# Patient Record
Sex: MALE | Race: WHITE | NOT HISPANIC OR LATINO | Employment: UNEMPLOYED | URBAN - METROPOLITAN AREA
[De-identification: names, ages, dates, MRNs, and addresses within clinical notes are randomized per-mention and may not be internally consistent; named-entity substitution may affect disease eponyms.]

---

## 2020-04-23 LAB — EXTERNAL HIV SCREEN: NORMAL

## 2021-12-23 ENCOUNTER — HOSPITAL ENCOUNTER (INPATIENT)
Facility: HOSPITAL | Age: 61
LOS: 3 days | Discharge: HOME | DRG: 720 | End: 2021-12-27
Attending: EMERGENCY MEDICINE | Admitting: STUDENT IN AN ORGANIZED HEALTH CARE EDUCATION/TRAINING PROGRAM
Payer: COMMERCIAL

## 2021-12-23 ENCOUNTER — APPOINTMENT (EMERGENCY)
Dept: RADIOLOGY | Facility: HOSPITAL | Age: 61
DRG: 720 | End: 2021-12-23
Payer: COMMERCIAL

## 2021-12-23 DIAGNOSIS — J18.9 PNEUMONIA: ICD-10-CM

## 2021-12-23 DIAGNOSIS — J44.1 ACUTE EXACERBATION OF CHRONIC OBSTRUCTIVE PULMONARY DISEASE (COPD) (HCC): ICD-10-CM

## 2021-12-23 DIAGNOSIS — Z72.0 TOBACCO ABUSE DISORDER: ICD-10-CM

## 2021-12-23 DIAGNOSIS — J44.1 COPD EXACERBATION (HCC): Primary | ICD-10-CM

## 2021-12-23 LAB
ALBUMIN SERPL BCP-MCNC: 4.5 G/DL (ref 3.5–5)
ALP SERPL-CCNC: 73 U/L (ref 46–116)
ALT SERPL W P-5'-P-CCNC: 21 U/L (ref 12–78)
ANION GAP SERPL CALCULATED.3IONS-SCNC: 11 MMOL/L (ref 4–13)
AST SERPL W P-5'-P-CCNC: 24 U/L (ref 5–45)
BASOPHILS # BLD AUTO: 0.13 THOUSANDS/ΜL (ref 0–0.1)
BASOPHILS NFR BLD AUTO: 1 % (ref 0–1)
BILIRUB SERPL-MCNC: 0.64 MG/DL (ref 0.2–1)
BUN SERPL-MCNC: 17 MG/DL (ref 5–25)
CALCIUM SERPL-MCNC: 9.2 MG/DL (ref 8.3–10.1)
CARDIAC TROPONIN I PNL SERPL HS: 16 NG/L
CHLORIDE SERPL-SCNC: 103 MMOL/L (ref 100–108)
CO2 SERPL-SCNC: 29 MMOL/L (ref 21–32)
CREAT SERPL-MCNC: 0.98 MG/DL (ref 0.6–1.3)
EOSINOPHIL # BLD AUTO: 0.43 THOUSAND/ΜL (ref 0–0.61)
EOSINOPHIL NFR BLD AUTO: 3 % (ref 0–6)
ERYTHROCYTE [DISTWIDTH] IN BLOOD BY AUTOMATED COUNT: 13.2 % (ref 11.6–15.1)
FLUAV RNA RESP QL NAA+PROBE: NEGATIVE
FLUBV RNA RESP QL NAA+PROBE: NEGATIVE
GFR SERPL CREATININE-BSD FRML MDRD: 82 ML/MIN/1.73SQ M
GLUCOSE SERPL-MCNC: 100 MG/DL (ref 65–140)
HCT VFR BLD AUTO: 52.4 % (ref 36.5–49.3)
HGB BLD-MCNC: 17.3 G/DL (ref 12–17)
IMM GRANULOCYTES # BLD AUTO: 0.08 THOUSAND/UL (ref 0–0.2)
IMM GRANULOCYTES NFR BLD AUTO: 1 % (ref 0–2)
LYMPHOCYTES # BLD AUTO: 1.37 THOUSANDS/ΜL (ref 0.6–4.47)
LYMPHOCYTES NFR BLD AUTO: 8 % (ref 14–44)
MCH RBC QN AUTO: 29.9 PG (ref 26.8–34.3)
MCHC RBC AUTO-ENTMCNC: 33 G/DL (ref 31.4–37.4)
MCV RBC AUTO: 91 FL (ref 82–98)
MONOCYTES # BLD AUTO: 0.82 THOUSAND/ΜL (ref 0.17–1.22)
MONOCYTES NFR BLD AUTO: 5 % (ref 4–12)
NEUTROPHILS # BLD AUTO: 13.81 THOUSANDS/ΜL (ref 1.85–7.62)
NEUTS SEG NFR BLD AUTO: 82 % (ref 43–75)
NRBC BLD AUTO-RTO: 0 /100 WBCS
PLATELET # BLD AUTO: 323 THOUSANDS/UL (ref 149–390)
PMV BLD AUTO: 9.7 FL (ref 8.9–12.7)
POTASSIUM SERPL-SCNC: 4.4 MMOL/L (ref 3.5–5.3)
PROT SERPL-MCNC: 8.5 G/DL (ref 6.4–8.2)
RBC # BLD AUTO: 5.78 MILLION/UL (ref 3.88–5.62)
RSV RNA RESP QL NAA+PROBE: NEGATIVE
SARS-COV-2 RNA RESP QL NAA+PROBE: NEGATIVE
SODIUM SERPL-SCNC: 143 MMOL/L (ref 136–145)
WBC # BLD AUTO: 16.64 THOUSAND/UL (ref 4.31–10.16)

## 2021-12-23 PROCEDURE — 93005 ELECTROCARDIOGRAM TRACING: CPT

## 2021-12-23 PROCEDURE — 84484 ASSAY OF TROPONIN QUANT: CPT | Performed by: EMERGENCY MEDICINE

## 2021-12-23 PROCEDURE — 0241U HB NFCT DS VIR RESP RNA 4 TRGT: CPT | Performed by: EMERGENCY MEDICINE

## 2021-12-23 PROCEDURE — 99285 EMERGENCY DEPT VISIT HI MDM: CPT

## 2021-12-23 PROCEDURE — 71045 X-RAY EXAM CHEST 1 VIEW: CPT

## 2021-12-23 PROCEDURE — 80053 COMPREHEN METABOLIC PANEL: CPT | Performed by: EMERGENCY MEDICINE

## 2021-12-23 PROCEDURE — 36415 COLL VENOUS BLD VENIPUNCTURE: CPT | Performed by: EMERGENCY MEDICINE

## 2021-12-23 PROCEDURE — 96374 THER/PROPH/DIAG INJ IV PUSH: CPT

## 2021-12-23 PROCEDURE — 85025 COMPLETE CBC W/AUTO DIFF WBC: CPT | Performed by: EMERGENCY MEDICINE

## 2021-12-23 PROCEDURE — 94640 AIRWAY INHALATION TREATMENT: CPT

## 2021-12-23 RX ORDER — METHYLPREDNISOLONE SODIUM SUCCINATE 125 MG/2ML
125 INJECTION, POWDER, LYOPHILIZED, FOR SOLUTION INTRAMUSCULAR; INTRAVENOUS ONCE
Status: COMPLETED | OUTPATIENT
Start: 2021-12-23 | End: 2021-12-23

## 2021-12-23 RX ORDER — ALBUTEROL SULFATE 2.5 MG/3ML
5 SOLUTION RESPIRATORY (INHALATION) ONCE
Status: COMPLETED | OUTPATIENT
Start: 2021-12-23 | End: 2021-12-23

## 2021-12-23 RX ADMIN — METHYLPREDNISOLONE SODIUM SUCCINATE 125 MG: 125 INJECTION, POWDER, FOR SOLUTION INTRAMUSCULAR; INTRAVENOUS at 21:48

## 2021-12-23 RX ADMIN — IPRATROPIUM BROMIDE 0.5 MG: 0.5 SOLUTION RESPIRATORY (INHALATION) at 21:48

## 2021-12-23 RX ADMIN — ALBUTEROL SULFATE 5 MG: 2.5 SOLUTION RESPIRATORY (INHALATION) at 21:48

## 2021-12-24 PROBLEM — M54.50 CHRONIC MIDLINE LOW BACK PAIN WITHOUT SCIATICA: Status: ACTIVE | Noted: 2020-04-22

## 2021-12-24 PROBLEM — J44.1 ACUTE EXACERBATION OF CHRONIC OBSTRUCTIVE PULMONARY DISEASE (COPD) (HCC): Status: ACTIVE | Noted: 2021-12-24

## 2021-12-24 PROBLEM — A41.9 SEPSIS (HCC): Status: ACTIVE | Noted: 2021-12-24

## 2021-12-24 PROBLEM — J18.9 PNEUMONIA: Status: ACTIVE | Noted: 2021-12-24

## 2021-12-24 PROBLEM — J44.9 COPD (CHRONIC OBSTRUCTIVE PULMONARY DISEASE) (HCC): Status: ACTIVE | Noted: 2020-05-22

## 2021-12-24 PROBLEM — B18.1 CHRONIC VIRAL HEPATITIS B WITHOUT DELTA AGENT AND WITHOUT COMA (HCC): Status: ACTIVE | Noted: 2021-08-11

## 2021-12-24 PROBLEM — G89.29 CHRONIC MIDLINE LOW BACK PAIN WITHOUT SCIATICA: Status: ACTIVE | Noted: 2020-04-22

## 2021-12-24 PROBLEM — Z72.0 TOBACCO ABUSE DISORDER: Status: ACTIVE | Noted: 2020-04-22

## 2021-12-24 LAB
2HR DELTA HS TROPONIN: 3 NG/L
4HR DELTA HS TROPONIN: -1 NG/L
APTT PPP: 37 SECONDS (ref 23–37)
ATRIAL RATE: 113 BPM
CARDIAC TROPONIN I PNL SERPL HS: 15 NG/L
CARDIAC TROPONIN I PNL SERPL HS: 19 NG/L
INR PPP: 1.03 (ref 0.84–1.19)
LACTATE SERPL-SCNC: 1.5 MMOL/L (ref 0.5–2)
P AXIS: 78 DEGREES
PR INTERVAL: 136 MS
PROCALCITONIN SERPL-MCNC: <0.05 NG/ML
PROTHROMBIN TIME: 13.1 SECONDS (ref 11.6–14.5)
QRS AXIS: 93 DEGREES
QRSD INTERVAL: 84 MS
QT INTERVAL: 318 MS
QTC INTERVAL: 436 MS
S PNEUM AG UR QL: NEGATIVE
T WAVE AXIS: 68 DEGREES
VENTRICULAR RATE: 113 BPM

## 2021-12-24 PROCEDURE — 84484 ASSAY OF TROPONIN QUANT: CPT | Performed by: EMERGENCY MEDICINE

## 2021-12-24 PROCEDURE — 84145 PROCALCITONIN (PCT): CPT | Performed by: EMERGENCY MEDICINE

## 2021-12-24 PROCEDURE — 87449 NOS EACH ORGANISM AG IA: CPT | Performed by: PHYSICIAN ASSISTANT

## 2021-12-24 PROCEDURE — 36415 COLL VENOUS BLD VENIPUNCTURE: CPT | Performed by: EMERGENCY MEDICINE

## 2021-12-24 PROCEDURE — 85610 PROTHROMBIN TIME: CPT | Performed by: EMERGENCY MEDICINE

## 2021-12-24 PROCEDURE — 94640 AIRWAY INHALATION TREATMENT: CPT

## 2021-12-24 PROCEDURE — 99223 1ST HOSP IP/OBS HIGH 75: CPT | Performed by: INTERNAL MEDICINE

## 2021-12-24 PROCEDURE — 87186 SC STD MICRODIL/AGAR DIL: CPT | Performed by: EMERGENCY MEDICINE

## 2021-12-24 PROCEDURE — 87205 SMEAR GRAM STAIN: CPT | Performed by: PHYSICIAN ASSISTANT

## 2021-12-24 PROCEDURE — NC001 PR NO CHARGE: Performed by: NURSE PRACTITIONER

## 2021-12-24 PROCEDURE — 85730 THROMBOPLASTIN TIME PARTIAL: CPT | Performed by: EMERGENCY MEDICINE

## 2021-12-24 PROCEDURE — 87077 CULTURE AEROBIC IDENTIFY: CPT | Performed by: EMERGENCY MEDICINE

## 2021-12-24 PROCEDURE — 99406 BEHAV CHNG SMOKING 3-10 MIN: CPT | Performed by: INTERNAL MEDICINE

## 2021-12-24 PROCEDURE — 83605 ASSAY OF LACTIC ACID: CPT | Performed by: EMERGENCY MEDICINE

## 2021-12-24 PROCEDURE — 99285 EMERGENCY DEPT VISIT HI MDM: CPT | Performed by: EMERGENCY MEDICINE

## 2021-12-24 PROCEDURE — 94760 N-INVAS EAR/PLS OXIMETRY 1: CPT

## 2021-12-24 PROCEDURE — 87040 BLOOD CULTURE FOR BACTERIA: CPT | Performed by: EMERGENCY MEDICINE

## 2021-12-24 PROCEDURE — 94664 DEMO&/EVAL PT USE INHALER: CPT

## 2021-12-24 PROCEDURE — 93010 ELECTROCARDIOGRAM REPORT: CPT | Performed by: INTERNAL MEDICINE

## 2021-12-24 PROCEDURE — 87070 CULTURE OTHR SPECIMN AEROBIC: CPT | Performed by: PHYSICIAN ASSISTANT

## 2021-12-24 RX ORDER — METHYLPREDNISOLONE SODIUM SUCCINATE 40 MG/ML
40 INJECTION, POWDER, LYOPHILIZED, FOR SOLUTION INTRAMUSCULAR; INTRAVENOUS EVERY 8 HOURS
Status: DISCONTINUED | OUTPATIENT
Start: 2021-12-24 | End: 2021-12-24

## 2021-12-24 RX ORDER — IPRATROPIUM BROMIDE AND ALBUTEROL SULFATE 2.5; .5 MG/3ML; MG/3ML
3 SOLUTION RESPIRATORY (INHALATION)
Status: DISCONTINUED | OUTPATIENT
Start: 2021-12-24 | End: 2021-12-24

## 2021-12-24 RX ORDER — ALBUTEROL SULFATE 90 UG/1
2 AEROSOL, METERED RESPIRATORY (INHALATION) EVERY 4 HOURS PRN
Status: DISCONTINUED | OUTPATIENT
Start: 2021-12-24 | End: 2021-12-27 | Stop reason: HOSPADM

## 2021-12-24 RX ORDER — BUDESONIDE 0.5 MG/2ML
0.5 INHALANT ORAL
Status: DISCONTINUED | OUTPATIENT
Start: 2021-12-24 | End: 2021-12-27 | Stop reason: HOSPADM

## 2021-12-24 RX ORDER — METHYLPREDNISOLONE SODIUM SUCCINATE 40 MG/ML
40 INJECTION, POWDER, LYOPHILIZED, FOR SOLUTION INTRAMUSCULAR; INTRAVENOUS EVERY 12 HOURS SCHEDULED
Status: DISCONTINUED | OUTPATIENT
Start: 2021-12-24 | End: 2021-12-26

## 2021-12-24 RX ORDER — HYDROMORPHONE HCL/PF 1 MG/ML
0.5 SYRINGE (ML) INJECTION EVERY 6 HOURS PRN
Status: DISCONTINUED | OUTPATIENT
Start: 2021-12-24 | End: 2021-12-27 | Stop reason: HOSPADM

## 2021-12-24 RX ORDER — NICOTINE 21 MG/24HR
1 PATCH, TRANSDERMAL 24 HOURS TRANSDERMAL DAILY
Status: DISCONTINUED | OUTPATIENT
Start: 2021-12-24 | End: 2021-12-27 | Stop reason: HOSPADM

## 2021-12-24 RX ORDER — ALBUTEROL SULFATE 90 UG/1
2 AEROSOL, METERED RESPIRATORY (INHALATION) EVERY 6 HOURS PRN
COMMUNITY
Start: 2021-08-26

## 2021-12-24 RX ORDER — GUAIFENESIN 600 MG
600 TABLET, EXTENDED RELEASE 12 HR ORAL EVERY 12 HOURS SCHEDULED
Status: DISCONTINUED | OUTPATIENT
Start: 2021-12-24 | End: 2021-12-27 | Stop reason: HOSPADM

## 2021-12-24 RX ORDER — BENZONATATE 100 MG/1
100 CAPSULE ORAL 3 TIMES DAILY PRN
Status: DISCONTINUED | OUTPATIENT
Start: 2021-12-24 | End: 2021-12-27 | Stop reason: HOSPADM

## 2021-12-24 RX ORDER — ACETAMINOPHEN 325 MG/1
650 TABLET ORAL EVERY 6 HOURS PRN
Status: DISCONTINUED | OUTPATIENT
Start: 2021-12-24 | End: 2021-12-27 | Stop reason: HOSPADM

## 2021-12-24 RX ORDER — UMECLIDINIUM BROMIDE AND VILANTEROL TRIFENATATE 62.5; 25 UG/1; UG/1
1 POWDER RESPIRATORY (INHALATION) DAILY
COMMUNITY
Start: 2021-09-01

## 2021-12-24 RX ORDER — AZITHROMYCIN 250 MG/1
250 TABLET, FILM COATED ORAL EVERY 24 HOURS
Status: DISCONTINUED | OUTPATIENT
Start: 2021-12-24 | End: 2021-12-26

## 2021-12-24 RX ORDER — BUDESONIDE 0.5 MG/2ML
0.5 INHALANT ORAL
Status: DISCONTINUED | OUTPATIENT
Start: 2021-12-24 | End: 2021-12-24

## 2021-12-24 RX ORDER — MAGNESIUM HYDROXIDE/ALUMINUM HYDROXICE/SIMETHICONE 120; 1200; 1200 MG/30ML; MG/30ML; MG/30ML
30 SUSPENSION ORAL EVERY 6 HOURS PRN
Status: DISCONTINUED | OUTPATIENT
Start: 2021-12-24 | End: 2021-12-27 | Stop reason: HOSPADM

## 2021-12-24 RX ORDER — LEVALBUTEROL 1.25 MG/.5ML
SOLUTION, CONCENTRATE RESPIRATORY (INHALATION)
Status: COMPLETED
Start: 2021-12-24 | End: 2021-12-24

## 2021-12-24 RX ORDER — OXYCODONE HYDROCHLORIDE 5 MG/1
5 TABLET ORAL EVERY 6 HOURS PRN
Status: DISCONTINUED | OUTPATIENT
Start: 2021-12-24 | End: 2021-12-27 | Stop reason: HOSPADM

## 2021-12-24 RX ORDER — LEVALBUTEROL 1.25 MG/.5ML
1.25 SOLUTION, CONCENTRATE RESPIRATORY (INHALATION)
Status: DISCONTINUED | OUTPATIENT
Start: 2021-12-24 | End: 2021-12-26

## 2021-12-24 RX ADMIN — IPRATROPIUM BROMIDE 0.5 MG: 0.5 SOLUTION RESPIRATORY (INHALATION) at 08:53

## 2021-12-24 RX ADMIN — SODIUM CHLORIDE 250 ML: 0.9 INJECTION, SOLUTION INTRAVENOUS at 03:52

## 2021-12-24 RX ADMIN — METHYLPREDNISOLONE SODIUM SUCCINATE 40 MG: 40 INJECTION, POWDER, FOR SOLUTION INTRAMUSCULAR; INTRAVENOUS at 10:25

## 2021-12-24 RX ADMIN — CEFTRIAXONE SODIUM 1000 MG: 10 INJECTION, POWDER, FOR SOLUTION INTRAVENOUS at 10:28

## 2021-12-24 RX ADMIN — IPRATROPIUM BROMIDE 0.5 MG: 0.5 SOLUTION RESPIRATORY (INHALATION) at 21:10

## 2021-12-24 RX ADMIN — CEFEPIME HYDROCHLORIDE 2000 MG: 2 INJECTION, POWDER, FOR SOLUTION INTRAVENOUS at 00:42

## 2021-12-24 RX ADMIN — LEVALBUTEROL HYDROCHLORIDE 1.25 MG: 1.25 SOLUTION, CONCENTRATE RESPIRATORY (INHALATION) at 08:54

## 2021-12-24 RX ADMIN — UMECLIDINIUM BROMIDE AND VILANTEROL TRIFENATATE 1 PUFF: 62.5; 25 POWDER RESPIRATORY (INHALATION) at 10:24

## 2021-12-24 RX ADMIN — LEVALBUTEROL HYDROCHLORIDE 1.25 MG: 1.25 SOLUTION, CONCENTRATE RESPIRATORY (INHALATION) at 03:03

## 2021-12-24 RX ADMIN — VANCOMYCIN HYDROCHLORIDE 1250 MG: 1 INJECTION, POWDER, LYOPHILIZED, FOR SOLUTION INTRAVENOUS at 01:12

## 2021-12-24 RX ADMIN — LEVALBUTEROL HYDROCHLORIDE 1.25 MG: 1.25 SOLUTION, CONCENTRATE RESPIRATORY (INHALATION) at 21:10

## 2021-12-24 RX ADMIN — AZITHROMYCIN MONOHYDRATE 250 MG: 250 TABLET ORAL at 13:17

## 2021-12-24 RX ADMIN — LEVALBUTEROL HYDROCHLORIDE 1.25 MG: 1.25 SOLUTION, CONCENTRATE RESPIRATORY (INHALATION) at 13:18

## 2021-12-24 RX ADMIN — IPRATROPIUM BROMIDE 0.5 MG: 0.5 SOLUTION RESPIRATORY (INHALATION) at 03:04

## 2021-12-24 RX ADMIN — BUDESONIDE 0.5 MG: 0.5 INHALANT ORAL at 21:10

## 2021-12-24 RX ADMIN — GUAIFENESIN 600 MG: 600 TABLET ORAL at 22:08

## 2021-12-24 RX ADMIN — METHYLPREDNISOLONE SODIUM SUCCINATE 40 MG: 40 INJECTION, POWDER, FOR SOLUTION INTRAMUSCULAR; INTRAVENOUS at 22:08

## 2021-12-24 RX ADMIN — IPRATROPIUM BROMIDE 0.5 MG: 0.5 SOLUTION RESPIRATORY (INHALATION) at 13:19

## 2021-12-25 LAB
ALBUMIN SERPL BCP-MCNC: 4.1 G/DL (ref 3.5–5)
ALP SERPL-CCNC: 60 U/L (ref 46–116)
ALT SERPL W P-5'-P-CCNC: 18 U/L (ref 12–78)
ANION GAP SERPL CALCULATED.3IONS-SCNC: 10 MMOL/L (ref 4–13)
AST SERPL W P-5'-P-CCNC: 23 U/L (ref 5–45)
BASOPHILS # BLD AUTO: 0.03 THOUSANDS/ΜL (ref 0–0.1)
BASOPHILS NFR BLD AUTO: 0 % (ref 0–1)
BILIRUB SERPL-MCNC: 0.39 MG/DL (ref 0.2–1)
BUN SERPL-MCNC: 28 MG/DL (ref 5–25)
CALCIUM SERPL-MCNC: 9.2 MG/DL (ref 8.3–10.1)
CHLORIDE SERPL-SCNC: 105 MMOL/L (ref 100–108)
CO2 SERPL-SCNC: 27 MMOL/L (ref 21–32)
CREAT SERPL-MCNC: 1.21 MG/DL (ref 0.6–1.3)
EOSINOPHIL # BLD AUTO: 0 THOUSAND/ΜL (ref 0–0.61)
EOSINOPHIL NFR BLD AUTO: 0 % (ref 0–6)
ERYTHROCYTE [DISTWIDTH] IN BLOOD BY AUTOMATED COUNT: 13.5 % (ref 11.6–15.1)
GFR SERPL CREATININE-BSD FRML MDRD: 64 ML/MIN/1.73SQ M
GLUCOSE SERPL-MCNC: 133 MG/DL (ref 65–140)
HCT VFR BLD AUTO: 49 % (ref 36.5–49.3)
HGB BLD-MCNC: 16.4 G/DL (ref 12–17)
IMM GRANULOCYTES # BLD AUTO: 0.16 THOUSAND/UL (ref 0–0.2)
IMM GRANULOCYTES NFR BLD AUTO: 1 % (ref 0–2)
LYMPHOCYTES # BLD AUTO: 1.2 THOUSANDS/ΜL (ref 0.6–4.47)
LYMPHOCYTES NFR BLD AUTO: 5 % (ref 14–44)
MCH RBC QN AUTO: 30.5 PG (ref 26.8–34.3)
MCHC RBC AUTO-ENTMCNC: 33.5 G/DL (ref 31.4–37.4)
MCV RBC AUTO: 91 FL (ref 82–98)
MONOCYTES # BLD AUTO: 1.08 THOUSAND/ΜL (ref 0.17–1.22)
MONOCYTES NFR BLD AUTO: 4 % (ref 4–12)
NEUTROPHILS # BLD AUTO: 22.24 THOUSANDS/ΜL (ref 1.85–7.62)
NEUTS SEG NFR BLD AUTO: 90 % (ref 43–75)
NRBC BLD AUTO-RTO: 0 /100 WBCS
PLATELET # BLD AUTO: 355 THOUSANDS/UL (ref 149–390)
PMV BLD AUTO: 9.4 FL (ref 8.9–12.7)
POTASSIUM SERPL-SCNC: 4.7 MMOL/L (ref 3.5–5.3)
PROCALCITONIN SERPL-MCNC: <0.05 NG/ML
PROT SERPL-MCNC: 7.7 G/DL (ref 6.4–8.2)
RBC # BLD AUTO: 5.38 MILLION/UL (ref 3.88–5.62)
SODIUM SERPL-SCNC: 142 MMOL/L (ref 136–145)
WBC # BLD AUTO: 24.71 THOUSAND/UL (ref 4.31–10.16)

## 2021-12-25 PROCEDURE — 80053 COMPREHEN METABOLIC PANEL: CPT | Performed by: INTERNAL MEDICINE

## 2021-12-25 PROCEDURE — 94640 AIRWAY INHALATION TREATMENT: CPT

## 2021-12-25 PROCEDURE — 94760 N-INVAS EAR/PLS OXIMETRY 1: CPT

## 2021-12-25 PROCEDURE — 99233 SBSQ HOSP IP/OBS HIGH 50: CPT | Performed by: INTERNAL MEDICINE

## 2021-12-25 PROCEDURE — 84145 PROCALCITONIN (PCT): CPT | Performed by: EMERGENCY MEDICINE

## 2021-12-25 PROCEDURE — 85025 COMPLETE CBC W/AUTO DIFF WBC: CPT | Performed by: INTERNAL MEDICINE

## 2021-12-25 RX ADMIN — METHYLPREDNISOLONE SODIUM SUCCINATE 40 MG: 40 INJECTION, POWDER, FOR SOLUTION INTRAMUSCULAR; INTRAVENOUS at 09:06

## 2021-12-25 RX ADMIN — NICOTINE 1 PATCH: 21 PATCH, EXTENDED RELEASE TRANSDERMAL at 09:08

## 2021-12-25 RX ADMIN — AZITHROMYCIN MONOHYDRATE 250 MG: 250 TABLET ORAL at 12:07

## 2021-12-25 RX ADMIN — BUDESONIDE 0.5 MG: 0.5 INHALANT ORAL at 08:23

## 2021-12-25 RX ADMIN — IPRATROPIUM BROMIDE 0.5 MG: 0.5 SOLUTION RESPIRATORY (INHALATION) at 14:04

## 2021-12-25 RX ADMIN — GUAIFENESIN 600 MG: 600 TABLET ORAL at 09:06

## 2021-12-25 RX ADMIN — GUAIFENESIN 600 MG: 600 TABLET ORAL at 20:50

## 2021-12-25 RX ADMIN — BUDESONIDE 0.5 MG: 0.5 INHALANT ORAL at 20:27

## 2021-12-25 RX ADMIN — IPRATROPIUM BROMIDE 0.5 MG: 0.5 SOLUTION RESPIRATORY (INHALATION) at 20:28

## 2021-12-25 RX ADMIN — CEFTRIAXONE SODIUM 1000 MG: 10 INJECTION, POWDER, FOR SOLUTION INTRAVENOUS at 15:58

## 2021-12-25 RX ADMIN — NICOTINE 1 PATCH: 21 PATCH, EXTENDED RELEASE TRANSDERMAL at 12:12

## 2021-12-25 RX ADMIN — IPRATROPIUM BROMIDE 0.5 MG: 0.5 SOLUTION RESPIRATORY (INHALATION) at 08:23

## 2021-12-25 RX ADMIN — METHYLPREDNISOLONE SODIUM SUCCINATE 40 MG: 40 INJECTION, POWDER, FOR SOLUTION INTRAMUSCULAR; INTRAVENOUS at 20:50

## 2021-12-25 RX ADMIN — LEVALBUTEROL HYDROCHLORIDE 1.25 MG: 1.25 SOLUTION, CONCENTRATE RESPIRATORY (INHALATION) at 20:28

## 2021-12-25 RX ADMIN — DICLOFENAC SODIUM 2 G: 10 GEL TOPICAL at 12:08

## 2021-12-25 RX ADMIN — LEVALBUTEROL HYDROCHLORIDE 1.25 MG: 1.25 SOLUTION, CONCENTRATE RESPIRATORY (INHALATION) at 14:04

## 2021-12-25 RX ADMIN — DICLOFENAC SODIUM 2 G: 10 GEL TOPICAL at 18:28

## 2021-12-25 RX ADMIN — LEVALBUTEROL HYDROCHLORIDE 1.25 MG: 1.25 SOLUTION, CONCENTRATE RESPIRATORY (INHALATION) at 08:23

## 2021-12-26 PROBLEM — R78.81 POSITIVE BLOOD CULTURE: Status: ACTIVE | Noted: 2021-12-26

## 2021-12-26 LAB
ANION GAP SERPL CALCULATED.3IONS-SCNC: 8 MMOL/L (ref 4–13)
BACTERIA SPT RESP CULT: ABNORMAL
BASOPHILS # BLD AUTO: 0.01 THOUSANDS/ΜL (ref 0–0.1)
BASOPHILS NFR BLD AUTO: 0 % (ref 0–1)
BUN SERPL-MCNC: 32 MG/DL (ref 5–25)
CALCIUM SERPL-MCNC: 8.7 MG/DL (ref 8.3–10.1)
CHLORIDE SERPL-SCNC: 105 MMOL/L (ref 100–108)
CO2 SERPL-SCNC: 28 MMOL/L (ref 21–32)
CREAT SERPL-MCNC: 1.18 MG/DL (ref 0.6–1.3)
EOSINOPHIL # BLD AUTO: 0 THOUSAND/ΜL (ref 0–0.61)
EOSINOPHIL NFR BLD AUTO: 0 % (ref 0–6)
ERYTHROCYTE [DISTWIDTH] IN BLOOD BY AUTOMATED COUNT: 13.2 % (ref 11.6–15.1)
GFR SERPL CREATININE-BSD FRML MDRD: 66 ML/MIN/1.73SQ M
GLUCOSE SERPL-MCNC: 135 MG/DL (ref 65–140)
GRAM STN SPEC: ABNORMAL
HCT VFR BLD AUTO: 46.8 % (ref 36.5–49.3)
HGB BLD-MCNC: 15.8 G/DL (ref 12–17)
IMM GRANULOCYTES # BLD AUTO: 0.07 THOUSAND/UL (ref 0–0.2)
IMM GRANULOCYTES NFR BLD AUTO: 0 % (ref 0–2)
LYMPHOCYTES # BLD AUTO: 1.01 THOUSANDS/ΜL (ref 0.6–4.47)
LYMPHOCYTES NFR BLD AUTO: 6 % (ref 14–44)
MCH RBC QN AUTO: 30.4 PG (ref 26.8–34.3)
MCHC RBC AUTO-ENTMCNC: 33.8 G/DL (ref 31.4–37.4)
MCV RBC AUTO: 90 FL (ref 82–98)
MONOCYTES # BLD AUTO: 0.91 THOUSAND/ΜL (ref 0.17–1.22)
MONOCYTES NFR BLD AUTO: 6 % (ref 4–12)
NEUTROPHILS # BLD AUTO: 14.59 THOUSANDS/ΜL (ref 1.85–7.62)
NEUTS SEG NFR BLD AUTO: 88 % (ref 43–75)
NRBC BLD AUTO-RTO: 0 /100 WBCS
PLATELET # BLD AUTO: 301 THOUSANDS/UL (ref 149–390)
PMV BLD AUTO: 9.3 FL (ref 8.9–12.7)
POTASSIUM SERPL-SCNC: 4.4 MMOL/L (ref 3.5–5.3)
RBC # BLD AUTO: 5.19 MILLION/UL (ref 3.88–5.62)
SODIUM SERPL-SCNC: 141 MMOL/L (ref 136–145)
WBC # BLD AUTO: 16.59 THOUSAND/UL (ref 4.31–10.16)

## 2021-12-26 PROCEDURE — NC001 PR NO CHARGE: Performed by: NURSE PRACTITIONER

## 2021-12-26 PROCEDURE — 85025 COMPLETE CBC W/AUTO DIFF WBC: CPT | Performed by: INTERNAL MEDICINE

## 2021-12-26 PROCEDURE — 80048 BASIC METABOLIC PNL TOTAL CA: CPT | Performed by: INTERNAL MEDICINE

## 2021-12-26 PROCEDURE — 94760 N-INVAS EAR/PLS OXIMETRY 1: CPT

## 2021-12-26 PROCEDURE — 94640 AIRWAY INHALATION TREATMENT: CPT

## 2021-12-26 RX ORDER — PREDNISONE 20 MG/1
40 TABLET ORAL DAILY
Status: DISCONTINUED | OUTPATIENT
Start: 2021-12-27 | End: 2021-12-27 | Stop reason: HOSPADM

## 2021-12-26 RX ORDER — CEFPODOXIME PROXETIL 200 MG/1
200 TABLET, FILM COATED ORAL 2 TIMES DAILY WITH MEALS
Status: DISCONTINUED | OUTPATIENT
Start: 2021-12-26 | End: 2021-12-27 | Stop reason: HOSPADM

## 2021-12-26 RX ORDER — LEVALBUTEROL 1.25 MG/.5ML
1.25 SOLUTION, CONCENTRATE RESPIRATORY (INHALATION)
Status: DISCONTINUED | OUTPATIENT
Start: 2021-12-26 | End: 2021-12-27 | Stop reason: HOSPADM

## 2021-12-26 RX ORDER — ECHINACEA PURPUREA EXTRACT 125 MG
1 TABLET ORAL
Status: DISCONTINUED | OUTPATIENT
Start: 2021-12-26 | End: 2021-12-27 | Stop reason: HOSPADM

## 2021-12-26 RX ADMIN — GUAIFENESIN 600 MG: 600 TABLET ORAL at 08:39

## 2021-12-26 RX ADMIN — IPRATROPIUM BROMIDE 0.5 MG: 0.5 SOLUTION RESPIRATORY (INHALATION) at 09:04

## 2021-12-26 RX ADMIN — BUDESONIDE 0.5 MG: 0.5 INHALANT ORAL at 20:06

## 2021-12-26 RX ADMIN — METHYLPREDNISOLONE SODIUM SUCCINATE 40 MG: 40 INJECTION, POWDER, FOR SOLUTION INTRAMUSCULAR; INTRAVENOUS at 08:39

## 2021-12-26 RX ADMIN — LEVALBUTEROL HYDROCHLORIDE 1.25 MG: 1.25 SOLUTION, CONCENTRATE RESPIRATORY (INHALATION) at 02:24

## 2021-12-26 RX ADMIN — BUDESONIDE 0.5 MG: 0.5 INHALANT ORAL at 09:04

## 2021-12-26 RX ADMIN — LEVALBUTEROL HYDROCHLORIDE 1.25 MG: 1.25 SOLUTION, CONCENTRATE RESPIRATORY (INHALATION) at 09:04

## 2021-12-26 RX ADMIN — ACETAMINOPHEN 650 MG: 325 TABLET, FILM COATED ORAL at 20:29

## 2021-12-26 RX ADMIN — IPRATROPIUM BROMIDE 0.5 MG: 0.5 SOLUTION RESPIRATORY (INHALATION) at 13:00

## 2021-12-26 RX ADMIN — IPRATROPIUM BROMIDE 0.5 MG: 0.5 SOLUTION RESPIRATORY (INHALATION) at 02:24

## 2021-12-26 RX ADMIN — IPRATROPIUM BROMIDE 0.5 MG: 0.5 SOLUTION RESPIRATORY (INHALATION) at 20:06

## 2021-12-26 RX ADMIN — GUAIFENESIN 600 MG: 600 TABLET ORAL at 20:16

## 2021-12-26 RX ADMIN — DICLOFENAC SODIUM 2 G: 10 GEL TOPICAL at 08:39

## 2021-12-26 RX ADMIN — NICOTINE 1 PATCH: 21 PATCH, EXTENDED RELEASE TRANSDERMAL at 08:39

## 2021-12-26 RX ADMIN — LEVALBUTEROL HYDROCHLORIDE 1.25 MG: 1.25 SOLUTION, CONCENTRATE RESPIRATORY (INHALATION) at 20:06

## 2021-12-26 RX ADMIN — LEVALBUTEROL HYDROCHLORIDE 1.25 MG: 1.25 SOLUTION, CONCENTRATE RESPIRATORY (INHALATION) at 13:00

## 2021-12-26 RX ADMIN — CEFPODOXIME PROXETIL 200 MG: 200 TABLET, FILM COATED ORAL at 17:22

## 2021-12-27 VITALS
DIASTOLIC BLOOD PRESSURE: 76 MMHG | BODY MASS INDEX: 25.79 KG/M2 | OXYGEN SATURATION: 92 % | TEMPERATURE: 97.5 F | WEIGHT: 170.19 LBS | RESPIRATION RATE: 22 BRPM | HEIGHT: 68 IN | HEART RATE: 86 BPM | SYSTOLIC BLOOD PRESSURE: 130 MMHG

## 2021-12-27 PROBLEM — A41.9 SEPSIS (HCC): Status: RESOLVED | Noted: 2021-12-24 | Resolved: 2021-12-27

## 2021-12-27 LAB
BACTERIA BLD CULT: ABNORMAL
GRAM STN SPEC: ABNORMAL

## 2021-12-27 PROCEDURE — 94664 DEMO&/EVAL PT USE INHALER: CPT

## 2021-12-27 PROCEDURE — 94761 N-INVAS EAR/PLS OXIMETRY MLT: CPT

## 2021-12-27 PROCEDURE — 94640 AIRWAY INHALATION TREATMENT: CPT

## 2021-12-27 PROCEDURE — 94760 N-INVAS EAR/PLS OXIMETRY 1: CPT

## 2021-12-27 PROCEDURE — 99406 BEHAV CHNG SMOKING 3-10 MIN: CPT | Performed by: INTERNAL MEDICINE

## 2021-12-27 PROCEDURE — 99239 HOSP IP/OBS DSCHRG MGMT >30: CPT | Performed by: INTERNAL MEDICINE

## 2021-12-27 RX ORDER — PREDNISONE 20 MG/1
40 TABLET ORAL DAILY
Qty: 10 TABLET | Refills: 0 | Status: SHIPPED | OUTPATIENT
Start: 2021-12-28 | End: 2022-01-02

## 2021-12-27 RX ORDER — NICOTINE 21 MG/24HR
1 PATCH, TRANSDERMAL 24 HOURS TRANSDERMAL DAILY
Qty: 28 PATCH | Refills: 0 | Status: SHIPPED | OUTPATIENT
Start: 2021-12-28 | End: 2022-02-22

## 2021-12-27 RX ORDER — CEFPODOXIME PROXETIL 200 MG/1
200 TABLET, FILM COATED ORAL 2 TIMES DAILY WITH MEALS
Qty: 20 TABLET | Refills: 0 | Status: SHIPPED | OUTPATIENT
Start: 2021-12-27 | End: 2022-01-06

## 2021-12-27 RX ADMIN — IPRATROPIUM BROMIDE 0.5 MG: 0.5 SOLUTION RESPIRATORY (INHALATION) at 07:51

## 2021-12-27 RX ADMIN — NICOTINE 1 PATCH: 21 PATCH, EXTENDED RELEASE TRANSDERMAL at 08:42

## 2021-12-27 RX ADMIN — LEVALBUTEROL HYDROCHLORIDE 1.25 MG: 1.25 SOLUTION, CONCENTRATE RESPIRATORY (INHALATION) at 13:23

## 2021-12-27 RX ADMIN — LEVALBUTEROL HYDROCHLORIDE 1.25 MG: 1.25 SOLUTION, CONCENTRATE RESPIRATORY (INHALATION) at 07:51

## 2021-12-27 RX ADMIN — DICLOFENAC SODIUM 2 G: 10 GEL TOPICAL at 08:31

## 2021-12-27 RX ADMIN — IPRATROPIUM BROMIDE 0.5 MG: 0.5 SOLUTION RESPIRATORY (INHALATION) at 13:23

## 2021-12-27 RX ADMIN — ENOXAPARIN SODIUM 40 MG: 40 INJECTION SUBCUTANEOUS at 08:42

## 2021-12-27 RX ADMIN — CEFPODOXIME PROXETIL 200 MG: 200 TABLET, FILM COATED ORAL at 08:31

## 2021-12-27 RX ADMIN — GUAIFENESIN 600 MG: 600 TABLET ORAL at 08:42

## 2021-12-27 RX ADMIN — PREDNISONE 40 MG: 20 TABLET ORAL at 08:43

## 2021-12-27 RX ADMIN — BUDESONIDE 0.5 MG: 0.5 INHALANT ORAL at 07:51

## 2021-12-29 LAB — BACTERIA BLD CULT: NORMAL

## 2021-12-30 LAB

## 2022-02-18 ENCOUNTER — OFFICE VISIT (OUTPATIENT)
Dept: INTERNAL MEDICINE CLINIC | Facility: CLINIC | Age: 62
End: 2022-02-18
Payer: COMMERCIAL

## 2022-02-18 VITALS
OXYGEN SATURATION: 94 % | SYSTOLIC BLOOD PRESSURE: 124 MMHG | TEMPERATURE: 97.9 F | DIASTOLIC BLOOD PRESSURE: 80 MMHG | WEIGHT: 176 LBS | HEIGHT: 68 IN | BODY MASS INDEX: 26.67 KG/M2 | HEART RATE: 92 BPM

## 2022-02-18 DIAGNOSIS — R73.01 ABNORMAL FASTING GLUCOSE: ICD-10-CM

## 2022-02-18 DIAGNOSIS — Z72.0 TOBACCO ABUSE DISORDER: ICD-10-CM

## 2022-02-18 DIAGNOSIS — J43.9 PULMONARY EMPHYSEMA, UNSPECIFIED EMPHYSEMA TYPE (HCC): ICD-10-CM

## 2022-02-18 DIAGNOSIS — B18.1 CHRONIC VIRAL HEPATITIS B WITHOUT DELTA AGENT AND WITHOUT COMA (HCC): Primary | ICD-10-CM

## 2022-02-18 DIAGNOSIS — Z13.220 ENCOUNTER FOR LIPID SCREENING FOR CARDIOVASCULAR DISEASE: ICD-10-CM

## 2022-02-18 DIAGNOSIS — Z13.6 ENCOUNTER FOR LIPID SCREENING FOR CARDIOVASCULAR DISEASE: ICD-10-CM

## 2022-02-18 DIAGNOSIS — Z12.11 SCREENING FOR COLON CANCER: ICD-10-CM

## 2022-02-18 PROCEDURE — 99214 OFFICE O/P EST MOD 30 MIN: CPT | Performed by: INTERNAL MEDICINE

## 2022-02-18 NOTE — PROGRESS NOTES
Assessment/Plan:      Quality Measures:                                   BMI Counseling: Body mass index is 25 88 kg/m²  The BMI is above normal  Nutrition recommendations include decreasing portion sizes and encouraging healthy choices of fruits and vegetables  Exercise recommendations include moderate physical activity 150 minutes/week  Rationale for BMI follow-up plan is due to patient being overweight or obese  Tobacco Cessation Counseling: Tobacco cessation counseling was provided  The patient is sincerely urged to quit consumption of tobacco  He is not ready to quit tobacco         Return in about 3 months (around 5/18/2022)  No problem-specific Assessment & Plan notes found for this encounter  Diagnoses and all orders for this visit:    Chronic viral hepatitis B without delta agent and without coma ([facility] )    Pulmonary emphysema, unspecified emphysema type (HCC)  -     CBC and differential; Future  -     Comprehensive metabolic panel; Future  -     TSH, 3rd generation with Free T4 reflex; Future    Tobacco abuse disorder    Encounter for lipid screening for cardiovascular disease  -     Lipid Panel with Direct LDL reflex; Future    Screening for colon cancer  -     Cologuard    Abnormal fasting glucose  -     Cancel: Hemoglobin A1C; Future          Subjective:      Patient ID: Guy Hammer is a 64 y o  male  [facility] is here to establish care  PMH including pulmonary emphysema  He is  and works in construction  He is from [place]  He was recently hospitalized for COPD exacerbation for 4 days  He was discharged with PO ABT and steroids  He finished the medications  He was supposed to have a hernia repair which required cardiology and pulmonary clearance but he became sick  Unclear if he will go through with it at this point  He does not trust the mesh that will be used in the surgery  He is a heavy smoker  Not interested in quitting  He is using anoro and oxygen as needed   He does have an albuterol prescription  He did not want to get blood work at first but states he will even though he is afraid of needles  Agreed to cologuard testing  ALLERGIES:  No Known Allergies    CURRENT MEDICATIONS:    Current Outpatient Medications:     albuterol (PROVENTIL HFA,VENTOLIN HFA) 90 mcg/act inhaler, Inhale 2 puffs every 6 (six) hours as needed, Disp: , Rfl:     umeclidinium-vilanterol (Anoro Ellipta) 62 5-25 MCG/INH inhaler, Inhale 1 puff daily, Disp: , Rfl:     nicotine (NICODERM CQ) 21 mg/24 hr TD 24 hr patch, Place 1 patch on the skin daily (Patient not taking: Reported on 2/18/2022 ), Disp: 28 patch, Rfl: 0    ACTIVE PROBLEM LIST:  Patient Active Problem List   Diagnosis    Chronic midline low back pain without sciatica    Chronic viral hepatitis B without delta agent and without coma (HCC)    COPD (chronic obstructive pulmonary disease) (HCC)    Tobacco abuse disorder    Acute exacerbation of chronic obstructive pulmonary disease (COPD) (HCC)    Pneumonia    Positive blood culture       PAST MEDICAL HISTORY:  Past Medical History:   Diagnosis Date    [facility]        PAST SURGICAL HISTORY:  History reviewed  No pertinent surgical history  FAMILY HISTORY:  History reviewed  No pertinent family history      SOCIAL HISTORY:  Social History     Socioeconomic History    Marital status: /Civil Union     Spouse name: Not on file    Number of children: Not on file    Years of education: Not on file    Highest education level: Not on file   Occupational History    Not on file   Tobacco Use    Smoking status: Current Every Day Smoker     Packs/day: 2 00     Years: 47 00     Pack years: 94 00    Smokeless tobacco: Never Used   Vaping Use    Vaping Use: Every day   Substance and Sexual Activity    Alcohol use: Not Currently    Drug use: No    Sexual activity: Not on file   Other Topics Concern    Not on file   Social History Narrative  Not on file     Social Determinants of Health     Financial Resource Strain: Not on file   Food Insecurity: No Food Insecurity    Worried About Running Out of Food in the Last Year: Never true    Jose L of Food in the Last Year: Never true   Transportation Needs: No Transportation Needs    Lack of Transportation (Medical): No    Lack of Transportation (Non-Medical): No   Physical Activity: Not on file   Stress: Not on file   Social Connections: Not on file   Intimate Partner Violence: Not on file   Housing Stability: Low Risk     Unable to Pay for Housing in the Last Year: No    Number of Places Lived in the Last Year: 1    Unstable Housing in the Last Year: No       Review of Systems   Respiratory: Positive for cough, shortness of breath and wheezing  All other systems reviewed and are negative  Objective:  Vitals:    02/18/22 1331   BP: 124/80   BP Location: Left arm   Patient Position: Sitting   Cuff Size: Adult   Pulse: 92   Temp: 97 9 °F (36 6 °C)   TempSrc: Temporal   SpO2: 94%   Weight: 79 8 kg (176 lb)   Height: 5' 8" (1 727 m)     Body mass index is 26 76 kg/m²  Physical Exam  Vitals and nursing note reviewed  Constitutional:       Appearance: Normal appearance  HENT:      Head: Normocephalic and atraumatic  Right Ear: Tympanic membrane normal       Left Ear: Tympanic membrane normal    Cardiovascular:      Rate and Rhythm: Normal rate and regular rhythm  Heart sounds: Normal heart sounds  Pulmonary:      Effort: Pulmonary effort is normal       Breath sounds: Decreased breath sounds present  Musculoskeletal:      Cervical back: Normal range of motion  Neurological:      Mental Status: He is alert  RESULTS:    No results found for this or any previous visit (from the past 1008 hour(s))  This note was created with voice recognition software  Phonic, grammatical and spelling errors may be present within the note as a result

## 2022-02-22 ENCOUNTER — OFFICE VISIT (OUTPATIENT)
Dept: INTERNAL MEDICINE CLINIC | Facility: CLINIC | Age: 62
End: 2022-02-22
Payer: COMMERCIAL

## 2022-02-22 VITALS
BODY MASS INDEX: 26.67 KG/M2 | TEMPERATURE: 98 F | HEART RATE: 109 BPM | OXYGEN SATURATION: 96 % | DIASTOLIC BLOOD PRESSURE: 80 MMHG | SYSTOLIC BLOOD PRESSURE: 124 MMHG | WEIGHT: 176 LBS | HEIGHT: 68 IN

## 2022-02-22 DIAGNOSIS — Z72.0 TOBACCO ABUSE DISORDER: ICD-10-CM

## 2022-02-22 DIAGNOSIS — J44.1 ACUTE EXACERBATION OF CHRONIC OBSTRUCTIVE PULMONARY DISEASE (COPD) (HCC): ICD-10-CM

## 2022-02-22 DIAGNOSIS — J43.9 PULMONARY EMPHYSEMA, UNSPECIFIED EMPHYSEMA TYPE (HCC): Primary | ICD-10-CM

## 2022-02-22 PROBLEM — G89.29 CHRONIC MIDLINE LOW BACK PAIN WITHOUT SCIATICA: Status: RESOLVED | Noted: 2020-04-22 | Resolved: 2022-02-22

## 2022-02-22 PROBLEM — R78.81 POSITIVE BLOOD CULTURE: Status: RESOLVED | Noted: 2021-12-26 | Resolved: 2022-02-22

## 2022-02-22 PROBLEM — J18.9 PNEUMONIA: Status: RESOLVED | Noted: 2021-12-24 | Resolved: 2022-02-22

## 2022-02-22 PROBLEM — M54.50 CHRONIC MIDLINE LOW BACK PAIN WITHOUT SCIATICA: Status: RESOLVED | Noted: 2020-04-22 | Resolved: 2022-02-22

## 2022-02-22 PROCEDURE — 99214 OFFICE O/P EST MOD 30 MIN: CPT | Performed by: INTERNAL MEDICINE

## 2022-02-22 RX ORDER — NICOTINE 21 MG/24HR
1 PATCH, TRANSDERMAL 24 HOURS TRANSDERMAL DAILY
Qty: 28 PATCH | Refills: 0 | Status: SHIPPED | OUTPATIENT
Start: 2022-02-22

## 2022-02-22 RX ORDER — METHYLPREDNISOLONE 4 MG/1
TABLET ORAL
Qty: 21 EACH | Refills: 0 | Status: SHIPPED | OUTPATIENT
Start: 2022-02-22 | End: 2022-05-12 | Stop reason: SDUPTHER

## 2022-02-22 NOTE — PROGRESS NOTES
Assessment/Plan:      Quality Measures:     BMI Counseling: Body mass index is 26 76 kg/m²  The BMI is above normal  Nutrition recommendations include decreasing portion sizes and encouraging healthy choices of fruits and vegetables  Rationale for BMI follow-up plan is due to patient being overweight or obese  Return for Next scheduled follow up  No problem-specific Assessment & Plan notes found for this encounter  Diagnoses and all orders for this visit:    Pulmonary emphysema, unspecified emphysema type ([facility] )  -     methylPREDNISolone 4 MG tablet therapy pack; Use as directed on package    Tobacco abuse disorder  -     nicotine (NICODERM CQ) 21 mg/24 hr TD 24 hr patch; Place 1 patch on the skin daily    Acute exacerbation of chronic obstructive pulmonary disease (COPD) ([facility] )          Subjective:      Patient ID: Gillian Ritchie is a 64 y o  male  Miriam Andujar is here for c/o SOB chest tightness, SOB since last night  H/o severe pulmonary emphysema  Recommended smoking cessation  + cough  PE with decreased breath sounds  No sputum production        ALLERGIES:  No Known Allergies    CURRENT MEDICATIONS:    Current Outpatient Medications:     albuterol (PROVENTIL HFA,VENTOLIN HFA) 90 mcg/act inhaler, Inhale 2 puffs every 6 (six) hours as needed, Disp: , Rfl:     umeclidinium-vilanterol (Anoro Ellipta) 62 5-25 MCG/INH inhaler, Inhale 1 puff daily, Disp: , Rfl:     methylPREDNISolone 4 MG tablet therapy pack, Use as directed on package, Disp: 21 each, Rfl: 0    nicotine (NICODERM CQ) 21 mg/24 hr TD 24 hr patch, Place 1 patch on the skin daily, Disp: 28 patch, Rfl: 0    ACTIVE PROBLEM LIST:  Patient Active Problem List   Diagnosis    Chronic viral hepatitis B without delta agent and without coma (HCC)    COPD (chronic obstructive pulmonary disease) (HCC)    Tobacco abuse disorder    Acute exacerbation of chronic obstructive pulmonary disease (COPD) (HCC)       PAST MEDICAL HISTORY:  Past Medical History:   Diagnosis Date    COPD (chronic obstructive pulmonary disease) ([facility] )        PAST SURGICAL HISTORY:  History reviewed  No pertinent surgical history  FAMILY HISTORY:  History reviewed  No pertinent family history  SOCIAL HISTORY:  Social History     Socioeconomic History    Marital status: /Civil Union     Spouse name: Not on file    Number of children: Not on file    Years of education: Not on file    Highest education level: Not on file   Occupational History    Not on file   Tobacco Use    Smoking status: Current Every Day Smoker     Packs/day: 2 00     Years: 47 00     Pack years: 94 00    Smokeless tobacco: Never Used   Vaping Use    Vaping Use: Every day   Substance and Sexual Activity    Alcohol use: Not Currently    Drug use: No    Sexual activity: Not on file   Other Topics Concern    Not on file   Social History Narrative    Not on file     Social Determinants of Health     Financial Resource Strain: Not on file   Food Insecurity: No Food Insecurity    Worried About Running Out of Food in the Last Year: Never true    Jose L of Food in the Last Year: Never true   Transportation Needs: No Transportation Needs    Lack of Transportation (Medical): No    Lack of Transportation (Non-Medical): No   Physical Activity: Not on file   Stress: Not on file   Social Connections: Not on file   Intimate Partner Violence: Not on file   Housing Stability: Low Risk     Unable to Pay for Housing in the Last Year: No    Number of Places Lived in the Last Year: 1    Unstable Housing in the Last Year: No       Review of Systems   Respiratory: Positive for cough, chest tightness and shortness of breath  All other systems reviewed and are negative          Objective:  Vitals:    02/22/22 1459   BP: 124/80   BP Location: Right arm   Patient Position: Sitting   Cuff Size: Adult   Pulse: (!) 109   Temp: 98 °F (36 7 °C)   TempSrc: Temporal   SpO2: 96%   Weight: 79 8 kg (176 lb)   Height: 5' 8" (1 727 m)     Body mass index is 26 76 kg/m²  Physical Exam  Vitals and nursing note reviewed  Constitutional:       Appearance: He is well-developed  HENT:      Head: Normocephalic and atraumatic  Cardiovascular:      Rate and Rhythm: Normal rate  Pulmonary:      Effort: Pulmonary effort is normal       Breath sounds: Decreased breath sounds present  Abdominal:      Palpations: Abdomen is soft  Musculoskeletal:         General: Normal range of motion  Cervical back: Normal range of motion  Skin:     General: Skin is warm and dry  Neurological:      General: No focal deficit present  Mental Status: He is alert and oriented to person, place, and time  Psychiatric:         Mood and Affect: Mood is anxious  RESULTS:    No results found for this or any previous visit (from the past 1008 hour(s))  This note was created with voice recognition software  Phonic, grammatical and spelling errors may be present within the note as a result

## 2022-04-15 LAB — HCV AB SER-ACNC: NEGATIVE

## 2022-04-19 ENCOUNTER — OFFICE VISIT (OUTPATIENT)
Dept: INTERNAL MEDICINE CLINIC | Facility: CLINIC | Age: 62
End: 2022-04-19
Payer: COMMERCIAL

## 2022-04-19 VITALS
WEIGHT: 177 LBS | TEMPERATURE: 98.6 F | HEIGHT: 68 IN | OXYGEN SATURATION: 96 % | SYSTOLIC BLOOD PRESSURE: 126 MMHG | DIASTOLIC BLOOD PRESSURE: 80 MMHG | HEART RATE: 66 BPM | BODY MASS INDEX: 26.83 KG/M2

## 2022-04-19 DIAGNOSIS — R10.32 LEFT LOWER QUADRANT ABDOMINAL PAIN: Primary | ICD-10-CM

## 2022-04-19 DIAGNOSIS — Z72.0 TOBACCO ABUSE DISORDER: ICD-10-CM

## 2022-04-19 DIAGNOSIS — K40.90 RIGHT INGUINAL HERNIA: ICD-10-CM

## 2022-04-19 PROBLEM — F17.210 TOBACCO DEPENDENCE DUE TO CIGARETTES: Status: ACTIVE | Noted: 2021-09-02

## 2022-04-19 PROBLEM — R10.9 ABDOMINAL PAIN: Status: ACTIVE | Noted: 2022-04-15

## 2022-04-19 PROCEDURE — 99214 OFFICE O/P EST MOD 30 MIN: CPT | Performed by: PHYSICIAN ASSISTANT

## 2022-04-19 NOTE — PATIENT INSTRUCTIONS
I recommend patient get in contact with his surgeon to discuss his recent hospitalization  If he does not get any satisfaction he will get back to me  If the pain worsens patient is to get to the emergency room

## 2022-04-19 NOTE — PROGRESS NOTES
Assessment/Plan:   Patient Instructions   I recommend patient get in contact with his surgeon to discuss his recent hospitalization  If he does not get any satisfaction he will get back to me  If the pain worsens patient is to get to the emergency room  Can use Tylenol for pain  Quality Measures:       Return for Next scheduled follow up  Diagnoses and all orders for this visit:    Left lower quadrant abdominal pain    Right inguinal hernia    Tobacco abuse disorder          Subjective:      Patient ID: Trixie White is a 64 y o  male  Hospital follow-up    Patient was hospitalized on 04/14/2022 at [address] [place] [address] for abdominal pain  Review of the notes indicate he was having left lower quadrant abdominal pain  CT done raise a question of a subacute appendicitis  Patient was hospitalized with what is indicated as the plan of antibiotics and conservative treatment  Patient did not understand this to be what was going to happen and he thought he was to have an operation  He then became anxious and signed out against medical advice  He has a history of diverticulosis but nothing acute was reported on the CT  He has a large right inguinal hernia and also clinically a left inguinal hernia  Prior toThis admission he had seen a general surgeon with [address] [place] [address] regarding the inguinal hernia and was told to follow-up in 2 months and that he had to stop smoking  At this time patient states the pain seems to just come and go, no  symptoms  No nausea, vomiting, diarrhea  Bowels are functioning  No noted blood in stool  Records from patient's hospital stay have been reviewed                 ALLERGIES:  No Known Allergies    CURRENT MEDICATIONS:    Current Outpatient Medications:     albuterol (PROVENTIL HFA,VENTOLIN HFA) 90 mcg/act inhaler, Inhale 2 puffs every 6 (six) hours as needed, Disp: , Rfl:     methylPREDNISolone 4 MG tablet therapy pack, Use as directed on package, Disp: 21 each, Rfl: 0    nicotine (NICODERM CQ) 21 mg/24 hr TD 24 hr patch, Place 1 patch on the skin daily, Disp: 28 patch, Rfl: 0    umeclidinium-vilanterol (Anoro Ellipta) 62 5-25 MCG/INH inhaler, Inhale 1 puff daily, Disp: , Rfl:     ACTIVE PROBLEM LIST:  Patient Active Problem List   Diagnosis    Chronic viral hepatitis B without delta agent and without coma (HCC)    COPD (chronic obstructive pulmonary disease) (HCC)    Tobacco abuse disorder    Acute exacerbation of chronic obstructive pulmonary disease (COPD) ([facility] )    Abdominal pain    Right inguinal hernia    Tobacco dependence due to cigarettes       PAST MEDICAL HISTORY:  Past Medical History:   Diagnosis Date    COPD (chronic obstructive pulmonary disease) ([facility] )        PAST SURGICAL HISTORY:  History reviewed  No pertinent surgical history  FAMILY HISTORY:  History reviewed  No pertinent family history  SOCIAL HISTORY:  Social History     Socioeconomic History    Marital status: /Civil Union     Spouse name: Not on file    Number of children: Not on file    Years of education: Not on file    Highest education level: Not on file   Occupational History    Not on file   Tobacco Use    Smoking status: Current Every Day Smoker     Packs/day: 2 00     Years: 47 00     Pack years: 94 00    Smokeless tobacco: Never Used   Vaping Use    Vaping Use: Every day   Substance and Sexual Activity    Alcohol use: Not Currently    Drug use: No    Sexual activity: Not on file   Other Topics Concern    Not on file   Social History Narrative    Not on file     Social Determinants of Health     Financial Resource Strain: Not on file   Food Insecurity: No Food Insecurity    Worried About Running Out of Food in the Last Year: Never true    Jose L of Food in the Last Year: Never true   Transportation Needs: No Transportation Needs    Lack of Transportation (Medical): No    Lack of Transportation (Non-Medical):  No   Physical Activity: Not on file   Stress: Not on file   Social Connections: Not on file   Intimate Partner Violence: Not on file   Housing Stability: Low Risk     Unable to Pay for Housing in the Last Year: No    Number of Places Lived in the Last Year: 1    Unstable Housing in the Last Year: No       Review of Systems   Constitutional: Negative for activity change, chills, fatigue and fever  HENT: Negative for congestion  Eyes: Negative for discharge  Respiratory: Positive for cough and shortness of breath  Negative for chest tightness  History of COPD with chronic shortness of breath and cough   Cardiovascular: Negative for chest pain, palpitations and leg swelling  Gastrointestinal: Positive for abdominal pain  Negative for blood in stool, constipation, diarrhea, nausea and vomiting  Endocrine: Negative for polydipsia, polyphagia and polyuria  Genitourinary: Positive for scrotal swelling  Negative for difficulty urinating and testicular pain  Musculoskeletal: Negative for arthralgias and myalgias  Skin: Negative for rash  Allergic/Immunologic: Negative for immunocompromised state  Neurological: Negative for dizziness, syncope, weakness, light-headedness and headaches  Hematological: Negative for adenopathy  Does not bruise/bleed easily  Psychiatric/Behavioral: Negative for dysphoric mood, sleep disturbance and suicidal ideas  The patient is not nervous/anxious  Objective:  Vitals:    04/19/22 0925   BP: 126/80   BP Location: Left arm   Patient Position: Sitting   Cuff Size: Adult   Pulse: 66   Temp: 98 6 °F (37 °C)   TempSrc: Tympanic   SpO2: 96%   Weight: 80 3 kg (177 lb)   Height: 5' 8" (1 727 m)     Body mass index is 26 91 kg/m²  Physical Exam  Vitals and nursing note reviewed  Constitutional:       General: He is not in acute distress  Appearance: He is well-developed        Comments: Smells heavily of cigarette smoke   HENT:      Head: Normocephalic and atraumatic  Eyes:      Extraocular Movements: Extraocular movements intact  Pupils: Pupils are equal, round, and reactive to light  Neck:      Thyroid: No thyromegaly  Vascular: No carotid bruit or JVD  Cardiovascular:      Rate and Rhythm: Normal rate and regular rhythm  Heart sounds: Normal heart sounds  Pulmonary:      Effort: Pulmonary effort is normal  No respiratory distress  Comments: Decreased overall breath sounds throughout  Abdominal:      General: Abdomen is flat  Bowel sounds are normal       Palpations: Abdomen is soft  Tenderness: There is no abdominal tenderness  There is no right CVA tenderness, left CVA tenderness, guarding or rebound  Hernia: A hernia is present  Hernia is present in the left inguinal area and right inguinal area  Musculoskeletal:      Cervical back: Neck supple  Right lower leg: No edema  Left lower leg: No edema  Lymphadenopathy:      Cervical: No cervical adenopathy  Skin:     General: Skin is warm and dry  Findings: No rash  Neurological:      General: No focal deficit present  Mental Status: He is alert and oriented to person, place, and time  Mental status is at baseline  Psychiatric:         Mood and Affect: Mood normal          Behavior: Behavior normal            RESULTS:    No results found for this or any previous visit (from the past 1008 hour(s))  This note was created with voice recognition software  Phonic, grammatical and spelling errors may be present within the note as a result

## 2022-05-03 ENCOUNTER — DOCUMENTATION (OUTPATIENT)
Dept: INTERNAL MEDICINE CLINIC | Facility: CLINIC | Age: 62
End: 2022-05-03

## 2022-05-03 ENCOUNTER — TELEPHONE (OUTPATIENT)
Dept: ADMINISTRATIVE | Facility: OTHER | Age: 62
End: 2022-05-03

## 2022-05-03 NOTE — TELEPHONE ENCOUNTER
Upon review of the In Basket request we were able to locate, review, and update the patient chart as requested for Hepatitis C  and HIV  Any additional questions or concerns should be emailed to the Practice Liaisons via [email or web address] email, please do not reply via In Basket      Thank you  Jenny Maldonado

## 2022-05-03 NOTE — PROGRESS NOTES
Spoke with patient he did receive his cologuard kit and will complete it this week prior to his follow up appt with Dr Christopher Murray

## 2022-05-03 NOTE — TELEPHONE ENCOUNTER
----- Message from Diego Hall sent at 5/3/2022 10:26 AM EDT -----  Regarding: hepc / HIV  05/03/22 10:26 AM    Hello, our patient attached above has had Hepatitis C completed/performed  Please assist in updating the patient chart by pulling the Care Everywhere (CE) document  The date of service is 4/15/22  Thank you,  Diego HallS [facility] [place] [facility]       05/03/22 10:27 AM    Hello, our patient Kerri Philippe has had HIV completed/performed  Please assist in updating the patient chart by pulling the Care Everywhere (CE) document  The date of service is 4/23/20       Thank you,  Sonya Thornton MA   INTERNAL MED [facility]

## 2022-05-12 ENCOUNTER — TELEPHONE (OUTPATIENT)
Dept: INTERNAL MEDICINE CLINIC | Facility: CLINIC | Age: 62
End: 2022-05-12

## 2022-05-12 NOTE — TELEPHONE ENCOUNTER
Patient is having sob due to his copd  Would like to know if you would send a script to methylprednisolone 4 MG tablet therapy pack? Dr Barbie Baumann usually gives him 10 mg  Per patient he did call his office and they would not give him appt  Did offer him an appointment but he could not make those times       [facility]    Call back #[phone]

## 2022-05-19 ENCOUNTER — OFFICE VISIT (OUTPATIENT)
Dept: INTERNAL MEDICINE CLINIC | Facility: CLINIC | Age: 62
End: 2022-05-19
Payer: COMMERCIAL

## 2022-05-19 VITALS
WEIGHT: 176.2 LBS | DIASTOLIC BLOOD PRESSURE: 78 MMHG | TEMPERATURE: 99.3 F | HEART RATE: 116 BPM | HEIGHT: 68 IN | RESPIRATION RATE: 18 BRPM | OXYGEN SATURATION: 93 % | SYSTOLIC BLOOD PRESSURE: 118 MMHG | BODY MASS INDEX: 26.7 KG/M2

## 2022-05-19 DIAGNOSIS — Z12.11 COLON CANCER SCREENING: ICD-10-CM

## 2022-05-19 DIAGNOSIS — K57.92 DIVERTICULITIS: Primary | ICD-10-CM

## 2022-05-19 PROCEDURE — 99214 OFFICE O/P EST MOD 30 MIN: CPT | Performed by: INTERNAL MEDICINE

## 2022-05-19 RX ORDER — BUPROPION HYDROCHLORIDE 150 MG/1
150 TABLET ORAL EVERY MORNING
COMMUNITY
Start: 2022-04-09

## 2022-05-19 RX ORDER — AMOXICILLIN AND CLAVULANATE POTASSIUM 875; 125 MG/1; MG/1
1 TABLET, FILM COATED ORAL EVERY 12 HOURS SCHEDULED
Qty: 20 TABLET | Refills: 0 | Status: SHIPPED | OUTPATIENT
Start: 2022-05-19 | End: 2022-05-29

## 2022-05-19 NOTE — PROGRESS NOTES
Assessment/Plan:     Kishore Sheikh is here with abdominal pain  He was seen in the ED for abdominal pain and found to have mild appendicitis  He is having LLQ pain c/w diverticulitis  Will tx with ABT  Quality Measures:       Depression Screening and Follow-up Plan: Patient was screened for depression during today's encounter  They screened negative with a PHQ-2 score of 1  Tobacco Cessation Counseling: Tobacco cessation counseling was provided  The patient is sincerely urged to quit consumption of tobacco  He is not ready to quit tobacco         Return in about 2 weeks (around 6/2/2022)  No problem-specific Assessment & Plan notes found for this encounter  Diagnoses and all orders for this visit:    Diverticulitis  -     amoxicillin-clavulanate (Augmentin) 875-125 mg per tablet; Take 1 tablet by mouth every 12 (twelve) hours for 10 days  -     Ambulatory referral for colonoscopy; Future    Colon cancer screening  -     Ambulatory referral for colonoscopy; Future    Other orders  -     buPROPion (WELLBUTRIN XL) 150 mg 24 hr tablet; Take 150 mg by mouth every morning          Subjective:      Patient ID: Orion Finnegan is a 64 y o  male  Ginna Ege is here with abdominal pain        ALLERGIES:  No Known Allergies    CURRENT MEDICATIONS:    Current Outpatient Medications:     albuterol (PROVENTIL HFA,VENTOLIN HFA) 90 mcg/act inhaler, Inhale 2 puffs every 6 (six) hours as needed, Disp: , Rfl:     amoxicillin-clavulanate (Augmentin) 875-125 mg per tablet, Take 1 tablet by mouth every 12 (twelve) hours for 10 days, Disp: 20 tablet, Rfl: 0    buPROPion (WELLBUTRIN XL) 150 mg 24 hr tablet, Take 150 mg by mouth every morning, Disp: , Rfl:     nicotine (NICODERM CQ) 21 mg/24 hr TD 24 hr patch, Place 1 patch on the skin daily, Disp: 28 patch, Rfl: 0    umeclidinium-vilanterol (Anoro Ellipta) 62 5-25 MCG/INH inhaler, Inhale 1 puff daily, Disp: , Rfl:     ACTIVE PROBLEM LIST:  Patient Active Problem List Diagnosis    Chronic viral hepatitis B without delta agent and without coma (HCC)    COPD (chronic obstructive pulmonary disease) (HCC)    Tobacco abuse disorder    Acute exacerbation of chronic obstructive pulmonary disease (COPD) ([facility] )    Abdominal pain    Right inguinal hernia    Tobacco dependence due to cigarettes       PAST MEDICAL HISTORY:  Past Medical History:   Diagnosis Date    COPD (chronic obstructive pulmonary disease) ([facility] )        PAST SURGICAL HISTORY:  History reviewed  No pertinent surgical history  FAMILY HISTORY:  History reviewed  No pertinent family history  SOCIAL HISTORY:  Social History     Socioeconomic History    Marital status: /Civil Union     Spouse name: Not on file    Number of children: Not on file    Years of education: Not on file    Highest education level: Not on file   Occupational History    Not on file   Tobacco Use    Smoking status: Current Every Day Smoker     Packs/day: 2 00     Years: 47 00     Pack years: 94 00    Smokeless tobacco: Never Used   Vaping Use    Vaping Use: Every day   Substance and Sexual Activity    Alcohol use: Not Currently    Drug use: No    Sexual activity: Not on file   Other Topics Concern    Not on file   Social History Narrative    Not on file     Social Determinants of Health     Financial Resource Strain: Not on file   Food Insecurity: No Food Insecurity    Worried About Running Out of Food in the Last Year: Never true    Jose L of Food in the Last Year: Never true   Transportation Needs: No Transportation Needs    Lack of Transportation (Medical): No    Lack of Transportation (Non-Medical):  No   Physical Activity: Not on file   Stress: Not on file   Social Connections: Not on file   Intimate Partner Violence: Not on file   Housing Stability: Low Risk     Unable to Pay for Housing in the Last Year: No    Number of Places Lived in the Last Year: 1    Unstable Housing in the Last Year: No       Review of Systems   Gastrointestinal: Positive for abdominal pain  All other systems reviewed and are negative  Objective:  Vitals:    05/19/22 1415   BP: 118/78   BP Location: Left arm   Patient Position: Sitting   Cuff Size: Adult   Pulse: (!) 116   Resp: 18   Temp: 99 3 °F (37 4 °C)   TempSrc: Tympanic   SpO2: 93%   Weight: 79 9 kg (176 lb 3 2 oz)   Height: 5' 8" (1 727 m)     Body mass index is 26 79 kg/m²  Physical Exam  Vitals and nursing note reviewed  Constitutional:       Appearance: Normal appearance  HENT:      Head: Normocephalic and atraumatic  Nose: Nose normal       Mouth/Throat:      Mouth: Mucous membranes are moist    Cardiovascular:      Rate and Rhythm: Tachycardia present  Pulmonary:      Effort: Pulmonary effort is normal    Abdominal:      General: Bowel sounds are normal       Palpations: Abdomen is soft  Tenderness: There is abdominal tenderness  Musculoskeletal:         General: Normal range of motion  Cervical back: Normal range of motion  Skin:     General: Skin is warm and dry  Neurological:      General: No focal deficit present  Mental Status: He is alert and oriented to person, place, and time  Psychiatric:         Mood and Affect: Mood normal          Behavior: Behavior normal            RESULTS:    Recent Results (from the past 1008 hour(s))   Hepatitis C antibody    Collection Time: 04/15/22 12:00 AM   Result Value Ref Range    HEP C AB negative        This note was created with voice recognition software  Phonic, grammatical and spelling errors may be present within the note as a result

## 2022-05-31 ENCOUNTER — OFFICE VISIT (OUTPATIENT)
Dept: INTERNAL MEDICINE CLINIC | Facility: CLINIC | Age: 62
End: 2022-05-31
Payer: COMMERCIAL

## 2022-05-31 VITALS
RESPIRATION RATE: 18 BRPM | OXYGEN SATURATION: 96 % | HEIGHT: 68 IN | HEART RATE: 92 BPM | BODY MASS INDEX: 26.83 KG/M2 | TEMPERATURE: 97.3 F | DIASTOLIC BLOOD PRESSURE: 80 MMHG | SYSTOLIC BLOOD PRESSURE: 130 MMHG | WEIGHT: 177 LBS

## 2022-05-31 DIAGNOSIS — K46.9 HERNIA OF ABDOMINAL CAVITY: Primary | ICD-10-CM

## 2022-05-31 PROCEDURE — 99214 OFFICE O/P EST MOD 30 MIN: CPT | Performed by: INTERNAL MEDICINE

## 2022-05-31 RX ORDER — LEVOFLOXACIN 500 MG/1
500 TABLET, FILM COATED ORAL DAILY
COMMUNITY
Start: 2022-05-25 | End: 2022-06-04

## 2022-05-31 RX ORDER — DEXTROMETHORPHAN HYDROBROMIDE AND PROMETHAZINE HYDROCHLORIDE 15; 6.25 MG/5ML; MG/5ML
5 SYRUP ORAL 4 TIMES DAILY PRN
COMMUNITY
Start: 2022-05-25 | End: 2022-06-02

## 2022-05-31 NOTE — PROGRESS NOTES
Assessment/Plan:     Harpreet Carranza is here to discuss CT findings of April's study  Findings c/w abdominal wall hernia  He was treated for diverticulitis early May  Reports continued abdominal pain  Refer to surgery  Quality Measures:       No follow-ups on file  No problem-specific Assessment & Plan notes found for this encounter  Diagnoses and all orders for this visit:    Hernia of abdominal cavity  -     Ambulatory Referral to General Surgery; Future    Other orders  -     levofloxacin (LEVAQUIN) 500 mg tablet; Take 500 mg by mouth daily  -     promethazine-dextromethorphan (PHENERGAN-DM) 6 25-15 mg/5 mL oral syrup; Take 5 mL by mouth 4 (four) times a day as needed        Subjective:      Patient ID: Susan Yancey is a 64 y o  male  Pt is here to discuss CT findings        ALLERGIES:  No Known Allergies    CURRENT MEDICATIONS:    Current Outpatient Medications:     albuterol (PROVENTIL HFA,VENTOLIN HFA) 90 mcg/act inhaler, Inhale 2 puffs every 6 (six) hours as needed, Disp: , Rfl:     buPROPion (WELLBUTRIN XL) 150 mg 24 hr tablet, Take 150 mg by mouth every morning, Disp: , Rfl:     levofloxacin (LEVAQUIN) 500 mg tablet, Take 500 mg by mouth daily, Disp: , Rfl:     nicotine (NICODERM CQ) 21 mg/24 hr TD 24 hr patch, Place 1 patch on the skin daily, Disp: 28 patch, Rfl: 0    promethazine-dextromethorphan (PHENERGAN-DM) 6 25-15 mg/5 mL oral syrup, Take 5 mL by mouth 4 (four) times a day as needed, Disp: , Rfl:     umeclidinium-vilanterol (Anoro Ellipta) 62 5-25 MCG/INH inhaler, Inhale 1 puff daily, Disp: , Rfl:     ACTIVE PROBLEM LIST:  Patient Active Problem List   Diagnosis    Chronic viral hepatitis B without delta agent and without coma (HCC)    COPD (chronic obstructive pulmonary disease) (HCC)    Tobacco abuse disorder    Acute exacerbation of chronic obstructive pulmonary disease (COPD) ([address] )    Abdominal pain    Right inguinal hernia    Tobacco dependence due to cigarettes PAST MEDICAL HISTORY:  Past Medical History:   Diagnosis Date    COPD (chronic obstructive pulmonary disease) ([facility] )        PAST SURGICAL HISTORY:  History reviewed  No pertinent surgical history  FAMILY HISTORY:  History reviewed  No pertinent family history  SOCIAL HISTORY:  Social History     Socioeconomic History    Marital status: /Civil Union     Spouse name: Not on file    Number of children: Not on file    Years of education: Not on file    Highest education level: Not on file   Occupational History    Not on file   Tobacco Use    Smoking status: Current Every Day Smoker     Packs/day: 2 00     Years: 47 00     Pack years: 94 00    Smokeless tobacco: Never Used   Vaping Use    Vaping Use: Every day   Substance and Sexual Activity    Alcohol use: Not Currently    Drug use: No    Sexual activity: Not on file   Other Topics Concern    Not on file   Social History Narrative    Not on file     Social Determinants of Health     Financial Resource Strain: Not on file   Food Insecurity: No Food Insecurity    Worried About Running Out of Food in the Last Year: Never true    Jose L of Food in the Last Year: Never true   Transportation Needs: No Transportation Needs    Lack of Transportation (Medical): No    Lack of Transportation (Non-Medical): No   Physical Activity: Not on file   Stress: Not on file   Social Connections: Not on file   Intimate Partner Violence: Not on file   Housing Stability: Low Risk     Unable to Pay for Housing in the Last Year: No    Number of Places Lived in the Last Year: 1    Unstable Housing in the Last Year: No       Review of Systems   Gastrointestinal: Positive for abdominal pain  Psychiatric/Behavioral: The patient is nervous/anxious  All other systems reviewed and are negative          Objective:  Vitals:    05/31/22 1034   BP: 130/80   BP Location: Left arm   Patient Position: Sitting   Cuff Size: Adult   Pulse: 92   Resp: 18   Temp: (!) 97 3 °F (36 3 °C)   TempSrc: Tympanic   SpO2: 96%   Weight: 80 3 kg (177 lb)   Height: 5' 8" (1 727 m)     Body mass index is 26 91 kg/m²  Physical Exam  Vitals and nursing note reviewed  Constitutional:       Appearance: Normal appearance  HENT:      Head: Normocephalic and atraumatic  Nose: Nose normal    Cardiovascular:      Rate and Rhythm: Normal rate and regular rhythm  Abdominal:      General: Bowel sounds are normal       Tenderness: There is abdominal tenderness  Musculoskeletal:         General: Normal range of motion  Cervical back: Normal range of motion  Skin:     General: Skin is warm and dry  Neurological:      General: No focal deficit present  Mental Status: He is alert  Psychiatric:         Mood and Affect: Mood normal            RESULTS:    No results found for this or any previous visit (from the past 1008 hour(s))  This note was created with voice recognition software  Phonic, grammatical and spelling errors may be present within the note as a result

## 2022-06-15 ENCOUNTER — CONSULT (OUTPATIENT)
Dept: SURGERY | Facility: CLINIC | Age: 62
End: 2022-06-15
Payer: COMMERCIAL

## 2022-06-15 VITALS
HEART RATE: 70 BPM | SYSTOLIC BLOOD PRESSURE: 138 MMHG | DIASTOLIC BLOOD PRESSURE: 88 MMHG | WEIGHT: 177 LBS | OXYGEN SATURATION: 100 % | BODY MASS INDEX: 26.83 KG/M2 | HEIGHT: 68 IN

## 2022-06-15 DIAGNOSIS — K57.90 DIVERTICULAR DISEASE: ICD-10-CM

## 2022-06-15 DIAGNOSIS — K46.9 HERNIA OF ABDOMINAL CAVITY: ICD-10-CM

## 2022-06-15 DIAGNOSIS — R10.32 LEFT LOWER QUADRANT ABDOMINAL PAIN: Primary | ICD-10-CM

## 2022-06-15 PROCEDURE — 99244 OFF/OP CNSLTJ NEW/EST MOD 40: CPT | Performed by: SURGERY

## 2022-06-15 NOTE — PROGRESS NOTES
Consult- General Surgery   Piper Pierson III 64 y o  male MRN: [id number]  Unit/Bed#:  Encounter: [id number]    Assessment/Plan     Assessment:  Left-sided abdominal pain, etiology unknown  History of tobacco abuse  History of COPD  Plan: At this time I advised the patient to have a CT scan of the abdomen and pelvis due to persistent left-sided abdominal pain  He does have bilateral inguinal hernia, left greater than the right  He has a history of diverticular disease with diverticulitis in the past, CT scan report from April of 2022 at [facility] revealed diverticular disease  I will advised the patient to have screening colonoscopy with GI in the near future  Further recommendations will be made pending on the CT scan results and colonoscopy  History of Present Illness     HPI:  Piper Pierson III is a 64 y o  male who presents to my office for evaluation of inguinal hernia  The patient has been complaining of left-sided abdominal pain for quite some time, the symptoms and not related with any type of food ingestion, not related to any physical activity, he does complains of occasional constipation, denied having any chills, fever, nausea, vomiting or any other constitutional symptoms  The patient has had CT scan of the abdomen and pelvis in April of this year because of the pain revealing left inguinal hernia, this was performed at [facility]  No films available at this time  The patient will still has persistent symptoms without improvement with any type of over-the-counter medication  He never had colonoscopy in the past   He denied having any fever, chills or any other constitutional symptoms  The patient used to smoke 5 packs a day, he is down to 1 pack a day  Review of Systems: The rest of the review of system total of 10 were negative except for the HPI      Historical Information   Past Medical History:   Diagnosis Date    COPD (chronic obstructive pulmonary disease) ([facility] ) History reviewed  No pertinent surgical history  Social History   Social History     Substance and Sexual Activity   Alcohol Use Not Currently     Social History     Substance and Sexual Activity   Drug Use No     Social History     Tobacco Use   Smoking Status Current Every Day Smoker    Packs/day: 2 00    Years: 47 00    Pack years: 94 00   Smokeless Tobacco Never Used     Family History: non-contributory    Meds/Allergies   all medications and allergies reviewed     Current Outpatient Medications:     albuterol (PROVENTIL HFA,VENTOLIN HFA) 90 mcg/act inhaler, Inhale 2 puffs every 6 (six) hours as needed, Disp: , Rfl:     buPROPion (WELLBUTRIN XL) 150 mg 24 hr tablet, Take 150 mg by mouth every morning, Disp: , Rfl:     nicotine (NICODERM CQ) 21 mg/24 hr TD 24 hr patch, Place 1 patch on the skin daily, Disp: 28 patch, Rfl: 0    umeclidinium-vilanterol (Anoro Ellipta) 62 5-25 MCG/INH inhaler, Inhale 1 puff daily, Disp: , Rfl:   No Known Allergies    Objective     Current Vitals:   Blood Pressure: 138/88 (06/15/22 1234)  Pulse: 70 (06/15/22 1234)  Height: 5' 8" (172 7 cm) (06/15/22 1234)  Weight - Scale: 80 3 kg (177 lb) (06/15/22 1234)  SpO2: 100 % (06/15/22 1234)    Physical Exam  Vitals and nursing note reviewed  Constitutional:       General: He is not in acute distress  Cardiovascular:      Rate and Rhythm: Normal rate and regular rhythm  Heart sounds: No murmur heard  Pulmonary:      Effort: No respiratory distress  Breath sounds: Normal breath sounds  Abdominal:      Palpations: Abdomen is soft  There is no mass  Tenderness: There is no abdominal tenderness  Genitourinary:     Comments: Digital examination of the right and left inguinal canal revealed bilateral inguinal hernia, left greater than the right, both easily reducible at this time  Skin:     General: Skin is warm  Coloration: Skin is not jaundiced  Findings: No erythema or rash     Neurological: Mental Status: He is alert and oriented to person, place, and time  Cranial Nerves: No cranial nerve deficit     Psychiatric:         Mood and Affect: Mood normal          Behavior: Behavior normal

## 2022-07-07 ENCOUNTER — HOSPITAL ENCOUNTER (OUTPATIENT)
Dept: CT IMAGING | Facility: HOSPITAL | Age: 62
Discharge: HOME | End: 2022-07-07
Attending: SURGERY
Payer: COMMERCIAL

## 2022-07-07 DIAGNOSIS — R10.32 LEFT LOWER QUADRANT ABDOMINAL PAIN: ICD-10-CM

## 2022-07-07 PROCEDURE — G1004 CDSM NDSC: HCPCS

## 2022-07-07 PROCEDURE — 74176 CT ABD & PELVIS W/O CONTRAST: CPT

## 2022-07-13 ENCOUNTER — TELEPHONE (OUTPATIENT)
Dept: SURGERY | Facility: CLINIC | Age: 62
End: 2022-07-13

## 2022-07-14 ENCOUNTER — TELEPHONE (OUTPATIENT)
Dept: SURGERY | Facility: CLINIC | Age: 62
End: 2022-07-14

## 2022-07-15 ENCOUNTER — TELEPHONE (OUTPATIENT)
Dept: INTERNAL MEDICINE CLINIC | Facility: CLINIC | Age: 62
End: 2022-07-15

## 2022-07-15 DIAGNOSIS — J43.9 PULMONARY EMPHYSEMA, UNSPECIFIED EMPHYSEMA TYPE (HCC): Primary | ICD-10-CM

## 2022-07-15 RX ORDER — METHYLPREDNISOLONE 4 MG/1
TABLET ORAL
Qty: 21 EACH | Refills: 0 | Status: SHIPPED | OUTPATIENT
Start: 2022-07-15 | End: 2022-08-05 | Stop reason: SDUPTHER

## 2022-07-15 NOTE — TELEPHONE ENCOUNTER
Patient is requesting a script for Prednisone, 10 mg tablets, he takes 4 tablets for 3 days; 3 tablets for 3 days; 2 tablets for 3 days; and then 1 tablet for 3 days  He is having a hard time breathing and his pulmonologist, Dr Elias Richardson office was is not in this afternoon to refill this for him  He is asking for enough to get him through until Wednesday, July 20  He cannot see him until then  This is usually given to him for this  Please let patient know today if this will be sent over today      Call back #[phone]

## 2022-07-18 ENCOUNTER — OFFICE VISIT (OUTPATIENT)
Dept: SURGERY | Facility: CLINIC | Age: 62
End: 2022-07-18
Payer: COMMERCIAL

## 2022-07-18 VITALS
HEART RATE: 92 BPM | WEIGHT: 178 LBS | BODY MASS INDEX: 26.98 KG/M2 | SYSTOLIC BLOOD PRESSURE: 144 MMHG | HEIGHT: 68 IN | DIASTOLIC BLOOD PRESSURE: 88 MMHG | OXYGEN SATURATION: 97 %

## 2022-07-18 DIAGNOSIS — K76.9 LIVER LESION: Primary | ICD-10-CM

## 2022-07-18 DIAGNOSIS — R10.32 LEFT LOWER QUADRANT ABDOMINAL PAIN: Primary | ICD-10-CM

## 2022-07-18 PROCEDURE — 99213 OFFICE O/P EST LOW 20 MIN: CPT | Performed by: SURGERY

## 2022-07-18 NOTE — PROGRESS NOTES
Follow Up - General Surgery   Ric Barba III 64 y o  male MRN: [id number]  Unit/Bed#:  Encounter: [id number]    Assessment/Plan     Assessment:  Left lower quadrant abdominal pain, etiology unknown, suspect diverticular disease  History of COPD  History of heavy smoking  Plan:  The patient was advised to have a colonoscopy since he never had colonoscopy in the past   For the liver lesion I will defer that to his primary care physician to order the MRI  He will follow up in our office once he decides to quit smoking to proceed with hernia surgery  History of Present Illness     HPI:  Ric Barba III is a 64 y o  male who presents to my office for follow-up  The patient was complaining of pain from the left lower quadrant for which CT scan of the abdomen and pelvis was ordered  CT scan films and report discussed with patient  He still has persistent left lower quadrant abdominal pain not related to inguinal hernias at this time  He had no fever, chills, diarrhea or constipation  He never had colonoscopy in the past   Patient is scheduled to see GI next month to proceed with colonoscopy  Review of Systems: The rest of the review of system total of 10 were negative except for the HPI  Historical Information   Past Medical History:   Diagnosis Date    COPD (chronic obstructive pulmonary disease) ([facility] )      History reviewed  No pertinent surgical history    Social History   Social History     Substance and Sexual Activity   Alcohol Use Not Currently     Social History     Substance and Sexual Activity   Drug Use No     Social History     Tobacco Use   Smoking Status Current Every Day Smoker    Packs/day: 2 00    Years: 47 00    Pack years: 94 00   Smokeless Tobacco Never Used     Family History: non-contributory    Meds/Allergies   all medications and allergies reviewed     Current Outpatient Medications:     albuterol (PROVENTIL HFA,VENTOLIN HFA) 90 mcg/act inhaler, Inhale 2 puffs every 6 (six) hours as needed, Disp: , Rfl:     buPROPion (WELLBUTRIN XL) 150 mg 24 hr tablet, Take 150 mg by mouth every morning, Disp: , Rfl:     methylPREDNISolone 4 MG tablet therapy pack, Use as directed on package, Disp: 21 each, Rfl: 0    umeclidinium-vilanterol (Anoro Ellipta) 62 5-25 MCG/INH inhaler, Inhale 1 puff daily, Disp: , Rfl:     nicotine (NICODERM CQ) 21 mg/24 hr TD 24 hr patch, Place 1 patch on the skin daily (Patient not taking: Reported on 7/18/2022), Disp: 28 patch, Rfl: 0  No Known Allergies    Objective     Current Vitals:   Blood Pressure: 144/88 (07/18/22 1259)  Pulse: 92 (07/18/22 1259)  Height: 5' 8" (172 7 cm) (07/18/22 1259)  Weight - Scale: 80 7 kg (178 lb) (07/18/22 1259)  SpO2: 97 % (07/18/22 1259)    Physical Exam  Vitals and nursing note reviewed  Constitutional:       General: He is not in acute distress  Cardiovascular:      Rate and Rhythm: Normal rate and regular rhythm  Pulmonary:      Effort: No respiratory distress  Breath sounds: Normal breath sounds  Abdominal:      Comments: Abdomen is soft, nondistended and mildly tender left lower quadrant without guarding or rebound  No mass palpable visceromegaly noted  Skin:     General: Skin is warm  Coloration: Skin is not jaundiced  Findings: No erythema or rash  Neurological:      Mental Status: He is alert and oriented to person, place, and time  Cranial Nerves: No cranial nerve deficit  Psychiatric:         Mood and Affect: Mood normal          Behavior: Behavior normal        Imaging: I have personally reviewed pertinent reports  and I have personally reviewed pertinent films in PACS  Procedure: CT abdomen pelvis without contrast    Result Date: 7/13/2022  Narrative: CT ABDOMEN AND PELVIS WITHOUT IV CONTRAST INDICATION:   R10 32: Left lower quadrant pain  COMPARISON:  None   TECHNIQUE:  CT examination of the abdomen and pelvis was performed without intravenous contrast  This examination was performed without intravenous contrast in the context of the critical nationwide Omnipaque shortage  Axial, sagittal, and coronal 2D reformatted images were created from the source data and submitted for interpretation  Radiation dose length product (DLP) for this visit:  600 mGy-cm   This examination, like all CT scans performed in the [facility], was performed utilizing techniques to minimize radiation dose exposure, including the use of iterative reconstruction and automated exposure control  Enteric contrast was administered  FINDINGS: ABDOMEN LOWER CHEST: Bibasilar groundglass density seen within the dependent atelectasis LIVER/BILIARY TREE:  A hypodensity seen left hepatic lobe in image 14 series 2 measuring 1 3 cm, additional hypodensity seen in segment 5 image 32 series 2 measuring 1 5 cm GALLBLADDER:  No calcified gallstones  No pericholecystic inflammatory change  SPLEEN:  Unremarkable  PANCREAS:  Unremarkable  ADRENAL GLANDS:  Unremarkable  KIDNEYS/URETERS:  Unremarkable  No hydronephrosis  STOMACH AND BOWEL:  Unremarkable  APPENDIX:  No findings to suggest appendicitis  ABDOMINOPELVIC CAVITY:  No ascites  No pneumoperitoneum  No lymphadenopathy  VESSELS:  No abdominal aortic aneurysm seen Atherosclerotic changes are seen PELVIS REPRODUCTIVE ORGANS:  Unremarkable for patient's age  URINARY BLADDER:  Unremarkable  ABDOMINAL WALL/INGUINAL REGIONS:  Umbilical hernia containing fat seen   OSSEOUS STRUCTURES:  No acute compression collapse of the vertebra No gross lytic lesion     Impression: No acute inflammatory stranding No hydronephrosis No renal or ureteric calculi Incidentally noted hypodense liver lesions ranging in size from 1 3 to 1 5 cm, indeterminate suggest nonemergent evaluation with the MRI for further characterization A significant finding and follow-up notification has been created Workstation performed: [id number]

## 2022-07-19 ENCOUNTER — TELEPHONE (OUTPATIENT)
Dept: INTERNAL MEDICINE CLINIC | Facility: CLINIC | Age: 62
End: 2022-07-19

## 2022-07-19 NOTE — TELEPHONE ENCOUNTER
Patient is coming in tomorrow, 07/20, to see Dr Kaur Rodriguez and this will be discussed at that time

## 2022-07-19 NOTE — TELEPHONE ENCOUNTER
----- Message from Sloan Aguilar MD sent at 7/18/2022  1:19 PM EDT -----  Liver lesion seen on ct abdomen- I ordered mri which was recommended by the radiologist  ----- Message -----  From: Meli Almazan MD  Sent: 7/18/2022   1:16 PM EDT  To: Sloan Aguilar MD

## 2022-08-03 ENCOUNTER — TELEPHONE (OUTPATIENT)
Dept: OTHER | Facility: OTHER | Age: 62
End: 2022-08-03

## 2022-08-03 NOTE — TELEPHONE ENCOUNTER
Called and spoke to patient   Confirmed his appointment for 8/4/2022 @ 3:20 pm with Dr Enedina Gross

## 2022-08-04 ENCOUNTER — OFFICE VISIT (OUTPATIENT)
Dept: GASTROENTEROLOGY | Facility: CLINIC | Age: 62
End: 2022-08-04
Payer: COMMERCIAL

## 2022-08-04 VITALS
BODY MASS INDEX: 27.43 KG/M2 | SYSTOLIC BLOOD PRESSURE: 128 MMHG | OXYGEN SATURATION: 98 % | HEIGHT: 68 IN | DIASTOLIC BLOOD PRESSURE: 78 MMHG | WEIGHT: 181 LBS | HEART RATE: 78 BPM

## 2022-08-04 DIAGNOSIS — Z12.11 COLON CANCER SCREENING: ICD-10-CM

## 2022-08-04 DIAGNOSIS — K57.90 DIVERTICULAR DISEASE: ICD-10-CM

## 2022-08-04 PROCEDURE — 99244 OFF/OP CNSLTJ NEW/EST MOD 40: CPT | Performed by: INTERNAL MEDICINE

## 2022-08-04 RX ORDER — PREDNISONE 10 MG/1
TABLET ORAL
COMMUNITY
Start: 2022-07-20 | End: 2022-08-05

## 2022-08-04 RX ORDER — DEXTROMETHORPHAN HYDROBROMIDE AND PROMETHAZINE HYDROCHLORIDE 15; 6.25 MG/5ML; MG/5ML
SYRUP ORAL
COMMUNITY
Start: 2022-07-20 | End: 2022-09-06

## 2022-08-04 NOTE — PROGRESS NOTES
[facility] - Outpatient Consultation  Fidel Contreras III 58 y o  male MRN: [id number]  Encounter: [id number]          ASSESSMENT AND PLAN:      1  Colon cancer screening  - Ambulatory referral for colonoscopy  - Colonoscopy; Future    2  Diverticular disease  - Ambulatory Referral to Gastroenterology  - Colonoscopy; Future    ______________________________________________________________________    HPI:  This 68-year-old male was referred to the office for the evaluation of chronic left lower quadrant abdominal pain  This pain but ongoing for the past year  He states that he has it almost every single day  He does very in his bowel movements from diarrhea at times to constipation at times  He denies any problems with recent rectal bleeding  He has never undergone colonoscopy in the past   He did undergo a CT scan of the abdomen and pelvis on July 7, 2022  It showed evidence of an umbilical hernia containing fat  There was a hypodensity seen in the left hepatic lobe measuring 1 3 cm as well as an additional hypodensity in segment 5 measuring 1 5 cm  The pancreas spleen and adrenal glands were unremarkable  There was no evidence of hydronephrosis or renal/ureteral calculi  There has been no weight loss or weight gain  He states that home oxygen sometimes helps the pain  He denies anything that seems to provoke the pain  REVIEW OF SYSTEMS:    CONSTITUTIONAL: Denies any fever, chills, rigors, and weight loss  HEENT: No earache or tinnitus  Denies hearing loss or visual disturbances  CARDIOVASCULAR: No chest pain or palpitations  RESPIRATORY: Denies any cough, hemoptysis, shortness of breath or dyspnea on exertion  GASTROINTESTINAL: As noted in the History of Present Illness  GENITOURINARY: No problems with urination  Denies any hematuria or dysuria  NEUROLOGIC: No dizziness or vertigo, denies headaches  MUSCULOSKELETAL: Denies any muscle or joint pain     SKIN: Denies skin rashes or itching  ENDOCRINE: Denies excessive thirst  Denies intolerance to heat or cold  PSYCHOSOCIAL: Denies depression or anxiety  Denies any recent memory loss  Historical Information   Past Medical History:   Diagnosis Date    COPD (chronic obstructive pulmonary disease) ([facility] [address]     Diverticulitis of colon     Hernia of abdominal cavity     Liver cyst      History reviewed  No pertinent surgical history  Social History   Social History     Substance and Sexual Activity   Alcohol Use Not Currently     Social History     Substance and Sexual Activity   Drug Use No     Social History     Tobacco Use   Smoking Status Current Every Day Smoker    Packs/day: 2 00    Years: 47 00    Pack years: 94 00   Smokeless Tobacco Never Used     History reviewed  No pertinent family history  Meds/Allergies       Current Outpatient Medications:     promethazine-dextromethorphan (PHENERGAN-DM) 6 25-15 mg/5 mL oral syrup    umeclidinium-vilanterol (Anoro Ellipta) 62 5-25 MCG/INH inhaler    albuterol (PROVENTIL HFA,VENTOLIN HFA) 90 mcg/act inhaler    buPROPion (WELLBUTRIN XL) 150 mg 24 hr tablet    methylPREDNISolone 4 MG tablet therapy pack    nicotine (NICODERM CQ) 21 mg/24 hr TD 24 hr patch    predniSONE 10 mg tablet    No Known Allergies        Objective     Blood pressure 128/78, pulse 78, height 5' 8" (1 727 m), weight 82 1 kg (181 lb), SpO2 98 %  Body mass index is 27 52 kg/m²  PHYSICAL EXAM:      General Appearance:   Alert, cooperative, no distress   HEENT:   Normocephalic, atraumatic, anicteric      Neck:  Supple, symmetrical, trachea midline   Lungs:   Clear to auscultation bilaterally; no rales, rhonchi or wheezing; respirations unlabored    Heart[de-identified]   Regular rate and rhythm; no murmur, rub, or gallop     Abdomen:   Soft, tender in the left lower quadrant, non-distended; normal bowel sounds; no masses, no organomegaly    Genitalia:   Deferred    Rectal:   Deferred  Extremities:  No cyanosis, clubbing or edema    Pulses:  2+ and symmetric    Skin:  No jaundice, rashes, or lesions    Lymph nodes:  No palpable cervical lymphadenopathy        Lab Results:   No visits with results within 1 Day(s) from this visit  Latest known visit with results is:   Telephone on 05/03/2022   Component Date Value    HEP C AB 04/15/2022 negative     HIV Screen 04/23/2020 Non-Reactive          Radiology Results:   CT abdomen pelvis without contrast    Result Date: 7/13/2022  Narrative: CT ABDOMEN AND PELVIS WITHOUT IV CONTRAST INDICATION:   R10 32: Left lower quadrant pain  COMPARISON:  None  TECHNIQUE:  CT examination of the abdomen and pelvis was performed without intravenous contrast  This examination was performed without intravenous contrast in the context of the critical nationwide Omnipaque shortage  Axial, sagittal, and coronal 2D reformatted images were created from the source data and submitted for interpretation  Radiation dose length product (DLP) for this visit:  600 mGy-cm   This examination, like all CT scans performed in the [facility], was performed utilizing techniques to minimize radiation dose exposure, including the use of iterative reconstruction and automated exposure control  Enteric contrast was administered  FINDINGS: ABDOMEN LOWER CHEST: Bibasilar groundglass density seen within the dependent atelectasis LIVER/BILIARY TREE:  A hypodensity seen left hepatic lobe in image 14 series 2 measuring 1 3 cm, additional hypodensity seen in segment 5 image 32 series 2 measuring 1 5 cm GALLBLADDER:  No calcified gallstones  No pericholecystic inflammatory change  SPLEEN:  Unremarkable  PANCREAS:  Unremarkable  ADRENAL GLANDS:  Unremarkable  KIDNEYS/URETERS:  Unremarkable  No hydronephrosis  STOMACH AND BOWEL:  Unremarkable  APPENDIX:  No findings to suggest appendicitis  ABDOMINOPELVIC CAVITY:  No ascites  No pneumoperitoneum  No lymphadenopathy   VESSELS:  No abdominal aortic aneurysm seen Atherosclerotic changes are seen PELVIS REPRODUCTIVE ORGANS:  Unremarkable for patient's age  URINARY BLADDER:  Unremarkable  ABDOMINAL WALL/INGUINAL REGIONS:  Umbilical hernia containing fat seen   OSSEOUS STRUCTURES:  No acute compression collapse of the vertebra No gross lytic lesion     Impression: No acute inflammatory stranding No hydronephrosis No renal or ureteric calculi Incidentally noted hypodense liver lesions ranging in size from 1 3 to 1 5 cm, indeterminate suggest nonemergent evaluation with the MRI for further characterization A significant finding and follow-up notification has been created Workstation performed: [id number]

## 2022-08-04 NOTE — LETTER
August 9, 2022     Umang Estrella, [address] [facility] [address]  [place]    Patient: Deedee Enciso III   YOB: 1960   Date of Visit: 8/4/2022       Dear Dr Missael Lyons: Thank you for referring Adrienne Horton to me for evaluation  Below are my notes for this consultation  If you have questions, please do not hesitate to call me  I look forward to following your patient along with you  Sincerely,        María Meeks, DO        CC: No Recipients  María Meeks,   8/4/2022  3:45 PM  Signed  [facility] - Outpatient Consultation  Calleen Pickerel III 58 y o  male MRN: [id number]  Encounter: [id number]          ASSESSMENT AND PLAN:      1  Colon cancer screening  - Ambulatory referral for colonoscopy  - Colonoscopy; Future    2  Diverticular disease  - Ambulatory Referral to Gastroenterology  - Colonoscopy; Future    ______________________________________________________________________    HPI:  This 79-year-old male was referred to the office for the evaluation of chronic left lower quadrant abdominal pain  This pain but ongoing for the past year  He states that he has it almost every single day  He does very in his bowel movements from diarrhea at times to constipation at times  He denies any problems with recent rectal bleeding  He has never undergone colonoscopy in the past   He did undergo a CT scan of the abdomen and pelvis on July 7, 2022  It showed evidence of an umbilical hernia containing fat  There was a hypodensity seen in the left hepatic lobe measuring 1 3 cm as well as an additional hypodensity in segment 5 measuring 1 5 cm  The pancreas spleen and adrenal glands were unremarkable  There was no evidence of hydronephrosis or renal/ureteral calculi  There has been no weight loss or weight gain  He states that home oxygen sometimes helps the pain  He denies anything that seems to provoke the pain        REVIEW OF SYSTEMS:    CONSTITUTIONAL: Denies any fever, chills, rigors, and weight loss  HEENT: No earache or tinnitus  Denies hearing loss or visual disturbances  CARDIOVASCULAR: No chest pain or palpitations  RESPIRATORY: Denies any cough, hemoptysis, shortness of breath or dyspnea on exertion  GASTROINTESTINAL: As noted in the History of Present Illness  GENITOURINARY: No problems with urination  Denies any hematuria or dysuria  NEUROLOGIC: No dizziness or vertigo, denies headaches  MUSCULOSKELETAL: Denies any muscle or joint pain  SKIN: Denies skin rashes or itching  ENDOCRINE: Denies excessive thirst  Denies intolerance to heat or cold  PSYCHOSOCIAL: Denies depression or anxiety  Denies any recent memory loss  Historical Information   Past Medical History:   Diagnosis Date    COPD (chronic obstructive pulmonary disease) ([facility] [address] )     Diverticulitis of colon     Hernia of abdominal cavity     Liver cyst      History reviewed  No pertinent surgical history  Social History   Social History     Substance and Sexual Activity   Alcohol Use Not Currently     Social History     Substance and Sexual Activity   Drug Use No     Social History     Tobacco Use   Smoking Status Current Every Day Smoker    Packs/day: 2 00    Years: 47 00    Pack years: 94 00   Smokeless Tobacco Never Used     History reviewed  No pertinent family history  Meds/Allergies       Current Outpatient Medications:     promethazine-dextromethorphan (PHENERGAN-DM) 6 25-15 mg/5 mL oral syrup    umeclidinium-vilanterol (Anoro Ellipta) 62 5-25 MCG/INH inhaler    albuterol (PROVENTIL HFA,VENTOLIN HFA) 90 mcg/act inhaler    buPROPion (WELLBUTRIN XL) 150 mg 24 hr tablet    methylPREDNISolone 4 MG tablet therapy pack    nicotine (NICODERM CQ) 21 mg/24 hr TD 24 hr patch    predniSONE 10 mg tablet    No Known Allergies        Objective     Blood pressure 128/78, pulse 78, height 5' 8" (1 727 m), weight 82 1 kg (181 lb), SpO2 98 %   Body mass index is 27 52 kg/m²  PHYSICAL EXAM:      General Appearance:   Alert, cooperative, no distress   HEENT:   Normocephalic, atraumatic, anicteric      Neck:  Supple, symmetrical, trachea midline   Lungs:   Clear to auscultation bilaterally; no rales, rhonchi or wheezing; respirations unlabored    Heart[de-identified]   Regular rate and rhythm; no murmur, rub, or gallop  Abdomen:   Soft, tender in the left lower quadrant, non-distended; normal bowel sounds; no masses, no organomegaly    Genitalia:   Deferred    Rectal:   Deferred    Extremities:  No cyanosis, clubbing or edema    Pulses:  2+ and symmetric    Skin:  No jaundice, rashes, or lesions    Lymph nodes:  No palpable cervical lymphadenopathy        Lab Results:   No visits with results within 1 Day(s) from this visit  Latest known visit with results is:   Telephone on 05/03/2022   Component Date Value    HEP C AB 04/15/2022 negative     HIV Screen 04/23/2020 Non-Reactive          Radiology Results:   CT abdomen pelvis without contrast    Result Date: 7/13/2022  Narrative: CT ABDOMEN AND PELVIS WITHOUT IV CONTRAST INDICATION:   R10 32: Left lower quadrant pain  COMPARISON:  None  TECHNIQUE:  CT examination of the abdomen and pelvis was performed without intravenous contrast  This examination was performed without intravenous contrast in the context of the critical nationwide Omnipaque shortage  Axial, sagittal, and coronal 2D reformatted images were created from the source data and submitted for interpretation  Radiation dose length product (DLP) for this visit:  600 mGy-cm   This examination, like all CT scans performed in the [facility], was performed utilizing techniques to minimize radiation dose exposure, including the use of iterative reconstruction and automated exposure control  Enteric contrast was administered   FINDINGS: ABDOMEN LOWER CHEST: Bibasilar groundglass density seen within the dependent atelectasis LIVER/BILIARY TREE: A hypodensity seen left hepatic lobe in image 14 series 2 measuring 1 3 cm, additional hypodensity seen in segment 5 image 32 series 2 measuring 1 5 cm GALLBLADDER:  No calcified gallstones  No pericholecystic inflammatory change  SPLEEN:  Unremarkable  PANCREAS:  Unremarkable  ADRENAL GLANDS:  Unremarkable  KIDNEYS/URETERS:  Unremarkable  No hydronephrosis  STOMACH AND BOWEL:  Unremarkable  APPENDIX:  No findings to suggest appendicitis  ABDOMINOPELVIC CAVITY:  No ascites  No pneumoperitoneum  No lymphadenopathy  VESSELS:  No abdominal aortic aneurysm seen Atherosclerotic changes are seen PELVIS REPRODUCTIVE ORGANS:  Unremarkable for patient's age  URINARY BLADDER:  Unremarkable  ABDOMINAL WALL/INGUINAL REGIONS:  Umbilical hernia containing fat seen   OSSEOUS STRUCTURES:  No acute compression collapse of the vertebra No gross lytic lesion     Impression: No acute inflammatory stranding No hydronephrosis No renal or ureteric calculi Incidentally noted hypodense liver lesions ranging in size from 1 3 to 1 5 cm, indeterminate suggest nonemergent evaluation with the MRI for further characterization A significant finding and follow-up notification has been created Workstation performed: [id number]

## 2022-08-04 NOTE — PATIENT INSTRUCTIONS
Scheduled date of colonoscopy (as of today):10/20/22  Physician performing colonoscopy:Christopher  Location of colonoscopy:[place]  Bowel prep reviewed with patient:mag Cit/miralax  Instructions reviewed with patient by:Stanislav johnston  Clearances:  none

## 2022-08-05 ENCOUNTER — TELEPHONE (OUTPATIENT)
Dept: INTERNAL MEDICINE CLINIC | Facility: CLINIC | Age: 62
End: 2022-08-05

## 2022-08-05 ENCOUNTER — TELEPHONE (OUTPATIENT)
Dept: OTHER | Facility: OTHER | Age: 62
End: 2022-08-05

## 2022-08-05 DIAGNOSIS — J43.9 PULMONARY EMPHYSEMA, UNSPECIFIED EMPHYSEMA TYPE (HCC): ICD-10-CM

## 2022-08-05 RX ORDER — METHYLPREDNISOLONE 4 MG/1
TABLET ORAL
Qty: 21 EACH | Refills: 0 | Status: SHIPPED | OUTPATIENT
Start: 2022-08-05 | End: 2022-08-19 | Stop reason: SDUPTHER

## 2022-08-05 NOTE — TELEPHONE ENCOUNTER
TYE: Spoke with patient  History of diverticular disease    Patient c/o lower abdominal pain, He feels he is constipated  Tylenol helps relief the pain  Reassured patient he can take tylenol PRN, warm heating pads, and start colace 100mg daily  HE understands if he develops diarrhea to stop stool softener

## 2022-08-05 NOTE — TELEPHONE ENCOUNTER
Patient stopped by the office asking for an appt due to he is having breathing problems  Patient was offered an appt for this afternoon but said he wasn't able to come back  Patient wanted to know if you could send in a script for prednisone? Please advise    Madelyn Pradhan

## 2022-08-19 ENCOUNTER — TELEPHONE (OUTPATIENT)
Dept: INTERNAL MEDICINE CLINIC | Facility: CLINIC | Age: 62
End: 2022-08-19

## 2022-08-19 DIAGNOSIS — J43.9 PULMONARY EMPHYSEMA, UNSPECIFIED EMPHYSEMA TYPE (HCC): ICD-10-CM

## 2022-08-19 RX ORDER — METHYLPREDNISOLONE 4 MG/1
TABLET ORAL
Qty: 21 EACH | Refills: 0 | Status: SHIPPED | OUTPATIENT
Start: 2022-08-19 | End: 2022-09-06

## 2022-08-19 NOTE — TELEPHONE ENCOUNTER
As per Dr Payam Alvarez, "I sent in a steroid pack that Dr Katrin Amin had given before   If not improving, needs to be seen somewhere "    Spoke with the patient and advised him of this  He verbalized understanding

## 2022-08-19 NOTE — TELEPHONE ENCOUNTER
Patient is requesting prednisone for his breathing  He is having some difficulty  He does not want to go to ER  He is asking if a script could be sent to the pharmacy  He called his pulmonologist and he said they are not in today       Please advise        Call back #[phone]

## 2022-09-06 ENCOUNTER — OFFICE VISIT (OUTPATIENT)
Dept: INTERNAL MEDICINE CLINIC | Facility: CLINIC | Age: 62
End: 2022-09-06
Payer: COMMERCIAL

## 2022-09-06 VITALS
HEART RATE: 92 BPM | RESPIRATION RATE: 16 BRPM | OXYGEN SATURATION: 95 % | SYSTOLIC BLOOD PRESSURE: 108 MMHG | WEIGHT: 179 LBS | BODY MASS INDEX: 27.13 KG/M2 | HEIGHT: 68 IN | DIASTOLIC BLOOD PRESSURE: 70 MMHG

## 2022-09-06 DIAGNOSIS — Z72.0 TOBACCO ABUSE DISORDER: ICD-10-CM

## 2022-09-06 DIAGNOSIS — K13.79 MOUTH PAIN: ICD-10-CM

## 2022-09-06 DIAGNOSIS — Z12.2 SCREENING FOR LUNG CANCER: ICD-10-CM

## 2022-09-06 DIAGNOSIS — F17.210 CIGARETTE NICOTINE DEPENDENCE WITHOUT COMPLICATION: Primary | ICD-10-CM

## 2022-09-06 PROCEDURE — 99214 OFFICE O/P EST MOD 30 MIN: CPT | Performed by: INTERNAL MEDICINE

## 2022-09-06 NOTE — PROGRESS NOTES
Assessment/Plan:      Patient is here with mouth pain  Should be seen by ENT and the dentist      He has a CT neck pending for a soft tissue mass on his neck  Looks like a lipoma  CT lung screening also ordered  Quality Measures:  BMI Counseling: Body mass index is 27 22 kg/m²  The BMI is above normal  Nutrition recommendations include decreasing portion sizes and encouraging healthy choices of fruits and vegetables  Exercise recommendations include moderate physical activity 150 minutes/week  Rationale for BMI follow-up plan is due to patient being overweight or obese  Tobacco Cessation Counseling: Tobacco cessation counseling was provided  The patient is sincerely urged to quit consumption of tobacco  He is not ready to quit tobacco           No follow-ups on file  No problem-specific Assessment & Plan notes found for this encounter  Diagnoses and all orders for this visit:    Cigarette nicotine dependence without complication  -     CT lung screening program; Future    Tobacco abuse disorder    Screening for lung cancer  -     CT lung screening program; Future    Mouth pain  -     Ambulatory Referral to Otolaryngology; Future          Subjective:      Patient ID: Yanely Hyde is a 58 y o  male  Here with mouth pain        ALLERGIES:  No Known Allergies    CURRENT MEDICATIONS:    Current Outpatient Medications:     albuterol (PROVENTIL HFA,VENTOLIN HFA) 90 mcg/act inhaler, Inhale 2 puffs every 6 (six) hours as needed, Disp: , Rfl:     umeclidinium-vilanterol (Anoro Ellipta) 62 5-25 MCG/INH inhaler, Inhale 1 puff daily, Disp: , Rfl:     ACTIVE PROBLEM LIST:  Patient Active Problem List   Diagnosis    Chronic viral hepatitis B without delta agent and without coma (HCC)    COPD (chronic obstructive pulmonary disease) (HCC)    Tobacco abuse disorder    Acute exacerbation of chronic obstructive pulmonary disease (COPD) ([facility] [address]    Abdominal pain    Right inguinal hernia    Tobacco dependence due to cigarettes    Left lower quadrant abdominal pain       PAST MEDICAL HISTORY:  Past Medical History:   Diagnosis Date    COPD (chronic obstructive pulmonary disease) ([address] )     Diverticulitis of colon     Hernia of abdominal cavity     Liver cyst        PAST SURGICAL HISTORY:  History reviewed  No pertinent surgical history  FAMILY HISTORY:  History reviewed  No pertinent family history  SOCIAL HISTORY:  Social History     Socioeconomic History    Marital status: /Civil Union     Spouse name: Not on file    Number of children: Not on file    Years of education: Not on file    Highest education level: Not on file   Occupational History    Not on file   Tobacco Use    Smoking status: Current Every Day Smoker     Packs/day: 2 00     Years: 47 00     Pack years: 94 00    Smokeless tobacco: Never Used   Vaping Use    Vaping Use: Every day   Substance and Sexual Activity    Alcohol use: Not Currently    Drug use: No    Sexual activity: Yes   Other Topics Concern    Not on file   Social History Narrative    Not on file     Social Determinants of Health     Financial Resource Strain: Not on file   Food Insecurity: No Food Insecurity    Worried About Running Out of Food in the Last Year: Never true    Jose L of Food in the Last Year: Never true   Transportation Needs: No Transportation Needs    Lack of Transportation (Medical): No    Lack of Transportation (Non-Medical): No   Physical Activity: Not on file   Stress: Not on file   Social Connections: Not on file   Intimate Partner Violence: Not on file   Housing Stability: Low Risk     Unable to Pay for Housing in the Last Year: No    Number of Places Lived in the Last Year: 1    Unstable Housing in the Last Year: No       Review of Systems   HENT: Positive for dental problem  Respiratory: Positive for shortness of breath  All other systems reviewed and are negative          Objective:  Vitals:    09/06/22 0943   BP: 108/70   BP Location: Left arm   Patient Position: Sitting   Pulse: 92   Resp: 16   SpO2: 95%   Weight: 81 2 kg (179 lb)   Height: 5' 8" (1 727 m)     Body mass index is 27 22 kg/m²  Physical Exam  Vitals and nursing note reviewed  Constitutional:       Comments: disheleved   HENT:      Head: Normocephalic and atraumatic  Mouth/Throat:      Dentition: Abnormal dentition  Dental tenderness, dental caries, dental abscesses and gum lesions present  Neck:      Comments: Neck mass  Cardiovascular:      Rate and Rhythm: Normal rate  Pulmonary:      Effort: Pulmonary effort is normal       Breath sounds: Normal breath sounds  Musculoskeletal:         General: Normal range of motion  Cervical back: Normal range of motion  Skin:     General: Skin is warm and dry  Neurological:      General: No focal deficit present  Mental Status: He is alert and oriented to person, place, and time  Psychiatric:         Mood and Affect: Mood normal            RESULTS:    No results found for this or any previous visit (from the past 1008 hour(s))  This note was created with voice recognition software  Phonic, grammatical and spelling errors may be present within the note as a result

## 2022-09-13 ENCOUNTER — HOSPITAL ENCOUNTER (OUTPATIENT)
Dept: MRI IMAGING | Facility: HOSPITAL | Age: 62
Discharge: HOME | End: 2022-09-13
Attending: INTERNAL MEDICINE
Payer: COMMERCIAL

## 2022-09-13 ENCOUNTER — HOSPITAL ENCOUNTER (OUTPATIENT)
Dept: RADIOLOGY | Facility: HOSPITAL | Age: 62
End: 2022-09-13
Payer: COMMERCIAL

## 2022-09-13 ENCOUNTER — HOSPITAL ENCOUNTER (OUTPATIENT)
Dept: RADIOLOGY | Facility: HOSPITAL | Age: 62
Discharge: HOME | End: 2022-09-13

## 2022-09-13 DIAGNOSIS — K76.9 LIVER LESION: ICD-10-CM

## 2022-09-13 PROCEDURE — 74181 MRI ABDOMEN W/O CONTRAST: CPT

## 2022-09-13 PROCEDURE — G1004 CDSM NDSC: HCPCS

## 2022-09-16 ENCOUNTER — TELEPHONE (OUTPATIENT)
Dept: INTERNAL MEDICINE CLINIC | Facility: CLINIC | Age: 62
End: 2022-09-16

## 2022-09-19 ENCOUNTER — TELEMEDICINE (OUTPATIENT)
Dept: INTERNAL MEDICINE CLINIC | Facility: CLINIC | Age: 62
End: 2022-09-19
Payer: COMMERCIAL

## 2022-09-19 VITALS — BODY MASS INDEX: 27.22 KG/M2 | HEIGHT: 68 IN

## 2022-09-19 DIAGNOSIS — B18.1 CHRONIC VIRAL HEPATITIS B WITHOUT DELTA AGENT AND WITHOUT COMA (HCC): ICD-10-CM

## 2022-09-19 DIAGNOSIS — J43.9 PULMONARY EMPHYSEMA, UNSPECIFIED EMPHYSEMA TYPE (HCC): Primary | ICD-10-CM

## 2022-09-19 DIAGNOSIS — U07.1 COVID: ICD-10-CM

## 2022-09-19 PROCEDURE — 99214 OFFICE O/P EST MOD 30 MIN: CPT | Performed by: INTERNAL MEDICINE

## 2022-09-19 RX ORDER — NIRMATRELVIR AND RITONAVIR 300-100 MG
3 KIT ORAL 2 TIMES DAILY
Qty: 30 TABLET | Refills: 0 | Status: SHIPPED | OUTPATIENT
Start: 2022-09-19 | End: 2022-09-24

## 2022-09-19 RX ORDER — GUAIFENESIN 600 MG/1
1200 TABLET, EXTENDED RELEASE ORAL EVERY 12 HOURS SCHEDULED
Qty: 56 TABLET | Refills: 0 | Status: SHIPPED | OUTPATIENT
Start: 2022-09-19 | End: 2022-10-03

## 2022-09-19 NOTE — PROGRESS NOTES
COVID-19 Outpatient Progress Note    Assessment/Plan:    Problem List Items Addressed This Visit        Digestive    Chronic viral hepatitis B without delta agent and without coma (HCC)    Relevant Medications    nirmatrelvir & ritonavir (Paxlovid, 300/100,) tablet therapy pack       Respiratory    COPD (chronic obstructive pulmonary disease) (HCC) - Primary    Relevant Medications    guaiFENesin (MUCINEX) 600 mg 12 hr tablet      Other Visit Diagnoses     COVID        Relevant Medications    nirmatrelvir & ritonavir (Paxlovid, 300/100,) tablet therapy pack    guaiFENesin (MUCINEX) 600 mg 12 hr tablet         Disposition:     Patient has asymptomatic or mild COVID-19 infection  Based off CDC guidelines, they were recommended to isolate for 5 days  If they are asymptomatic or symptoms are improving with no fevers in the past 24 hours, isolation may be ended followed by 5 days of wearing a mask when around othes to minimize risk of infecting others  If still have a fever or other symptoms have not improved, continue to isolate until they improve  Regardless of when they end isolation, avoid being around people who are more likely to get very sick from COVID-19 until at least day 11  I have spent 10 minutes directly with the patient  Greater than 50% of this time was spent in counseling/coordination of care regarding: instructions for management         Encounter provider: Mounika Smith MD     Provider located at: [address] [place] [address] [address] [place] [id number] [phone]     Recent Visits  Date Type Provider Dept   09/16/22 Telephone Erin Jimenez [facility]   Showing recent visits within past 7 days and meeting all other requirements  Today's Visits  Date Type Provider Dept   09/19/22 Telemedicine Mounika Smith MD [facility]   Showing today's visits and meeting all other requirements  Future Appointments  No visits were found meeting these conditions  Showing future appointments within next 150 days and meeting all other requirements     This virtual check-in was done via [address] and patient was informed that this is a secure, HIPAA-compliant platform  He agrees to proceed  Patient agrees to participate in a virtual check in via telephone or video visit instead of presenting to the office to address urgent/immediate medical needs  Patient is aware this is a billable service  He acknowledged consent and understanding of privacy and security of the video platform  The patient has agreed to participate and understands they can discontinue the visit at any time  After connecting through [facility], the patient was identified by name and date of birth  Jarrod Gonzalez III was informed that this was a telemedicine visit and that the exam was being conducted confidentially over secure lines  My office door was closed  No one else was in the room  Jarrod Gonzalez III acknowledged consent and understanding of privacy and security of the telemedicine visit  I informed the patient that I have reviewed his record in Epic and presented the opportunity for him to ask any questions regarding the visit today  The patient agreed to participate  Verification of patient location:  Patient is located in the following state in which I hold an active license: [place]    Subjective:   Jarrod Gonzalez III is a 58 y o  male who has been screened for COVID-19  Symptom change since last report: worsening  Patient's symptoms include chills, nasal congestion, rhinorrhea, cough and headache      - Date of symptom onset: 9/15/2022  - Date of positive COVID-19 test: 9/18/2022  Type of test: Home antigen  Patient with typical symptoms of COVID-19 and they attest that they were positive on home rapid antigen testing  Image of positive result is not able to be uploaded into their chart       COVID-19 vaccination status: Not vaccinated    Gerson Britton has been staying home and has isolated themselves in his home  He is taking care to not share personal items and is cleaning all surfaces that are touched often, like counters, tabletops, and doorknobs using household cleaning sprays or wipes  He is wearing a mask when he leaves his room  Lab Results   Component Value Date    SARSCOV2 Negative 12/23/2021       Review of Systems   Constitutional: Positive for chills  HENT: Positive for congestion and rhinorrhea  Respiratory: Positive for cough  Neurological: Positive for headaches  All other systems reviewed and are negative  Current Outpatient Medications on File Prior to Visit   Medication Sig    albuterol (PROVENTIL HFA,VENTOLIN HFA) 90 mcg/act inhaler Inhale 2 puffs every 6 (six) hours as needed    umeclidinium-vilanterol (Anoro Ellipta) 62 5-25 MCG/INH inhaler Inhale 1 puff daily       Objective:    Ht 5' 8" (1 727 m)   BMI 27 22 kg/m²      Physical Exam  Vitals and nursing note reviewed  Constitutional:       Appearance: He is ill-appearing  Pulmonary:      Effort: Pulmonary effort is normal    Neurological:      Mental Status: He is alert and oriented to person, place, and time     Psychiatric:         Mood and Affect: Mood normal        Tiffany Martinez MD

## 2022-10-13 ENCOUNTER — TELEPHONE (OUTPATIENT)
Dept: INTERNAL MEDICINE CLINIC | Facility: CLINIC | Age: 62
End: 2022-10-13

## 2022-10-13 NOTE — TELEPHONE ENCOUNTER
LMOM advising patient that the form was completed and faxed  Pt also advised that this is a one time courtesy

## 2022-10-13 NOTE — TELEPHONE ENCOUNTER
Okay to do this as a 1 time courtesy  Patient needs to contact the electric company in figure out some type of payment method so this does not happen again
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
## 2022-11-01 ENCOUNTER — OFFICE VISIT (OUTPATIENT)
Dept: INTERNAL MEDICINE CLINIC | Facility: CLINIC | Age: 62
End: 2022-11-01

## 2022-11-01 VITALS
SYSTOLIC BLOOD PRESSURE: 114 MMHG | RESPIRATION RATE: 18 BRPM | DIASTOLIC BLOOD PRESSURE: 76 MMHG | HEART RATE: 95 BPM | TEMPERATURE: 96.7 F | HEIGHT: 68 IN | BODY MASS INDEX: 27.25 KG/M2 | OXYGEN SATURATION: 92 % | WEIGHT: 179.8 LBS

## 2022-11-01 DIAGNOSIS — K40.90 RIGHT INGUINAL HERNIA: ICD-10-CM

## 2022-11-01 DIAGNOSIS — R22.0 LUMP OF SCALP: ICD-10-CM

## 2022-11-01 DIAGNOSIS — I10 PRIMARY HYPERTENSION: ICD-10-CM

## 2022-11-01 DIAGNOSIS — R73.01 ABNORMAL FASTING GLUCOSE: ICD-10-CM

## 2022-11-01 DIAGNOSIS — F17.210 CIGARETTE NICOTINE DEPENDENCE WITHOUT COMPLICATION: Primary | ICD-10-CM

## 2022-11-01 DIAGNOSIS — N52.9 ERECTILE DYSFUNCTION, UNSPECIFIED ERECTILE DYSFUNCTION TYPE: ICD-10-CM

## 2022-11-01 DIAGNOSIS — Z72.0 TOBACCO ABUSE DISORDER: ICD-10-CM

## 2022-11-01 DIAGNOSIS — J43.9 PULMONARY EMPHYSEMA, UNSPECIFIED EMPHYSEMA TYPE (HCC): ICD-10-CM

## 2022-11-01 PROBLEM — J44.1 ACUTE EXACERBATION OF CHRONIC OBSTRUCTIVE PULMONARY DISEASE (COPD) (HCC): Status: RESOLVED | Noted: 2021-12-24 | Resolved: 2022-11-01

## 2022-11-01 PROBLEM — R10.32 LEFT LOWER QUADRANT ABDOMINAL PAIN: Status: RESOLVED | Noted: 2022-07-18 | Resolved: 2022-11-01

## 2022-11-01 RX ORDER — METHYLPREDNISOLONE 4 MG/1
TABLET ORAL
Qty: 21 EACH | Refills: 0 | Status: SHIPPED | OUTPATIENT
Start: 2022-11-01 | End: 2022-11-01

## 2022-11-01 RX ORDER — PREDNISONE 20 MG/1
40 TABLET ORAL DAILY
Qty: 10 TABLET | Refills: 0 | Status: SHIPPED | OUTPATIENT
Start: 2022-11-01 | End: 2022-11-06

## 2022-11-01 RX ORDER — ALBUTEROL SULFATE 90 UG/1
2 AEROSOL, METERED RESPIRATORY (INHALATION) EVERY 4 HOURS PRN
Qty: 18 G | Refills: 3 | Status: SHIPPED | OUTPATIENT
Start: 2022-11-01

## 2022-11-01 RX ORDER — BUPROPION HYDROCHLORIDE 150 MG/1
1 TABLET ORAL EVERY MORNING
COMMUNITY
Start: 2022-10-10
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
## 2022-12-09 ENCOUNTER — TELEPHONE (OUTPATIENT)
Dept: INTERNAL MEDICINE CLINIC | Facility: CLINIC | Age: 62
End: 2022-12-09

## 2022-12-09 DIAGNOSIS — J43.9 PULMONARY EMPHYSEMA, UNSPECIFIED EMPHYSEMA TYPE (HCC): ICD-10-CM

## 2022-12-09 DIAGNOSIS — Z72.0 TOBACCO ABUSE DISORDER: Primary | ICD-10-CM

## 2022-12-09 RX ORDER — PREDNISONE 20 MG/1
40 TABLET ORAL DAILY
Qty: 10 TABLET | Refills: 0 | Status: SHIPPED | OUTPATIENT
Start: 2022-12-09 | End: 2022-12-14 | Stop reason: SDUPTHER

## 2022-12-09 NOTE — TELEPHONE ENCOUNTER
Patient is asking if you can give him another script for prednisone like you did the last time  Patient said that you gave him 20 mg tablet-take 2 tablets by mouth every day for 5 days       Patient said that he is having difficulty breathing    [facility]

## 2022-12-14 ENCOUNTER — DOCUMENTATION (OUTPATIENT)
Dept: INTERNAL MEDICINE CLINIC | Facility: CLINIC | Age: 62
End: 2022-12-14

## 2022-12-14 DIAGNOSIS — J43.2 CENTRILOBULAR EMPHYSEMA (HCC): ICD-10-CM

## 2022-12-14 DIAGNOSIS — J43.9 PULMONARY EMPHYSEMA, UNSPECIFIED EMPHYSEMA TYPE (HCC): Primary | ICD-10-CM

## 2022-12-14 RX ORDER — LEVOFLOXACIN 500 MG/1
500 TABLET, FILM COATED ORAL EVERY 24 HOURS
Qty: 7 TABLET | Refills: 0 | Status: SHIPPED | OUTPATIENT
Start: 2022-12-14 | End: 2022-12-21

## 2022-12-14 RX ORDER — PREDNISONE 20 MG/1
40 TABLET ORAL DAILY
Qty: 10 TABLET | Refills: 0 | Status: SHIPPED | OUTPATIENT
Start: 2022-12-14 | End: 2022-12-19

## 2022-12-15 ENCOUNTER — OFFICE VISIT (OUTPATIENT)
Dept: INTERNAL MEDICINE CLINIC | Facility: CLINIC | Age: 62
End: 2022-12-15

## 2022-12-15 ENCOUNTER — HOSPITAL ENCOUNTER (OUTPATIENT)
Dept: RADIOLOGY | Facility: HOSPITAL | Age: 62
End: 2022-12-15

## 2022-12-15 VITALS
BODY MASS INDEX: 27.89 KG/M2 | HEART RATE: 93 BPM | WEIGHT: 184 LBS | HEIGHT: 68 IN | RESPIRATION RATE: 18 BRPM | TEMPERATURE: 96.9 F | DIASTOLIC BLOOD PRESSURE: 84 MMHG | OXYGEN SATURATION: 96 % | SYSTOLIC BLOOD PRESSURE: 138 MMHG

## 2022-12-15 DIAGNOSIS — J43.9 PULMONARY EMPHYSEMA, UNSPECIFIED EMPHYSEMA TYPE (HCC): Primary | ICD-10-CM

## 2022-12-15 DIAGNOSIS — I10 PRIMARY HYPERTENSION: ICD-10-CM

## 2022-12-15 DIAGNOSIS — R06.02 SOB (SHORTNESS OF BREATH): ICD-10-CM

## 2022-12-15 DIAGNOSIS — Z72.0 TOBACCO ABUSE DISORDER: ICD-10-CM

## 2022-12-15 DIAGNOSIS — J43.9 PULMONARY EMPHYSEMA, UNSPECIFIED EMPHYSEMA TYPE (HCC): ICD-10-CM

## 2022-12-15 RX ORDER — NICOTINE 21 MG/24HR
PATCH, TRANSDERMAL 24 HOURS TRANSDERMAL
COMMUNITY
Start: 2022-12-06

## 2022-12-15 RX ORDER — MONTELUKAST SODIUM 10 MG/1
10 TABLET ORAL
Qty: 30 TABLET | Refills: 5 | Status: SHIPPED | OUTPATIENT
Start: 2022-12-15

## 2022-12-15 NOTE — PROGRESS NOTES
Assessment/Plan:     Patient is here with c/o sob and cough; was seen yesterday in the parking lot and I sent him steroids and ABT  Lungs sound anibal; concern for PNA; will obtain STAT CXR  Quality Measures:     BMI Counseling: Body mass index is 27 34 kg/m²  The BMI is above normal  Nutrition recommendations include decreasing portion sizes and encouraging healthy choices of fruits and vegetables  Rationale for BMI follow-up plan is due to patient being overweight or obese  Depression Screening and Follow-up Plan: Patient was screened for depression during today's encounter  They screened negative with a PHQ-2 score of 1  Tobacco Cessation Counseling: Tobacco cessation counseling was provided  The patient is sincerely urged to quit consumption of tobacco  He is not ready to quit tobacco           Return for Next scheduled follow up  No problem-specific Assessment & Plan notes found for this encounter  Diagnoses and all orders for this visit:    Pulmonary emphysema, unspecified emphysema type ([address] )  -     XR chest pa & lateral; Future  -     montelukast (SINGULAIR) 10 mg tablet; Take 1 tablet (10 mg total) by mouth daily at bedtime    Tobacco abuse disorder    Primary hypertension    SOB (shortness of breath)  -     XR chest pa & lateral; Future    Other orders  -     nicotine (NICODERM CQ) 21 mg/24 hr TD 24 hr patch          Subjective:      Patient ID: Nichelle Calvert is a 58 y o  male  Here with sick complaints        ALLERGIES:  No Known Allergies    CURRENT MEDICATIONS:    Current Outpatient Medications:   •  albuterol (PROVENTIL HFA,VENTOLIN HFA) 90 mcg/act inhaler, Inhale 2 puffs every 4 (four) hours as needed for wheezing, Disp: 18 g, Rfl: 3  •  buPROPion (WELLBUTRIN XL) 150 mg 24 hr tablet, Take 1 tablet by mouth every morning, Disp: , Rfl:   •  levofloxacin (LEVAQUIN) 500 mg tablet, Take 1 tablet (500 mg total) by mouth every 24 hours for 7 days, Disp: 7 tablet, Rfl: 0  • montelukast (SINGULAIR) 10 mg tablet, Take 1 tablet (10 mg total) by mouth daily at bedtime, Disp: 30 tablet, Rfl: 5  •  nicotine (NICODERM CQ) 21 mg/24 hr TD 24 hr patch, , Disp: , Rfl:   •  predniSONE 20 mg tablet, Take 2 tablets (40 mg total) by mouth daily for 5 days, Disp: 10 tablet, Rfl: 0  •  umeclidinium-vilanterol (Anoro Ellipta) 62 5-25 MCG/INH inhaler, Inhale 1 puff daily, Disp: , Rfl:     ACTIVE PROBLEM LIST:  Patient Active Problem List   Diagnosis   • Chronic viral hepatitis B without delta agent and without coma (HCC)   • COPD (chronic obstructive pulmonary disease) (HCC)   • Tobacco abuse disorder   • Abdominal pain   • Right inguinal hernia   • Tobacco dependence due to cigarettes       PAST MEDICAL HISTORY:  Past Medical History:   Diagnosis Date   • COPD (chronic obstructive pulmonary disease) ([facility] )    • Diverticulitis of colon    • Hernia of abdominal cavity    • Liver cyst        PAST SURGICAL HISTORY:  History reviewed  No pertinent surgical history  FAMILY HISTORY:  History reviewed  No pertinent family history      SOCIAL HISTORY:  Social History     Socioeconomic History   • Marital status: /Civil Union     Spouse name: Not on file   • Number of children: Not on file   • Years of education: Not on file   • Highest education level: Not on file   Occupational History   • Not on file   Tobacco Use   • Smoking status: Every Day     Packs/day: 2 00     Years: 47 00     Pack years: 94 00     Types: Cigarettes   • Smokeless tobacco: Never   Vaping Use   • Vaping Use: Every day   Substance and Sexual Activity   • Alcohol use: Not Currently   • Drug use: No   • Sexual activity: Yes   Other Topics Concern   • Not on file   Social History Narrative   • Not on file     Social Determinants of Health     Financial Resource Strain: Not on file   Food Insecurity: No Food Insecurity   • Worried About Running Out of Food in the Last Year: Never true   • Ran Out of Food in the Last Year: Never true Transportation Needs: No Transportation Needs   • Lack of Transportation (Medical): No   • Lack of Transportation (Non-Medical): No   Physical Activity: Not on file   Stress: Not on file   Social Connections: Not on file   Intimate Partner Violence: Not on file   Housing Stability: Low Risk    • Unable to Pay for Housing in the Last Year: No   • Number of Places Lived in the Last Year: 1   • Unstable Housing in the Last Year: No       Review of Systems   Respiratory: Positive for cough, shortness of breath and wheezing  All other systems reviewed and are negative  Objective:  Vitals:    12/15/22 1027   BP: 138/84   BP Location: Left arm   Patient Position: Sitting   Pulse: 93   Resp: 18   Temp: (!) 96 9 °F (36 1 °C)   TempSrc: Tympanic   SpO2: 96%   Weight: 83 5 kg (184 lb)   Height: 5' 8" (1 727 m)     Body mass index is 27 98 kg/m²  Physical Exam  Vitals and nursing note reviewed  Constitutional:       Appearance: Normal appearance  HENT:      Head: Normocephalic and atraumatic  Cardiovascular:      Rate and Rhythm: Normal rate  Heart sounds: Normal heart sounds  Pulmonary:      Effort: Pulmonary effort is normal       Breath sounds: Rhonchi present  Musculoskeletal:         General: Normal range of motion  Cervical back: Normal range of motion  Comments: Fingernail clubbing b/l hands   Skin:     General: Skin is warm  Neurological:      General: No focal deficit present  Mental Status: He is alert and oriented to person, place, and time  Mental status is at baseline  Psychiatric:         Mood and Affect: Mood normal            RESULTS:    No results found for this or any previous visit (from the past 1008 hour(s))  This note was created with voice recognition software  Phonic, grammatical and spelling errors may be present within the note as a result

## 2022-12-18 ENCOUNTER — VBI (OUTPATIENT)
Dept: ADMINISTRATIVE | Facility: OTHER | Age: 62
End: 2022-12-18

## 2022-12-28 ENCOUNTER — OFFICE VISIT (OUTPATIENT)
Dept: INTERNAL MEDICINE CLINIC | Facility: CLINIC | Age: 62
End: 2022-12-28

## 2022-12-28 VITALS
DIASTOLIC BLOOD PRESSURE: 66 MMHG | SYSTOLIC BLOOD PRESSURE: 122 MMHG | RESPIRATION RATE: 16 BRPM | HEIGHT: 68 IN | BODY MASS INDEX: 27.01 KG/M2 | OXYGEN SATURATION: 95 % | WEIGHT: 178.2 LBS | TEMPERATURE: 97.3 F | HEART RATE: 92 BPM

## 2022-12-28 DIAGNOSIS — K57.92 DIVERTICULITIS: ICD-10-CM

## 2022-12-28 DIAGNOSIS — Z72.0 TOBACCO ABUSE DISORDER: Primary | ICD-10-CM

## 2022-12-28 RX ORDER — AMOXICILLIN AND CLAVULANATE POTASSIUM 875; 125 MG/1; MG/1
1 TABLET, FILM COATED ORAL EVERY 12 HOURS SCHEDULED
Qty: 20 TABLET | Refills: 0 | Status: SHIPPED | OUTPATIENT
Start: 2022-12-28 | End: 2023-01-07

## 2022-12-28 NOTE — PROGRESS NOTES
Assessment/Plan:     Here with abdominal pain since christmas; pain is to LLQ and feels similar to when he had diverticulitis; reports some diarrhea alternating with constipation; has not followed up with GI; will report to the ER if he develops worsening pain and or fever  He was given the number for GI  Quality Measures:     BMI Counseling: Body mass index is 27 98 kg/m²  The BMI is above normal  Nutrition recommendations include decreasing portion sizes and encouraging healthy choices of fruits and vegetables  Exercise recommendations include moderate physical activity 150 minutes/week  Rationale for BMI follow-up plan is due to patient being overweight or obese  Depression Screening and Follow-up Plan: Patient was screened for depression during today's encounter  They screened negative with a PHQ-2 score of 2  Tobacco Cessation Counseling: Tobacco cessation counseling was provided  The patient is sincerely urged to quit consumption of tobacco  He is not ready to quit tobacco           Return for Next scheduled follow up  No problem-specific Assessment & Plan notes found for this encounter  Diagnoses and all orders for this visit:    Tobacco abuse disorder    Diverticulitis  -     amoxicillin-clavulanate (Augmentin) 875-125 mg per tablet; Take 1 tablet by mouth every 12 (twelve) hours for 10 days          Subjective:      Patient ID: Alessandra Altman is a 58 y o  male  Here with abdominal pain        ALLERGIES:  No Known Allergies    CURRENT MEDICATIONS:    Current Outpatient Medications:   •  albuterol (PROVENTIL HFA,VENTOLIN HFA) 90 mcg/act inhaler, Inhale 2 puffs every 4 (four) hours as needed for wheezing, Disp: 18 g, Rfl: 3  •  amoxicillin-clavulanate (Augmentin) 875-125 mg per tablet, Take 1 tablet by mouth every 12 (twelve) hours for 10 days, Disp: 20 tablet, Rfl: 0  •  buPROPion (WELLBUTRIN XL) 150 mg 24 hr tablet, Take 1 tablet by mouth every morning, Disp: , Rfl:   •  montelukast (SINGULAIR) 10 mg tablet, Take 1 tablet (10 mg total) by mouth daily at bedtime, Disp: 30 tablet, Rfl: 5  •  nicotine (NICODERM CQ) 21 mg/24 hr TD 24 hr patch, , Disp: , Rfl:   •  umeclidinium-vilanterol (Anoro Ellipta) 62 5-25 MCG/INH inhaler, Inhale 1 puff daily, Disp: , Rfl:     ACTIVE PROBLEM LIST:  Patient Active Problem List   Diagnosis   • Chronic viral hepatitis B without delta agent and without coma (HCC)   • COPD (chronic obstructive pulmonary disease) (HCC)   • Tobacco abuse disorder   • Abdominal pain   • Right inguinal hernia   • Tobacco dependence due to cigarettes       PAST MEDICAL HISTORY:  Past Medical History:   Diagnosis Date   • COPD (chronic obstructive pulmonary disease) ([facility] )    • Diverticulitis of colon    • Hernia of abdominal cavity    • Liver cyst        PAST SURGICAL HISTORY:  History reviewed  No pertinent surgical history  FAMILY HISTORY:  History reviewed  No pertinent family history      SOCIAL HISTORY:  Social History     Socioeconomic History   • Marital status: /Civil Union     Spouse name: Not on file   • Number of children: Not on file   • Years of education: Not on file   • Highest education level: Not on file   Occupational History   • Not on file   Tobacco Use   • Smoking status: Every Day     Packs/day: 2 00     Years: 47 00     Pack years: 94 00     Types: Cigarettes   • Smokeless tobacco: Never   Vaping Use   • Vaping Use: Every day   Substance and Sexual Activity   • Alcohol use: Not Currently   • Drug use: No   • Sexual activity: Yes   Other Topics Concern   • Not on file   Social History Narrative   • Not on file     Social Determinants of Health     Financial Resource Strain: Not on file   Food Insecurity: Not on file   Transportation Needs: Not on file   Physical Activity: Not on file   Stress: Not on file   Social Connections: Not on file   Intimate Partner Violence: Not on file   Housing Stability: Not on file       Review of Systems   Respiratory: Positive for shortness of breath  Gastrointestinal: Positive for abdominal pain, constipation and diarrhea  All other systems reviewed and are negative  Objective:  Vitals:    12/28/22 0857   BP: 122/66   BP Location: Left arm   Patient Position: Sitting   Cuff Size: Adult   Pulse: 92   Resp: 16   Temp: (!) 97 3 °F (36 3 °C)   TempSrc: Tympanic   SpO2: 95%   Weight: 80 8 kg (178 lb 3 2 oz)   Height: 5' 8" (1 727 m)     Body mass index is 27 1 kg/m²  Physical Exam  Vitals and nursing note reviewed  Constitutional:       Appearance: He is well-developed  HENT:      Head: Normocephalic and atraumatic  Cardiovascular:      Rate and Rhythm: Normal rate  Pulmonary:      Effort: Pulmonary effort is normal       Breath sounds: Normal breath sounds  Abdominal:      General: Bowel sounds are increased  Palpations: Abdomen is soft  Tenderness: There is abdominal tenderness in the left lower quadrant  Skin:     General: Skin is warm  Neurological:      General: No focal deficit present  Mental Status: He is alert  RESULTS:    No results found for this or any previous visit (from the past 1008 hour(s))  This note was created with voice recognition software  Phonic, grammatical and spelling errors may be present within the note as a result

## 2023-01-03 ENCOUNTER — PREP FOR PROCEDURE (OUTPATIENT)
Dept: GASTROENTEROLOGY | Facility: CLINIC | Age: 63
End: 2023-01-03

## 2023-01-03 ENCOUNTER — OFFICE VISIT (OUTPATIENT)
Dept: UROLOGY | Facility: CLINIC | Age: 63
End: 2023-01-03

## 2023-01-03 VITALS
DIASTOLIC BLOOD PRESSURE: 84 MMHG | HEIGHT: 68 IN | OXYGEN SATURATION: 99 % | BODY MASS INDEX: 26.52 KG/M2 | SYSTOLIC BLOOD PRESSURE: 138 MMHG | HEART RATE: 84 BPM | WEIGHT: 175 LBS

## 2023-01-03 DIAGNOSIS — Z12.5 SCREENING FOR PROSTATE CANCER: Primary | ICD-10-CM

## 2023-01-03 DIAGNOSIS — Z12.11 COLON CANCER SCREENING: Primary | ICD-10-CM

## 2023-01-03 DIAGNOSIS — N52.9 ERECTILE DYSFUNCTION, UNSPECIFIED ERECTILE DYSFUNCTION TYPE: ICD-10-CM

## 2023-01-03 DIAGNOSIS — N52.9 ERECTILE DYSFUNCTION, UNSPECIFIED ERECTILE DYSFUNCTION TYPE: Primary | ICD-10-CM

## 2023-01-03 RX ORDER — SILDENAFIL 50 MG/1
50 TABLET, FILM COATED ORAL DAILY PRN
Qty: 10 TABLET | Refills: 0 | Status: SHIPPED | OUTPATIENT
Start: 2023-01-03

## 2023-01-03 RX ORDER — TADALAFIL 10 MG/1
TABLET ORAL
Qty: 10 TABLET | Refills: 0 | Status: SHIPPED | OUTPATIENT
Start: 2023-01-03 | End: 2023-01-03

## 2023-01-03 NOTE — PROGRESS NOTES
1/3/2023      Chief Complaint   Patient presents with   • Erectile Dysfunction         Assessment and Plan    58 y o  male -- New patient    1  Erectile dysfunction  2  Tobacco dependence disorder  3  COPD  - Discussed conservative measures with tobacco cessation, diet, exercise  - Discussed addition of PDE5i  Medication and side effects reviewed  Prescription for Cialis sent to pharmacy  - Denies chest pain or use of nitroglycerin  - Follow up in 3 months  - Call with any questions or concerns  - All questions answered; patient understands and agrees with plan    4  Routine prostate cancer screening  - PSA order in place, no PSA on file for review  - YULIA today deferred  - PSA in near future      History of Present Illness  Aldona Lundborg III is a 58 y o  male new patient here for evaluation of erectile dysfunction  Patient states he has had difficulties with achieving and maintaining erections for quite some time  Denies prior use of PDE 5 inhibitors  Denies chest pain or use of nitroglycerin  Patient has longstanding history of tobacco abuse  Patient also has COPD  Denies urinary symptoms  Denies gross hematuria, dysuria, flank pain, suprapubic pressure, fever, chills, nausea, vomiting, weight loss, bone pain  Denies prior prostate cancer screening  No prior PSA on file for review  Denies family history of  malignancies  Denies seeing urology in the past      Review of Systems   Constitutional: Negative for activity change, appetite change, chills and fever  HENT: Negative for congestion and trouble swallowing  Respiratory: Negative for cough and shortness of breath  Cardiovascular: Negative for chest pain, palpitations and leg swelling  Gastrointestinal: Negative for abdominal pain, constipation, diarrhea, nausea and vomiting  Genitourinary: Negative for difficulty urinating, dysuria, flank pain, frequency, hematuria and urgency     Musculoskeletal: Negative for back pain and gait problem  Skin: Negative for wound  Allergic/Immunologic: Negative for immunocompromised state  Neurological: Negative for dizziness and syncope  Hematological: Does not bruise/bleed easily  Psychiatric/Behavioral: Negative for confusion  All other systems reviewed and are negative  Vitals  Vitals:    01/03/23 1421   BP: 138/84   BP Location: Left arm   Patient Position: Sitting   Cuff Size: Adult   Pulse: 84   SpO2: 99%   Weight: 79 4 kg (175 lb)   Height: 5' 8" (1 727 m)       Physical Exam  Constitutional:       General: He is not in acute distress  Appearance: Normal appearance  He is not ill-appearing, toxic-appearing or diaphoretic  HENT:      Head: Normocephalic  Nose: No congestion  Eyes:      General: No scleral icterus  Right eye: No discharge  Left eye: No discharge  Conjunctiva/sclera: Conjunctivae normal       Pupils: Pupils are equal, round, and reactive to light  Pulmonary:      Effort: Pulmonary effort is normal    Musculoskeletal:      Cervical back: Normal range of motion  Skin:     General: Skin is warm and dry  Coloration: Skin is not jaundiced or pale  Findings: No bruising, erythema, lesion or rash  Neurological:      General: No focal deficit present  Mental Status: He is alert and oriented to person, place, and time  Mental status is at baseline  Gait: Gait normal    Psychiatric:         Mood and Affect: Mood normal          Behavior: Behavior normal          Thought Content:  Thought content normal          Judgment: Judgment normal            Past History  Past Medical History:   Diagnosis Date   • COPD (chronic obstructive pulmonary disease) (HCC)    • Diverticulitis of colon    • Erectile dysfunction    • Hernia of abdominal cavity    • Liver cyst      Social History     Socioeconomic History   • Marital status: /Civil Union     Spouse name: None   • Number of children: None   • Years of education: None   • Highest education level: None   Occupational History   • None   Tobacco Use   • Smoking status: Every Day     Packs/day: 1 50     Types: Cigarettes   • Smokeless tobacco: Never   Vaping Use   • Vaping Use: Never used   Substance and Sexual Activity   • Alcohol use: Not Currently   • Drug use: No   • Sexual activity: Yes   Other Topics Concern   • None   Social History Narrative   • None     Social Determinants of Health     Financial Resource Strain: Not on file   Food Insecurity: Not on file   Transportation Needs: Not on file   Physical Activity: Not on file   Stress: Not on file   Social Connections: Not on file   Intimate Partner Violence: Not on file   Housing Stability: Not on file     Social History     Tobacco Use   Smoking Status Every Day   • Packs/day: 1 50   • Types: Cigarettes   Smokeless Tobacco Never     History reviewed  No pertinent family history  The following portions of the patient's history were reviewed and updated as appropriate: allergies, current medications, past medical history, past social history, past surgical history and problem list     Results  No results found for this or any previous visit (from the past 1 hour(s))  ]  No results found for: PSA  Lab Results   Component Value Date    CALCIUM 8 7 12/26/2021    K 4 4 12/26/2021    CO2 28 12/26/2021     12/26/2021    BUN 32 (H) 12/26/2021    CREATININE 1 18 12/26/2021     Lab Results   Component Value Date    WBC 16 59 (H) 12/26/2021    HGB 15 8 12/26/2021    HCT 46 8 12/26/2021    MCV 90 12/26/2021     12/26/2021       Anika Travis PA-C

## 2023-01-06 ENCOUNTER — TELEPHONE (OUTPATIENT)
Dept: INTERNAL MEDICINE CLINIC | Facility: CLINIC | Age: 63
End: 2023-01-06

## 2023-01-06 NOTE — TELEPHONE ENCOUNTER
Pt called on voicemail asking if we could prescribe him prednisone?      Didn't leave a reason as to why

## 2023-02-14 ENCOUNTER — TELEPHONE (OUTPATIENT)
Dept: INTERNAL MEDICINE CLINIC | Facility: CLINIC | Age: 63
End: 2023-02-14

## 2023-02-14 DIAGNOSIS — J43.9 PULMONARY EMPHYSEMA, UNSPECIFIED EMPHYSEMA TYPE (HCC): Primary | ICD-10-CM

## 2023-02-14 RX ORDER — METHYLPREDNISOLONE 4 MG/1
TABLET ORAL
Qty: 21 EACH | Refills: 0 | Status: SHIPPED | OUTPATIENT
Start: 2023-02-14 | End: 2023-03-23 | Stop reason: ALTCHOICE

## 2023-02-14 NOTE — TELEPHONE ENCOUNTER
This is Triny Kateryna for doctor Gary Hunter  I really need a prescription for Prednisone for my cough  [phone]

## 2023-02-23 ENCOUNTER — TELEPHONE (OUTPATIENT)
Dept: INTERNAL MEDICINE CLINIC | Facility: CLINIC | Age: 63
End: 2023-02-23

## 2023-02-23 NOTE — TELEPHONE ENCOUNTER
Patient presented to the office today with complaints of discomfort under his diaphragm, lower abdominal pain, discolored stools, and left hand pain  I assessed the patient as best as I could at the check in window  I reviewed cardiac related signs/symptoms (lightheadedness, headaches, blurry vision, left arm pain, palpitations, jaw/neck pain) all to which he denied experiencing  His pain seemed more like a burning pain versus dull  Given his history, I informed him that it could, but not definitively, be an issue with his gallbladder  He informed me that this was told to him in the past as well  He complained of some discolored stools  Unrelated to the other matter, he complained of some left hand pain/tingling, a potential cyst on his wrist, but states that the pain was not radiating up his arm  I explained that whether it is something potentially cardiac or gallbladder related, he should go to the ED to be assessed  I informed him that they can perform stat labs and imaging to check everything out  The patient did not care to go to the ED and verbalized that he was just going to head back home  I advised him that since he was already out, he should head to the hospital and be on the safe side regarding the symptoms he was having  Patient declined and left the office

## 2023-03-06 ENCOUNTER — TELEPHONE (OUTPATIENT)
Dept: INTERNAL MEDICINE CLINIC | Facility: CLINIC | Age: 63
End: 2023-03-06

## 2023-03-07 NOTE — TELEPHONE ENCOUNTER
Uche Casillas MD  [facility] 14 hours ago (4:26 PM)     Advanced emphysema r/t smoking; No nodules; repeat 1 year

## 2023-03-23 ENCOUNTER — OFFICE VISIT (OUTPATIENT)
Dept: INTERNAL MEDICINE CLINIC | Facility: CLINIC | Age: 63
End: 2023-03-23

## 2023-03-23 VITALS
HEIGHT: 68 IN | DIASTOLIC BLOOD PRESSURE: 66 MMHG | SYSTOLIC BLOOD PRESSURE: 124 MMHG | TEMPERATURE: 98 F | HEART RATE: 92 BPM | OXYGEN SATURATION: 96 % | WEIGHT: 176 LBS | BODY MASS INDEX: 26.67 KG/M2

## 2023-03-23 DIAGNOSIS — K57.92 DIVERTICULITIS: ICD-10-CM

## 2023-03-23 DIAGNOSIS — Z72.0 TOBACCO ABUSE DISORDER: ICD-10-CM

## 2023-03-23 DIAGNOSIS — J43.9 PULMONARY EMPHYSEMA, UNSPECIFIED EMPHYSEMA TYPE (HCC): Primary | ICD-10-CM

## 2023-03-23 DIAGNOSIS — R10.32 LEFT LOWER QUADRANT ABDOMINAL PAIN: ICD-10-CM

## 2023-03-23 DIAGNOSIS — I10 PRIMARY HYPERTENSION: ICD-10-CM

## 2023-03-23 NOTE — PROGRESS NOTES
Assessment/Plan:      Jocelin Hardwick is here with LLQ pain x months; h/o diverticulitis; reports pain is continuous, will radiate to mid abdomen, feels like he is being hit in the stomach with a bat, associated with some diarrhea and nausea; right now, it is tolerable; has a couple of hernias needing repair; has colonoscopy scheduled for 3/27;    Labs and imaging ordered;    Still smoking;     Quality Measures:     BMI Counseling: Body mass index is 26 76 kg/m²  The BMI is above normal  Nutrition recommendations include decreasing portion sizes and encouraging healthy choices of fruits and vegetables  Exercise recommendations include moderate physical activity 150 minutes/week  Rationale for BMI follow-up plan is due to patient being overweight or obese  Depression Screening and Follow-up Plan: Clincally patient does not have depression  No treatment is required  Tobacco Cessation Counseling: Tobacco cessation counseling was provided  The patient is sincerely urged to quit consumption of tobacco  He is not ready to quit tobacco           Return in about 3 months (around 6/23/2023)  No problem-specific Assessment & Plan notes found for this encounter  Diagnoses and all orders for this visit:    Pulmonary emphysema, unspecified emphysema type ([facility] [address] )    Tobacco abuse disorder    Primary hypertension    Diverticulitis    Left lower quadrant abdominal pain  -     CBC and differential; Future  -     Comprehensive metabolic panel; Future  -     Urinalysis, Screen  -     CT abdomen pelvis w contrast; Future          Subjective:      Patient ID: Daylin Smith is a 58 y o  male  Here with abdominal pain        ALLERGIES:  No Known Allergies    CURRENT MEDICATIONS:    Current Outpatient Medications:   •  albuterol (PROVENTIL HFA,VENTOLIN HFA) 90 mcg/act inhaler, Inhale 2 puffs every 4 (four) hours as needed for wheezing, Disp: 18 g, Rfl: 3  •  nicotine (NICODERM CQ) 21 mg/24 hr TD 24 hr patch, , Disp: , Rfl: •  sildenafil (VIAGRA) 50 MG tablet, Take 1 tablet (50 mg total) by mouth daily as needed for erectile dysfunction, Disp: 10 tablet, Rfl: 0  •  umeclidinium-vilanterol (Anoro Ellipta) 62 5-25 MCG/INH inhaler, Inhale 1 puff daily, Disp: , Rfl:     ACTIVE PROBLEM LIST:  Patient Active Problem List   Diagnosis   • Chronic viral hepatitis B without delta agent and without coma (HCC)   • COPD (chronic obstructive pulmonary disease) (HCC)   • Tobacco abuse disorder   • Abdominal pain   • Right inguinal hernia   • Tobacco dependence due to cigarettes       PAST MEDICAL HISTORY:  Past Medical History:   Diagnosis Date   • COPD (chronic obstructive pulmonary disease) (HCC)    • Diverticulitis of colon    • Erectile dysfunction    • Hernia of abdominal cavity    • Liver cyst        PAST SURGICAL HISTORY:  History reviewed  No pertinent surgical history  FAMILY HISTORY:  History reviewed  No pertinent family history  SOCIAL HISTORY:  Social History     Socioeconomic History   • Marital status: /Civil Union     Spouse name: Not on file   • Number of children: Not on file   • Years of education: Not on file   • Highest education level: Not on file   Occupational History   • Not on file   Tobacco Use   • Smoking status: Every Day     Packs/day: 1 50     Types: Cigarettes   • Smokeless tobacco: Never   Vaping Use   • Vaping Use: Never used   Substance and Sexual Activity   • Alcohol use: Not Currently   • Drug use: No   • Sexual activity: Yes   Other Topics Concern   • Not on file   Social History Narrative   • Not on file     Social Determinants of Health     Financial Resource Strain: Not on file   Food Insecurity: Not on file   Transportation Needs: Not on file   Physical Activity: Not on file   Stress: Not on file   Social Connections: Not on file   Intimate Partner Violence: Not on file   Housing Stability: Not on file       Review of Systems   Respiratory: Positive for shortness of breath      Gastrointestinal: Positive for abdominal pain  All other systems reviewed and are negative  Objective:  Vitals:    03/23/23 1115   BP: 124/66   Pulse: 92   Temp: 98 °F (36 7 °C)   SpO2: 96%   Weight: 79 8 kg (176 lb)   Height: 5' 8" (1 727 m)     Body mass index is 26 76 kg/m²  Physical Exam  Vitals and nursing note reviewed  Constitutional:       Appearance: Normal appearance  Comments: disheveled   HENT:      Head: Normocephalic and atraumatic  Cardiovascular:      Rate and Rhythm: Normal rate and regular rhythm  Heart sounds: Normal heart sounds  Pulmonary:      Effort: Pulmonary effort is normal       Breath sounds: Wheezing present  Abdominal:      General: Bowel sounds are normal       Palpations: Abdomen is soft  Tenderness: There is abdominal tenderness  Musculoskeletal:         General: Normal range of motion  Cervical back: Normal range of motion  Right lower leg: No edema  Left lower leg: No edema  Skin:     General: Skin is warm and dry  Neurological:      General: No focal deficit present  Mental Status: He is alert and oriented to person, place, and time  Mental status is at baseline  Psychiatric:         Mood and Affect: Mood normal            RESULTS:    In chart    This note was created with voice recognition software  Phonic, grammatical and spelling errors may be present within the note as a result

## 2023-06-23 ENCOUNTER — OFFICE VISIT (OUTPATIENT)
Dept: INTERNAL MEDICINE CLINIC | Facility: CLINIC | Age: 63
End: 2023-06-23
Payer: COMMERCIAL

## 2023-06-23 VITALS
WEIGHT: 175.8 LBS | DIASTOLIC BLOOD PRESSURE: 68 MMHG | HEIGHT: 68 IN | HEART RATE: 95 BPM | BODY MASS INDEX: 26.64 KG/M2 | OXYGEN SATURATION: 95 % | SYSTOLIC BLOOD PRESSURE: 118 MMHG

## 2023-06-23 DIAGNOSIS — J43.9 PULMONARY EMPHYSEMA, UNSPECIFIED EMPHYSEMA TYPE (HCC): ICD-10-CM

## 2023-06-23 DIAGNOSIS — Z12.5 PROSTATE CANCER SCREENING: ICD-10-CM

## 2023-06-23 DIAGNOSIS — Z72.0 TOBACCO ABUSE DISORDER: Primary | ICD-10-CM

## 2023-06-23 DIAGNOSIS — I10 PRIMARY HYPERTENSION: ICD-10-CM

## 2023-06-23 PROBLEM — R10.9 ABDOMINAL PAIN: Status: RESOLVED | Noted: 2022-04-15 | Resolved: 2023-06-23

## 2023-06-23 PROCEDURE — 99214 OFFICE O/P EST MOD 30 MIN: CPT | Performed by: INTERNAL MEDICINE

## 2023-06-23 RX ORDER — AZITHROMYCIN 250 MG/1
TABLET, FILM COATED ORAL
COMMUNITY
Start: 2023-05-08 | End: 2023-06-23

## 2023-06-23 RX ORDER — AZITHROMYCIN 250 MG/1
TABLET, FILM COATED ORAL
Qty: 6 TABLET | Refills: 0 | Status: SHIPPED | OUTPATIENT
Start: 2023-06-23 | End: 2023-06-28

## 2023-06-23 RX ORDER — BENZONATATE 200 MG/1
200 CAPSULE ORAL 3 TIMES DAILY PRN
Qty: 20 CAPSULE | Refills: 0 | Status: SHIPPED | OUTPATIENT
Start: 2023-06-23

## 2023-06-23 RX ORDER — BUPROPION HYDROCHLORIDE 150 MG/1
TABLET ORAL
COMMUNITY
Start: 2023-06-19 | End: 2023-06-23

## 2023-06-23 RX ORDER — PREDNISONE 20 MG/1
40 TABLET ORAL DAILY
Qty: 10 TABLET | Refills: 0 | Status: SHIPPED | OUTPATIENT
Start: 2023-06-23 | End: 2023-06-28

## 2023-06-23 RX ORDER — BENZONATATE 200 MG/1
CAPSULE ORAL
COMMUNITY
Start: 2023-05-16 | End: 2023-06-23 | Stop reason: SDUPTHER

## 2023-06-23 RX ORDER — METHYLPREDNISOLONE 4 MG/1
TABLET ORAL
Qty: 21 EACH | Refills: 0 | Status: SHIPPED | OUTPATIENT
Start: 2023-06-23 | End: 2023-06-23

## 2023-06-23 NOTE — PROGRESS NOTES
Assessment/Plan:     Sudhakar Little is here for 3 month f/u, reports sob and productive cough, h/o emphysema and following with pulmonary, recently treated with steroids and ABT; lungs with decreased sounds; We discussed his other issues, abdominal pain continues, CT A/P ordered last visit; he does have a h/o diverticulitis and hernia; needs to see GI and surgery; discussed this numerous times; he continues to share with me that his wife is on drugs; Labs ordered  Quality Measures:     BMI Counseling: Body mass index is 26 73 kg/m²  The BMI is above normal  Nutrition recommendations include decreasing portion sizes and encouraging healthy choices of fruits and vegetables  Exercise recommendations include moderate physical activity 150 minutes/week  Rationale for BMI follow-up plan is due to patient being overweight or obese  Depression Screening and Follow-up Plan: Clincally patient does not have depression  No treatment is required  Tobacco Cessation Counseling: Tobacco cessation counseling was provided  The patient is sincerely urged to quit consumption of tobacco  He is not ready to quit tobacco           Return in about 4 months (around 10/23/2023)  No problem-specific Assessment & Plan notes found for this encounter  Diagnoses and all orders for this visit:    Tobacco abuse disorder    Primary hypertension  -     Comprehensive metabolic panel; Future  -     TSH, 3rd generation with Free T4 reflex; Future  -     CBC and differential; Future  -     Hemoglobin A1C; Future  -     Lipid Panel with Direct LDL reflex; Future    Pulmonary emphysema, unspecified emphysema type ([facility] [address] )  -     Discontinue: methylPREDNISolone 4 MG tablet therapy pack; Use as directed on package  -     azithromycin (Zithromax) 250 mg tablet; Take 2 tablets (500 mg total) by mouth daily for 1 day, THEN 1 tablet (250 mg total) daily for 4 days  -     benzonatate (TESSALON) 200 MG capsule;  Take 1 capsule (200 mg total) by mouth 3 (three) times a day as needed for cough  -     predniSONE 20 mg tablet; Take 2 tablets (40 mg total) by mouth daily for 5 days    Prostate cancer screening  -     PSA, total and free; Future    Other orders  -     Discontinue: azithromycin (ZITHROMAX) 250 mg tablet; TAKE 2 TABLETS BY MOUTH ON DAY ONE; THEN ONE TABLET DAILY FOR 4 DAYS  (Patient not taking: Reported on 6/23/2023)  -     Discontinue: benzonatate (TESSALON) 200 MG capsule; TAKE 1 CAPSULE (200 MG TOTAL) BY MOUTH 3 (THREE) TIMES A DAY AS NEEDED FOR COUGH   -     Discontinue: buPROPion (WELLBUTRIN XL) 150 mg 24 hr tablet;  (Patient not taking: Reported on 6/23/2023)          Subjective:      Patient ID: Lavern Templeton is a 58 y o  male  Here for routine f/u  ALLERGIES:  No Known Allergies    CURRENT MEDICATIONS:    Current Outpatient Medications:   •  albuterol (PROVENTIL HFA,VENTOLIN HFA) 90 mcg/act inhaler, Inhale 2 puffs every 4 (four) hours as needed for wheezing, Disp: 18 g, Rfl: 3  •  azithromycin (Zithromax) 250 mg tablet, Take 2 tablets (500 mg total) by mouth daily for 1 day, THEN 1 tablet (250 mg total) daily for 4 days  , Disp: 6 tablet, Rfl: 0  •  benzonatate (TESSALON) 200 MG capsule, Take 1 capsule (200 mg total) by mouth 3 (three) times a day as needed for cough, Disp: 20 capsule, Rfl: 0  •  nicotine (NICODERM CQ) 21 mg/24 hr TD 24 hr patch, , Disp: , Rfl:   •  predniSONE 20 mg tablet, Take 2 tablets (40 mg total) by mouth daily for 5 days, Disp: 10 tablet, Rfl: 0  •  sildenafil (VIAGRA) 50 MG tablet, Take 1 tablet (50 mg total) by mouth daily as needed for erectile dysfunction, Disp: 10 tablet, Rfl: 0  •  umeclidinium-vilanterol (Anoro Ellipta) 62 5-25 MCG/INH inhaler, Inhale 1 puff daily, Disp: , Rfl:     ACTIVE PROBLEM LIST:  Patient Active Problem List   Diagnosis   • Chronic viral hepatitis B without delta agent and without coma (HCC)   • COPD (chronic obstructive pulmonary disease) (HCC)   • Tobacco abuse disorder • Right inguinal hernia   • Tobacco dependence due to cigarettes       PAST MEDICAL HISTORY:  Past Medical History:   Diagnosis Date   • COPD (chronic obstructive pulmonary disease) ([facility] [address] )    • Diverticulitis of colon    • Erectile dysfunction    • Hernia of abdominal cavity    • Liver cyst        PAST SURGICAL HISTORY:  History reviewed  No pertinent surgical history  FAMILY HISTORY:  History reviewed  No pertinent family history  SOCIAL HISTORY:  Social History     Socioeconomic History   • Marital status: /Civil Union     Spouse name: Not on file   • Number of children: Not on file   • Years of education: Not on file   • Highest education level: Not on file   Occupational History   • Not on file   Tobacco Use   • Smoking status: Every Day     Packs/day: 1 50     Types: Cigarettes   • Smokeless tobacco: Never   Vaping Use   • Vaping Use: Never used   Substance and Sexual Activity   • Alcohol use: Not Currently   • Drug use: No   • Sexual activity: Yes   Other Topics Concern   • Not on file   Social History Narrative   • Not on file     Social Determinants of Health     Financial Resource Strain: Not on file   Food Insecurity: No Food Insecurity (12/24/2021)    Hunger Vital Sign    • Worried About Running Out of Food in the Last Year: Never true    • Ran Out of Food in the Last Year: Never true   Transportation Needs: No Transportation Needs (12/24/2021)    PRAPARE - Transportation    • Lack of Transportation (Medical): No    • Lack of Transportation (Non-Medical): No   Physical Activity: Not on file   Stress: Not on file   Social Connections: Not on file   Intimate Partner Violence: Not on file   Housing Stability: Low Risk  (12/24/2021)    Housing Stability Vital Sign    • Unable to Pay for Housing in the Last Year: No    • Number of Places Lived in the Last Year: 1    • Unstable Housing in the Last Year: No       Review of Systems   Constitutional: Negative for chills and fever     HENT: Negative "for ear pain and sore throat  Eyes: Negative for pain and visual disturbance  Respiratory: Positive for cough and shortness of breath  Cardiovascular: Negative for chest pain and palpitations  Gastrointestinal: Negative for abdominal pain and vomiting  Genitourinary: Negative for dysuria and hematuria  Musculoskeletal: Negative for arthralgias and back pain  Skin: Negative for color change and rash  Neurological: Negative for seizures and syncope  All other systems reviewed and are negative  Objective:  Vitals:    06/23/23 0907   BP: 118/68   BP Location: Left arm   Patient Position: Sitting   Cuff Size: Adult   Pulse: 95   SpO2: 95%   Weight: 79 7 kg (175 lb 12 8 oz)   Height: 5' 8\" (1 727 m)     Body mass index is 26 73 kg/m²  Physical Exam  Vitals and nursing note reviewed  Constitutional:       Appearance: Normal appearance  HENT:      Head: Normocephalic and atraumatic  Cardiovascular:      Rate and Rhythm: Normal rate and regular rhythm  Heart sounds: Normal heart sounds  Pulmonary:      Effort: Pulmonary effort is normal       Breath sounds: Decreased breath sounds present  Musculoskeletal:         General: Normal range of motion  Cervical back: Normal range of motion  Skin:     General: Skin is warm and dry  Neurological:      General: No focal deficit present  Mental Status: He is alert and oriented to person, place, and time  Mental status is at baseline  Psychiatric:         Mood and Affect: Mood normal            RESULTS:    In chart    This note was created with voice recognition software  Phonic, grammatical and spelling errors may be present within the note as a result      "

## 2023-07-27 ENCOUNTER — VBI (OUTPATIENT)
Dept: ADMINISTRATIVE | Facility: OTHER | Age: 63
End: 2023-07-27

## 2023-10-18 ENCOUNTER — VBI (OUTPATIENT)
Dept: ADMINISTRATIVE | Facility: OTHER | Age: 63
End: 2023-10-18

## 2023-10-24 ENCOUNTER — OFFICE VISIT (OUTPATIENT)
Dept: INTERNAL MEDICINE CLINIC | Facility: CLINIC | Age: 63
End: 2023-10-24
Payer: COMMERCIAL

## 2023-10-24 VITALS
SYSTOLIC BLOOD PRESSURE: 118 MMHG | WEIGHT: 179.4 LBS | DIASTOLIC BLOOD PRESSURE: 68 MMHG | HEIGHT: 68 IN | OXYGEN SATURATION: 96 % | HEART RATE: 86 BPM | BODY MASS INDEX: 27.19 KG/M2

## 2023-10-24 DIAGNOSIS — J43.9 PULMONARY EMPHYSEMA, UNSPECIFIED EMPHYSEMA TYPE (HCC): Primary | ICD-10-CM

## 2023-10-24 DIAGNOSIS — F17.210 TOBACCO DEPENDENCE DUE TO CIGARETTES: ICD-10-CM

## 2023-10-24 DIAGNOSIS — I10 PRIMARY HYPERTENSION: ICD-10-CM

## 2023-10-24 DIAGNOSIS — R73.01 ABNORMAL FASTING GLUCOSE: ICD-10-CM

## 2023-10-24 DIAGNOSIS — R10.32 LEFT LOWER QUADRANT ABDOMINAL PAIN: ICD-10-CM

## 2023-10-24 DIAGNOSIS — Z12.5 PROSTATE CANCER SCREENING: ICD-10-CM

## 2023-10-24 DIAGNOSIS — Z87.19 HISTORY OF DIVERTICULITIS: ICD-10-CM

## 2023-10-24 PROCEDURE — 99214 OFFICE O/P EST MOD 30 MIN: CPT | Performed by: INTERNAL MEDICINE

## 2023-10-24 RX ORDER — PREDNISONE 20 MG/1
40 TABLET ORAL DAILY
Qty: 10 TABLET | Refills: 0 | Status: SHIPPED | OUTPATIENT
Start: 2023-10-24 | End: 2023-10-29

## 2023-10-24 NOTE — PROGRESS NOTES
Assessment/Plan:     Patient is here for routine follow-up. We reviewed his chronic medical problems. He is due for blood work which has been ordered today including CBC CMP TSH A1c lipid and PSA. History of pulmonary emphysema he does follow with [facility] pulmonary. Up-to-date with CT lung screening. Lungs are decreased throughout, denies any changes in his cough or sputum production but some wheezing and shortness of breath. We will send a course of steroids. Discussed smoking cessation again. Discussed his chronic left lower quadrant pain, says that it comes and goes, feels like a pressure feels bloated, some constipation, denies nausea vomiting diarrhea. CAT scan of the abdomen pelvis was ordered last visit, he will call to schedule it. Probably will have to go back to surgery and GI. Quality Measures:     BMI Counseling: Body mass index is 27.28 kg/m². The BMI is above normal. Nutrition recommendations include decreasing portion sizes and encouraging healthy choices of fruits and vegetables. Exercise recommendations include moderate physical activity 150 minutes/week. Rationale for BMI follow-up plan is due to patient being overweight or obese. Depression Screening and Follow-up Plan: Clincally patient does not have depression. No treatment is required. Tobacco Cessation Counseling: Tobacco cessation counseling was provided. The patient is sincerely urged to quit consumption of tobacco. He is not ready to quit tobacco.          Return in about 4 months (around 2/24/2024). No problem-specific Assessment & Plan notes found for this encounter. Diagnoses and all orders for this visit:    Pulmonary emphysema, unspecified emphysema type ([address])  -     predniSONE 20 mg tablet;  Take 2 tablets (40 mg total) by mouth daily for 5 days    Tobacco dependence due to cigarettes    Left lower quadrant abdominal pain  -     Urinalysis, Screen    Primary hypertension  -     CBC and differential; Future  -     Comprehensive metabolic panel; Future  -     TSH, 3rd generation with Free T4 reflex; Future    Prostate cancer screening  -     PSA, total and free; Future    Abnormal fasting glucose  -     Hemoglobin A1C; Future  -     Lipid Panel with Direct LDL reflex; Future    History of diverticulitis  -     Urinalysis, Screen          Subjective:      Patient ID: Alisa Denver is a 61 y.o. male. Is here for routine follow-up        ALLERGIES:  No Known Allergies    CURRENT MEDICATIONS:    Current Outpatient Medications:     albuterol (PROVENTIL HFA,VENTOLIN HFA) 90 mcg/act inhaler, Inhale 2 puffs every 4 (four) hours as needed for wheezing, Disp: 18 g, Rfl: 3    nicotine (NICODERM CQ) 21 mg/24 hr TD 24 hr patch, , Disp: , Rfl:     predniSONE 20 mg tablet, Take 2 tablets (40 mg total) by mouth daily for 5 days, Disp: 10 tablet, Rfl: 0    umeclidinium-vilanterol (Anoro Ellipta) 62.5-25 MCG/INH inhaler, Inhale 1 puff daily, Disp: , Rfl:     ACTIVE PROBLEM LIST:  Patient Active Problem List   Diagnosis    Chronic viral hepatitis B without delta agent and without coma (HCC)    COPD (chronic obstructive pulmonary disease) (HCC)    Tobacco abuse disorder    Right inguinal hernia    Tobacco dependence due to cigarettes       PAST MEDICAL HISTORY:  Past Medical History:   Diagnosis Date    COPD (chronic obstructive pulmonary disease) (HCC)     Diverticulitis of colon     Erectile dysfunction     Hernia of abdominal cavity     Liver cyst        PAST SURGICAL HISTORY:  History reviewed. No pertinent surgical history. FAMILY HISTORY:  History reviewed. No pertinent family history.     SOCIAL HISTORY:  Social History     Socioeconomic History    Marital status: /Civil Union     Spouse name: Not on file    Number of children: Not on file    Years of education: Not on file    Highest education level: Not on file   Occupational History    Not on file   Tobacco Use    Smoking status: Every Day Packs/day: 1.50     Years: 50.00     Total pack years: 75.00     Types: Cigarettes    Smokeless tobacco: Never   Vaping Use    Vaping Use: Never used   Substance and Sexual Activity    Alcohol use: Not Currently    Drug use: No    Sexual activity: Yes   Other Topics Concern    Not on file   Social History Narrative    Not on file     Social Determinants of Health     Financial Resource Strain: Not on file   Food Insecurity: No Food Insecurity (12/24/2021)    Hunger Vital Sign     Worried About Running Out of Food in the Last Year: Never true     Ran Out of Food in the Last Year: Never true   Transportation Needs: No Transportation Needs (12/24/2021)    PRAPARE - Transportation     Lack of Transportation (Medical): No     Lack of Transportation (Non-Medical): No   Physical Activity: Not on file   Stress: Not on file   Social Connections: Not on file   Intimate Partner Violence: Not on file   Housing Stability: Low Risk  (12/24/2021)    Housing Stability Vital Sign     Unable to Pay for Housing in the Last Year: No     Number of Places Lived in the Last Year: 1     Unstable Housing in the Last Year: No       Review of Systems   Constitutional:  Negative for chills and fever. HENT:  Negative for ear pain and sore throat. Eyes:  Negative for pain and visual disturbance. Respiratory:  Positive for shortness of breath. Negative for cough. Cardiovascular:  Negative for chest pain and palpitations. Gastrointestinal:  Positive for abdominal pain (LLQ, bloated, gas like, constipation, comes and goes, positional; no diarrhea;). Negative for vomiting. Genitourinary:  Negative for dysuria and hematuria. Musculoskeletal:  Negative for arthralgias and back pain. Skin:  Negative for color change and rash. Neurological:  Negative for seizures and syncope. All other systems reviewed and are negative.         Objective:  Vitals:    10/24/23 0813   BP: 118/68   BP Location: Left arm   Patient Position: Sitting Cuff Size: Standard   Pulse: 86   SpO2: 96%   Weight: 81.4 kg (179 lb 6.4 oz)   Height: 5' 8" (1.727 m)     Body mass index is 27.28 kg/m². Physical Exam  Vitals and nursing note reviewed. Constitutional:       Appearance: Normal appearance. He is well-developed. Comments: disheveled   HENT:      Head: Normocephalic and atraumatic. Cardiovascular:      Rate and Rhythm: Normal rate and regular rhythm. Pulmonary:      Effort: Pulmonary effort is normal.      Breath sounds: Decreased air movement present. Decreased breath sounds present. Abdominal:      General: Abdomen is flat. Palpations: Abdomen is soft. Tenderness: There is abdominal tenderness in the left upper quadrant and left lower quadrant. Musculoskeletal:         General: Normal range of motion. Cervical back: Normal range of motion. Skin:     General: Skin is warm and dry. Neurological:      General: No focal deficit present. Mental Status: He is alert and oriented to person, place, and time. Mental status is at baseline. Psychiatric:         Mood and Affect: Mood normal.           RESULTS:  Hemoglobin   Date/Time Value Ref Range Status   12/26/2021 05:32 AM 15.8 12.0 - 17.0 g/dL Final     Hematocrit   Date/Time Value Ref Range Status   12/26/2021 05:32 AM 46.8 36.5 - 49.3 % Final     Platelets   Date/Time Value Ref Range Status   12/26/2021 05:32  149 - 390 Thousands/uL Final     Sodium   Date/Time Value Ref Range Status   12/26/2021 05:32  136 - 145 mmol/L Final     BUN   Date/Time Value Ref Range Status   12/26/2021 05:32 AM 32 (H) 5 - 25 mg/dL Final     Creatinine   Date/Time Value Ref Range Status   12/26/2021 05:32 AM 1.18 0.60 - 1.30 mg/dL Final     Comment:     Standardized to IDMS reference method      In chart    This note was created with voice recognition software. Phonic, grammatical and spelling errors may be present within the note as a result.

## 2023-11-03 ENCOUNTER — TELEPHONE (OUTPATIENT)
Dept: INTERNAL MEDICINE CLINIC | Facility: CLINIC | Age: 63
End: 2023-11-03

## 2023-11-03 NOTE — TELEPHONE ENCOUNTER
Patient is wondering if you can give him an antibiotic for his stomach due to pain. He has not scheduled the CT yet but thinks he has an infection going on.     [facility]

## 2023-12-07 ENCOUNTER — OFFICE VISIT (OUTPATIENT)
Dept: INTERNAL MEDICINE CLINIC | Facility: CLINIC | Age: 63
End: 2023-12-07
Payer: COMMERCIAL

## 2023-12-07 VITALS
RESPIRATION RATE: 16 BRPM | OXYGEN SATURATION: 98 % | DIASTOLIC BLOOD PRESSURE: 68 MMHG | SYSTOLIC BLOOD PRESSURE: 134 MMHG | HEIGHT: 68 IN | BODY MASS INDEX: 27.49 KG/M2 | HEART RATE: 111 BPM | WEIGHT: 181.4 LBS

## 2023-12-07 DIAGNOSIS — F17.210 TOBACCO DEPENDENCE DUE TO CIGARETTES: ICD-10-CM

## 2023-12-07 DIAGNOSIS — J43.9 PULMONARY EMPHYSEMA, UNSPECIFIED EMPHYSEMA TYPE (HCC): ICD-10-CM

## 2023-12-07 DIAGNOSIS — Z87.19 HISTORY OF DIVERTICULITIS: ICD-10-CM

## 2023-12-07 DIAGNOSIS — R10.32 LEFT LOWER QUADRANT ABDOMINAL PAIN: Primary | ICD-10-CM

## 2023-12-07 PROCEDURE — 99214 OFFICE O/P EST MOD 30 MIN: CPT | Performed by: INTERNAL MEDICINE

## 2023-12-07 RX ORDER — METRONIDAZOLE 500 MG/1
500 TABLET ORAL EVERY 8 HOURS SCHEDULED
Qty: 30 TABLET | Refills: 0 | Status: SHIPPED | OUTPATIENT
Start: 2023-12-07 | End: 2023-12-17

## 2023-12-07 NOTE — PROGRESS NOTES
Assessment/Plan:     Here with recurrent LLQ pain, feels like he is being "punched in the gut", comes and goes, associated with diarrhea and constipation, yellowish diarrhea yesterday, denies n/v, fever, or chills; h/o abdominal hernia and diverticulitis; I have ordered numerous CT scans and he is non compliant, discussed that I will send flagyl today for presumed diverticulitis but will not again unless he gets it done;    Needs to f/u with GI and surgery; flagyl sent, CBC CMP urine ordered. Quality Measures:     BMI Counseling: Body mass index is 27.58 kg/m². The BMI is above normal. Nutrition recommendations include decreasing portion sizes and encouraging healthy choices of fruits and vegetables. Exercise recommendations include moderate physical activity 150 minutes/week. Rationale for BMI follow-up plan is due to patient being overweight or obese. Depression Screening and Follow-up Plan: Patient was screened for depression during today's encounter. They screened negative with a PHQ-2 score of 0. Tobacco Cessation Counseling: Tobacco cessation counseling was provided. The patient is sincerely urged to quit consumption of tobacco. He is not ready to quit tobacco.          Return for Next scheduled follow up. No problem-specific Assessment & Plan notes found for this encounter. Diagnoses and all orders for this visit:    Left lower quadrant abdominal pain  -     CBC and differential; Future  -     Comprehensive metabolic panel; Future  -     UA w Reflex to Microscopic w Reflex to Culture -Lab Collect; Future  -     metroNIDAZOLE (FLAGYL) 500 mg tablet; Take 1 tablet (500 mg total) by mouth every 8 (eight) hours for 10 days    History of diverticulitis  -     metroNIDAZOLE (FLAGYL) 500 mg tablet;  Take 1 tablet (500 mg total) by mouth every 8 (eight) hours for 10 days    Pulmonary emphysema, unspecified emphysema type ([address])    Tobacco dependence due to cigarettes          Subjective: Patient ID: Bradley Bonilla is a 61 y.o. male. Here with abdominal pain. ALLERGIES:  No Known Allergies    CURRENT MEDICATIONS:    Current Outpatient Medications:     albuterol (PROVENTIL HFA,VENTOLIN HFA) 90 mcg/act inhaler, Inhale 2 puffs every 4 (four) hours as needed for wheezing, Disp: 18 g, Rfl: 3    metroNIDAZOLE (FLAGYL) 500 mg tablet, Take 1 tablet (500 mg total) by mouth every 8 (eight) hours for 10 days, Disp: 30 tablet, Rfl: 0    nicotine (NICODERM CQ) 21 mg/24 hr TD 24 hr patch, , Disp: , Rfl:     umeclidinium-vilanterol (Anoro Ellipta) 62.5-25 MCG/INH inhaler, Inhale 1 puff daily, Disp: , Rfl:     ACTIVE PROBLEM LIST:  Patient Active Problem List   Diagnosis    Chronic viral hepatitis B without delta agent and without coma (HCC)    COPD (chronic obstructive pulmonary disease) (HCC)    Tobacco abuse disorder    Right inguinal hernia    Tobacco dependence due to cigarettes       PAST MEDICAL HISTORY:  Past Medical History:   Diagnosis Date    COPD (chronic obstructive pulmonary disease) (HCC)     Diverticulitis of colon     Erectile dysfunction     Hernia of abdominal cavity     Liver cyst        PAST SURGICAL HISTORY:  History reviewed. No pertinent surgical history. FAMILY HISTORY:  History reviewed. No pertinent family history.     SOCIAL HISTORY:  Social History     Socioeconomic History    Marital status: /Civil Union     Spouse name: Not on file    Number of children: Not on file    Years of education: Not on file    Highest education level: Not on file   Occupational History    Not on file   Tobacco Use    Smoking status: Every Day     Packs/day: 1.50     Years: 50.00     Total pack years: 75.00     Types: Cigarettes    Smokeless tobacco: Never   Vaping Use    Vaping Use: Never used   Substance and Sexual Activity    Alcohol use: Not Currently    Drug use: No    Sexual activity: Yes   Other Topics Concern    Not on file   Social History Narrative    Not on file     Social Determinants of Health     Financial Resource Strain: Not on file   Food Insecurity: No Food Insecurity (12/24/2021)    Hunger Vital Sign     Worried About Running Out of Food in the Last Year: Never true     Ran Out of Food in the Last Year: Never true   Transportation Needs: No Transportation Needs (12/24/2021)    PRAPARE - Transportation     Lack of Transportation (Medical): No     Lack of Transportation (Non-Medical): No   Physical Activity: Not on file   Stress: Not on file   Social Connections: Not on file   Intimate Partner Violence: Not on file   Housing Stability: Low Risk  (12/24/2021)    Housing Stability Vital Sign     Unable to Pay for Housing in the Last Year: No     Number of Places Lived in the Last Year: 1     Unstable Housing in the Last Year: No       Review of Systems   Constitutional:  Negative for chills and fever. HENT:  Negative for ear pain and sore throat. Eyes:  Negative for pain and visual disturbance. Respiratory:  Negative for cough and shortness of breath. Cardiovascular:  Negative for chest pain and palpitations. Gastrointestinal:  Positive for abdominal pain and diarrhea (yellowish stool). Negative for vomiting. Genitourinary:  Negative for dysuria and hematuria. Musculoskeletal:  Positive for arthralgias. Negative for back pain. Skin:  Negative for color change and rash. Neurological:  Negative for seizures and syncope. All other systems reviewed and are negative. Objective:  Vitals:    12/07/23 1126   BP: 134/68   BP Location: Left arm   Patient Position: Sitting   Cuff Size: Adult   Pulse: (!) 111   Resp: 16   SpO2: 98%   Weight: 82.3 kg (181 lb 6.4 oz)   Height: 5' 8" (1.727 m)     Body mass index is 27.58 kg/m². Physical Exam  Vitals and nursing note reviewed. Constitutional:       Appearance: Normal appearance. He is normal weight. HENT:      Head: Normocephalic and atraumatic. Cardiovascular:      Rate and Rhythm: Normal rate. Pulmonary:      Effort: Pulmonary effort is normal.   Musculoskeletal:         General: Normal range of motion. Cervical back: Normal range of motion. Skin:     General: Skin is warm and dry. Neurological:      General: No focal deficit present. Mental Status: He is alert and oriented to person, place, and time. Mental status is at baseline. Psychiatric:         Mood and Affect: Mood normal.           RESULTS:  Hemoglobin   Date/Time Value Ref Range Status   12/26/2021 05:32 AM 15.8 12.0 - 17.0 g/dL Final     Hematocrit   Date/Time Value Ref Range Status   12/26/2021 05:32 AM 46.8 36.5 - 49.3 % Final     Platelets   Date/Time Value Ref Range Status   12/26/2021 05:32  149 - 390 Thousands/uL Final     Sodium   Date/Time Value Ref Range Status   12/26/2021 05:32  136 - 145 mmol/L Final     BUN   Date/Time Value Ref Range Status   12/26/2021 05:32 AM 32 (H) 5 - 25 mg/dL Final     Creatinine   Date/Time Value Ref Range Status   12/26/2021 05:32 AM 1.18 0.60 - 1.30 mg/dL Final     Comment:     Standardized to IDMS reference method      In chart    This note was created with voice recognition software. Phonic, grammatical and spelling errors may be present within the note as a result.

## 2024-01-22 ENCOUNTER — HOSPITAL ENCOUNTER (EMERGENCY)
Facility: HOSPITAL | Age: 64
Discharge: HOME | End: 2024-01-22
Attending: EMERGENCY MEDICINE
Payer: COMMERCIAL

## 2024-01-22 ENCOUNTER — APPOINTMENT (EMERGENCY)
Dept: RADIOLOGY | Facility: HOSPITAL | Age: 64
End: 2024-01-22
Payer: COMMERCIAL

## 2024-01-22 VITALS
DIASTOLIC BLOOD PRESSURE: 69 MMHG | TEMPERATURE: 98.1 F | SYSTOLIC BLOOD PRESSURE: 143 MMHG | OXYGEN SATURATION: 96 % | RESPIRATION RATE: 20 BRPM | HEART RATE: 91 BPM

## 2024-01-22 DIAGNOSIS — J44.1 COPD EXACERBATION (HCC): Primary | ICD-10-CM

## 2024-01-22 LAB
ALBUMIN SERPL BCP-MCNC: 4.5 G/DL (ref 3.5–5)
ALP SERPL-CCNC: 54 U/L (ref 34–104)
ALT SERPL W P-5'-P-CCNC: 8 U/L (ref 7–52)
ANION GAP SERPL CALCULATED.3IONS-SCNC: 7 MMOL/L
AST SERPL W P-5'-P-CCNC: 13 U/L (ref 13–39)
BASOPHILS # BLD AUTO: 0.06 THOUSANDS/ÂΜL (ref 0–0.1)
BASOPHILS NFR BLD AUTO: 0 % (ref 0–1)
BILIRUB SERPL-MCNC: 0.84 MG/DL (ref 0.2–1)
BUN SERPL-MCNC: 18 MG/DL (ref 5–25)
CALCIUM SERPL-MCNC: 9.5 MG/DL (ref 8.4–10.2)
CARDIAC TROPONIN I PNL SERPL HS: 3 NG/L
CHLORIDE SERPL-SCNC: 104 MMOL/L (ref 96–108)
CO2 SERPL-SCNC: 29 MMOL/L (ref 21–32)
CREAT SERPL-MCNC: 1.06 MG/DL (ref 0.6–1.3)
D DIMER PPP FEU-MCNC: 0.36 UG/ML FEU
EOSINOPHIL # BLD AUTO: 0.17 THOUSAND/ÂΜL (ref 0–0.61)
EOSINOPHIL NFR BLD AUTO: 1 % (ref 0–6)
ERYTHROCYTE [DISTWIDTH] IN BLOOD BY AUTOMATED COUNT: 13.6 % (ref 11.6–15.1)
FLUAV RNA RESP QL NAA+PROBE: NEGATIVE
FLUBV RNA RESP QL NAA+PROBE: NEGATIVE
GFR SERPL CREATININE-BSD FRML MDRD: 74 ML/MIN/1.73SQ M
GLUCOSE SERPL-MCNC: 90 MG/DL (ref 65–140)
HCT VFR BLD AUTO: 50.8 % (ref 36.5–49.3)
HGB BLD-MCNC: 16.9 G/DL (ref 12–17)
IMM GRANULOCYTES # BLD AUTO: 0.09 THOUSAND/UL (ref 0–0.2)
IMM GRANULOCYTES NFR BLD AUTO: 1 % (ref 0–2)
LYMPHOCYTES # BLD AUTO: 1.95 THOUSANDS/ÂΜL (ref 0.6–4.47)
LYMPHOCYTES NFR BLD AUTO: 12 % (ref 14–44)
MCH RBC QN AUTO: 30.3 PG (ref 26.8–34.3)
MCHC RBC AUTO-ENTMCNC: 33.3 G/DL (ref 31.4–37.4)
MCV RBC AUTO: 91 FL (ref 82–98)
MONOCYTES # BLD AUTO: 1.66 THOUSAND/ÂΜL (ref 0.17–1.22)
MONOCYTES NFR BLD AUTO: 10 % (ref 4–12)
NEUTROPHILS # BLD AUTO: 12.64 THOUSANDS/ÂΜL (ref 1.85–7.62)
NEUTS SEG NFR BLD AUTO: 76 % (ref 43–75)
NRBC BLD AUTO-RTO: 0 /100 WBCS
PLATELET # BLD AUTO: 297 THOUSANDS/UL (ref 149–390)
PMV BLD AUTO: 9.1 FL (ref 8.9–12.7)
POTASSIUM SERPL-SCNC: 4.7 MMOL/L (ref 3.5–5.3)
PROT SERPL-MCNC: 7.6 G/DL (ref 6.4–8.4)
RBC # BLD AUTO: 5.57 MILLION/UL (ref 3.88–5.62)
RSV RNA RESP QL NAA+PROBE: NEGATIVE
SARS-COV-2 RNA RESP QL NAA+PROBE: NEGATIVE
SODIUM SERPL-SCNC: 140 MMOL/L (ref 135–147)
WBC # BLD AUTO: 16.57 THOUSAND/UL (ref 4.31–10.16)

## 2024-01-22 PROCEDURE — 0241U HB NFCT DS VIR RESP RNA 4 TRGT: CPT | Performed by: EMERGENCY MEDICINE

## 2024-01-22 PROCEDURE — 94760 N-INVAS EAR/PLS OXIMETRY 1: CPT

## 2024-01-22 PROCEDURE — 94640 AIRWAY INHALATION TREATMENT: CPT

## 2024-01-22 PROCEDURE — 93005 ELECTROCARDIOGRAM TRACING: CPT

## 2024-01-22 PROCEDURE — 96374 THER/PROPH/DIAG INJ IV PUSH: CPT

## 2024-01-22 PROCEDURE — 85379 FIBRIN DEGRADATION QUANT: CPT | Performed by: EMERGENCY MEDICINE

## 2024-01-22 PROCEDURE — 96375 TX/PRO/DX INJ NEW DRUG ADDON: CPT

## 2024-01-22 PROCEDURE — 71046 X-RAY EXAM CHEST 2 VIEWS: CPT

## 2024-01-22 PROCEDURE — 85025 COMPLETE CBC W/AUTO DIFF WBC: CPT | Performed by: EMERGENCY MEDICINE

## 2024-01-22 PROCEDURE — 94644 CONT INHLJ TX 1ST HOUR: CPT

## 2024-01-22 PROCEDURE — 80053 COMPREHEN METABOLIC PANEL: CPT | Performed by: EMERGENCY MEDICINE

## 2024-01-22 PROCEDURE — 84484 ASSAY OF TROPONIN QUANT: CPT | Performed by: EMERGENCY MEDICINE

## 2024-01-22 PROCEDURE — 99285 EMERGENCY DEPT VISIT HI MDM: CPT | Performed by: EMERGENCY MEDICINE

## 2024-01-22 PROCEDURE — 99285 EMERGENCY DEPT VISIT HI MDM: CPT

## 2024-01-22 PROCEDURE — 36415 COLL VENOUS BLD VENIPUNCTURE: CPT | Performed by: EMERGENCY MEDICINE

## 2024-01-22 RX ORDER — AMOXICILLIN AND CLAVULANATE POTASSIUM 875; 125 MG/1; MG/1
1 TABLET, FILM COATED ORAL EVERY 12 HOURS
Qty: 14 TABLET | Refills: 0 | Status: SHIPPED | OUTPATIENT
Start: 2024-01-22 | End: 2024-01-29

## 2024-01-22 RX ORDER — SODIUM CHLORIDE FOR INHALATION 0.9 %
12 VIAL, NEBULIZER (ML) INHALATION ONCE
Status: COMPLETED | OUTPATIENT
Start: 2024-01-22 | End: 2024-01-22

## 2024-01-22 RX ORDER — KETOROLAC TROMETHAMINE 30 MG/ML
15 INJECTION, SOLUTION INTRAMUSCULAR; INTRAVENOUS ONCE
Status: COMPLETED | OUTPATIENT
Start: 2024-01-22 | End: 2024-01-22

## 2024-01-22 RX ORDER — DEXAMETHASONE SODIUM PHOSPHATE 10 MG/ML
10 INJECTION, SOLUTION INTRAMUSCULAR; INTRAVENOUS ONCE
Status: COMPLETED | OUTPATIENT
Start: 2024-01-22 | End: 2024-01-22

## 2024-01-22 RX ORDER — AMOXICILLIN AND CLAVULANATE POTASSIUM 875; 125 MG/1; MG/1
1 TABLET, FILM COATED ORAL ONCE
Status: COMPLETED | OUTPATIENT
Start: 2024-01-22 | End: 2024-01-22

## 2024-01-22 RX ORDER — DEXAMETHASONE 6 MG/1
6 TABLET ORAL ONCE
Qty: 1 TABLET | Refills: 0 | Status: SHIPPED | OUTPATIENT
Start: 2024-01-22 | End: 2024-01-22

## 2024-01-22 RX ADMIN — IPRATROPIUM BROMIDE 1 MG: 0.5 SOLUTION RESPIRATORY (INHALATION) at 13:51

## 2024-01-22 RX ADMIN — ALBUTEROL SULFATE 10 MG: 2.5 SOLUTION RESPIRATORY (INHALATION) at 13:51

## 2024-01-22 RX ADMIN — DEXAMETHASONE SODIUM PHOSPHATE 10 MG: 10 INJECTION, SOLUTION INTRAMUSCULAR; INTRAVENOUS at 13:52

## 2024-01-22 RX ADMIN — AMOXICILLIN AND CLAVULANATE POTASSIUM 1 TABLET: 875; 125 TABLET, FILM COATED ORAL at 15:36

## 2024-01-22 RX ADMIN — Medication 12 ML: at 13:51

## 2024-01-22 RX ADMIN — KETOROLAC TROMETHAMINE 15 MG: 30 INJECTION, SOLUTION INTRAMUSCULAR; INTRAVENOUS at 13:52

## 2024-01-22 NOTE — ED PROVIDER NOTES
History  Chief Complaint   Patient presents with   • Cough     Pt reports coughing, shortness of breath, and chest tightness with sick symptoms. Pt is to wear supplemental oxygen but decided not to since the ER will provide it. Increased work of breathing noted.      Patient is 63-year-old male past medical history of COPD on 2 to 3 L home oxygen, diverticulitis, hernia, HPV presenting with cough.  Patient notes intermittent cough for the last several months, white-yellow sputum nonbloody.  Notes since yesterday has had some constant central stabbing chest pain which radiates to the back and is not exertional in nature and associated with shortness of breath which is also worse at rest.  Denies any fevers, nausea vomiting, dizziness, leg pain or swelling, rashes, vision changes, dysuria.  Took promethazine with some relief.  Denies any history of blood clots, clotting disorders, cancer diagnosis, recent surgeries or immobilization or use of estrogen products.  Denies any history of intubations or ICU stays relative to his COPD.        Prior to Admission Medications   Prescriptions Last Dose Informant Patient Reported? Taking?   albuterol (PROVENTIL HFA,VENTOLIN HFA) 90 mcg/act inhaler  Self No No   Sig: Inhale 2 puffs every 4 (four) hours as needed for wheezing   nicotine (NICODERM CQ) 21 mg/24 hr TD 24 hr patch  Self Yes No   umeclidinium-vilanterol (Anoro Ellipta) 62.5-25 MCG/INH inhaler  Self Yes No   Sig: Inhale 1 puff daily      Facility-Administered Medications: None       Past Medical History:   Diagnosis Date   • COPD (chronic obstructive pulmonary disease) (HCC)    • Diverticulitis of colon    • Erectile dysfunction    • Hernia of abdominal cavity    • Liver cyst        History reviewed. No pertinent surgical history.    History reviewed. No pertinent family history.  I have reviewed and agree with the history as documented.    E-Cigarette/Vaping   • E-Cigarette Use Never User      E-Cigarette/Vaping  Substances   • Nicotine No    • THC No    • CBD No    • Flavoring No    • Other No    • Unknown No      Social History     Tobacco Use   • Smoking status: Every Day     Current packs/day: 1.50     Average packs/day: 1.5 packs/day for 50.0 years (75.0 ttl pk-yrs)     Types: Cigarettes   • Smokeless tobacco: Never   Vaping Use   • Vaping status: Never Used   Substance Use Topics   • Alcohol use: Not Currently   • Drug use: No       Review of Systems   All other systems reviewed and are negative.      Physical Exam  Physical Exam  Vitals reviewed.   Constitutional:       General: He is not in acute distress.     Appearance: Normal appearance. He is not ill-appearing.   HENT:      Mouth/Throat:      Mouth: Mucous membranes are moist.   Eyes:      Conjunctiva/sclera: Conjunctivae normal.   Cardiovascular:      Rate and Rhythm: Normal rate and regular rhythm.      Pulses: Normal pulses.      Heart sounds: Normal heart sounds.   Pulmonary:      Effort: Pulmonary effort is normal.      Comments: Scant expiratory wheezing in the apices with no retractions, accessory muscle use or conversational dyspnea  Abdominal:      General: Abdomen is flat.      Palpations: Abdomen is soft.      Tenderness: There is no abdominal tenderness.   Musculoskeletal:         General: No swelling. Normal range of motion.      Cervical back: Neck supple.      Right lower leg: No edema.      Left lower leg: No edema.   Skin:     General: Skin is warm and dry.   Neurological:      General: No focal deficit present.      Mental Status: He is alert.   Psychiatric:         Mood and Affect: Mood normal.         Vital Signs  ED Triage Vitals [01/22/24 1134]   Temperature Pulse Respirations Blood Pressure SpO2   98.1 °F (36.7 °C) 91 20 143/69 96 %      Temp Source Heart Rate Source Patient Position - Orthostatic VS BP Location FiO2 (%)   Oral Monitor Sitting Left arm --      Pain Score       --           Vitals:    01/22/24 1134   BP: 143/69   Pulse: 91    Patient Position - Orthostatic VS: Sitting         Visual Acuity      ED Medications  Medications   albuterol inhalation solution 10 mg (has no administration in time range)   ipratropium (ATROVENT) 0.02 % inhalation solution 1 mg (has no administration in time range)   sodium chloride 0.9 % inhalation solution 12 mL (has no administration in time range)   dexamethasone (PF) (DECADRON) injection 10 mg (has no administration in time range)   ketorolac (TORADOL) injection 15 mg (has no administration in time range)       Diagnostic Studies  Results Reviewed       Procedure Component Value Units Date/Time    D-dimer, quantitative [[id number]]     Lab Status: No result Specimen: Blood     Comprehensive metabolic panel [[id number]]     Lab Status: No result Specimen: Blood     CBC and differential [[id number]]     Lab Status: No result Specimen: Blood     HS Troponin 0hr (reflex protocol) [[id number]]     Lab Status: No result Specimen: Blood     FLU/RSV/COVID - if FLU/RSV clinically relevant [[id number]]     Lab Status: No result Specimen: Nares from Nose                    XR chest 2 views    (Results Pending)              Procedures  ECG 12 Lead Documentation Only    Date/Time: 1/22/2024 2:45 PM    Performed by: Shanon Gusman DO  Authorized by: Shanon Gusman DO    Patient location:  ED  Interpretation:     Interpretation: normal    Rate:     ECG rate assessment: normal    Rhythm:     Rhythm: sinus rhythm    Ectopy:     Ectopy: none    QRS:     QRS axis:  Normal    QRS intervals:  Normal  Conduction:     Conduction: normal    ST segments:     ST segments:  Normal  T waves:     T waves: normal             ED Course  ED Course as of 01/22/24 1821   Mon Jan 22, 2024   1523 Wheezing improved, patient notes improvement of his symptoms, will send with steroids, antibiotics for COPD exacerbation I discussed return precautions with patient states he understands.                               SBIRT 22yo+       Flowsheet Row Most Recent Value   Initial Alcohol Screen: US AUDIT-C     1. How often do you have a drink containing alcohol? 0 Filed at: 01/22/2024 1136   2. How many drinks containing alcohol do you have on a typical day you are drinking?  0 Filed at: 01/22/2024 1136   3a. Male UNDER 65: How often do you have five or more drinks on one occasion? 0 Filed at: 01/22/2024 1136   3b. FEMALE Any Age, or MALE 65+: How often do you have 4 or more drinks on one occassion? 0 Filed at: 01/22/2024 1136   Audit-C Score 0 Filed at: 01/22/2024 1136   LAZARA: How many times in the past year have you...    Used an illegal drug or used a prescription medication for non-medical reasons? Never Filed at: 01/22/2024 1136                      Medical Decision Making  Patient is a 63-year-old male past medical history of COPD, diverticulitis, HPV, hernia presenting with cough and shortness of breath.  Patient is well-appearing at bedside with stable vitals and in no acute distress.  He has lungs with expiratory wheezing which is faint in the apices with no conversational dyspnea, accessory muscle use or retractions, no lower extremity edema, equal radial pulses and no other significant physical exam findings.  Will give treatment for COPD exacerbation as well as pain control and obtain labs to assess for electrode MIs, anemia, MARIANO, COVID flu RSV testing, chest x-ray to assess for pneumonia, pneumothorax, pulmonary edema, D-dimer to assess for pulmonary embolism and continue to monitor.    Amount and/or Complexity of Data Reviewed  Labs: ordered.  Radiology: ordered and independent interpretation performed.    Risk  Prescription drug management.             Disposition  Final diagnoses:   None     ED Disposition       None          Follow-up Information    None         Patient's Medications   Discharge Prescriptions    No medications on file       No discharge procedures on file.    PDMP Review       None            ED  Provider  Electronically Signed by             Shanon Gusman,   01/22/24 1828

## 2024-01-23 LAB
ATRIAL RATE: 89 BPM
P AXIS: 79 DEGREES
PR INTERVAL: 132 MS
QRS AXIS: 87 DEGREES
QRSD INTERVAL: 88 MS
QT INTERVAL: 332 MS
QTC INTERVAL: 403 MS
T WAVE AXIS: 62 DEGREES
VENTRICULAR RATE: 89 BPM

## 2024-02-01 ENCOUNTER — TELEPHONE (OUTPATIENT)
Dept: INTERNAL MEDICINE CLINIC | Facility: CLINIC | Age: 64
End: 2024-02-01

## 2024-02-01 DIAGNOSIS — J43.9 PULMONARY EMPHYSEMA, UNSPECIFIED EMPHYSEMA TYPE (HCC): Primary | ICD-10-CM

## 2024-02-01 RX ORDER — DEXTROMETHORPHAN HYDROBROMIDE AND PROMETHAZINE HYDROCHLORIDE 15; 6.25 MG/5ML; MG/5ML
1.25 SYRUP ORAL 4 TIMES DAILY PRN
Qty: 118 ML | Refills: 0 | Status: SHIPPED | OUTPATIENT
Start: 2024-02-01

## 2024-02-01 RX ORDER — PREDNISONE 20 MG/1
40 TABLET ORAL DAILY
Qty: 10 TABLET | Refills: 0 | Status: SHIPPED | OUTPATIENT
Start: 2024-02-01 | End: 2024-02-06

## 2024-02-01 NOTE — TELEPHONE ENCOUNTER
Patient was at the ER for chest pain from coughing. Patient was told to fup  with you.   Patient is asking for Promethison and Prednsone?    Patient stopped by and wanted an apt but you did not have any apts left.    Please advise......

## 2024-02-27 ENCOUNTER — OFFICE VISIT (OUTPATIENT)
Dept: INTERNAL MEDICINE CLINIC | Facility: CLINIC | Age: 64
End: 2024-02-27
Payer: COMMERCIAL

## 2024-02-27 VITALS
OXYGEN SATURATION: 95 % | SYSTOLIC BLOOD PRESSURE: 106 MMHG | DIASTOLIC BLOOD PRESSURE: 80 MMHG | RESPIRATION RATE: 18 BRPM | WEIGHT: 175.4 LBS | HEART RATE: 93 BPM | BODY MASS INDEX: 26.58 KG/M2 | HEIGHT: 68 IN | TEMPERATURE: 97.6 F

## 2024-02-27 DIAGNOSIS — F17.210 TOBACCO DEPENDENCE DUE TO CIGARETTES: Primary | ICD-10-CM

## 2024-02-27 DIAGNOSIS — Z87.19 HISTORY OF DIVERTICULITIS: ICD-10-CM

## 2024-02-27 DIAGNOSIS — M54.2 NECK PAIN: ICD-10-CM

## 2024-02-27 DIAGNOSIS — J43.9 PULMONARY EMPHYSEMA, UNSPECIFIED EMPHYSEMA TYPE (HCC): ICD-10-CM

## 2024-02-27 DIAGNOSIS — R10.32 LEFT LOWER QUADRANT ABDOMINAL PAIN: ICD-10-CM

## 2024-02-27 PROCEDURE — 99214 OFFICE O/P EST MOD 30 MIN: CPT | Performed by: INTERNAL MEDICINE

## 2024-02-27 RX ORDER — PREDNISONE 20 MG/1
40 TABLET ORAL DAILY
Qty: 10 TABLET | Refills: 0 | Status: SHIPPED | OUTPATIENT
Start: 2024-02-27 | End: 2024-03-03

## 2024-02-27 RX ORDER — MONTELUKAST SODIUM 10 MG/1
10 TABLET ORAL
Qty: 30 TABLET | Refills: 5 | Status: SHIPPED | OUTPATIENT
Start: 2024-02-27

## 2024-02-27 RX ORDER — AZITHROMYCIN 250 MG/1
TABLET, FILM COATED ORAL DAILY
Qty: 6 TABLET | Refills: 0 | Status: SHIPPED | OUTPATIENT
Start: 2024-02-27 | End: 2024-03-02

## 2024-02-27 NOTE — PROGRESS NOTES
Assessment/Plan:     Here for 4 month follow up;    H/o COPD, he follows with pulmonary, was recently at ED with COPD exacerbation, xray normal; sent home with steroids and ABT; still smoking and not ready to quit today. Continue regular use of anoro and albuterol prn. Z pack and steroids sent for future COPD exacerbation.    H/o recurrent diverticulitis and he reports alternating diarrhea and constipation, abdominal bulge, h/o hernia; denies fever chills, has CT a/p pending, call GI and surgery.    H/o neck pain, back pain; chiropractor ordered.     Quality Measures:       Depression Screening and Follow-up Plan: Patient was screened for depression during today's encounter. They screened negative with a PHQ-2 score of 0.    Tobacco Cessation Counseling: Tobacco cessation counseling was provided. The patient is sincerely urged to quit consumption of tobacco. He is not ready to quit tobacco.          Return in about 4 months (around 6/27/2024).    No problem-specific Assessment & Plan notes found for this encounter.       Diagnoses and all orders for this visit:    Tobacco dependence due to cigarettes    Pulmonary emphysema, unspecified emphysema type (HCC)  -     montelukast (SINGULAIR) 10 mg tablet; Take 1 tablet (10 mg total) by mouth daily at bedtime  -     azithromycin (Zithromax) 250 mg tablet; Take 2 tablets (500 mg total) by mouth daily for 1 day, THEN 1 tablet (250 mg total) daily for 4 days.  -     predniSONE 20 mg tablet; Take 2 tablets (40 mg total) by mouth daily for 5 days    History of diverticulitis    Left lower quadrant abdominal pain    Neck pain  -     Ambulatory Referral to Chiropractic; Future          Subjective:      Patient ID: Felipe Sanders III is a 63 y.o. male.      Patient is here for routine follow-up.  We reviewed his chronic medical problems.  Reviewed recent blood work.  Ordered labs for next time including CBC CMP TSH A1c lipid PSA.        ALLERGIES:  No Known Allergies    CURRENT  MEDICATIONS:    Current Outpatient Medications:     albuterol (PROVENTIL HFA,VENTOLIN HFA) 90 mcg/act inhaler, Inhale 2 puffs every 4 (four) hours as needed for wheezing, Disp: 18 g, Rfl: 3    azithromycin (Zithromax) 250 mg tablet, Take 2 tablets (500 mg total) by mouth daily for 1 day, THEN 1 tablet (250 mg total) daily for 4 days., Disp: 6 tablet, Rfl: 0    montelukast (SINGULAIR) 10 mg tablet, Take 1 tablet (10 mg total) by mouth daily at bedtime, Disp: 30 tablet, Rfl: 5    predniSONE 20 mg tablet, Take 2 tablets (40 mg total) by mouth daily for 5 days, Disp: 10 tablet, Rfl: 0    promethazine-dextromethorphan (PHENERGAN-DM) 6.25-15 mg/5 mL oral syrup, Take 1.25 mL by mouth 4 (four) times a day as needed for cough, Disp: 118 mL, Rfl: 0    umeclidinium-vilanterol (Anoro Ellipta) 62.5-25 MCG/INH inhaler, Inhale 1 puff daily, Disp: , Rfl:     ACTIVE PROBLEM LIST:  Patient Active Problem List   Diagnosis    Chronic viral hepatitis B without delta agent and without coma (HCC)    COPD (chronic obstructive pulmonary disease) (HCC)    Tobacco abuse disorder    Right inguinal hernia    Tobacco dependence due to cigarettes    History of diverticulitis       PAST MEDICAL HISTORY:  Past Medical History:   Diagnosis Date    COPD (chronic obstructive pulmonary disease) (HCC)     Diverticulitis of colon     Erectile dysfunction     Hernia of abdominal cavity     Liver cyst        PAST SURGICAL HISTORY:  History reviewed. No pertinent surgical history.    FAMILY HISTORY:  History reviewed. No pertinent family history.    SOCIAL HISTORY:  Social History     Socioeconomic History    Marital status: /Civil Union     Spouse name: Not on file    Number of children: Not on file    Years of education: Not on file    Highest education level: Not on file   Occupational History    Not on file   Tobacco Use    Smoking status: Every Day     Current packs/day: 1.50     Average packs/day: 1.5 packs/day for 50.0 years (75.0 ttl pk-yrs)      Types: Cigarettes    Smokeless tobacco: Never   Vaping Use    Vaping status: Never Used   Substance and Sexual Activity    Alcohol use: Not Currently    Drug use: No    Sexual activity: Yes   Other Topics Concern    Not on file   Social History Narrative    Not on file     Social Determinants of Health     Financial Resource Strain: Not on file   Food Insecurity: No Food Insecurity (12/24/2021)    Hunger Vital Sign     Worried About Running Out of Food in the Last Year: Never true     Ran Out of Food in the Last Year: Never true   Transportation Needs: No Transportation Needs (12/24/2021)    PRAPARE - Transportation     Lack of Transportation (Medical): No     Lack of Transportation (Non-Medical): No   Physical Activity: Not on file   Stress: Not on file   Social Connections: Not on file   Intimate Partner Violence: Not on file   Housing Stability: Low Risk  (12/24/2021)    Housing Stability Vital Sign     Unable to Pay for Housing in the Last Year: No     Number of Places Lived in the Last Year: 1     Unstable Housing in the Last Year: No       Review of Systems   Constitutional:  Negative for chills and fever.   HENT:  Positive for voice change. Negative for ear pain and sore throat.    Eyes:  Negative for pain and visual disturbance.   Respiratory:  Positive for cough, shortness of breath and wheezing.    Cardiovascular:  Negative for chest pain and palpitations.   Gastrointestinal:  Positive for abdominal pain, blood in stool, constipation and rectal pain. Negative for vomiting.   Genitourinary:  Negative for dysuria and hematuria.   Musculoskeletal:  Positive for arthralgias, back pain, gait problem and neck pain.   Skin:  Negative for color change and rash.   Neurological:  Negative for seizures and syncope.   All other systems reviewed and are negative.        Objective:  Vitals:    02/27/24 0815   BP: 106/80   BP Location: Right arm   Patient Position: Sitting   Cuff Size: Adult   Pulse: 93   Resp: 18  "  Temp: 97.6 °F (36.4 °C)   TempSrc: Tympanic   SpO2: 95%   Weight: 79.6 kg (175 lb 6.4 oz)   Height: 5' 8\" (1.727 m)     Body mass index is 26.67 kg/m².     Physical Exam  Vitals and nursing note reviewed.   Constitutional:       Appearance: Normal appearance.   HENT:      Head: Normocephalic and atraumatic.   Cardiovascular:      Rate and Rhythm: Normal rate and regular rhythm.      Heart sounds: Normal heart sounds.   Pulmonary:      Effort: Pulmonary effort is normal.      Comments: Decreased sounds,no retractions, accessory muscle use or conversational dyspnea  Musculoskeletal:         General: Normal range of motion.      Cervical back: Normal range of motion.   Lymphadenopathy:      Cervical: No cervical adenopathy.   Skin:     General: Skin is warm and dry.   Neurological:      General: No focal deficit present.      Mental Status: He is alert and oriented to person, place, and time. Mental status is at baseline.   Psychiatric:         Mood and Affect: Mood normal.           RESULTS:  Hemoglobin   Date/Time Value Ref Range Status   01/22/2024 01:46 PM 16.9 12.0 - 17.0 g/dL Final     Hematocrit   Date/Time Value Ref Range Status   01/22/2024 01:46 PM 50.8 (H) 36.5 - 49.3 % Final     Platelets   Date/Time Value Ref Range Status   01/22/2024 01:46  149 - 390 Thousands/uL Final     Sodium   Date/Time Value Ref Range Status   01/22/2024 01:46  135 - 147 mmol/L Final   04/15/2022 03:40  136 - 145 mmol/L Final     BUN   Date/Time Value Ref Range Status   01/22/2024 01:46 PM 18 5 - 25 mg/dL Final   04/15/2022 03:40 AM 15 7 - 25 mg/dL Final     Creatinine   Date/Time Value Ref Range Status   01/22/2024 01:46 PM 1.06 0.60 - 1.30 mg/dL Final     Comment:     Standardized to IDMS reference method   04/15/2022 03:40 AM 1.00 0.70 - 1.30 mg/dL Final      In chart    This note was created with voice recognition software.  Phonic, grammatical and spelling errors may be present within the note as a result.  "

## 2024-03-26 ENCOUNTER — TELEPHONE (OUTPATIENT)
Dept: INTERNAL MEDICINE CLINIC | Facility: CLINIC | Age: 64
End: 2024-03-26

## 2024-03-26 NOTE — TELEPHONE ENCOUNTER
Patient needs a note so they do not shut off his power before 4/3 please. PPL -     Account # [id number]    Phone # [phone]    Fax # [phone]    Patient said that he needs the power to run his oxygen.    Please advise.....   Normal vision: sees adequately in most situations; can see medication labels, newsprint

## 2024-03-27 NOTE — TELEPHONE ENCOUNTER
Pt called saying that dr franks office pulm told him they couldn't do the form that for the ppl stuff it needs to come from the pcp even though we dont order oxygen    Are we able to contact and do this for him?

## 2024-03-27 NOTE — TELEPHONE ENCOUNTER
Pt called,  ppl told him pcp has to request form   pt cannot,  so we need to call and give acct # and such to have them fax it,    ill call them and have form sent , so just be on lookout in [employer] today/gustavo for it

## 2024-04-02 ENCOUNTER — OFFICE VISIT (OUTPATIENT)
Dept: INTERNAL MEDICINE CLINIC | Facility: CLINIC | Age: 64
End: 2024-04-02
Payer: COMMERCIAL

## 2024-04-02 VITALS
WEIGHT: 180 LBS | DIASTOLIC BLOOD PRESSURE: 84 MMHG | SYSTOLIC BLOOD PRESSURE: 134 MMHG | HEIGHT: 68 IN | HEART RATE: 95 BPM | BODY MASS INDEX: 27.28 KG/M2 | OXYGEN SATURATION: 96 %

## 2024-04-02 DIAGNOSIS — Z87.19 HISTORY OF DIVERTICULITIS: Primary | ICD-10-CM

## 2024-04-02 DIAGNOSIS — R10.32 LEFT LOWER QUADRANT ABDOMINAL PAIN: ICD-10-CM

## 2024-04-02 DIAGNOSIS — J43.9 PULMONARY EMPHYSEMA, UNSPECIFIED EMPHYSEMA TYPE (HCC): ICD-10-CM

## 2024-04-02 DIAGNOSIS — F17.210 TOBACCO DEPENDENCE DUE TO CIGARETTES: ICD-10-CM

## 2024-04-02 PROBLEM — R91.1 LUNG NODULE: Status: RESOLVED | Noted: 2020-04-22 | Resolved: 2024-04-02

## 2024-04-02 PROBLEM — Z72.0 TOBACCO USE: Status: ACTIVE | Noted: 2020-04-22

## 2024-04-02 PROBLEM — J43.8 OTHER EMPHYSEMA (HCC): Status: ACTIVE | Noted: 2021-08-11

## 2024-04-02 PROCEDURE — 99214 OFFICE O/P EST MOD 30 MIN: CPT | Performed by: INTERNAL MEDICINE

## 2024-04-02 RX ORDER — PREDNISONE 20 MG/1
40 TABLET ORAL DAILY
Qty: 10 TABLET | Refills: 0 | Status: SHIPPED | OUTPATIENT
Start: 2024-04-02 | End: 2024-04-07

## 2024-04-02 RX ORDER — DEXTROMETHORPHAN HYDROBROMIDE AND PROMETHAZINE HYDROCHLORIDE 15; 6.25 MG/5ML; MG/5ML
1.25 SYRUP ORAL 4 TIMES DAILY PRN
Qty: 118 ML | Refills: 0 | Status: SHIPPED | OUTPATIENT
Start: 2024-04-02

## 2024-04-02 NOTE — PROGRESS NOTES
Assessment/Plan:     Patient has been complaining about abdominal pain for few months now, he has a history of  recurrent diverticulitis and he reports alternating diarrhea and constipation, abdominal bulge, h/o hernia; denies fever chills,     CAT scan of his abdomen and pelvis has been pending since last year. Instructed him that he needs to call GI.  He was scheduled for colonoscopy but he canceled at the last minute. He is refusing IV contrast.    History of pulmonary emphysema, reports SOB. Prednisone sent.     Quality Measures:       Depression Screening and Follow-up Plan: Clincally patient does not have depression. No treatment is required.     Tobacco Cessation Counseling: Tobacco cessation counseling was provided. The patient is sincerely urged to quit consumption of tobacco. He is not ready to quit tobacco.          Return for Next scheduled follow up.    No problem-specific Assessment & Plan notes found for this encounter.       Diagnoses and all orders for this visit:    History of diverticulitis  -     CT abdomen pelvis wo contrast; Future  -     Ambulatory Referral to Gastroenterology; Future    Left lower quadrant abdominal pain  -     CT abdomen pelvis wo contrast; Future  -     Ambulatory Referral to Gastroenterology; Future    Tobacco dependence due to cigarettes    Pulmonary emphysema, unspecified emphysema type (HCC)  -     promethazine-dextromethorphan (PHENERGAN-DM) 6.25-15 mg/5 mL oral syrup; Take 1.25 mL by mouth 4 (four) times a day as needed for cough  -     predniSONE 20 mg tablet; Take 2 tablets (40 mg total) by mouth daily for 5 days          Subjective:      Patient ID: Felipe Sanders III is a 63 y.o. male.    Here with continued abdominal pain.        ALLERGIES:  No Known Allergies    CURRENT MEDICATIONS:    Current Outpatient Medications:     predniSONE 20 mg tablet, Take 2 tablets (40 mg total) by mouth daily for 5 days, Disp: 10 tablet, Rfl: 0    promethazine-dextromethorphan  (PHENERGAN-DM) 6.25-15 mg/5 mL oral syrup, Take 1.25 mL by mouth 4 (four) times a day as needed for cough, Disp: 118 mL, Rfl: 0    umeclidinium-vilanterol (Anoro Ellipta) 62.5-25 MCG/INH inhaler, Inhale 1 puff daily, Disp: , Rfl:     albuterol (PROVENTIL HFA,VENTOLIN HFA) 90 mcg/act inhaler, Inhale 2 puffs every 4 (four) hours as needed for wheezing (Patient not taking: Reported on 4/2/2024), Disp: 18 g, Rfl: 3    ACTIVE PROBLEM LIST:  Patient Active Problem List   Diagnosis    Chronic viral hepatitis B without delta agent and without coma (HCC)    COPD (chronic obstructive pulmonary disease) (HCC)    Tobacco abuse disorder    Right inguinal hernia    Tobacco dependence due to cigarettes    History of diverticulitis    Tobacco use    Other emphysema (HCC)       PAST MEDICAL HISTORY:  Past Medical History:   Diagnosis Date    COPD (chronic obstructive pulmonary disease) (HCC)     Diverticulitis of colon     Erectile dysfunction     Hernia of abdominal cavity     Liver cyst        PAST SURGICAL HISTORY:  History reviewed. No pertinent surgical history.    FAMILY HISTORY:  History reviewed. No pertinent family history.    SOCIAL HISTORY:  Social History     Socioeconomic History    Marital status: /Civil Union     Spouse name: Not on file    Number of children: Not on file    Years of education: Not on file    Highest education level: Not on file   Occupational History    Not on file   Tobacco Use    Smoking status: Every Day     Current packs/day: 1.50     Average packs/day: 1.5 packs/day for 50.0 years (75.0 ttl pk-yrs)     Types: Cigarettes    Smokeless tobacco: Never   Vaping Use    Vaping status: Never Used   Substance and Sexual Activity    Alcohol use: Not Currently    Drug use: No    Sexual activity: Yes   Other Topics Concern    Not on file   Social History Narrative    Not on file     Social Determinants of Health     Financial Resource Strain: Not on file   Food Insecurity: No Food Insecurity  "(12/24/2021)    Hunger Vital Sign     Worried About Running Out of Food in the Last Year: Never true     Ran Out of Food in the Last Year: Never true   Transportation Needs: No Transportation Needs (12/24/2021)    PRAPARE - Transportation     Lack of Transportation (Medical): No     Lack of Transportation (Non-Medical): No   Physical Activity: Not on file   Stress: Not on file   Social Connections: Not on file   Intimate Partner Violence: Not on file   Housing Stability: Low Risk  (12/24/2021)    Housing Stability Vital Sign     Unable to Pay for Housing in the Last Year: No     Number of Places Lived in the Last Year: 1     Unstable Housing in the Last Year: No       Review of Systems   Constitutional:  Negative for chills and fever.   HENT:  Negative for ear pain and sore throat.    Eyes:  Negative for pain and visual disturbance.   Respiratory:  Negative for cough and shortness of breath.    Cardiovascular:  Negative for chest pain and palpitations.   Gastrointestinal:  Positive for abdominal pain. Negative for vomiting.   Genitourinary:  Negative for dysuria and hematuria.   Musculoskeletal:  Negative for arthralgias and back pain.   Skin:  Negative for color change and rash.   Neurological:  Negative for seizures and syncope.   All other systems reviewed and are negative.        Objective:  Vitals:    04/02/24 1257   BP: 134/84   BP Location: Left arm   Patient Position: Sitting   Cuff Size: Standard   Pulse: 95   SpO2: 96%   Weight: 81.6 kg (180 lb)   Height: 5' 8\" (1.727 m)     Body mass index is 27.37 kg/m².     Physical Exam  Vitals and nursing note reviewed.   Constitutional:       Appearance: Normal appearance.   HENT:      Head: Normocephalic and atraumatic.   Cardiovascular:      Rate and Rhythm: Normal rate.   Pulmonary:      Effort: Pulmonary effort is normal.      Breath sounds: Normal breath sounds.   Abdominal:      General: Abdomen is flat. Bowel sounds are normal. There is no distension.      " Palpations: Abdomen is soft.      Tenderness: There is no abdominal tenderness.   Musculoskeletal:         General: Normal range of motion.      Cervical back: Normal range of motion.   Skin:     General: Skin is warm and dry.   Neurological:      General: No focal deficit present.      Mental Status: He is alert and oriented to person, place, and time. Mental status is at baseline.   Psychiatric:         Mood and Affect: Mood normal.           RESULTS:  Hemoglobin   Date/Time Value Ref Range Status   01/22/2024 01:46 PM 16.9 12.0 - 17.0 g/dL Final     Hematocrit   Date/Time Value Ref Range Status   01/22/2024 01:46 PM 50.8 (H) 36.5 - 49.3 % Final     Platelets   Date/Time Value Ref Range Status   01/22/2024 01:46  149 - 390 Thousands/uL Final     Sodium   Date/Time Value Ref Range Status   01/22/2024 01:46  135 - 147 mmol/L Final   04/15/2022 03:40  136 - 145 mmol/L Final     BUN   Date/Time Value Ref Range Status   01/22/2024 01:46 PM 18 5 - 25 mg/dL Final   04/15/2022 03:40 AM 15 7 - 25 mg/dL Final     Creatinine   Date/Time Value Ref Range Status   01/22/2024 01:46 PM 1.06 0.60 - 1.30 mg/dL Final     Comment:     Standardized to IDMS reference method   04/15/2022 03:40 AM 1.00 0.70 - 1.30 mg/dL Final      In chart    This note was created with voice recognition software.  Phonic, grammatical and spelling errors may be present within the note as a result.

## 2024-04-11 ENCOUNTER — HOSPITAL ENCOUNTER (OUTPATIENT)
Dept: CT IMAGING | Facility: HOSPITAL | Age: 64
End: 2024-04-11
Attending: INTERNAL MEDICINE
Payer: COMMERCIAL

## 2024-04-11 DIAGNOSIS — R10.32 LEFT LOWER QUADRANT ABDOMINAL PAIN: ICD-10-CM

## 2024-04-11 DIAGNOSIS — Z87.19 HISTORY OF DIVERTICULITIS: ICD-10-CM

## 2024-04-11 PROCEDURE — 74176 CT ABD & PELVIS W/O CONTRAST: CPT

## 2024-04-15 ENCOUNTER — TELEPHONE (OUTPATIENT)
Dept: INTERNAL MEDICINE CLINIC | Facility: CLINIC | Age: 64
End: 2024-04-15

## 2024-04-25 ENCOUNTER — CONSULT (OUTPATIENT)
Dept: GASTROENTEROLOGY | Facility: CLINIC | Age: 64
End: 2024-04-25
Payer: COMMERCIAL

## 2024-04-25 VITALS
WEIGHT: 179.2 LBS | HEART RATE: 90 BPM | SYSTOLIC BLOOD PRESSURE: 128 MMHG | OXYGEN SATURATION: 97 % | DIASTOLIC BLOOD PRESSURE: 82 MMHG | HEIGHT: 68 IN | TEMPERATURE: 97.6 F | BODY MASS INDEX: 27.16 KG/M2

## 2024-04-25 DIAGNOSIS — Z87.19 HISTORY OF DIVERTICULITIS: ICD-10-CM

## 2024-04-25 DIAGNOSIS — R10.32 LEFT LOWER QUADRANT ABDOMINAL PAIN: ICD-10-CM

## 2024-04-25 DIAGNOSIS — R93.2 ABNORMAL CT OF LIVER: Primary | ICD-10-CM

## 2024-04-25 DIAGNOSIS — B18.1 CHRONIC VIRAL HEPATITIS B WITHOUT DELTA AGENT AND WITHOUT COMA (HCC): ICD-10-CM

## 2024-04-25 PROCEDURE — 99213 OFFICE O/P EST LOW 20 MIN: CPT | Performed by: PHYSICIAN ASSISTANT

## 2024-04-25 RX ORDER — AMOXICILLIN AND CLAVULANATE POTASSIUM 875; 125 MG/1; MG/1
TABLET, FILM COATED ORAL
COMMUNITY
Start: 2024-04-11 | End: 2024-04-25

## 2024-04-25 RX ORDER — PREDNISONE 10 MG/1
TABLET ORAL
COMMUNITY
Start: 2024-04-11 | End: 2024-05-07

## 2024-04-25 RX ORDER — DICYCLOMINE HCL 20 MG
20 TABLET ORAL EVERY 6 HOURS PRN
Qty: 30 TABLET | Refills: 3 | Status: SHIPPED | OUTPATIENT
Start: 2024-04-25

## 2024-04-25 NOTE — PATIENT INSTRUCTIONS
Scheduled date of colonoscopy (as of today):5/16/24  Physician performing colonoscopy:Christopher  Location of colonoscopy:[facility]  Bowel prep reviewed with patient:miralax/dulcolax  Instructions reviewed with patient by:Beth CAO  Clearances:  none

## 2024-04-25 NOTE — PROGRESS NOTES
[facility] - Outpatient Consultation  Felipe Sanders III 63 y.o. male MRN: [id number]  Encounter: [id number]          ASSESSMENT AND PLAN:      1. Left lower quadrant abdominal pain  2. History of diverticulitis  He notes chronic, constant LLQ pain  CT AP without cause  Will plan colonoscopy - he has been scheduled for this in the past but canceled or no showed.  Explained the importance of this including ruling out a colon tumor    Dicyclomine as needed    3. Abnormal CT of liver  CT AP without contrast shows vague hypodense lesions in the liver which are indeterminate and have minimally increased in size when compared to 2022    He needs to have an MRI with contrast -he has refused contrast in the past as he reports he is scared of needles  Review the importance of having the MRI with contrast including ruling out liver malignancy    4. Chronic viral hepatitis B without delta agent and without coma (HCC)  It seems he was diagnosed with acute hepatitis B in 2020 at a hospital in [place]  He did have a follow-up DNA level 1 month later showing improvement from 14,000-17  Will once again follow-up these levels  Liver enzymes are normal    ______________________________________________________________________    HPI: 63-year-old male with a history of diverticulosis, COPD, hepatitis B, diverticulitis who presents for evaluation of left lower quadrant abdominal pain.  He reports that this pain has been going on for over 10 years.  He reports that he has a history of diverticulitis and actually had to be admitted to the hospital for this approximately 10 years ago.  He notes that he has pain in the left lower quadrant every day but it exacerbates at different times.  He thinks that certain foods make it worse such as red meat.  He has been trying to modify his diet.  He reports that recently his bowel movements have changed that they are thin and ribbonlike.  He has had intermittent  episodes of rectal bleeding.  He has never had a colonoscopy.  He was evaluated in this office in 2022 with the same complaint.  At that time he was scheduled for a colonoscopy but reports that he forgot about it and never prepped and canceled the procedure.  He was called to reschedule but did not do this.  CTs in the past have shown indeterminate liver lesions.  His most recent CT from earlier this month shows vague hypodense lesions in the liver which are indeterminate and they were noted to have minimally increased in size when compared to his scan from 2022.  He has been advised to have CTs with contrast or MRIs but he has consistently refused.  He reports that he has a fear of needles and has refused the IV contrast.  He has a history of hepatitis B diagnosed in 2020.  This was diagnosed and treated at a hospital in [place].  He was told it was acute hepatitis B and should resolve itself.  He notes that his wife has hepatitis C and is an IV drug user.  He has been tested and found to be negative for hepatitis C.      REVIEW OF SYSTEMS:    CONSTITUTIONAL: Denies any fever, chills, rigors, and weight loss.  HEENT: No earache or tinnitus. Denies hearing loss or visual disturbances.  CARDIOVASCULAR: No chest pain or palpitations.   RESPIRATORY: Denies any cough, hemoptysis, shortness of breath or dyspnea on exertion.  GASTROINTESTINAL: As noted in the History of Present Illness.   GENITOURINARY: No problems with urination. Denies any hematuria or dysuria.  NEUROLOGIC: No dizziness or vertigo, denies headaches.   MUSCULOSKELETAL: Denies any muscle or joint pain.   SKIN: Denies skin rashes or itching.   ENDOCRINE: Denies excessive thirst. Denies intolerance to heat or cold.  PSYCHOSOCIAL: Denies depression or anxiety. Denies any recent memory loss.       Historical Information   Past Medical History:   Diagnosis Date    COPD (chronic obstructive pulmonary disease) (HCC)     Diverticulitis of colon     Erectile  "dysfunction     Hernia of abdominal cavity     Liver cyst      History reviewed. No pertinent surgical history.  Social History   Social History     Substance and Sexual Activity   Alcohol Use Not Currently     Social History     Substance and Sexual Activity   Drug Use No     Social History     Tobacco Use   Smoking Status Every Day    Current packs/day: 1.50    Average packs/day: 1.5 packs/day for 50.0 years (75.0 ttl pk-yrs)    Types: Cigarettes   Smokeless Tobacco Never     History reviewed. No pertinent family history.    Meds/Allergies       Current Outpatient Medications:     dicyclomine (BENTYL) 20 mg tablet    predniSONE 10 mg tablet    promethazine-dextromethorphan (PHENERGAN-DM) 6.25-15 mg/5 mL oral syrup    umeclidinium-vilanterol (Anoro Ellipta) 62.5-25 MCG/INH inhaler    No Known Allergies        Objective     Blood pressure 128/82, pulse 90, temperature 97.6 °F (36.4 °C), temperature source Tympanic, height 5' 8\" (1.727 m), weight 81.3 kg (179 lb 3.2 oz), SpO2 97%. Body mass index is 27.25 kg/m².        PHYSICAL EXAM:      General Appearance:   Alert, cooperative, no distress   HEENT:   Normocephalic, atraumatic, anicteric.     Neck:  Supple, symmetrical, trachea midline   Lungs:   Clear to auscultation bilaterally; no rales, rhonchi or wheezing; respirations unlabored    Heart::   Regular rate and rhythm; no murmur, rub, or gallop.   Abdomen:   Soft, non-tender, non-distended; normal bowel sounds; no masses, no organomegaly    Genitalia:   Deferred    Rectal:   Deferred    Extremities:  No cyanosis, clubbing or edema    Pulses:  2+ and symmetric    Skin:  No jaundice, rashes, or lesions    Lymph nodes:  No palpable cervical lymphadenopathy        Lab Results:   No visits with results within 1 Day(s) from this visit.   Latest known visit with results is:   Admission on 01/22/2024, Discharged on 01/22/2024   Component Date Value    Ventricular Rate 01/22/2024 89     Atrial Rate 01/22/2024 89     GA " Interval 01/22/2024 132     QRSD Interval 01/22/2024 88     QT Interval 01/22/2024 332     QTC Interval 01/22/2024 403     P Axis 01/22/2024 79     QRS [place] 01/22/2024 87     T Wave [place] 01/22/2024 62     Sodium 01/22/2024 140     Potassium 01/22/2024 4.7     Chloride 01/22/2024 104     CO2 01/22/2024 29     ANION GAP 01/22/2024 7     BUN 01/22/2024 18     Creatinine 01/22/2024 1.06     Glucose 01/22/2024 90     Calcium 01/22/2024 9.5     AST 01/22/2024 13     ALT 01/22/2024 8     Alkaline Phosphatase 01/22/2024 54     Total Protein 01/22/2024 7.6     Albumin 01/22/2024 4.5     Total Bilirubin 01/22/2024 0.84     eGFR 01/22/2024 74     WBC 01/22/2024 16.57 (H)     RBC 01/22/2024 5.57     Hemoglobin 01/22/2024 16.9     Hematocrit 01/22/2024 50.8 (H)     MCV 01/22/2024 91     MCH 01/22/2024 30.3     MCHC 01/22/2024 33.3     RDW 01/22/2024 13.6     MPV 01/22/2024 9.1     Platelets 01/22/2024 297     nRBC 01/22/2024 0     Segmented % 01/22/2024 76 (H)     Immature Grans % 01/22/2024 1     Lymphocytes % 01/22/2024 12 (L)     Monocytes % 01/22/2024 10     Eosinophils Relative 01/22/2024 1     Basophils Relative 01/22/2024 0     Absolute Neutrophils 01/22/2024 12.64 (H)     Absolute Immature Grans 01/22/2024 0.09     Absolute Lymphocytes 01/22/2024 1.95     Absolute Monocytes 01/22/2024 1.66 (H)     Eosinophils Absolute 01/22/2024 0.17     Basophils Absolute 01/22/2024 0.06     hs TnI 0hr 01/22/2024 3     SARS-CoV-2 01/22/2024 Negative     INFLUENZA A PCR 01/22/2024 Negative     INFLUENZA B PCR 01/22/2024 Negative     RSV PCR 01/22/2024 Negative     D-Dimer, Quant 01/22/2024 0.36          Radiology Results:   CT abdomen pelvis wo contrast    Result Date: 4/18/2024  Narrative: CT ABDOMEN AND PELVIS WITHOUT IV CONTRAST INDICATION: Z87.19: Personal history of other diseases of the digestive system R10.32: Left lower quadrant pain. COMPARISON: Prior CT study dated 7/7/2022 and prior MRI study dated 9/13/2022. TECHNIQUE: CT  examination of the abdomen and pelvis was performed without intravenous contrast. Multiplanar 2D reformatted images were created from the source data. This examination, like all CT scans performed in the [facility], was performed utilizing techniques to minimize radiation dose exposure, including the use of iterative reconstruction and automated exposure control. Radiation dose length product (DLP) for this visit: 566 mGy-cm Enteric Contrast: Not administered. FINDINGS: ABDOMEN LOWER CHEST: Mild patchy atelectasis in the visualized lower lobes. LIVER/BILIARY TREE: There is a 1.5 cm vague hypodense lesion in the right hepatic lobe on series 2, image 63, slightly increased in size previously measuring 1.3 cm. There is a 1 cm hypodense lesion in the medial left hepatic lobe previously measuring 9 mm. As mentioned previously, these are indeterminate. GALLBLADDER: No calcified gallstones. No pericholecystic inflammatory change. SPLEEN: Unremarkable. PANCREAS: Unremarkable. ADRENAL GLANDS: Unremarkable. KIDNEYS/URETERS: Unremarkable. No hydronephrosis. STOMACH AND BOWEL: No bowel obstruction. Mild sigmoid diverticulosis without acute diverticulitis. No focal inflammatory process. APPENDIX: Normal. ABDOMINOPELVIC CAVITY: No ascites. No pneumoperitoneum. No lymphadenopathy. VESSELS: The abdominal aorta is calcified. PELVIS REPRODUCTIVE ORGANS: The prostate is top normal in size. URINARY BLADDER: Unremarkable. ABDOMINAL WALL/INGUINAL REGIONS: Tiny fat-containing periumbilical hernia. BONES: No acute fracture or suspicious osseous lesion. Slight degenerative retrolisthesis of L2 over L3 and L3 over L4 again seen. Mild to moderate multilevel degenerative disc disease. Moderate bilateral hip osteoarthrosis.     Impression: Vague hypodense lesions in the liver are redemonstrated, indeterminate. These have minimally increased in size. While these may reflect hemangiomas, a more definitive diagnosis can be made  with MRI without and with intravenous gadolinium. No acute CT findings in the abdomen or pelvis. Mild sigmoid diverticulosis without acute diverticulitis. Workstation performed: [id number]

## 2024-04-29 ENCOUNTER — TELEPHONE (OUTPATIENT)
Age: 64
End: 2024-04-29

## 2024-04-29 NOTE — TELEPHONE ENCOUNTER
Pt called asking what is wrong with him?  Said he had a CT and now he is to have an MRI. Was told he may have a tumor and wants to know if he has cancer.    Also, states he is having diarrhea.

## 2024-04-30 ENCOUNTER — TELEPHONE (OUTPATIENT)
Age: 64
End: 2024-04-30

## 2024-05-07 ENCOUNTER — HOSPITAL ENCOUNTER (OUTPATIENT)
Dept: MRI IMAGING | Facility: HOSPITAL | Age: 64
Discharge: HOME | End: 2024-05-07
Payer: COMMERCIAL

## 2024-05-07 ENCOUNTER — APPOINTMENT (OUTPATIENT)
Dept: LAB | Facility: HOSPITAL | Age: 64
End: 2024-05-07
Payer: COMMERCIAL

## 2024-05-07 DIAGNOSIS — R93.2 ABNORMAL CT OF LIVER: ICD-10-CM

## 2024-05-07 DIAGNOSIS — B18.1 CHRONIC VIRAL HEPATITIS B WITHOUT DELTA AGENT AND WITHOUT COMA (HCC): ICD-10-CM

## 2024-05-07 LAB
AFP-TM SERPL-MCNC: 3.47 NG/ML (ref 0–9)
ANION GAP SERPL CALCULATED.3IONS-SCNC: 7 MMOL/L (ref 4–13)
BUN SERPL-MCNC: 14 MG/DL (ref 5–25)
CALCIUM SERPL-MCNC: 9.3 MG/DL (ref 8.4–10.2)
CHLORIDE SERPL-SCNC: 109 MMOL/L (ref 96–108)
CO2 SERPL-SCNC: 27 MMOL/L (ref 21–32)
CREAT SERPL-MCNC: 1.04 MG/DL (ref 0.6–1.3)
GFR SERPL CREATININE-BSD FRML MDRD: 76 ML/MIN/1.73SQ M
GLUCOSE SERPL-MCNC: 78 MG/DL (ref 65–140)
POTASSIUM SERPL-SCNC: 3.9 MMOL/L (ref 3.5–5.3)
SODIUM SERPL-SCNC: 143 MMOL/L (ref 135–147)

## 2024-05-07 PROCEDURE — 80048 BASIC METABOLIC PNL TOTAL CA: CPT

## 2024-05-07 PROCEDURE — 36415 COLL VENOUS BLD VENIPUNCTURE: CPT

## 2024-05-07 PROCEDURE — 86707 HEPATITIS BE ANTIBODY: CPT

## 2024-05-07 PROCEDURE — 82105 ALPHA-FETOPROTEIN SERUM: CPT

## 2024-05-07 PROCEDURE — 87350 HEPATITIS BE AG IA: CPT

## 2024-05-07 PROCEDURE — 74181 MRI ABDOMEN W/O CONTRAST: CPT

## 2024-05-07 PROCEDURE — 87340 HEPATITIS B SURFACE AG IA: CPT

## 2024-05-08 ENCOUNTER — TELEPHONE (OUTPATIENT)
Age: 64
End: 2024-05-08

## 2024-05-08 LAB — HBV SURFACE AG SER QL: NORMAL

## 2024-05-08 NOTE — TELEPHONE ENCOUNTER
Pt concern regarding results of MRI need to know what was seen, i told him the DR will speak with him at his apt.

## 2024-05-08 NOTE — TELEPHONE ENCOUNTER
Pt called back concerned over test results.  Asking if the results were really bad that he needs to come in for an appointment.  Asking for some insight.     Pt is scheduled for an appointment on Friday morning

## 2024-05-09 LAB
HBV E AB SERPL QL IA: POSITIVE
HBV E AG SERPL QL IA: NEGATIVE

## 2024-05-10 ENCOUNTER — TELEPHONE (OUTPATIENT)
Dept: INTERNAL MEDICINE CLINIC | Facility: CLINIC | Age: 64
End: 2024-05-10

## 2024-05-13 ENCOUNTER — TELEPHONE (OUTPATIENT)
Age: 64
End: 2024-05-13

## 2024-05-14 NOTE — TELEPHONE ENCOUNTER
Patient is calling regarding cancelling a procedure.    Date/Time: 05/16     Performing Physician: Vincenzo White     Performing Physician/Nursing Supervisor Notified?:  YES [] NO [x]    Patient requesting call back to reschedule: YES [x] NO []

## 2024-05-24 ENCOUNTER — TELEPHONE (OUTPATIENT)
Age: 64
End: 2024-05-24

## 2024-05-24 DIAGNOSIS — R06.02 SOB (SHORTNESS OF BREATH): Primary | ICD-10-CM

## 2024-05-24 RX ORDER — METHYLPREDNISOLONE 4 MG/1
TABLET ORAL
Qty: 21 EACH | Refills: 0 | Status: SHIPPED | OUTPATIENT
Start: 2024-05-24

## 2024-05-24 NOTE — TELEPHONE ENCOUNTER
Pt called in to report that he is having his chronic respiratory issue and didn't want to give CTS details, stated Dr. Rodriguez knows and that he needs Prednisone ordered and that his Pulmonary Dr also told him he needs an antibiotic as well.   Please further advise pt at [phone].   Thank you

## 2024-05-24 NOTE — TELEPHONE ENCOUNTER
Felipe call to ask about medication prednisone if side affects would make his eyes dilate I transfer call to [place] office.

## 2024-05-24 NOTE — TELEPHONE ENCOUNTER
Nyla w/patient explained that Rx for steroids were sent but he will need to make apt for antibiotic.  Patient then informed me that he was just arrested 3 hrs ago for accusation of DUI/Metamphetamine he was requesting to be seen today so that he can receive documentation indicating that his dilated pupils is not a result of drugs but perhaps something else medically.. he was informed no apt were available and he should either go to urgent care or make an appointment next week with our office patient acknowledged

## 2024-06-01 ENCOUNTER — HOSPITAL ENCOUNTER (EMERGENCY)
Facility: HOSPITAL | Age: 64
Discharge: HOME | End: 2024-06-01
Attending: EMERGENCY MEDICINE
Payer: COMMERCIAL

## 2024-06-01 ENCOUNTER — APPOINTMENT (EMERGENCY)
Dept: RADIOLOGY | Facility: HOSPITAL | Age: 64
End: 2024-06-01
Payer: COMMERCIAL

## 2024-06-01 ENCOUNTER — APPOINTMENT (EMERGENCY)
Dept: CT IMAGING | Facility: HOSPITAL | Age: 64
End: 2024-06-01
Payer: COMMERCIAL

## 2024-06-01 VITALS
RESPIRATION RATE: 16 BRPM | OXYGEN SATURATION: 94 % | DIASTOLIC BLOOD PRESSURE: 65 MMHG | TEMPERATURE: 97.8 F | SYSTOLIC BLOOD PRESSURE: 113 MMHG | HEART RATE: 92 BPM

## 2024-06-01 DIAGNOSIS — J18.9 PNEUMONIA: Primary | ICD-10-CM

## 2024-06-01 LAB
2HR DELTA HS TROPONIN: -1 NG/L
ALBUMIN SERPL BCP-MCNC: 4 G/DL (ref 3.5–5)
ALP SERPL-CCNC: 49 U/L (ref 34–104)
ALT SERPL W P-5'-P-CCNC: 5 U/L (ref 7–52)
ANION GAP SERPL CALCULATED.3IONS-SCNC: 10 MMOL/L (ref 4–13)
AST SERPL W P-5'-P-CCNC: 12 U/L (ref 13–39)
ATRIAL RATE: 102 BPM
ATRIAL RATE: 102 BPM
BASOPHILS # BLD AUTO: 0.06 THOUSANDS/ÂΜL (ref 0–0.1)
BASOPHILS NFR BLD AUTO: 0 % (ref 0–1)
BILIRUB SERPL-MCNC: 0.63 MG/DL (ref 0.2–1)
BNP SERPL-MCNC: 35 PG/ML (ref 0–100)
BUN SERPL-MCNC: 16 MG/DL (ref 5–25)
CALCIUM SERPL-MCNC: 9.2 MG/DL (ref 8.4–10.2)
CARDIAC TROPONIN I PNL SERPL HS: 4 NG/L
CARDIAC TROPONIN I PNL SERPL HS: 5 NG/L
CHLORIDE SERPL-SCNC: 101 MMOL/L (ref 96–108)
CO2 SERPL-SCNC: 26 MMOL/L (ref 21–32)
CREAT SERPL-MCNC: 1.16 MG/DL (ref 0.6–1.3)
EOSINOPHIL # BLD AUTO: 0.15 THOUSAND/ÂΜL (ref 0–0.61)
EOSINOPHIL NFR BLD AUTO: 1 % (ref 0–6)
ERYTHROCYTE [DISTWIDTH] IN BLOOD BY AUTOMATED COUNT: 13.5 % (ref 11.6–15.1)
GFR SERPL CREATININE-BSD FRML MDRD: 66 ML/MIN/1.73SQ M
GLUCOSE SERPL-MCNC: 109 MG/DL (ref 65–140)
HCT VFR BLD AUTO: 46.5 % (ref 36.5–49.3)
HGB BLD-MCNC: 15.5 G/DL (ref 12–17)
IMM GRANULOCYTES # BLD AUTO: 0.13 THOUSAND/UL (ref 0–0.2)
IMM GRANULOCYTES NFR BLD AUTO: 1 % (ref 0–2)
LACTATE SERPL-SCNC: 1.2 MMOL/L (ref 0.5–2)
LYMPHOCYTES # BLD AUTO: 1.33 THOUSANDS/ÂΜL (ref 0.6–4.47)
LYMPHOCYTES NFR BLD AUTO: 7 % (ref 14–44)
MCH RBC QN AUTO: 30.6 PG (ref 26.8–34.3)
MCHC RBC AUTO-ENTMCNC: 33.3 G/DL (ref 31.4–37.4)
MCV RBC AUTO: 92 FL (ref 82–98)
MONOCYTES # BLD AUTO: 2.35 THOUSAND/ÂΜL (ref 0.17–1.22)
MONOCYTES NFR BLD AUTO: 12 % (ref 4–12)
NEUTROPHILS # BLD AUTO: 16.13 THOUSANDS/ÂΜL (ref 1.85–7.62)
NEUTS SEG NFR BLD AUTO: 79 % (ref 43–75)
NRBC BLD AUTO-RTO: 0 /100 WBCS
P AXIS: 85 DEGREES
P AXIS: 85 DEGREES
PLATELET # BLD AUTO: 212 THOUSANDS/UL (ref 149–390)
PMV BLD AUTO: 9.4 FL (ref 8.9–12.7)
POTASSIUM SERPL-SCNC: 3.9 MMOL/L (ref 3.5–5.3)
PR INTERVAL: 120 MS
PR INTERVAL: 124 MS
PROT SERPL-MCNC: 7.2 G/DL (ref 6.4–8.4)
QRS AXIS: 88 DEGREES
QRS AXIS: 89 DEGREES
QRSD INTERVAL: 88 MS
QRSD INTERVAL: 90 MS
QT INTERVAL: 310 MS
QT INTERVAL: 312 MS
QTC INTERVAL: 404 MS
QTC INTERVAL: 406 MS
RBC # BLD AUTO: 5.07 MILLION/UL (ref 3.88–5.62)
SODIUM SERPL-SCNC: 137 MMOL/L (ref 135–147)
T WAVE AXIS: 75 DEGREES
T WAVE AXIS: 78 DEGREES
VENTRICULAR RATE: 102 BPM
VENTRICULAR RATE: 102 BPM
WBC # BLD AUTO: 20.15 THOUSAND/UL (ref 4.31–10.16)

## 2024-06-01 PROCEDURE — 93005 ELECTROCARDIOGRAM TRACING: CPT

## 2024-06-01 PROCEDURE — 71046 X-RAY EXAM CHEST 2 VIEWS: CPT

## 2024-06-01 PROCEDURE — 83880 ASSAY OF NATRIURETIC PEPTIDE: CPT | Performed by: EMERGENCY MEDICINE

## 2024-06-01 PROCEDURE — 80053 COMPREHEN METABOLIC PANEL: CPT | Performed by: EMERGENCY MEDICINE

## 2024-06-01 PROCEDURE — 71250 CT THORAX DX C-: CPT

## 2024-06-01 PROCEDURE — 36415 COLL VENOUS BLD VENIPUNCTURE: CPT | Performed by: EMERGENCY MEDICINE

## 2024-06-01 PROCEDURE — 99285 EMERGENCY DEPT VISIT HI MDM: CPT

## 2024-06-01 PROCEDURE — 96360 HYDRATION IV INFUSION INIT: CPT

## 2024-06-01 PROCEDURE — 83605 ASSAY OF LACTIC ACID: CPT | Performed by: EMERGENCY MEDICINE

## 2024-06-01 PROCEDURE — 99285 EMERGENCY DEPT VISIT HI MDM: CPT | Performed by: EMERGENCY MEDICINE

## 2024-06-01 PROCEDURE — 94640 AIRWAY INHALATION TREATMENT: CPT

## 2024-06-01 PROCEDURE — 87040 BLOOD CULTURE FOR BACTERIA: CPT | Performed by: EMERGENCY MEDICINE

## 2024-06-01 PROCEDURE — 85025 COMPLETE CBC W/AUTO DIFF WBC: CPT | Performed by: EMERGENCY MEDICINE

## 2024-06-01 PROCEDURE — 93010 ELECTROCARDIOGRAM REPORT: CPT | Performed by: INTERNAL MEDICINE

## 2024-06-01 PROCEDURE — 84484 ASSAY OF TROPONIN QUANT: CPT | Performed by: EMERGENCY MEDICINE

## 2024-06-01 RX ORDER — ALBUTEROL SULFATE 90 UG/1
2 AEROSOL, METERED RESPIRATORY (INHALATION) ONCE
Status: COMPLETED | OUTPATIENT
Start: 2024-06-01 | End: 2024-06-01

## 2024-06-01 RX ORDER — PREDNISONE 10 MG/1
TABLET ORAL
Qty: 30 TABLET | Refills: 0 | Status: SHIPPED | OUTPATIENT
Start: 2024-06-01

## 2024-06-01 RX ORDER — ALBUTEROL SULFATE 90 UG/1
2 AEROSOL, METERED RESPIRATORY (INHALATION) EVERY 4 HOURS PRN
Qty: 6.7 G | Refills: 0 | Status: SHIPPED | OUTPATIENT
Start: 2024-06-01 | End: 2025-06-01

## 2024-06-01 RX ORDER — IPRATROPIUM BROMIDE AND ALBUTEROL SULFATE 2.5; .5 MG/3ML; MG/3ML
3 SOLUTION RESPIRATORY (INHALATION) ONCE
Status: COMPLETED | OUTPATIENT
Start: 2024-06-01 | End: 2024-06-01

## 2024-06-01 RX ORDER — DOXYCYCLINE HYCLATE 100 MG/1
100 CAPSULE ORAL ONCE
Status: COMPLETED | OUTPATIENT
Start: 2024-06-01 | End: 2024-06-01

## 2024-06-01 RX ORDER — DEXTROMETHORPHAN HYDROBROMIDE AND PROMETHAZINE HYDROCHLORIDE 15; 6.25 MG/5ML; MG/5ML
5 SYRUP ORAL 4 TIMES DAILY PRN
Qty: 118 ML | Refills: 0 | Status: SHIPPED | OUTPATIENT
Start: 2024-06-01

## 2024-06-01 RX ORDER — ALBUTEROL SULFATE 2.5 MG/3ML
2.5 SOLUTION RESPIRATORY (INHALATION) EVERY 6 HOURS PRN
Qty: 75 ML | Refills: 0 | Status: SHIPPED | OUTPATIENT
Start: 2024-06-01 | End: 2025-06-01

## 2024-06-01 RX ORDER — DOXYCYCLINE HYCLATE 100 MG/1
100 CAPSULE ORAL 2 TIMES DAILY
Qty: 20 CAPSULE | Refills: 0 | Status: SHIPPED | OUTPATIENT
Start: 2024-06-01 | End: 2024-06-11

## 2024-06-01 RX ADMIN — SODIUM CHLORIDE 1000 ML: 0.9 INJECTION, SOLUTION INTRAVENOUS at 12:26

## 2024-06-01 RX ADMIN — DOXYCYCLINE HYCLATE 100 MG: 100 CAPSULE ORAL at 10:55

## 2024-06-01 RX ADMIN — IPRATROPIUM BROMIDE AND ALBUTEROL SULFATE 3 ML: 2.5; .5 SOLUTION RESPIRATORY (INHALATION) at 13:23

## 2024-06-01 RX ADMIN — PREDNISONE 50 MG: 20 TABLET ORAL at 10:55

## 2024-06-01 RX ADMIN — IPRATROPIUM BROMIDE AND ALBUTEROL SULFATE 3 ML: 2.5; .5 SOLUTION RESPIRATORY (INHALATION) at 10:55

## 2024-06-01 RX ADMIN — ALBUTEROL SULFATE 2 PUFF: 90 AEROSOL, METERED RESPIRATORY (INHALATION) at 10:55

## 2024-06-03 ENCOUNTER — TELEPHONE (OUTPATIENT)
Age: 64
End: 2024-06-03

## 2024-06-03 NOTE — TELEPHONE ENCOUNTER
Patient was notified regarding Rx for prednisone patient understood and requested to be transferred to [facility], requested if I can help with anything prior to transfer but he stated no and wanted to be transferred to [place].

## 2024-06-03 NOTE — TELEPHONE ENCOUNTER
Patient called to speak with Thuy.    Patient seen in ED on 6/1/24 for pneumonia.    Provider recently prescribed prednisone 4 mg, but did not provide enough medication (patient stating the doctor knew that).    Patient seeking additional prednisone as well as requesting his lab results for the [facility] be obtained.    Offered patient an appointment with PAC today.  Patient refused.    Only wants to speak with Thuy.

## 2024-06-06 LAB
BACTERIA BLD CULT: NORMAL
BACTERIA BLD CULT: NORMAL

## 2024-06-27 ENCOUNTER — OFFICE VISIT (OUTPATIENT)
Dept: INTERNAL MEDICINE CLINIC | Facility: CLINIC | Age: 64
End: 2024-06-27
Payer: COMMERCIAL

## 2024-06-27 VITALS
HEIGHT: 68 IN | SYSTOLIC BLOOD PRESSURE: 124 MMHG | OXYGEN SATURATION: 98 % | RESPIRATION RATE: 17 BRPM | HEART RATE: 82 BPM | DIASTOLIC BLOOD PRESSURE: 78 MMHG | WEIGHT: 177 LBS | BODY MASS INDEX: 26.83 KG/M2

## 2024-06-27 DIAGNOSIS — Z72.0 TOBACCO USE: ICD-10-CM

## 2024-06-27 DIAGNOSIS — G89.29 CHRONIC LEFT LOWER QUADRANT PAIN: ICD-10-CM

## 2024-06-27 DIAGNOSIS — Z87.19 HISTORY OF DIVERTICULITIS: ICD-10-CM

## 2024-06-27 DIAGNOSIS — J43.8 OTHER EMPHYSEMA (HCC): ICD-10-CM

## 2024-06-27 DIAGNOSIS — H53.9 VISUAL DISTURBANCE: Primary | ICD-10-CM

## 2024-06-27 DIAGNOSIS — R10.32 CHRONIC LEFT LOWER QUADRANT PAIN: ICD-10-CM

## 2024-06-27 DIAGNOSIS — K76.9 LIVER LESION: ICD-10-CM

## 2024-06-27 DIAGNOSIS — J43.9 PULMONARY EMPHYSEMA, UNSPECIFIED EMPHYSEMA TYPE (HCC): ICD-10-CM

## 2024-06-27 PROCEDURE — 99214 OFFICE O/P EST MOD 30 MIN: CPT | Performed by: INTERNAL MEDICINE

## 2024-06-27 NOTE — ASSESSMENT & PLAN NOTE
Left lower lobe pneumonia based on the chest x-ray from [facility] the patient was given Z-Caesar without any improvement was started on Augmentin and taper dose of prednisone.    Severe COPD last pulmonary function test was reviewed showed an FEV1 of 51% predicted currently on Anoro and albuterol. With home oxygen at 2 L/min.

## 2024-06-27 NOTE — PROGRESS NOTES
Ambulatory Visit  Name: Felipe Sanders III      : 1960      MRN: [id number]  Encounter Provider: Emil Silverman MD  Encounter Date: 2024   Encounter department: [facility] INTERNAL MEDICINE [place]    Assessment & Plan     1. Visual disturbance  -     Ambulatory Referral to Optometry; Future  2. Pulmonary emphysema, unspecified emphysema type (HCC)  Assessment & Plan:  Left lower lobe pneumonia based on the chest x-ray from [facility] the patient was given Z-Caesar without any improvement was started on Augmentin and taper dose of prednisone.    Severe COPD last pulmonary function test was reviewed showed an FEV1 of 51% predicted currently on Anoro and albuterol. With home oxygen at 2 L/min.    3. History of diverticulitis  4. Chronic left lower quadrant pain  5. Other emphysema (HCC)  Assessment & Plan:  Left lower lobe pneumonia based on the chest x-ray from [facility] the patient was given Z-Caesar without any improvement was started on Augmentin and taper dose of prednisone.    Severe COPD last pulmonary function test was reviewed showed an FEV1 of 51% predicted currently on Anoro and albuterol. With home oxygen at 2 L/min.    6. Liver lesion  7. Tobacco use  Assessment & Plan:   I spoke with the patient about his current tobacco use.  continue f/u with pulmonary.      Depression Screening and Follow-up Plan: Clincally patient does not have depression. No treatment is required.     Tobacco Cessation Counseling: Tobacco cessation counseling was provided. The patient is sincerely urged to quit consumption of tobacco. He is not ready to quit tobacco.       History of Present Illness     Patient is here for routine follow up, reviewed chronic medical problems, ordered labs for next visit including CBC CMP TSH A1C LIPID. Reviewed labs for this visit.        Review of Systems   Constitutional:  Negative for chills and fever.   HENT:  Negative for ear pain and sore throat.     Eyes:  Negative for pain and visual disturbance.   Respiratory:  Negative for cough and shortness of breath.    Cardiovascular:  Negative for chest pain and palpitations.   Gastrointestinal:  Negative for abdominal pain and vomiting.   Genitourinary:  Negative for dysuria and hematuria.   Musculoskeletal:  Negative for arthralgias and back pain.   Skin:  Negative for color change and rash.   Neurological:  Negative for seizures and syncope.   All other systems reviewed and are negative.    Past Medical History   Past Medical History:   Diagnosis Date    COPD (chronic obstructive pulmonary disease) (HCC)     Diverticulitis of colon     Erectile dysfunction     Hernia of abdominal cavity     Liver cyst      History reviewed. No pertinent surgical history.  History reviewed. No pertinent family history.  Current Outpatient Medications on File Prior to Visit   Medication Sig Dispense Refill    albuterol (2.5 mg/3 mL) 0.083 % nebulizer solution Take 3 mL (2.5 mg total) by nebulization every 6 (six) hours as needed for wheezing 75 mL 0    albuterol (PROVENTIL HFA,VENTOLIN HFA) 90 mcg/act inhaler Inhale 2 puffs every 4 (four) hours as needed for wheezing 6.7 g 0    dicyclomine (BENTYL) 20 mg tablet Take 1 tablet (20 mg total) by mouth every 6 (six) hours as needed (abdominal pain) 30 tablet 3    oxygen gas Inhale continuous as needed      predniSONE 10 mg tablet Please take by mouth 5 tablets on days 1&2, 4 tabs on days 3&4, 3 tabs on days 5&6, 2 tabs on days 7&8, 1 tab on days 9&10. 30 tablet 0    promethazine-dextromethorphan (PHENERGAN-DM) 6.25-15 mg/5 mL oral syrup Take 1.25 mL by mouth 4 (four) times a day as needed for cough 118 mL 0    promethazine-dextromethorphan (PHENERGAN-DM) 6.25-15 mg/5 mL oral syrup Take 5 mL by mouth 4 (four) times a day as needed for cough 118 mL 0    umeclidinium-vilanterol (Anoro Ellipta) 62.5-25 MCG/INH inhaler Inhale 1 puff daily Per Pt takes as needed only      [DISCONTINUED]  "methylPREDNISolone 4 MG tablet therapy pack Use as directed on package (Patient not taking: Reported on 6/27/2024) 21 each 0     No current facility-administered medications on file prior to visit.   No Known Allergies   Objective     /78 (BP Location: Left arm, Patient Position: Sitting, Cuff Size: Adult)   Pulse 82   Resp 17   Ht 5' 8\" (1.727 m)   Wt 80.3 kg (177 lb)   SpO2 98%   BMI 26.91 kg/m²     Physical Exam  Vitals and nursing note reviewed.   Constitutional:       General: He is not in acute distress.     Appearance: He is well-developed.      Comments: Appears disheveled   HENT:      Head: Normocephalic.   Eyes:      Conjunctiva/sclera: Conjunctivae normal.   Cardiovascular:      Rate and Rhythm: Normal rate and regular rhythm.      Heart sounds: No murmur heard.  Pulmonary:      Effort: Pulmonary effort is normal. No respiratory distress.      Breath sounds: Decreased breath sounds present.   Abdominal:      Palpations: Abdomen is soft.      Tenderness: There is no abdominal tenderness.   Musculoskeletal:         General: No swelling.   Skin:     General: Skin is warm and dry.      Capillary Refill: Capillary refill takes less than 2 seconds.   Neurological:      Mental Status: He is alert and oriented to person, place, and time.   Psychiatric:         Mood and Affect: Mood normal.       Administrative Statements   I have spent a total time of 15 minutes on 06/27/24 In caring for this patient including Documenting in the medical record, Reviewing / ordering tests, medicine, procedures  , and Obtaining or reviewing history  .        "

## 2024-06-27 NOTE — ASSESSMENT & PLAN NOTE
Left lower lobe pneumonia based on the chest x-ray from [facility] the patient was given Z-Caeasr without any improvement was started on Augmentin and taper dose of prednisone.    Severe COPD last pulmonary function test was reviewed showed an FEV1 of 51% predicted currently on Anoro and albuterol. With home oxygen at 2 L/min.

## 2024-07-14 ENCOUNTER — NURSE TRIAGE (OUTPATIENT)
Dept: OTHER | Facility: OTHER | Age: 64
End: 2024-07-14

## 2024-07-14 NOTE — TELEPHONE ENCOUNTER
Pt calling in requesting refill for Prednisone. Per chart review, last prescribed from ED visit 6/1. Per policy, RN cannot refill medication at this time. RN advised patient to follow up with [facility] pulmonologist for more refills. Pt disconnect phone call.

## 2024-07-14 NOTE — TELEPHONE ENCOUNTER
"Reason for Disposition  • [1] Caller has NON-URGENT medicine question about med that PCP prescribed AND [2] triager unable to answer question    Answer Assessment - Initial Assessment Questions  1. DRUG NAME: \"What medicine do you need to have refilled?\"      predniSONE 10 mg tablet    2. REFILLS REMAINING: \"How many refills are remaining?\" (Note: The label on the medicine or pill bottle will show how many refills are remaining. If there are no refills remaining, then a renewal may be needed.)      0    3. EXPIRATION DATE: \"What is the expiration date?\" (Note: The label states when the prescription will , and thus can no longer be refilled.)      N/A    4. PRESCRIBING HCP: \"Who prescribed it?\" Reason: If prescribed by specialist, call should be referred to that group.      Johanne Nunez MD    5. SYMPTOMS: \"Do you have any symptoms?\"      \"It's for my lungs\"    Protocols used: Medication Refill and Renewal Call-ADULT-    "

## 2024-07-14 NOTE — TELEPHONE ENCOUNTER
"Regarding: Prednisone request  ----- Message from Jennifer JUAREZ sent at 7/14/2024  9:19 AM EDT -----  \" I would like some prednisone. \"    "

## 2024-07-15 DIAGNOSIS — J18.9 PNEUMONIA: ICD-10-CM

## 2024-07-15 RX ORDER — PREDNISONE 10 MG/1
TABLET ORAL
Qty: 30 TABLET | Refills: 0 | Status: SHIPPED | OUTPATIENT
Start: 2024-07-15

## 2024-07-15 NOTE — TELEPHONE ENCOUNTER
Patient is requesting a refill for the prednisone.  He said he is having issues with his lungs opening up and Dr. Silverman knows his history.

## 2024-08-07 ENCOUNTER — VBI (OUTPATIENT)
Dept: ADMINISTRATIVE | Facility: OTHER | Age: 64
End: 2024-08-07

## 2024-08-07 NOTE — TELEPHONE ENCOUNTER
08/07/24 6:40 AM     Chart reviewed for CRC: Colonoscopy ; nothing is submitted to the patient's insurance at this time.     Amira France   [facility]

## 2024-09-17 ENCOUNTER — APPOINTMENT (EMERGENCY)
Dept: CT IMAGING | Facility: HOSPITAL | Age: 64
End: 2024-09-17
Payer: COMMERCIAL

## 2024-09-17 ENCOUNTER — APPOINTMENT (EMERGENCY)
Dept: RADIOLOGY | Facility: HOSPITAL | Age: 64
End: 2024-09-17
Payer: COMMERCIAL

## 2024-09-17 ENCOUNTER — HOSPITAL ENCOUNTER (EMERGENCY)
Facility: HOSPITAL | Age: 64
Discharge: HOME | End: 2024-09-17
Payer: COMMERCIAL

## 2024-09-17 VITALS
BODY MASS INDEX: 27.37 KG/M2 | HEART RATE: 92 BPM | TEMPERATURE: 97.7 F | RESPIRATION RATE: 18 BRPM | WEIGHT: 180 LBS | DIASTOLIC BLOOD PRESSURE: 69 MMHG | OXYGEN SATURATION: 94 % | SYSTOLIC BLOOD PRESSURE: 137 MMHG

## 2024-09-17 DIAGNOSIS — J44.1 COPD EXACERBATION (HCC): Primary | ICD-10-CM

## 2024-09-17 LAB
ANION GAP SERPL CALCULATED.3IONS-SCNC: 8 MMOL/L (ref 4–13)
ATRIAL RATE: 83 BPM
BASOPHILS # BLD AUTO: 0.09 THOUSANDS/ΜL (ref 0–0.1)
BASOPHILS NFR BLD AUTO: 1 % (ref 0–1)
BUN SERPL-MCNC: 13 MG/DL (ref 5–25)
CALCIUM SERPL-MCNC: 8.8 MG/DL (ref 8.4–10.2)
CHLORIDE SERPL-SCNC: 104 MMOL/L (ref 96–108)
CO2 SERPL-SCNC: 28 MMOL/L (ref 21–32)
CREAT SERPL-MCNC: 0.92 MG/DL (ref 0.6–1.3)
EOSINOPHIL # BLD AUTO: 1.74 THOUSAND/ΜL (ref 0–0.61)
EOSINOPHIL NFR BLD AUTO: 12 % (ref 0–6)
ERYTHROCYTE [DISTWIDTH] IN BLOOD BY AUTOMATED COUNT: 13.4 % (ref 11.6–15.1)
FLUAV AG UPPER RESP QL IA.RAPID: NEGATIVE
FLUBV AG UPPER RESP QL IA.RAPID: NEGATIVE
GFR SERPL CREATININE-BSD FRML MDRD: 87 ML/MIN/1.73SQ M
GLUCOSE SERPL-MCNC: 124 MG/DL (ref 65–140)
HCT VFR BLD AUTO: 51.7 % (ref 36.5–49.3)
HGB BLD-MCNC: 16.7 G/DL (ref 12–17)
IMM GRANULOCYTES # BLD AUTO: 0.09 THOUSAND/UL (ref 0–0.2)
IMM GRANULOCYTES NFR BLD AUTO: 1 % (ref 0–2)
LYMPHOCYTES # BLD AUTO: 1.5 THOUSANDS/ΜL (ref 0.6–4.47)
LYMPHOCYTES NFR BLD AUTO: 11 % (ref 14–44)
MCH RBC QN AUTO: 30.1 PG (ref 26.8–34.3)
MCHC RBC AUTO-ENTMCNC: 32.3 G/DL (ref 31.4–37.4)
MCV RBC AUTO: 93 FL (ref 82–98)
MONOCYTES # BLD AUTO: 0.85 THOUSAND/ΜL (ref 0.17–1.22)
MONOCYTES NFR BLD AUTO: 6 % (ref 4–12)
NEUTROPHILS # BLD AUTO: 9.93 THOUSANDS/ΜL (ref 1.85–7.62)
NEUTS SEG NFR BLD AUTO: 69 % (ref 43–75)
NRBC BLD AUTO-RTO: 0 /100 WBCS
P AXIS: 77 DEGREES
PLATELET # BLD AUTO: 288 THOUSANDS/UL (ref 149–390)
PMV BLD AUTO: 9.3 FL (ref 8.9–12.7)
POTASSIUM SERPL-SCNC: 4.3 MMOL/L (ref 3.5–5.3)
PR INTERVAL: 136 MS
QRS AXIS: 75 DEGREES
QRSD INTERVAL: 90 MS
QT INTERVAL: 352 MS
QTC INTERVAL: 413 MS
RBC # BLD AUTO: 5.55 MILLION/UL (ref 3.88–5.62)
SARS-COV+SARS-COV-2 AG RESP QL IA.RAPID: NEGATIVE
SODIUM SERPL-SCNC: 140 MMOL/L (ref 135–147)
T WAVE AXIS: 64 DEGREES
VENTRICULAR RATE: 83 BPM
WBC # BLD AUTO: 14.2 THOUSAND/UL (ref 4.31–10.16)

## 2024-09-17 PROCEDURE — 96365 THER/PROPH/DIAG IV INF INIT: CPT

## 2024-09-17 PROCEDURE — 99285 EMERGENCY DEPT VISIT HI MDM: CPT

## 2024-09-17 PROCEDURE — 71046 X-RAY EXAM CHEST 2 VIEWS: CPT

## 2024-09-17 PROCEDURE — 93010 ELECTROCARDIOGRAM REPORT: CPT | Performed by: INTERNAL MEDICINE

## 2024-09-17 PROCEDURE — 85025 COMPLETE CBC W/AUTO DIFF WBC: CPT

## 2024-09-17 PROCEDURE — 94760 N-INVAS EAR/PLS OXIMETRY 1: CPT

## 2024-09-17 PROCEDURE — 80048 BASIC METABOLIC PNL TOTAL CA: CPT

## 2024-09-17 PROCEDURE — 36415 COLL VENOUS BLD VENIPUNCTURE: CPT

## 2024-09-17 PROCEDURE — 93005 ELECTROCARDIOGRAM TRACING: CPT

## 2024-09-17 PROCEDURE — 87811 SARS-COV-2 COVID19 W/OPTIC: CPT

## 2024-09-17 PROCEDURE — 96375 TX/PRO/DX INJ NEW DRUG ADDON: CPT

## 2024-09-17 PROCEDURE — 87804 INFLUENZA ASSAY W/OPTIC: CPT

## 2024-09-17 PROCEDURE — 94644 CONT INHLJ TX 1ST HOUR: CPT

## 2024-09-17 RX ORDER — ALBUTEROL SULFATE 5 MG/ML
10 SOLUTION RESPIRATORY (INHALATION) ONCE
Status: COMPLETED | OUTPATIENT
Start: 2024-09-17 | End: 2024-09-17

## 2024-09-17 RX ORDER — SODIUM CHLORIDE FOR INHALATION 0.9 %
12 VIAL, NEBULIZER (ML) INHALATION ONCE
Status: COMPLETED | OUTPATIENT
Start: 2024-09-17 | End: 2024-09-17

## 2024-09-17 RX ORDER — METHYLPREDNISOLONE SODIUM SUCCINATE 125 MG/2ML
125 INJECTION, POWDER, LYOPHILIZED, FOR SOLUTION INTRAMUSCULAR; INTRAVENOUS ONCE
Status: COMPLETED | OUTPATIENT
Start: 2024-09-17 | End: 2024-09-17

## 2024-09-17 RX ORDER — MAGNESIUM SULFATE HEPTAHYDRATE 40 MG/ML
2 INJECTION, SOLUTION INTRAVENOUS ONCE
Status: COMPLETED | OUTPATIENT
Start: 2024-09-17 | End: 2024-09-17

## 2024-09-17 RX ORDER — PREDNISONE 20 MG/1
20 TABLET ORAL DAILY
Qty: 5 TABLET | Refills: 0 | Status: SHIPPED | OUTPATIENT
Start: 2024-09-17 | End: 2024-09-22

## 2024-09-17 RX ADMIN — MAGNESIUM SULFATE HEPTAHYDRATE 2 G: 40 INJECTION, SOLUTION INTRAVENOUS at 09:56

## 2024-09-17 RX ADMIN — ALBUTEROL SULFATE 10 MG: 2.5 SOLUTION RESPIRATORY (INHALATION) at 10:08

## 2024-09-17 RX ADMIN — ISODIUM CHLORIDE 12 ML: 0.03 SOLUTION RESPIRATORY (INHALATION) at 10:08

## 2024-09-17 RX ADMIN — METHYLPREDNISOLONE SODIUM SUCCINATE 125 MG: 125 INJECTION, POWDER, FOR SOLUTION INTRAMUSCULAR; INTRAVENOUS at 09:53

## 2024-09-17 RX ADMIN — IPRATROPIUM BROMIDE 1 MG: 0.5 SOLUTION RESPIRATORY (INHALATION) at 10:08

## 2024-09-17 NOTE — DISCHARGE INSTRUCTIONS
Please take prednisone as prescribed.  Recommend following up with your pulmonary doctor for reevaluation.  Continue medications as prescribed.  Return to the ED with any new or worsening concerns.

## 2024-09-17 NOTE — ED PROVIDER NOTES
1. COPD exacerbation (HCC)      ED Disposition       ED Disposition   Discharge    Condition   Stable    Date/Time   Tue Sep 17, 2024  1:02 PM    Comment   Felipe Sanders III discharge to home/self care.                   Assessment & Plan       Medical Decision Making  Amount and/or Complexity of Data Reviewed  Labs: ordered. Decision-making details documented in ED Course.  Radiology: ordered and independent interpretation performed.    Risk  Prescription drug management.      Patient is a 64 year old male with PMHx COPD, presenting to the ED for evaluation of increased shortness of breath and wheezing for the past 2 days, with increased PRN oxygen requirement. Ddx COPD exacerbation, PNA, metabolic derangement. Patient continues to smoke intermittently. Treated with ESPINOZA nebulizer, IV steroids, Mg 2g IV.            ED Course as of 09/17/24 1606   Tue Sep 17, 2024   0943 CXR shows hyperinflation, otherwise no acute cardiopulmonary disease as interpreted by me     0944 CT chest reviewed from [facility] 9/5/24:  Impression:   1. Emphysema particularly in the upper lobes with subpleural bullae formations   and mild thickening of the pleura without discrete mass, airspace infiltrate or   pleural effusion.   2.  Marked coronary calcifications.   3.  Mild aortic calcifications      1034 SARS COV Rapid Antigen: Negative   1035 Influenza A Rapid Antigen: Negative   1035 Influenza B Rapid Antigen: Negative   1035 WBC(!): 14.20  Likely related to recent steroid taper     1212 BMP delayed, needed to be redrawn. On reassessment, patient is off oxygen with normal O2 saturation. Feels improved. Wheezing significantly improved.   1312 Labs reviewed, on reassessment patient is resting comfortably. NO acute distress. Off oxygen wit normal O2 saturation. Patient will be prescribed Prednisone 20 mg x5 days. No evidence of PNA and patient does not have productive cough, therefore abx treatment deferred at this time.  Patient will call his  pulmonologist for a follow-up appointment.    I reviewed all testing with the patient:  I gave oral return precautions for what to return for in addition to the written return precautions.   The patient verbalized understanding of the discharge instructions and warnings that would necessitate return to the Emergency Department.  I specifically highlighted areas of special concern regarding the written and verbal discharge instructions and return precautions.    All questions were answered prior to discharge.         Medications   albuterol inhalation solution 10 mg (10 mg Nebulization Given 9/17/24 1008)   ipratropium (ATROVENT) 0.02 % inhalation solution 1 mg (1 mg Nebulization Given 9/17/24 1008)   sodium chloride 0.9 % inhalation solution 12 mL (12 mL Nebulization Given 9/17/24 1008)   methylPREDNISolone sodium succinate (Solu-MEDROL) injection 125 mg (125 mg Intravenous Given 9/17/24 0953)   magnesium sulfate 2 g/50 mL IVPB (premix) 2 g (0 g Intravenous Stopped 9/17/24 1056)       History of Present Illness       HPI    Patient is a 64 year old male with PMHx COPD, hx diverticulitis, presenting to the ED for evaluation of wheezing, increased shortness of breath, and increased PRN oxygen requirement over the past 2 days. States that he recently finished a course of steroids for treatment of COPD exacerbation.  Patient denies fevers, chills, increased cough, chest pain, abdominal pain, nausea, vomiting, diarrhea, urinary complaints.    Review of Systems   All other systems reviewed and are negative.          Objective     ED Triage Vitals   Temperature Pulse Blood Pressure Respirations SpO2 Patient Position - Orthostatic VS   09/17/24 0857 09/17/24 0857 09/17/24 0857 09/17/24 0857 09/17/24 0857 09/17/24 0857   97.7 °F (36.5 °C) 94 147/73 22 97 % Sitting      Temp Source Heart Rate Source BP Location FiO2 (%) Pain Score    09/17/24 0857 09/17/24 0857 09/17/24 0857 -- 09/17/24 1226    Oral Monitor Left arm  No Pain         Physical Exam  Vitals and nursing note reviewed.   Constitutional:       General: He is not in acute distress.     Appearance: He is well-developed and normal weight. He is not ill-appearing, toxic-appearing or diaphoretic.      Comments: Wearing 4L oxygen nasal cannula     HENT:      Head: Normocephalic and atraumatic.      Mouth/Throat:      Mouth: Mucous membranes are moist.      Pharynx: Oropharynx is clear.   Eyes:      Conjunctiva/sclera: Conjunctivae normal.   Neck:      Vascular: No hepatojugular reflux or JVD.   Cardiovascular:      Rate and Rhythm: Normal rate and regular rhythm.      Pulses: Normal pulses.      Heart sounds: Normal heart sounds. No murmur heard.  Pulmonary:      Effort: Pulmonary effort is normal. Tachypnea present. No accessory muscle usage or respiratory distress.      Breath sounds: Examination of the right-upper field reveals wheezing. Examination of the left-upper field reveals wheezing. Examination of the right-middle field reveals wheezing. Examination of the left-middle field reveals wheezing. Wheezing present. No decreased breath sounds, rhonchi or rales.   Chest:      Chest wall: No mass, tenderness or edema.   Abdominal:      Palpations: Abdomen is soft.      Tenderness: There is no abdominal tenderness.   Musculoskeletal:         General: No swelling. Normal range of motion.      Cervical back: Normal range of motion and neck supple.      Right lower leg: No tenderness. No edema.      Left lower leg: No tenderness. No edema.   Skin:     General: Skin is warm and dry.      Capillary Refill: Capillary refill takes less than 2 seconds.      Findings: No erythema.   Neurological:      General: No focal deficit present.      Mental Status: He is alert and oriented to person, place, and time.   Psychiatric:         Mood and Affect: Mood normal.         Labs Reviewed   CBC AND DIFFERENTIAL - Abnormal       Result Value    WBC 14.20 (*)     RBC 5.55      Hemoglobin 16.7       Hematocrit 51.7 (*)     MCV 93      MCH 30.1      MCHC 32.3      RDW 13.4      MPV 9.3      Platelets 288      nRBC 0      Segmented % 69      Immature Grans % 1      Lymphocytes % 11 (*)     Monocytes % 6      Eosinophils Relative 12 (*)     Basophils Relative 1      Absolute Neutrophils 9.93 (*)     Absolute Immature Grans 0.09      Absolute Lymphocytes 1.50      Absolute Monocytes 0.85      Eosinophils Absolute 1.74 (*)     Basophils Absolute 0.09     COVID-19/INFLUENZA A/B RAPID ANTIGEN (30 MIN.TAT) - Normal    SARS COV Rapid Antigen Negative      Influenza A Rapid Antigen Negative      Influenza B Rapid Antigen Negative      Narrative:     This test has been performed using the [employer] Kati 2 FLU+SARS Antigen test under the Emergency Use Authorization (EUA). This test has been validated by the  and verified by the performing laboratory. The Kati uses lateral flow immunofluorescent sandwich assay to detect SARS-COV, Influenza A and Influenza B Antigen.     The [employer] Kati 2 SARS Antigen test does not differentiate between SARS-CoV and SARS-CoV-2.     Negative results are presumptive and may be confirmed with a molecular assay, if necessary, for patient management. Negative results do not rule out SARS-CoV-2 or influenza infection and should not be used as the sole basis for treatment or patient management decisions. A negative test result may occur if the level of antigen in a sample is below the limit of detection of this test.     Positive results are indicative of the presence of viral antigens, but do not rule out bacterial infection or co-infection with other viruses.     All test results should be used as an adjunct to clinical observations and other information available to the provider.    FOR PEDIATRIC PATIENTS - copy/paste COVID Guidelines URL to browser: [email or web address]   BASIC METABOLIC PANEL    Sodium 140      Potassium 4.3       Chloride 104      CO2 28      ANION GAP 8      BUN 13      Creatinine 0.92      Glucose 124      Calcium 8.8      eGFR 87      Narrative:     [employer] guidelines for Chronic Kidney Disease (CKD):     Stage 1 with normal or high GFR (GFR > 90 mL/min/1.73 square meters)    Stage 2 Mild CKD (GFR = 60-89 mL/min/1.73 square meters)    Stage 3A Moderate CKD (GFR = 45-59 mL/min/1.73 square meters)    Stage 3B Moderate CKD (GFR = 30-44 mL/min/1.73 square meters)    Stage 4 Severe CKD (GFR = 15-29 mL/min/1.73 square meters)    Stage 5 End Stage CKD (GFR <15 mL/min/1.73 square meters)  Note: GFR calculation is accurate only with a steady state creatinine     XR chest 2 views   ED Interpretation by Андрей Ledbetter DO (09/17 0935)   Hyperinflation, otherwise no acute cardiopulmonary disease        Final Interpretation by Wendy Chadwick MD (09/17 1020)      No acute cardiopulmonary disease.      Moderate emphysema.         Workstation performed: [id number]             Procedures       Андрей Ledbetter DO  09/17/24 7932

## 2024-09-30 DIAGNOSIS — Z87.19 HISTORY OF DIVERTICULITIS: ICD-10-CM

## 2024-09-30 DIAGNOSIS — R10.32 LEFT LOWER QUADRANT ABDOMINAL PAIN: ICD-10-CM

## 2024-09-30 RX ORDER — DICYCLOMINE HCL 20 MG
TABLET ORAL
Qty: 30 TABLET | Refills: 3 | Status: SHIPPED | OUTPATIENT
Start: 2024-09-30

## 2024-10-27 DIAGNOSIS — R10.32 LEFT LOWER QUADRANT ABDOMINAL PAIN: ICD-10-CM

## 2024-10-27 DIAGNOSIS — Z87.19 HISTORY OF DIVERTICULITIS: ICD-10-CM

## 2024-10-28 RX ORDER — DICYCLOMINE HCL 20 MG
TABLET ORAL
Qty: 120 TABLET | Refills: 0 | Status: SHIPPED | OUTPATIENT
Start: 2024-10-28

## 2024-11-23 DIAGNOSIS — Z87.19 HISTORY OF DIVERTICULITIS: ICD-10-CM

## 2024-11-23 DIAGNOSIS — R10.32 LEFT LOWER QUADRANT ABDOMINAL PAIN: ICD-10-CM

## 2024-11-25 RX ORDER — DICYCLOMINE HCL 20 MG
TABLET ORAL
Qty: 120 TABLET | Refills: 5 | Status: SHIPPED | OUTPATIENT
Start: 2024-11-25

## 2024-12-04 ENCOUNTER — APPOINTMENT (EMERGENCY)
Dept: CT IMAGING | Facility: HOSPITAL | Age: 64
End: 2024-12-04
Payer: COMMERCIAL

## 2024-12-04 ENCOUNTER — HOSPITAL ENCOUNTER (OUTPATIENT)
Facility: HOSPITAL | Age: 64
Setting detail: OBSERVATION
Discharge: HOME | End: 2024-12-05
Attending: EMERGENCY MEDICINE | Admitting: FAMILY MEDICINE
Payer: COMMERCIAL

## 2024-12-04 DIAGNOSIS — A09 INFECTIOUS COLITIS: ICD-10-CM

## 2024-12-04 DIAGNOSIS — K52.9 COLITIS: ICD-10-CM

## 2024-12-04 DIAGNOSIS — H10.9 CONJUNCTIVITIS, LEFT EYE: Primary | ICD-10-CM

## 2024-12-04 DIAGNOSIS — R10.12 LEFT UPPER QUADRANT ABDOMINAL PAIN: ICD-10-CM

## 2024-12-04 LAB
ALBUMIN SERPL BCG-MCNC: 4 G/DL (ref 3.5–5)
ALP SERPL-CCNC: 54 U/L (ref 34–104)
ALT SERPL W P-5'-P-CCNC: 14 U/L (ref 7–52)
ANION GAP SERPL CALCULATED.3IONS-SCNC: 8 MMOL/L (ref 4–13)
AST SERPL W P-5'-P-CCNC: 16 U/L (ref 13–39)
BASOPHILS # BLD AUTO: 0.06 THOUSANDS/ÂΜL (ref 0–0.1)
BASOPHILS NFR BLD AUTO: 0 % (ref 0–1)
BILIRUB SERPL-MCNC: 0.67 MG/DL (ref 0.2–1)
BUN SERPL-MCNC: 18 MG/DL (ref 5–25)
CALCIUM SERPL-MCNC: 9.3 MG/DL (ref 8.4–10.2)
CHLORIDE SERPL-SCNC: 102 MMOL/L (ref 96–108)
CO2 SERPL-SCNC: 30 MMOL/L (ref 21–32)
CREAT SERPL-MCNC: 1.13 MG/DL (ref 0.6–1.3)
EOSINOPHIL # BLD AUTO: 0.5 THOUSAND/ÂΜL (ref 0–0.61)
EOSINOPHIL NFR BLD AUTO: 3 % (ref 0–6)
ERYTHROCYTE [DISTWIDTH] IN BLOOD BY AUTOMATED COUNT: 13.6 % (ref 11.6–15.1)
GFR SERPL CREATININE-BSD FRML MDRD: 68 ML/MIN/1.73SQ M
GLUCOSE SERPL-MCNC: 109 MG/DL (ref 65–140)
HCT VFR BLD AUTO: 49.9 % (ref 36.5–49.3)
HGB BLD-MCNC: 15.9 G/DL (ref 12–17)
IMM GRANULOCYTES # BLD AUTO: 0.14 THOUSAND/UL (ref 0–0.2)
IMM GRANULOCYTES NFR BLD AUTO: 1 % (ref 0–2)
LACTATE SERPL-SCNC: 1.4 MMOL/L (ref 0.5–2)
LIPASE SERPL-CCNC: 28 U/L (ref 11–82)
LYMPHOCYTES # BLD AUTO: 0.77 THOUSANDS/ÂΜL (ref 0.6–4.47)
LYMPHOCYTES NFR BLD AUTO: 5 % (ref 14–44)
MCH RBC QN AUTO: 29.7 PG (ref 26.8–34.3)
MCHC RBC AUTO-ENTMCNC: 31.9 G/DL (ref 31.4–37.4)
MCV RBC AUTO: 93 FL (ref 82–98)
MONOCYTES # BLD AUTO: 0.41 THOUSAND/ÂΜL (ref 0.17–1.22)
MONOCYTES NFR BLD AUTO: 3 % (ref 4–12)
NEUTROPHILS # BLD AUTO: 12.71 THOUSANDS/ÂΜL (ref 1.85–7.62)
NEUTS SEG NFR BLD AUTO: 88 % (ref 43–75)
NRBC BLD AUTO-RTO: 0 /100 WBCS
PLATELET # BLD AUTO: 236 THOUSANDS/UL (ref 149–390)
PMV BLD AUTO: 9.3 FL (ref 8.9–12.7)
POTASSIUM SERPL-SCNC: 3.9 MMOL/L (ref 3.5–5.3)
PROT SERPL-MCNC: 6.8 G/DL (ref 6.4–8.4)
RBC # BLD AUTO: 5.36 MILLION/UL (ref 3.88–5.62)
SODIUM SERPL-SCNC: 140 MMOL/L (ref 135–147)
WBC # BLD AUTO: 14.59 THOUSAND/UL (ref 4.31–10.16)

## 2024-12-04 PROCEDURE — 96361 HYDRATE IV INFUSION ADD-ON: CPT

## 2024-12-04 PROCEDURE — 36415 COLL VENOUS BLD VENIPUNCTURE: CPT | Performed by: PHYSICIAN ASSISTANT

## 2024-12-04 PROCEDURE — 99285 EMERGENCY DEPT VISIT HI MDM: CPT | Performed by: PHYSICIAN ASSISTANT

## 2024-12-04 PROCEDURE — 80053 COMPREHEN METABOLIC PANEL: CPT | Performed by: PHYSICIAN ASSISTANT

## 2024-12-04 PROCEDURE — 83605 ASSAY OF LACTIC ACID: CPT | Performed by: PHYSICIAN ASSISTANT

## 2024-12-04 PROCEDURE — 87040 BLOOD CULTURE FOR BACTERIA: CPT | Performed by: PHYSICIAN ASSISTANT

## 2024-12-04 PROCEDURE — 74177 CT ABD & PELVIS W/CONTRAST: CPT

## 2024-12-04 PROCEDURE — 83690 ASSAY OF LIPASE: CPT | Performed by: PHYSICIAN ASSISTANT

## 2024-12-04 PROCEDURE — 96375 TX/PRO/DX INJ NEW DRUG ADDON: CPT

## 2024-12-04 PROCEDURE — 96365 THER/PROPH/DIAG IV INF INIT: CPT

## 2024-12-04 PROCEDURE — 99285 EMERGENCY DEPT VISIT HI MDM: CPT

## 2024-12-04 PROCEDURE — 85025 COMPLETE CBC W/AUTO DIFF WBC: CPT | Performed by: PHYSICIAN ASSISTANT

## 2024-12-04 RX ORDER — ACETAMINOPHEN 325 MG/1
650 TABLET ORAL ONCE
Status: COMPLETED | OUTPATIENT
Start: 2024-12-04 | End: 2024-12-04

## 2024-12-04 RX ORDER — KETOROLAC TROMETHAMINE 30 MG/ML
15 INJECTION, SOLUTION INTRAMUSCULAR; INTRAVENOUS ONCE
Status: COMPLETED | OUTPATIENT
Start: 2024-12-04 | End: 2024-12-04

## 2024-12-04 RX ORDER — FENTANYL CITRATE 50 UG/ML
50 INJECTION, SOLUTION INTRAMUSCULAR; INTRAVENOUS ONCE
Refills: 0 | Status: COMPLETED | OUTPATIENT
Start: 2024-12-04 | End: 2024-12-04

## 2024-12-04 RX ORDER — TOBRAMYCIN 3 MG/ML
1 SOLUTION/ DROPS OPHTHALMIC ONCE
Status: COMPLETED | OUTPATIENT
Start: 2024-12-04 | End: 2024-12-04

## 2024-12-04 RX ORDER — METRONIDAZOLE 500 MG/1
500 TABLET ORAL ONCE
Status: COMPLETED | OUTPATIENT
Start: 2024-12-04 | End: 2024-12-05

## 2024-12-04 RX ADMIN — TOBRAMYCIN 1 DROP: 3 SOLUTION/ DROPS OPHTHALMIC at 23:06

## 2024-12-04 RX ADMIN — SODIUM CHLORIDE 1000 ML: 0.9 INJECTION, SOLUTION INTRAVENOUS at 20:01

## 2024-12-04 RX ADMIN — ACETAMINOPHEN 650 MG: 325 TABLET, FILM COATED ORAL at 21:01

## 2024-12-04 RX ADMIN — KETOROLAC TROMETHAMINE 15 MG: 30 INJECTION, SOLUTION INTRAMUSCULAR; INTRAVENOUS at 20:09

## 2024-12-04 RX ADMIN — CEFTRIAXONE SODIUM 1000 MG: 10 INJECTION, POWDER, FOR SOLUTION INTRAVENOUS at 22:52

## 2024-12-04 RX ADMIN — IOHEXOL 100 ML: 350 INJECTION, SOLUTION INTRAVENOUS at 21:41

## 2024-12-04 RX ADMIN — FENTANYL CITRATE 50 MCG: 50 INJECTION INTRAMUSCULAR; INTRAVENOUS at 23:06

## 2024-12-05 ENCOUNTER — TRANSITIONAL CARE MANAGEMENT (OUTPATIENT)
Dept: INTERNAL MEDICINE CLINIC | Facility: CLINIC | Age: 64
End: 2024-12-05

## 2024-12-05 VITALS
BODY MASS INDEX: 27.13 KG/M2 | HEIGHT: 68 IN | SYSTOLIC BLOOD PRESSURE: 115 MMHG | HEART RATE: 94 BPM | WEIGHT: 179 LBS | TEMPERATURE: 98.5 F | RESPIRATION RATE: 17 BRPM | OXYGEN SATURATION: 92 % | DIASTOLIC BLOOD PRESSURE: 66 MMHG

## 2024-12-05 PROBLEM — K52.9 COLITIS: Status: ACTIVE | Noted: 2024-12-05

## 2024-12-05 PROBLEM — R19.7 DIARRHEA: Status: ACTIVE | Noted: 2024-12-05

## 2024-12-05 PROBLEM — R10.12 LEFT UPPER QUADRANT ABDOMINAL PAIN: Status: ACTIVE | Noted: 2024-12-05

## 2024-12-05 LAB
ANION GAP SERPL CALCULATED.3IONS-SCNC: 4 MMOL/L (ref 4–13)
BUN SERPL-MCNC: 15 MG/DL (ref 5–25)
CALCIUM SERPL-MCNC: 8.4 MG/DL (ref 8.4–10.2)
CHLORIDE SERPL-SCNC: 105 MMOL/L (ref 96–108)
CO2 SERPL-SCNC: 30 MMOL/L (ref 21–32)
CREAT SERPL-MCNC: 1.11 MG/DL (ref 0.6–1.3)
ERYTHROCYTE [DISTWIDTH] IN BLOOD BY AUTOMATED COUNT: 13.8 % (ref 11.6–15.1)
GFR SERPL CREATININE-BSD FRML MDRD: 69 ML/MIN/1.73SQ M
GLUCOSE P FAST SERPL-MCNC: 93 MG/DL (ref 65–99)
GLUCOSE SERPL-MCNC: 93 MG/DL (ref 65–140)
HCT VFR BLD AUTO: 44.5 % (ref 36.5–49.3)
HGB BLD-MCNC: 14.4 G/DL (ref 12–17)
MCH RBC QN AUTO: 30.5 PG (ref 26.8–34.3)
MCHC RBC AUTO-ENTMCNC: 32.4 G/DL (ref 31.4–37.4)
MCV RBC AUTO: 94 FL (ref 82–98)
PLATELET # BLD AUTO: 231 THOUSANDS/UL (ref 149–390)
PMV BLD AUTO: 9.2 FL (ref 8.9–12.7)
POTASSIUM SERPL-SCNC: 4.3 MMOL/L (ref 3.5–5.3)
RBC # BLD AUTO: 4.72 MILLION/UL (ref 3.88–5.62)
SODIUM SERPL-SCNC: 139 MMOL/L (ref 135–147)
WBC # BLD AUTO: 8.93 THOUSAND/UL (ref 4.31–10.16)

## 2024-12-05 PROCEDURE — 99236 HOSP IP/OBS SAME DATE HI 85: CPT | Performed by: FAMILY MEDICINE

## 2024-12-05 PROCEDURE — 80048 BASIC METABOLIC PNL TOTAL CA: CPT | Performed by: FAMILY MEDICINE

## 2024-12-05 PROCEDURE — 85027 COMPLETE CBC AUTOMATED: CPT | Performed by: FAMILY MEDICINE

## 2024-12-05 PROCEDURE — NC001 PR NO CHARGE: Performed by: FAMILY MEDICINE

## 2024-12-05 PROCEDURE — 99254 IP/OBS CNSLTJ NEW/EST MOD 60: CPT | Performed by: INTERNAL MEDICINE

## 2024-12-05 RX ORDER — METRONIDAZOLE 500 MG/100ML
500 INJECTION, SOLUTION INTRAVENOUS EVERY 8 HOURS
Status: DISCONTINUED | OUTPATIENT
Start: 2024-12-05 | End: 2024-12-05 | Stop reason: HOSPADM

## 2024-12-05 RX ORDER — BENZONATATE 200 MG/1
CAPSULE ORAL
COMMUNITY
Start: 2024-10-14

## 2024-12-05 RX ORDER — ENOXAPARIN SODIUM 100 MG/ML
40 INJECTION SUBCUTANEOUS DAILY
Status: DISCONTINUED | OUTPATIENT
Start: 2024-12-05 | End: 2024-12-05 | Stop reason: HOSPADM

## 2024-12-05 RX ORDER — ALBUTEROL SULFATE 0.83 MG/ML
2.5 SOLUTION RESPIRATORY (INHALATION) EVERY 6 HOURS PRN
Status: DISCONTINUED | OUTPATIENT
Start: 2024-12-05 | End: 2024-12-05 | Stop reason: HOSPADM

## 2024-12-05 RX ORDER — ACETAMINOPHEN 325 MG/1
650 TABLET ORAL EVERY 6 HOURS PRN
Status: DISCONTINUED | OUTPATIENT
Start: 2024-12-05 | End: 2024-12-05 | Stop reason: HOSPADM

## 2024-12-05 RX ORDER — MAGNESIUM HYDROXIDE/ALUMINUM HYDROXICE/SIMETHICONE 120; 1200; 1200 MG/30ML; MG/30ML; MG/30ML
30 SUSPENSION ORAL EVERY 4 HOURS PRN
Status: DISCONTINUED | OUTPATIENT
Start: 2024-12-05 | End: 2024-12-05 | Stop reason: HOSPADM

## 2024-12-05 RX ADMIN — METRONIDAZOLE 500 MG: 500 TABLET ORAL at 00:28

## 2024-12-05 RX ADMIN — UMECLIDINIUM BROMIDE AND VILANTEROL TRIFENATATE 1 PUFF: 62.5; 25 POWDER RESPIRATORY (INHALATION) at 09:40

## 2024-12-05 RX ADMIN — METRONIDAZOLE 500 MG: 500 INJECTION, SOLUTION INTRAVENOUS at 09:39

## 2024-12-05 RX ADMIN — ALUMINUM HYDROXIDE, MAGNESIUM HYDROXIDE, AND DIMETHICONE 30 ML: 200; 20; 200 SUSPENSION ORAL at 02:23

## 2024-12-05 NOTE — ED PROVIDER NOTES
Time reflects when diagnosis was documented in both MDM as applicable and the Disposition within this note       Time User Action Codes Description Comment    12/4/2024  7:51 PM Wojciech Tan Add [H10.9] Conjunctivitis, left eye     12/4/2024 10:25 PM Wojciech Tan Add [A09] Infectious colitis           ED Disposition       ED Disposition   Admit    Condition   Stable    Date/Time   Wed Dec 4, 2024 10:26 PM    Comment   Case was discussed with Dr. Abbasi and the patient's admission status was agreed to be Admission Status: observation status to the service of Dr. Dr. Abbasi .               Assessment & Plan   {Hyperlinks  Risk Stratification - NIHSS - HEART SCORE - Fill out sepsis note and make sure you call [phone] if severe or septic shock:[id number]}    Medical Decision Making  This is a 64-year-old male patient comes in with left lower quadrant pain that he has for 3 years but intensified over the last 24 hours and has diarrhea no nausea vomiting or fever.  He is nontoxic no acute distress no urinary symptoms.  He also has left eye irritation without foreign body sensation no blurred vision double vision no photophobia and states that his eye was matted shut and he does not wear contacts.  Differential diagnose includes not limited to diverticulitis, colitis, abscess, kidney stone, pyelonephritis, conjunctivitis viral versus bacterial    Problems Addressed:  Conjunctivitis, left eye: acute illness or injury     Details: Patient given Tobrex, visual acuity was reviewed  Infectious colitis: acute illness or injury     Details: With a 14.59 white count and the positive colitis on the CAT scan patient will be admitted for infectious colitis.  Rocephin and Flagyl were given.  I did order a lactic acid and cultures 40 minutes ago and they are just being completed now.    Amount and/or Complexity of Data Reviewed  External Data Reviewed: labs and notes.     Details: Did review previous notes for past medical  history and comparison  Labs: ordered. Decision-making details documented in ED Course.     Details: Approxi-2 months ago patient's white blood cell count was in the 14,000's but it is unclear whether it went down and came back and due to the fact that he has increased pain to his left lower quadrant has colitis I will start him on antibiotics..  All other labs reviewed there is no acute findings.  As noted I did order lactic acid 40 minutes ago and has not been drawn yet the nurses and they are now doing it.  Radiology: ordered.     Details: Findings suggestive of colitis, most prominently involving the transverse and descending colon.  Discussion of management or test interpretation with external provider(s): Using joint decision made with the patient and medicine patient will be admitted for infectious colitis for IV antibiotics and further evaluation.    Risk  OTC drugs.  Prescription drug management.  Decision regarding hospitalization.             Medications   ceftriaxone (ROCEPHIN) 1 g/50 mL in dextrose IVPB (has no administration in time range)   metroNIDAZOLE (FLAGYL) tablet 500 mg (has no administration in time range)   tobramycin (TOBREX) 0.3 % ophthalmic solution 1 drop (has no administration in time range)   sodium chloride 0.9 % bolus 1,000 mL (1,000 mL Intravenous New Bag 12/4/24 2001)   ketorolac (TORADOL) injection 15 mg (15 mg Intravenous Given 12/4/24 2009)   acetaminophen (TYLENOL) tablet 650 mg (650 mg Oral Given 12/4/24 2101)   iohexol (OMNIPAQUE) 350 MG/ML injection (MULTI-DOSE) 100 mL (100 mL Intravenous Given 12/4/24 2141)       ED Risk Strat Scores                           SBIRT 22yo+      Flowsheet Row Most Recent Value   Initial Alcohol Screen: US AUDIT-C     1. How often do you have a drink containing alcohol? 0 Filed at: 12/04/2024 1846   2. How many drinks containing alcohol do you have on a typical day you are drinking?  0 Filed at: 12/04/2024 1846   3a. Male UNDER 65: How often do  you have five or more drinks on one occasion? 0 Filed at: 12/04/2024 1846   Audit-C Score 0 Filed at: 12/04/2024 1846   LAZARA: How many times in the past year have you...    Used an illegal drug or used a prescription medication for non-medical reasons? Never Filed at: 12/04/2024 1846                            History of Present Illness   {Hyperlinks  History (Med, Surg, Fam, Social) - Current Medications - Allergies  :[id number]}    Chief Complaint   Patient presents with    Abdominal Pain     C/o abdominal pain in LLQ x 3 years, but worsening today. States was suppose to see GI but never did.        Past Medical History:   Diagnosis Date    COPD (chronic obstructive pulmonary disease) (HCC)     Diverticulitis of colon     Erectile dysfunction     Hernia of abdominal cavity     Liver cyst       History reviewed. No pertinent surgical history.   History reviewed. No pertinent family history.   Social History     Tobacco Use    Smoking status: Every Day     Current packs/day: 1.50     Average packs/day: 1.5 packs/day for 50.0 years (75.0 ttl pk-yrs)     Types: Cigarettes    Smokeless tobacco: Never   Vaping Use    Vaping status: Never Used   Substance Use Topics    Alcohol use: Not Currently    Drug use: No      E-Cigarette/Vaping    E-Cigarette Use Never User       E-Cigarette/Vaping Substances    Nicotine No     THC No     CBD No     Flavoring No     Other No     Unknown No       I have reviewed and agree with the history as documented.     Is a 64-year-old male patient who has left lower quadrant pain x 3 years.  Follow-up with gastroenterology and has not.  Over the last 24 hours she has got severe pain left lower quadrant with diarrhea that is nonbloody.  No fever no chills no headache blurred vision double vision cough congestion sore throat no chest pain or shortness of breath no urinary symptoms nothing makes his better or worse.  He also complains of waking up this morning and his eye left was stuck shut  drainage and itchiness.  No foreign body sensation no double vision no blurred vision no photophobia.  He is a smoker and has COPD but currently is not having any problems with breathing.  At this time we discussed treatment options and he agreed to blood work CAT scan pain control urinalysis and he will be treated for conjunctivitis left eye        Review of Systems   Constitutional:  Negative for chills, diaphoresis, fatigue and fever.   HENT:  Negative for congestion, ear pain, nosebleeds and sore throat.    Eyes:  Positive for discharge and itching. Negative for photophobia, pain and visual disturbance.   Respiratory:  Negative for cough, choking, chest tightness, shortness of breath and wheezing.    Cardiovascular:  Negative for chest pain and palpitations.   Gastrointestinal:  Positive for abdominal pain and diarrhea. Negative for abdominal distention, anal bleeding, blood in stool, constipation, nausea, rectal pain and vomiting.   Genitourinary:  Negative for dysuria, flank pain, frequency and hematuria.   Musculoskeletal:  Negative for arthralgias, back pain, gait problem and joint swelling.   Skin:  Negative for color change and rash.   Neurological:  Negative for dizziness, seizures, syncope and headaches.   Psychiatric/Behavioral:  Negative for behavioral problems and confusion. The patient is not nervous/anxious.    All other systems reviewed and are negative.          Objective   {Hyperlinks  Historical Vitals - Historical Labs - Chart Review/Microbiology - Last Echo - Code Status  :[id number]}    ED Triage Vitals   Temperature Pulse Blood Pressure Respirations SpO2 Patient Position - Orthostatic VS   12/04/24 1844 12/04/24 1844 12/04/24 1844 12/04/24 1844 12/04/24 1844 12/04/24 1844   97.6 °F (36.4 °C) 105 132/74 20 95 % Sitting      Temp Source Heart Rate Source BP Location FiO2 (%) Pain Score    12/04/24 1844 12/04/24 1844 12/04/24 1844 -- 12/04/24 2009    Oral Monitor Left arm  8      Vitals       Date and Time Temp Pulse SpO2 Resp BP Pain Score FACES Pain Rating User   12/04/24 2101 -- -- -- -- -- 7 -- KW   12/04/24 2009 -- -- -- -- -- 8 -- KW   12/04/24 1844 97.6 °F (36.4 °C) 105 95 % 20 132/74 -- -- AM            Physical Exam  Vitals and nursing note reviewed.   Constitutional:       General: He is not in acute distress.     Appearance: Normal appearance. He is well-developed. He is not ill-appearing, toxic-appearing or diaphoretic.   HENT:      Head: Normocephalic and atraumatic.      Right Ear: Tympanic membrane, ear canal and external ear normal.      Left Ear: Tympanic membrane, ear canal and external ear normal.      Nose: Nose normal.      Mouth/Throat:      Mouth: Mucous membranes are moist.      Pharynx: Oropharynx is clear. No oropharyngeal exudate or posterior oropharyngeal erythema.   Eyes:      General: No scleral icterus.        Right eye: No discharge.         Left eye: Discharge present.     Extraocular Movements: Extraocular movements intact.      Pupils: Pupils are equal, round, and reactive to light.      Comments: Left eye conjunctive injected no foreign body upper or lower lid of or seen in the eye.   Cardiovascular:      Rate and Rhythm: Normal rate and regular rhythm.   Pulmonary:      Effort: Pulmonary effort is normal.      Breath sounds: Normal breath sounds.   Abdominal:      General: Bowel sounds are normal.      Palpations: Abdomen is soft.      Tenderness: There is abdominal tenderness in the left lower quadrant. There is guarding. There is no rebound. Negative signs include Patel's sign and McBurney's sign.   Musculoskeletal:         General: Normal range of motion.      Cervical back: Normal range of motion and neck supple.      Right lower leg: No edema.      Left lower leg: No edema.   Skin:     General: Skin is warm.      Capillary Refill: Capillary refill takes less than 2 seconds.   Neurological:      General: No focal deficit present.      Mental Status: He is  alert and oriented to person, place, and time. Mental status is at baseline.   Psychiatric:         Mood and Affect: Mood normal.         Behavior: Behavior normal.         Results Reviewed       Procedure Component Value Units Date/Time    Blood culture #1 [[id number]]     Lab Status: No result Specimen: Blood     Blood culture #2 [[id number]]     Lab Status: No result Specimen: Blood     Lactic acid, plasma (w/reflex if result > 2.0) [[id number]]     Lab Status: No result Specimen: Blood     Comprehensive metabolic panel [[id number]] Collected: 12/04/24 2000    Lab Status: Final result Specimen: Blood from Arm, Right Updated: 12/04/24 2059     Sodium 140 mmol/L      Potassium 3.9 mmol/L      Chloride 102 mmol/L      CO2 30 mmol/L      ANION GAP 8 mmol/L      BUN 18 mg/dL      Creatinine 1.13 mg/dL      Glucose 109 mg/dL      Calcium 9.3 mg/dL      AST 16 U/L      ALT 14 U/L      Alkaline Phosphatase 54 U/L      Total Protein 6.8 g/dL      Albumin 4.0 g/dL      Total Bilirubin 0.67 mg/dL      eGFR 68 ml/min/1.73sq m     Narrative:      [employer] guidelines for Chronic Kidney Disease (CKD):     Stage 1 with normal or high GFR (GFR > 90 mL/min/1.73 square meters)    Stage 2 Mild CKD (GFR = 60-89 mL/min/1.73 square meters)    Stage 3A Moderate CKD (GFR = 45-59 mL/min/1.73 square meters)    Stage 3B Moderate CKD (GFR = 30-44 mL/min/1.73 square meters)    Stage 4 Severe CKD (GFR = 15-29 mL/min/1.73 square meters)    Stage 5 End Stage CKD (GFR <15 mL/min/1.73 square meters)  Note: GFR calculation is accurate only with a steady state creatinine    Lipase [[id number]]  (Normal) Collected: 12/04/24 2000    Lab Status: Final result Specimen: Blood from Arm, Right Updated: 12/04/24 2059     Lipase 28 u/L     CBC and differential [[id number]]  (Abnormal) Collected: 12/04/24 2000    Lab Status: Final result Specimen: Blood from Arm, Right Updated: 12/04/24 2037     WBC 14.59 Thousand/uL      RBC 5.36  Million/uL      Hemoglobin 15.9 g/dL      Hematocrit 49.9 %      MCV 93 fL      MCH 29.7 pg      MCHC 31.9 g/dL      RDW 13.6 %      MPV 9.3 fL      Platelets 236 Thousands/uL      nRBC 0 /100 WBCs      Segmented % 88 %      Immature Grans % 1 %      Lymphocytes % 5 %      Monocytes % 3 %      Eosinophils Relative 3 %      Basophils Relative 0 %      Absolute Neutrophils 12.71 Thousands/µL      Absolute Immature Grans 0.14 Thousand/uL      Absolute Lymphocytes 0.77 Thousands/µL      Absolute Monocytes 0.41 Thousand/µL      Eosinophils Absolute 0.50 Thousand/µL      Basophils Absolute 0.06 Thousands/µL     UA w Reflex to Microscopic w Reflex to Culture [[id number]]     Lab Status: No result Specimen: Urine             CT abdomen pelvis with contrast   Final Interpretation by Campos Vasquez MD (12/04 2212)      Findings suggestive of colitis, most prominently involving the transverse and descending colon.         Workstation performed: [id number]             Procedures    ED Medication and Procedure Management   Prior to Admission Medications   Prescriptions Last Dose Informant Patient Reported? Taking?   albuterol (2.5 mg/3 mL) 0.083 % nebulizer solution   No No   Sig: Take 3 mL (2.5 mg total) by nebulization every 6 (six) hours as needed for wheezing   albuterol (PROVENTIL HFA,VENTOLIN HFA) 90 mcg/act inhaler   No No   Sig: Inhale 2 puffs every 4 (four) hours as needed for wheezing   dicyclomine (BENTYL) 20 mg tablet   No No   Sig: TAKE 1 TABLET (20 MG TOTAL) BY MOUTH EVERY 6 HOURS AS NEEDED FOR ABDOMINAL PAIN   oxygen gas   Yes No   Sig: Inhale continuous as needed   predniSONE 10 mg tablet   No No   Sig: Please take by mouth 5 tablets on days 1&2, 4 tabs on days 3&4, 3 tabs on days 5&6, 2 tabs on days 7&8, 1 tab on days 9&10.   promethazine-dextromethorphan (PHENERGAN-DM) 6.25-15 mg/5 mL oral syrup  Self No No   Sig: Take 1.25 mL by mouth 4 (four) times a day as needed for cough   promethazine-dextromethorphan  (PHENERGAN-DM) 6.25-15 mg/5 mL oral syrup   No No   Sig: Take 5 mL by mouth 4 (four) times a day as needed for cough   umeclidinium-vilanterol (Anoro Ellipta) 62.5-25 MCG/INH inhaler  Self Yes No   Sig: Inhale 1 puff daily Per Pt takes as needed only      Facility-Administered Medications: None     Patient's Medications   Discharge Prescriptions    No medications on file     No discharge procedures on file.  ED SEPSIS DOCUMENTATION   Time reflects when diagnosis was documented in both MDM as applicable and the Disposition within this note       Time User Action Codes Description Comment    12/4/2024  7:51 PM Wojciech Tan Add [H10.9] Conjunctivitis, left eye     12/4/2024 10:25 PM Wojciech Tan Add [A09] Infectious colitis                needed   predniSONE 10 mg tablet   No No   Sig: Please take by mouth 5 tablets on days 1&2, 4 tabs on days 3&4, 3 tabs on days 5&6, 2 tabs on days 7&8, 1 tab on days 9&10.   promethazine-dextromethorphan (PHENERGAN-DM) 6.25-15 mg/5 mL oral syrup  Self No No   Sig: Take 1.25 mL by mouth 4 (four) times a day as needed for cough   promethazine-dextromethorphan (PHENERGAN-DM) 6.25-15 mg/5 mL oral syrup   No No   Sig: Take 5 mL by mouth 4 (four) times a day as needed for cough   umeclidinium-vilanterol (Anoro Ellipta) 62.5-25 MCG/INH inhaler  Self Yes No   Sig: Inhale 1 puff daily Per Pt takes as needed only      Facility-Administered Medications: None     Discharge Medication List as of 12/5/2024  1:58 PM        CONTINUE these medications which have NOT CHANGED    Details   benzonatate (TESSALON) 200 MG capsule TAKE 1 CAPSULE (200 MG TOTAL) BY MOUTH 3 (THREE) TIMES A DAY AS NEEDED FOR COUGH., Historical Med      albuterol (2.5 mg/3 mL) 0.083 % nebulizer solution Take 3 mL (2.5 mg total) by nebulization every 6 (six) hours as needed for wheezing, Starting Sat 6/1/2024, Until Sun 6/1/2025 at 2359, Normal      albuterol (PROVENTIL HFA,VENTOLIN HFA) 90 mcg/act inhaler Inhale 2 puffs every 4 (four) hours as needed for wheezing, Starting Sat 6/1/2024, Until Sun 6/1/2025 at 2359, Normal      dicyclomine (BENTYL) 20 mg tablet TAKE 1 TABLET (20 MG TOTAL) BY MOUTH EVERY 6 HOURS AS NEEDED FOR ABDOMINAL PAIN, Normal      oxygen gas Inhale continuous as needed, Historical Med      predniSONE 10 mg tablet Please take by mouth 5 tablets on days 1&2, 4 tabs on days 3&4, 3 tabs on days 5&6, 2 tabs on days 7&8, 1 tab on days 9&10., Normal      promethazine-dextromethorphan (PHENERGAN-DM) 6.25-15 mg/5 mL oral syrup Take 1.25 mL by mouth 4 (four) times a day as needed for cough, Starting Tue 4/2/2024, Normal      umeclidinium-vilanterol (Anoro Ellipta) 62.5-25 MCG/INH inhaler Inhale 1 puff daily Per Pt takes as needed only, Starting Wed  9/1/2021, Historical Med           No discharge procedures on file.  ED SEPSIS DOCUMENTATION   Time reflects when diagnosis was documented in both MDM as applicable and the Disposition within this note       Time User Action Codes Description Comment    12/4/2024  7:51 PM Wojciech Tan [H10.9] Conjunctivitis, left eye     12/4/2024 10:25 PM Wojciech Tan [A09] Infectious colitis     12/5/2024  1:26 AM Stewart Abbasi [R10.12] Left upper quadrant abdominal pain     12/5/2024  1:26 AM Stewart Abbasi [K52.9] Colitis                  Wojciech Tan PA-C  12/09/24 0749

## 2024-12-05 NOTE — ASSESSMENT & PLAN NOTE
-Patient presented to [facility] yesterday with left sided abdominal pain  -CT AP notes colitis of the transverse and descending colon  -WBC is slightly elevated which may be due to recent prednisone  -Follow-up stool cultures as ordered -he has been on amoxicillin recently so C. difficile needs to be ruled out  -The patient has been seen in the outpatient setting on several occasions and recommended and scheduled for a colonoscopy.  He has canceled or no showed for all of the appointments.  It has been discussed with him multiple times the importance of this.  I have offered inpatient colonoscopy tomorrow but he is refusing stating he needs to be discharged.  -I will have the office call him to schedule an outpatient colonoscopy which he reports he will follow-up for  -I discussed the risk of missing a serious pathology such as malignancy or Crohn's disease given the chronic abdominal pain complaints that he has.  He verbalizes understanding  -Discussed with Slim who will likely discharge the patient today

## 2024-12-05 NOTE — ASSESSMENT & PLAN NOTE
Stool for c diff, O&P, enteric panel ordered and pending  Stool frequency reduced at the moment  Op fu with pcp /GI

## 2024-12-05 NOTE — H&P
H&P - Hospitalist   Name: Felipe Sanders III 64 y.o. male I MRN: [id number]  Unit/Bed#: ED 07 I Date of Admission: 12/4/2024   Date of Service: 12/5/2024 I Hospital Day: 0     Assessment & Plan  COPD (chronic obstructive pulmonary disease) (HCC)  Patient with underlying COPD.  He smokes but tries to stop.  Does not want to use a patch.  Continue inhaled Carriker steroid beta agonist combination  Tobacco use  Patient continues to smoke tobacco.  Cigarettes specifically.  Does not want to use a patch  Left upper quadrant abdominal pain  Patient presented with left upper quadrant abdominal pain.  This is somewhat chronic in nature but a lot worse over the past couple days and associated with diarrhea.  CT scan of the abdomen did show colitis.  Continue the Rocephin and metronidazole that he was ordered in the emergency room.  Also can have gastroenterology see him as they were supposed to scope him in the past and were unable to do so due to his poor social situation.  He has relationship with his wife that is very complicated and does not allow him to go to appointments somehow  Colitis  Patient with some diarrhea over the past couple days and his CT scan showed acute colitis.  Metronidazole and traction were started in the emergency room.  Will continue that.  The patient is a chronic abdominal pain and is scheduled for a colonoscopy which she missed times to get GI involved as well.  Diarrhea  Patient with some diarrhea over the past couple days and worsening abdominal pain CT scan as above shows colitis.  Appears his diarrhea is resolving.  Continue the antibiotics as noted      VTE Pharmacologic Prophylaxis:   Moderate Risk (Score 3-4) - Pharmacological DVT Prophylaxis Contraindicated. Sequential Compression Devices Ordered.  Code Status: Prior full  Discussion with family: Updated  (pt) at bedside.    Anticipated Length of Stay: Patient will be admitted on an observation basis with an anticipated  length of stay of less than 2 midnights secondary to abd pain.    History of Present Illness   Chief Complaint: Abdominal pain    Felipe Sanders III is a 64 y.o. male with a PMH of COPD, cigarette smoking chronic abdominal pain who presents with sebastián pain.  Patient has chronic abdominal pain that has been going on really for years.  He has had multiple attempts to get a colonoscopy but has not worked due to issues at home with his wife.  In any event over the past couple days he reports abdominal pain is gotten dramatically worse associate with diarrhea.  So he came to the ER in the ER he was hemodynamically stable, white count was a bit elevated.  At 14.5.  CT scan the abdomen was done which did show colitis.  Patient was started on Rocephin and metronidazole in the emergency room.  Patient when I saw him report his abdominal pain was a lot better.  He did get fentanyl IV.  Plan is admit the patient continues antibiotics.  Monitor him over the next day or 2 also and get GI to see him as they have been wanting to do a colonoscopy and maybe we can either get it done during this admission or most likely schedule him as an outpatient again.  GI can also weigh in on whether or not this current episode of colitis may be somehow related to his chronic abdominal pain    Review of Systems   Constitutional:  Positive for activity change and appetite change. Negative for chills, diaphoresis, fatigue and fever.   HENT:  Negative for congestion, ear pain, nosebleeds, rhinorrhea, sinus pain and sore throat.    Eyes:  Negative for discharge.   Respiratory:  Negative for apnea, cough, choking, chest tightness and shortness of breath.    Cardiovascular:  Negative for chest pain, palpitations and leg swelling.   Gastrointestinal:  Positive for abdominal pain, diarrhea and nausea. Negative for abdominal distention, anal bleeding, blood in stool, constipation and vomiting.   Genitourinary:  Negative for difficulty urinating, flank  pain, frequency and hematuria.   Musculoskeletal:  Positive for neck pain and neck stiffness. Negative for arthralgias, back pain, gait problem, joint swelling and myalgias.   Neurological:  Negative for dizziness, tremors, seizures, syncope, speech difficulty, weakness, light-headedness, numbness and headaches.   Hematological:  Negative for adenopathy. Does not bruise/bleed easily.   Psychiatric/Behavioral:  Negative for agitation, behavioral problems and confusion.        Historical Information   Past Medical History:   Diagnosis Date    COPD (chronic obstructive pulmonary disease) (HCC)     Diverticulitis of colon     Erectile dysfunction     Hernia of abdominal cavity     Liver cyst      History reviewed. No pertinent surgical history.  Social History     Tobacco Use    Smoking status: Every Day     Current packs/day: 1.50     Average packs/day: 1.5 packs/day for 50.0 years (75.0 ttl pk-yrs)     Types: Cigarettes    Smokeless tobacco: Never   Vaping Use    Vaping status: Never Used   Substance and Sexual Activity    Alcohol use: Not Currently    Drug use: No    Sexual activity: Yes     E-Cigarette/Vaping    E-Cigarette Use Never User      E-Cigarette/Vaping Substances    Nicotine No     THC No     CBD No     Flavoring No     Other No     Unknown No        Social History:  Marital Status: /Civil Union   Occupation: construction  Patient Pre-hospital Living Situation: Home  Patient Pre-hospital Level of Mobility: walks  Patient Pre-hospital Diet Restrictions: na    Meds/Allergies   I have reviewed home medications with patient personally.  Prior to Admission medications    Medication Sig Start Date End Date Taking? Authorizing Provider   albuterol (2.5 mg/3 mL) 0.083 % nebulizer solution Take 3 mL (2.5 mg total) by nebulization every 6 (six) hours as needed for wheezing 6/1/24 6/1/25  Johanne Nunez MD   albuterol (PROVENTIL HFA,VENTOLIN HFA) 90 mcg/act inhaler Inhale 2 puffs every 4 (four) hours as  needed for wheezing 6/1/24 6/1/25  Johanne Nunez MD   dicyclomine (BENTYL) 20 mg tablet TAKE 1 TABLET (20 MG TOTAL) BY MOUTH EVERY 6 HOURS AS NEEDED FOR ABDOMINAL PAIN 11/25/24   Cheri Marquez PA-C   oxygen gas Inhale continuous as needed    Historical Provider, MD   predniSONE 10 mg tablet Please take by mouth 5 tablets on days 1&2, 4 tabs on days 3&4, 3 tabs on days 5&6, 2 tabs on days 7&8, 1 tab on days 9&10. 7/15/24   Emil Silverman MD   promethazine-dextromethorphan (PHENERGAN-DM) 6.25-15 mg/5 mL oral syrup Take 1.25 mL by mouth 4 (four) times a day as needed for cough 4/2/24   Emil Silverman MD   promethazine-dextromethorphan (PHENERGAN-DM) 6.25-15 mg/5 mL oral syrup Take 5 mL by mouth 4 (four) times a day as needed for cough 6/1/24   Johanne Nunez MD   umeclidinium-vilanterol (Anoro Ellipta) 62.5-25 MCG/INH inhaler Inhale 1 puff daily Per Pt takes as needed only 9/1/21   Historical Provider, MD     No Known Allergies    Objective :  Temp:  [97.6 °F (36.4 °C)] 97.6 °F (36.4 °C)  HR:  [104-105] 104  BP: (112-132)/(58-74) 112/58  Resp:  [20] 20  SpO2:  [92 %-95 %] 92 %  O2 Device: None (Room air)    Physical Exam  Constitutional:       General: He is not in acute distress.     Appearance: Normal appearance. He is not ill-appearing.   HENT:      Head: Normocephalic and atraumatic.      Right Ear: Tympanic membrane normal.      Left Ear: Tympanic membrane normal.      Nose: Nose normal. No congestion or rhinorrhea.      Mouth/Throat:      Mouth: Mucous membranes are moist.   Eyes:      General:         Right eye: No discharge.         Left eye: No discharge.      Extraocular Movements: Extraocular movements intact.      Pupils: Pupils are equal, round, and reactive to light.   Cardiovascular:      Rate and Rhythm: Normal rate and regular rhythm.   Pulmonary:      Effort: Pulmonary effort is normal. No respiratory distress.      Breath sounds: Normal breath sounds. No stridor.  "  Abdominal:      General: Abdomen is flat. There is no distension.      Palpations: There is no mass.   Musculoskeletal:         General: No swelling or tenderness. Normal range of motion.      Cervical back: No rigidity or tenderness.      Right lower leg: No edema.      Left lower leg: No edema.   Skin:     General: Skin is warm.      Capillary Refill: Capillary refill takes less than 2 seconds.      Coloration: Skin is not jaundiced or pale.   Neurological:      General: No focal deficit present.      Mental Status: He is alert and oriented to person, place, and time.      Cranial Nerves: No cranial nerve deficit.      Sensory: No sensory deficit.   Psychiatric:         Mood and Affect: Mood normal.         Behavior: Behavior normal.          Lines/Drains:            Lab Results: I have reviewed the following results:  Results from last 7 days   Lab Units 12/04/24 2000   WBC Thousand/uL 14.59*   HEMOGLOBIN g/dL 15.9   HEMATOCRIT % 49.9*   PLATELETS Thousands/uL 236   SEGS PCT % 88*   LYMPHO PCT % 5*   MONO PCT % 3*   EOS PCT % 3     Results from last 7 days   Lab Units 12/04/24  2000   SODIUM mmol/L 140   POTASSIUM mmol/L 3.9   CHLORIDE mmol/L 102   CO2 mmol/L 30   BUN mg/dL 18   CREATININE mg/dL 1.13   ANION GAP mmol/L 8   CALCIUM mg/dL 9.3   ALBUMIN g/dL 4.0   TOTAL BILIRUBIN mg/dL 0.67   ALK PHOS U/L 54   ALT U/L 14   AST U/L 16   GLUCOSE RANDOM mg/dL 109             No results found for: \"HGBA1C\"  Results from last 7 days   Lab Units 12/04/24  2244   LACTIC ACID mmol/L 1.4       Imaging Results Review: I personally reviewed the following image studies/reports in PACS and discussed pertinent findings with Radiology: CT abdomen/pelvis. My interpretation of the radiology images/reports is:  .      Administrative Statements       ** Please Note: This note has been constructed using a voice recognition system. **    "

## 2024-12-05 NOTE — PLAN OF CARE
Problem: PAIN - ADULT  Goal: Verbalizes/displays adequate comfort level or baseline comfort level  Description: Interventions:  - Encourage patient to monitor pain and request assistance  - Assess pain using appropriate pain scale  - Administer analgesics based on type and severity of pain and evaluate response  - Implement non-pharmacological measures as appropriate and evaluate response  - Consider cultural and social influences on pain and pain management  - Notify physician/advanced practitioner if interventions unsuccessful or patient reports new pain  Outcome: Progressing     Problem: INFECTION - ADULT  Goal: Absence or prevention of progression during hospitalization  Description: INTERVENTIONS:  - Assess and monitor for signs and symptoms of infection  - Monitor lab/diagnostic results  - Monitor all insertion sites, i.e. indwelling lines, tubes, and drains  - Monitor endotracheal if appropriate and nasal secretions for changes in amount and color  - Hanahan appropriate cooling/warming therapies per order  - Administer medications as ordered  - Instruct and encourage patient and family to use good hand hygiene technique  - Identify and instruct in appropriate isolation precautions for identified infection/condition  Outcome: Progressing

## 2024-12-05 NOTE — ASSESSMENT & PLAN NOTE
Patient with some diarrhea over the past couple days and worsening abdominal pain CT scan as above shows colitis.  Appears his diarrhea is resolving.  Continue the antibiotics as noted

## 2024-12-05 NOTE — CONSULTS
Consultation - Gastroenterology   Name: Felipe Sanders III 64 y.o. male I MRN: [id number]  Unit/Bed#: -01 I Date of Admission: 12/4/2024   Date of Service: 12/5/2024 I Hospital Day: 0   Inpatient consult to gastroenterology  Consult performed by: Cheri Marquez PA-C  Consult ordered by: Stewart Abbasi MD        Physician Requesting Evaluation: Anton Knox MD   Reason for Evaluation / Principal Problem: Colitis    Assessment & Plan  Left upper quadrant abdominal pain    Colitis    Diarrhea  -Patient presented to [facility] yesterday with left sided abdominal pain  -CT AP notes colitis of the transverse and descending colon  -WBC is slightly elevated which may be due to recent prednisone  -Follow-up stool cultures as ordered -he has been on amoxicillin recently so C. difficile needs to be ruled out  -The patient has been seen in the outpatient setting on several occasions and recommended and scheduled for a colonoscopy.  He has canceled or no showed for all of the appointments.  It has been discussed with him multiple times the importance of this.  I have offered inpatient colonoscopy tomorrow but he is refusing stating he needs to be discharged.  -I will have the office call him to schedule an outpatient colonoscopy which he reports he will follow-up for  -I discussed the risk of missing a serious pathology such as malignancy or Crohn's disease given the chronic abdominal pain complaints that he has.  He verbalizes understanding  -Discussed with Slim who will likely discharge the patient today      Patient will be seen and evaluated by Dr. White.  All gordillo medical decisions were made by Dr. White.     History of Present Illness   HPI:  Felipe Sanders III is a 64 y.o. male with history of COPD recently on steroids and amoxicillin and chronic abdominal pain who presented to [facility] yesterday with complaints of increased significant abdominal pain and diarrhea.  He reports that the symptoms began  several days ago and have increased in intensity.  He reports that the pain is in the typical location for his pain which is the left abdomen however it radiates throughout the abdomen.  He reports that the diarrhea was not severe but was present.  There was no bloody stool.  He reports that he typically does fluctuate between diarrhea and constipation.  He reports that he has recently been on prednisone and amoxicillin for his COPD symptoms.  CT in the emergency room suggested left-sided colitis.  The patient has been evaluated several times for his chronic symptoms.  He has been advised and scheduled for a colonoscopy which she has canceled or no showed.    Review of Systems   Constitutional:  Positive for appetite change. Negative for activity change, fatigue, fever and unexpected weight change.   Respiratory:  Positive for shortness of breath and wheezing. Negative for cough.    Gastrointestinal:  Positive for abdominal pain, constipation, diarrhea and nausea. Negative for abdominal distention, blood in stool and vomiting.   Endocrine: Negative for cold intolerance and heat intolerance.   Genitourinary:  Negative for dysuria, flank pain and hematuria.   Neurological:  Negative for dizziness, numbness and headaches.     I have reviewed the patient's PMH, PSH, Social History, Family History, Meds, and Allergies  Historical Information   Past Medical History:   Diagnosis Date    COPD (chronic obstructive pulmonary disease) (HCC)     Diverticulitis of colon     Erectile dysfunction     Hernia of abdominal cavity     Liver cyst      History reviewed. No pertinent surgical history.  Social History     Tobacco Use    Smoking status: Every Day     Current packs/day: 1.50     Average packs/day: 1.5 packs/day for 50.0 years (75.0 ttl pk-yrs)     Types: Cigarettes    Smokeless tobacco: Never   Vaping Use    Vaping status: Never Used   Substance and Sexual Activity    Alcohol use: Never    Drug use: No    Sexual activity:  Yes     E-Cigarette/Vaping    E-Cigarette Use Never User      E-Cigarette/Vaping Substances    Nicotine No     THC No     CBD No     Flavoring No     Other No     Unknown No        Social History     Tobacco Use    Smoking status: Every Day     Current packs/day: 1.50     Average packs/day: 1.5 packs/day for 50.0 years (75.0 ttl pk-yrs)     Types: Cigarettes    Smokeless tobacco: Never   Vaping Use    Vaping status: Never Used   Substance and Sexual Activity    Alcohol use: Never    Drug use: No    Sexual activity: Yes     Prior to Admission Medications   Prescriptions Last Dose Informant Patient Reported? Taking?   albuterol (2.5 mg/3 mL) 0.083 % nebulizer solution   No No   Sig: Take 3 mL (2.5 mg total) by nebulization every 6 (six) hours as needed for wheezing   albuterol (PROVENTIL HFA,VENTOLIN HFA) 90 mcg/act inhaler   No No   Sig: Inhale 2 puffs every 4 (four) hours as needed for wheezing   benzonatate (TESSALON) 200 MG capsule   Yes Yes   Sig: TAKE 1 CAPSULE (200 MG TOTAL) BY MOUTH 3 (THREE) TIMES A DAY AS NEEDED FOR COUGH.   dicyclomine (BENTYL) 20 mg tablet   No No   Sig: TAKE 1 TABLET (20 MG TOTAL) BY MOUTH EVERY 6 HOURS AS NEEDED FOR ABDOMINAL PAIN   oxygen gas   Yes No   Sig: Inhale continuous as needed   predniSONE 10 mg tablet   No No   Sig: Please take by mouth 5 tablets on days 1&2, 4 tabs on days 3&4, 3 tabs on days 5&6, 2 tabs on days 7&8, 1 tab on days 9&10.   promethazine-dextromethorphan (PHENERGAN-DM) 6.25-15 mg/5 mL oral syrup  Self No No   Sig: Take 1.25 mL by mouth 4 (four) times a day as needed for cough   promethazine-dextromethorphan (PHENERGAN-DM) 6.25-15 mg/5 mL oral syrup   No No   Sig: Take 5 mL by mouth 4 (four) times a day as needed for cough   umeclidinium-vilanterol (Anoro Ellipta) 62.5-25 MCG/INH inhaler  Self Yes No   Sig: Inhale 1 puff daily Per Pt takes as needed only      Facility-Administered Medications: None     Patient has no known allergies.    Objective :  Temp:  [97.6  °F (36.4 °C)-98.5 °F (36.9 °C)] 98.5 °F (36.9 °C)  HR:  [] 94  BP: ()/() 115/66  Resp:  [17-20] 17  SpO2:  [91 %-96 %] 94 %  O2 Device: Nasal cannula  Nasal Cannula O2 Flow Rate (L/min):  [2 L/min-3 L/min] 3 L/min    Physical Exam  Vitals reviewed.   Constitutional:       Appearance: Normal appearance.   HENT:      Head: Normocephalic and atraumatic.   Eyes:      Extraocular Movements: Extraocular movements intact.      Pupils: Pupils are equal, round, and reactive to light.   Cardiovascular:      Rate and Rhythm: Normal rate and regular rhythm.   Pulmonary:      Breath sounds: Wheezing present.   Abdominal:      General: Bowel sounds are normal. There is no distension.      Palpations: Abdomen is soft. There is no mass.      Tenderness: There is abdominal tenderness.   Skin:     General: Skin is warm and dry.      Coloration: Skin is not jaundiced.   Neurological:      General: No focal deficit present.      Mental Status: He is alert and oriented to person, place, and time.           Lab Results: I have reviewed the following results:CBC/BMP:   .     12/05/24  0539   WBC 8.93   HGB 14.4   HCT 44.5      SODIUM 139   K 4.3      CO2 30   BUN 15   CREATININE 1.11   GLUC 93    , LFTs:   .     12/04/24 2000   AST 16   ALT 14   ALB 4.0   TBILI 0.67   ALKPHOS 54        Imaging Results Review: I reviewed radiology reports from this admission including: CT abdomen/pelvis.

## 2024-12-05 NOTE — ASSESSMENT & PLAN NOTE
Patient presented with left upper quadrant abdominal pain.  This is somewhat chronic in nature but a lot worse over the past couple days and associated with diarrhea.  CT scan of the abdomen did show colitis.  Continue the Rocephin and metronidazole that he was ordered in the emergency room.  Also can have gastroenterology see him as they were supposed to scope him in the past and were unable to do so due to his poor social situation.  He has relationship with his wife that is very complicated and does not allow him to go to appointments somehow

## 2024-12-05 NOTE — ASSESSMENT & PLAN NOTE
Patient with underlying COPD.  He smokes but tries to stop.  Does not want to use a patch.  Continue inhaled Carriker steroid beta agonist combination

## 2024-12-05 NOTE — ASSESSMENT & PLAN NOTE
C/c : ongoing LUQ pain x 4 years worsening in intensity yday. Associated with diarrhea. No fevers /chills    CT abdomen pelvis : Findings suggestive of colitis, most prominently involving the transverse and descending colon.  S/p iv ceftriaxone / flagyl -> no further abx needed for now.  Prn tylenol  Advanced diet this am  Stool studies pending  GI eval done. Recommending colonoscopy but patient would like to get it done as an OP. He understands that he needs to make sure he fu and get the colonoscopy done sooner than later  Ok to [place] home

## 2024-12-05 NOTE — DISCHARGE SUMMARY
Discharge Summary - Hospitalist   Name: Felipe Sanders III 64 y.o. male I MRN: [id number]  Unit/Bed#: -01 I Date of Admission: 12/4/2024   Date of Service: 12/5/2024 I Hospital Day: 0     Assessment & Plan  COPD (chronic obstructive pulmonary disease) (HCC)  Patient with underlying COPD.    Does not want to use a patch.    Continue as needed albuterol nebulizer/umeclidinium-vilanterol daily  No exacerbation.  Tobacco use  Declining nicotine patch  Smoking cessation counseling done  Left upper quadrant abdominal pain  C/c : ongoing LUQ pain x 4 years worsening in intensity yday. Associated with diarrhea. No fevers /chills    CT abdomen pelvis : Findings suggestive of colitis, most prominently involving the transverse and descending colon.  S/p iv ceftriaxone / flagyl -> no further abx needed for now.  Prn tylenol  Advanced diet this am  Stool studies pending  GI eval done. Recommending colonoscopy but patient would like to get it done as an OP. He understands that he needs to make sure he fu and get the colonoscopy done sooner than later  Ok to dc home  Colitis  As above  Diarrhea  Stool for c diff, O&P, enteric panel ordered and pending  Stool frequency reduced at the moment  Op fu with pcp /GI     Medical Problems       Resolved Problems  Date Reviewed: 12/5/2024   None       Discharging Physician / Practitioner: Anton Knox MD  PCP: Emil Silverman MD  Admission Date:   Admission Orders (From admission, onward)       Ordered        12/05/24 0128  Place in Observation  Once                          Discharge Date: 12/05/24    Consultations During Hospital Stay:  gi      Reason for Admission: [facility]    Hospital Course:   Felipe Sanders III is a 64 y.o. male patient who originally presented to the hospital on 12/4/2024 with underlying history of COPD/tobacco dependence presenting with acute on chronic left upper quadrant pain.  Follows outpatient with GI with recommendation to get colonoscopy but  "patient has not shown up for appointment.  Presenting to the ER reporting worsening left upper quadrant pain.  On CT noted to have findings of colitis involving transverse/descending colon.  Did receive ceftriaxone and Flagyl overnight.  Stool studies ordered.  As needed Tylenol given and patient kept on clear liquids.  This morning patient reports feeling much better.  No fever/normalized white count.  Discussed with GI, they are recommending that patient stay for colonoscopy however he would like to get it done as an outpatient.  He understands the risks of not getting the colonoscopy done sooner than later.  At this point patient can be discharged home with strong recommendations to get outpatient colonoscopy done ASAP.  Patient verbalizes understanding and is in agreement.  DC home.    Please see above list of diagnoses and related plan for additional information.     Condition at Discharge: fair    Discharge Day Visit / Exam:     Vitals: Blood Pressure: 115/66 (12/05/24 0934)  Pulse: 94 (12/05/24 0934)  Temperature: 98.5 °F (36.9 °C) (12/05/24 0745)  Temp Source: Oral (12/05/24 0157)  Respirations: 17 (12/05/24 0157)  Height: 5' 8\" (172.7 cm) (12/05/24 0157)  Weight - Scale: 81.2 kg (179 lb) (12/05/24 0157)  SpO2: 94 % (12/05/24 0934)  Physical Exam  Constitutional:       General: He is not in acute distress.     Appearance: He is normal weight. He is not toxic-appearing.   HENT:      Mouth/Throat:      Mouth: Mucous membranes are moist.   Cardiovascular:      Rate and Rhythm: Normal rate and regular rhythm.   Pulmonary:      Effort: Pulmonary effort is normal. No respiratory distress.      Breath sounds: Normal breath sounds.   Abdominal:      General: Abdomen is flat. Bowel sounds are normal.      Palpations: Abdomen is soft.      Tenderness: There is no abdominal tenderness. There is no guarding.   Musculoskeletal:      Right lower leg: No edema.      Left lower leg: No edema.   Neurological:      General: " No focal deficit present.      Mental Status: He is alert and oriented to person, place, and time.         Discharge instructions/Information to patient and family:   See after visit summary for information provided to patient and family.      Provisions for Follow-Up Care:  See after visit summary for information related to follow-up care and any pertinent home health orders.         Disposition:   Home      Discharge Medications:  See after visit summary for reconciled discharge medications provided to patient and/or family.      Administrative Statements   Discharge Statement:  I have spent a total time of 35 minutes in caring for this patient on the day of the visit/encounter.     **Please Note: This note may have been constructed using a voice recognition system**

## 2024-12-05 NOTE — ASSESSMENT & PLAN NOTE
Patient with some diarrhea over the past couple days and his CT scan showed acute colitis.  Metronidazole and traction were started in the emergency room.  Will continue that.  The patient is a chronic abdominal pain and is scheduled for a colonoscopy which she missed times to get GI involved as well.

## 2024-12-05 NOTE — ASSESSMENT & PLAN NOTE
Patient with underlying COPD.    Does not want to use a patch.    Continue as needed albuterol nebulizer/umeclidinium-vilanterol daily  No exacerbation.

## 2024-12-05 NOTE — CASE MANAGEMENT
Case Management Progress Note    Patient name Felipe Sanders III  Location /-01 MRN [id number]  : 1960 Date 2024       LOS (days): 0  Geometric Mean LOS (GMLOS) (days):   Days to GMLOS:        OBJECTIVE:        Current admission status: Observation  Preferred Pharmacy:   [id number] - [place] - [address]  [address]  [facility] [address]  Phone: [phone] Fax: [phone]    Primary Care Provider: Emil Silverman MD    Primary Insurance: [employer]  Secondary Insurance:     PROGRESS NOTE:  Discussed patient's case in interdisciplinary rounds this morning with [facility] provider. Patient is not medically cleared for discharge- pending GI consult for colitis. Anticipating discharge in 24-48h to home. Patient has insurance, PCP, and AMPAC of 24- no anticipated CM needs. CM will continue to follow.

## 2024-12-06 ENCOUNTER — TELEPHONE (OUTPATIENT)
Dept: GASTROENTEROLOGY | Facility: CLINIC | Age: 64
End: 2024-12-06

## 2024-12-06 NOTE — TELEPHONE ENCOUNTER
Called patient and schedule his procedure-reviewed prep and he stated he has a copy at home    Scheduled date of colonoscopy (as of today):12/10/24  Physician performing colonoscopy:Christopher  Location of colonoscopy:[place]  Bowel prep reviewed with patient:Dulco/Miralax  Instructions reviewed with patient by:Stanislav johnston  Clearances: none

## 2024-12-08 LAB
BACTERIA BLD CULT: NORMAL
BACTERIA BLD CULT: NORMAL

## 2024-12-09 DIAGNOSIS — J43.9 PULMONARY EMPHYSEMA, UNSPECIFIED EMPHYSEMA TYPE (HCC): ICD-10-CM

## 2024-12-10 ENCOUNTER — ANESTHESIA EVENT (OUTPATIENT)
Dept: GASTROENTEROLOGY | Facility: HOSPITAL | Age: 64
End: 2024-12-10
Payer: COMMERCIAL

## 2024-12-10 ENCOUNTER — ANESTHESIA (OUTPATIENT)
Dept: GASTROENTEROLOGY | Facility: HOSPITAL | Age: 64
End: 2024-12-10
Payer: COMMERCIAL

## 2024-12-10 ENCOUNTER — HOSPITAL ENCOUNTER (OUTPATIENT)
Dept: GASTROENTEROLOGY | Facility: HOSPITAL | Age: 64
Setting detail: OUTPATIENT SURGERY
Discharge: HOME | End: 2024-12-10
Payer: COMMERCIAL

## 2024-12-10 VITALS
HEART RATE: 91 BPM | DIASTOLIC BLOOD PRESSURE: 79 MMHG | TEMPERATURE: 98.3 F | HEIGHT: 68 IN | RESPIRATION RATE: 20 BRPM | BODY MASS INDEX: 26.73 KG/M2 | OXYGEN SATURATION: 94 % | WEIGHT: 176.37 LBS | SYSTOLIC BLOOD PRESSURE: 130 MMHG

## 2024-12-10 DIAGNOSIS — R10.32 LEFT LOWER QUADRANT ABDOMINAL PAIN: ICD-10-CM

## 2024-12-10 LAB
BACTERIA BLD CULT: NORMAL
BACTERIA BLD CULT: NORMAL

## 2024-12-10 PROCEDURE — 45378 DIAGNOSTIC COLONOSCOPY: CPT | Performed by: INTERNAL MEDICINE

## 2024-12-10 RX ORDER — DEXTROMETHORPHAN HYDROBROMIDE AND PROMETHAZINE HYDROCHLORIDE 15; 6.25 MG/5ML; MG/5ML
1.25 SYRUP ORAL 4 TIMES DAILY PRN
Qty: 118 ML | Refills: 0 | Status: SHIPPED | OUTPATIENT
Start: 2024-12-10

## 2024-12-10 RX ORDER — LIDOCAINE HYDROCHLORIDE 20 MG/ML
INJECTION, SOLUTION EPIDURAL; INFILTRATION; INTRACAUDAL; PERINEURAL AS NEEDED
Status: DISCONTINUED | OUTPATIENT
Start: 2024-12-10 | End: 2024-12-10

## 2024-12-10 RX ORDER — SODIUM CHLORIDE, SODIUM LACTATE, POTASSIUM CHLORIDE, CALCIUM CHLORIDE 600; 310; 30; 20 MG/100ML; MG/100ML; MG/100ML; MG/100ML
50 INJECTION, SOLUTION INTRAVENOUS CONTINUOUS
Status: DISCONTINUED | OUTPATIENT
Start: 2024-12-10 | End: 2024-12-14 | Stop reason: HOSPADM

## 2024-12-10 RX ORDER — PROPOFOL 10 MG/ML
INJECTION, EMULSION INTRAVENOUS AS NEEDED
Status: DISCONTINUED | OUTPATIENT
Start: 2024-12-10 | End: 2024-12-10

## 2024-12-10 RX ORDER — SODIUM CHLORIDE, SODIUM LACTATE, POTASSIUM CHLORIDE, CALCIUM CHLORIDE 600; 310; 30; 20 MG/100ML; MG/100ML; MG/100ML; MG/100ML
INJECTION, SOLUTION INTRAVENOUS CONTINUOUS PRN
Status: DISCONTINUED | OUTPATIENT
Start: 2024-12-10 | End: 2024-12-10

## 2024-12-10 RX ADMIN — PROPOFOL 50 MG: 10 INJECTION, EMULSION INTRAVENOUS at 13:54

## 2024-12-10 RX ADMIN — PROPOFOL 50 MG: 10 INJECTION, EMULSION INTRAVENOUS at 14:07

## 2024-12-10 RX ADMIN — LIDOCAINE HYDROCHLORIDE 50 MG: 20 INJECTION, SOLUTION EPIDURAL; INFILTRATION; INTRACAUDAL; PERINEURAL at 13:48

## 2024-12-10 RX ADMIN — PROPOFOL 50 MG: 10 INJECTION, EMULSION INTRAVENOUS at 14:01

## 2024-12-10 RX ADMIN — PROPOFOL 50 MG: 10 INJECTION, EMULSION INTRAVENOUS at 13:58

## 2024-12-10 RX ADMIN — PROPOFOL 50 MG: 10 INJECTION, EMULSION INTRAVENOUS at 13:50

## 2024-12-10 RX ADMIN — SODIUM CHLORIDE, SODIUM LACTATE, POTASSIUM CHLORIDE, AND CALCIUM CHLORIDE 50 ML/HR: .6; .31; .03; .02 INJECTION, SOLUTION INTRAVENOUS at 12:26

## 2024-12-10 RX ADMIN — PROPOFOL 50 MG: 10 INJECTION, EMULSION INTRAVENOUS at 14:04

## 2024-12-10 RX ADMIN — PROPOFOL 100 MG: 10 INJECTION, EMULSION INTRAVENOUS at 13:48

## 2024-12-10 RX ADMIN — SODIUM CHLORIDE, SODIUM LACTATE, POTASSIUM CHLORIDE, AND CALCIUM CHLORIDE: .6; .31; .03; .02 INJECTION, SOLUTION INTRAVENOUS at 13:48

## 2024-12-10 NOTE — ANESTHESIA POSTPROCEDURE EVALUATION
Post-Op Assessment Note    CV Status:  Stable    Pain management: adequate       Mental Status:  Alert and awake   Hydration Status:  Euvolemic   PONV Controlled:  Controlled   Airway Patency:  Patent     Post Op Vitals Reviewed: Yes    No anethesia notable event occurred.    Staff: CRNA           Last Filed PACU Vitals:  Vitals Value Taken Time   Temp 98.3 °F (36.8 °C) 12/10/24 1411   Pulse 92 12/10/24 1411   /63 12/10/24 1411   Resp 18 12/10/24 1411   SpO2 94 % 12/10/24 1411       Modified Madhu:  No data recorded

## 2024-12-10 NOTE — H&P
"History and Physical -  [facility]  Felipe Sanders III 64 y.o. male MRN: [id number]      HPI: Felipe Sanders III is a 64 y.o. year old male who presents for evaluation of chronic diarrhea      REVIEW OF SYSTEMS: Per the HPI, and otherwise unremarkable.    Historical Information   Past Medical History:   Diagnosis Date    COPD (chronic obstructive pulmonary disease) (HCC)     Diverticulitis of colon     Erectile dysfunction     Hernia of abdominal cavity     Liver cyst      Past Surgical History:   Procedure Laterality Date    NOSE SURGERY       Social History   Social History     Substance and Sexual Activity   Alcohol Use Never     Social History     Substance and Sexual Activity   Drug Use No     Social History     Tobacco Use   Smoking Status Every Day    Current packs/day: 1.50    Average packs/day: 1.5 packs/day for 50.0 years (75.0 ttl pk-yrs)    Types: Cigarettes   Smokeless Tobacco Never     History reviewed. No pertinent family history.    Meds/Allergies     Not in a hospital admission.    No Known Allergies    Objective     Blood pressure 147/86, pulse 94, temperature 97.6 °F (36.4 °C), temperature source Temporal, resp. rate 16, height 5' 8\" (1.727 m), weight 80 kg (176 lb 5.9 oz), SpO2 95%.      PHYSICAL EXAM    Gen: NAD  CV: RRR  CHEST: Clear  ABD: soft, NT/ND  EXT: no edema      ASSESSMENT/PLAN:  This is a 64 y.o. year old male here for colonoscopy with biopsies, and he is stable and optimized for his procedure.          "

## 2024-12-10 NOTE — ANESTHESIA PREPROCEDURE EVALUATION
Procedure:  COLONOSCOPY    Relevant Problems   GI/HEPATIC   (+) Chronic viral hepatitis B without delta agent and without coma (HCC)      PULMONARY   (+) COPD (chronic obstructive pulmonary disease) (HCC)   (+) Other emphysema (HCC)      Behavioral Health   (+) Tobacco dependence due to cigarettes        Physical Exam    Airway    Mallampati score: II  TM Distance: >3 FB  Neck ROM: full     Dental        Cardiovascular      Pulmonary      Other Findings        Anesthesia Plan  ASA Score- 3     Anesthesia Type- IV sedation with anesthesia with ASA Monitors.         Additional Monitors:     Airway Plan:            Plan Factors-Exercise tolerance (METS): >4 METS.    Chart reviewed.    Patient summary reviewed.    Patient is a current smoker.  Patient instructed to abstain from smoking on day of procedure. Patient smoked on day of surgery.            Induction- intravenous.    Postoperative Plan-         Informed Consent- Anesthetic plan and risks discussed with patient.  I personally reviewed this patient with the CRNA. Discussed and agreed on the Anesthesia Plan with the CRNA..

## 2024-12-10 NOTE — ANESTHESIA POSTPROCEDURE EVALUATION
Post-Op Assessment Note    CV Status:  Stable    Pain management: adequate       Mental Status:  Alert and awake   Hydration Status:  Euvolemic   PONV Controlled:  Controlled   Airway Patency:  Patent     Post Op Vitals Reviewed: Yes    No anethesia notable event occurred.    Staff: Anesthesiologist           Last Filed PACU Vitals:  Vitals Value Taken Time   Temp 98.3 °F (36.8 °C) 12/10/24 1411   Pulse 91 12/10/24 1431   /79 12/10/24 1431   Resp 20 12/10/24 1431   SpO2 94 % 12/10/24 1431       Modified Madhu:  Activity: 2 (12/10/2024  2:31 PM)  Respiration: 2 (12/10/2024  2:31 PM)  Circulation: 2 (12/10/2024  2:31 PM)  Consciousness: 2 (12/10/2024  2:31 PM)  Oxygen Saturation: 2 (12/10/2024  2:31 PM)  Modified Madhu Score: 10 (12/10/2024  2:31 PM)

## 2024-12-11 ENCOUNTER — NURSE TRIAGE (OUTPATIENT)
Age: 64
End: 2024-12-11

## 2024-12-11 NOTE — TELEPHONE ENCOUNTER
"Patient calling for something for pain.  Has had 2-3/10 pain for years in the LLQ.  Was told that there is something he could take since has had colitis and diverticulitis.  Would like sent to [facility] in [place].  Had colonoscopy yesterday which patient states looked good.  Please advise.    Reason for Disposition   The patient symptoms started >3 days ago, no other symptoms, Mild pain.    Answer Assessment - Initial Assessment Questions  1. When did the pain start?   Has had for years   2. Is the pain constant or intermittent and does the pain radiate? If so, where does it radiate?   constant  3. Is your LLQ (lower left quadrant) pain tender to touch or worsen after pressing on the abdomen?   denies  4. PAIN SEVERITY: If present, ask: \"How bad is the pain?\"  (e.g., Scale 1-10; mild, moderate, or severe)      - MILD (1-3): does not interfere with normal activities, abdomen soft and not tender to touch    - MODERATE (4-7): interferes with normal activities or awakens from sleep, tender to touch   - SEVERE (8-10): excruciating pain, doubled over, unable to do any normal activities   2-3/10   5. Does anything make the pain worse? Does anything make the pain better?   denies  6. Do you have a history of diverticulitis? If yes, what was your prior treatment for diverticulitis?   yes  7. Are you passing gas?   yes  8. Have you noticed a change in your bowels?   denies    Protocols used: GI-Left Lower Quadrant Pain (Diverticulitis)-ADULT-[place]    "

## 2024-12-12 NOTE — TELEPHONE ENCOUNTER
Spoke with pt, gave provider recommendations. Pt is having bm's but are small, advised he can take miralax as needed

## 2024-12-20 ENCOUNTER — VBI (OUTPATIENT)
Dept: ADMINISTRATIVE | Facility: OTHER | Age: 64
End: 2024-12-20

## 2024-12-20 NOTE — TELEPHONE ENCOUNTER
12/20/24 4:29 PM     Chart reviewed for CRC: Colonoscopy was/were submitted to the patient's insurance.     Amira France   [facility]

## 2024-12-29 ENCOUNTER — HOSPITAL ENCOUNTER (EMERGENCY)
Facility: HOSPITAL | Age: 64
Discharge: HOME | End: 2024-12-29
Attending: EMERGENCY MEDICINE | Admitting: EMERGENCY MEDICINE
Payer: COMMERCIAL

## 2024-12-29 ENCOUNTER — APPOINTMENT (EMERGENCY)
Dept: CT IMAGING | Facility: HOSPITAL | Age: 64
End: 2024-12-29
Payer: COMMERCIAL

## 2024-12-29 VITALS
TEMPERATURE: 97.5 F | OXYGEN SATURATION: 96 % | DIASTOLIC BLOOD PRESSURE: 88 MMHG | SYSTOLIC BLOOD PRESSURE: 163 MMHG | RESPIRATION RATE: 20 BRPM | HEART RATE: 109 BPM

## 2024-12-29 DIAGNOSIS — R10.9 ABDOMINAL PAIN: Primary | ICD-10-CM

## 2024-12-29 LAB
ALBUMIN SERPL BCG-MCNC: 4.3 G/DL (ref 3.5–5)
ALP SERPL-CCNC: 51 U/L (ref 34–104)
ALT SERPL W P-5'-P-CCNC: 16 U/L (ref 7–52)
ANION GAP SERPL CALCULATED.3IONS-SCNC: 7 MMOL/L (ref 4–13)
AST SERPL W P-5'-P-CCNC: 15 U/L (ref 13–39)
BACTERIA UR QL AUTO: NORMAL /HPF
BASOPHILS # BLD AUTO: 0.06 THOUSANDS/ΜL (ref 0–0.1)
BASOPHILS NFR BLD AUTO: 1 % (ref 0–1)
BILIRUB SERPL-MCNC: 0.48 MG/DL (ref 0.2–1)
BILIRUB UR QL STRIP: NEGATIVE
BUN SERPL-MCNC: 19 MG/DL (ref 5–25)
CALCIUM SERPL-MCNC: 9.7 MG/DL (ref 8.4–10.2)
CARDIAC TROPONIN I PNL SERPL HS: 3 NG/L (ref ?–50)
CHLORIDE SERPL-SCNC: 104 MMOL/L (ref 96–108)
CLARITY UR: CLEAR
CO2 SERPL-SCNC: 27 MMOL/L (ref 21–32)
COLOR UR: COLORLESS
CREAT SERPL-MCNC: 1.31 MG/DL (ref 0.6–1.3)
EOSINOPHIL # BLD AUTO: 0.26 THOUSAND/ΜL (ref 0–0.61)
EOSINOPHIL NFR BLD AUTO: 2 % (ref 0–6)
ERYTHROCYTE [DISTWIDTH] IN BLOOD BY AUTOMATED COUNT: 13.2 % (ref 11.6–15.1)
GFR SERPL CREATININE-BSD FRML MDRD: 57 ML/MIN/1.73SQ M
GLUCOSE SERPL-MCNC: 97 MG/DL (ref 65–140)
GLUCOSE UR STRIP-MCNC: NEGATIVE MG/DL
HCT VFR BLD AUTO: 47.1 % (ref 36.5–49.3)
HGB BLD-MCNC: 16.1 G/DL (ref 12–17)
HGB UR QL STRIP.AUTO: ABNORMAL
IMM GRANULOCYTES # BLD AUTO: 0.11 THOUSAND/UL (ref 0–0.2)
IMM GRANULOCYTES NFR BLD AUTO: 1 % (ref 0–2)
KETONES UR STRIP-MCNC: NEGATIVE MG/DL
LEUKOCYTE ESTERASE UR QL STRIP: NEGATIVE
LIPASE SERPL-CCNC: 24 U/L (ref 11–82)
LYMPHOCYTES # BLD AUTO: 1.91 THOUSANDS/ΜL (ref 0.6–4.47)
LYMPHOCYTES NFR BLD AUTO: 15 % (ref 14–44)
MCH RBC QN AUTO: 30.9 PG (ref 26.8–34.3)
MCHC RBC AUTO-ENTMCNC: 34.2 G/DL (ref 31.4–37.4)
MCV RBC AUTO: 90 FL (ref 82–98)
MONOCYTES # BLD AUTO: 1.18 THOUSAND/ΜL (ref 0.17–1.22)
MONOCYTES NFR BLD AUTO: 10 % (ref 4–12)
NEUTROPHILS # BLD AUTO: 8.92 THOUSANDS/ΜL (ref 1.85–7.62)
NEUTS SEG NFR BLD AUTO: 71 % (ref 43–75)
NITRITE UR QL STRIP: NEGATIVE
NON-SQ EPI CELLS URNS QL MICRO: NORMAL /HPF
NRBC BLD AUTO-RTO: 0 /100 WBCS
PH UR STRIP.AUTO: 6 [PH]
PLATELET # BLD AUTO: 303 THOUSANDS/UL (ref 149–390)
PMV BLD AUTO: 9.1 FL (ref 8.9–12.7)
POTASSIUM SERPL-SCNC: 4 MMOL/L (ref 3.5–5.3)
PROT SERPL-MCNC: 6.7 G/DL (ref 6.4–8.4)
PROT UR STRIP-MCNC: NEGATIVE MG/DL
RBC # BLD AUTO: 5.21 MILLION/UL (ref 3.88–5.62)
RBC #/AREA URNS AUTO: NORMAL /HPF
SODIUM SERPL-SCNC: 138 MMOL/L (ref 135–147)
SP GR UR STRIP.AUTO: 1.03 (ref 1–1.03)
UROBILINOGEN UR STRIP-ACNC: <2 MG/DL
WBC # BLD AUTO: 12.44 THOUSAND/UL (ref 4.31–10.16)
WBC #/AREA URNS AUTO: NORMAL /HPF

## 2024-12-29 PROCEDURE — 93005 ELECTROCARDIOGRAM TRACING: CPT

## 2024-12-29 PROCEDURE — 84484 ASSAY OF TROPONIN QUANT: CPT | Performed by: EMERGENCY MEDICINE

## 2024-12-29 PROCEDURE — 74177 CT ABD & PELVIS W/CONTRAST: CPT

## 2024-12-29 PROCEDURE — 83690 ASSAY OF LIPASE: CPT | Performed by: EMERGENCY MEDICINE

## 2024-12-29 PROCEDURE — 99285 EMERGENCY DEPT VISIT HI MDM: CPT

## 2024-12-29 PROCEDURE — 99285 EMERGENCY DEPT VISIT HI MDM: CPT | Performed by: EMERGENCY MEDICINE

## 2024-12-29 PROCEDURE — 96374 THER/PROPH/DIAG INJ IV PUSH: CPT

## 2024-12-29 PROCEDURE — 81001 URINALYSIS AUTO W/SCOPE: CPT | Performed by: EMERGENCY MEDICINE

## 2024-12-29 PROCEDURE — 80053 COMPREHEN METABOLIC PANEL: CPT | Performed by: EMERGENCY MEDICINE

## 2024-12-29 PROCEDURE — 36415 COLL VENOUS BLD VENIPUNCTURE: CPT

## 2024-12-29 PROCEDURE — 96375 TX/PRO/DX INJ NEW DRUG ADDON: CPT

## 2024-12-29 PROCEDURE — 96361 HYDRATE IV INFUSION ADD-ON: CPT

## 2024-12-29 PROCEDURE — 85025 COMPLETE CBC W/AUTO DIFF WBC: CPT | Performed by: EMERGENCY MEDICINE

## 2024-12-29 RX ORDER — FAMOTIDINE 10 MG/ML
20 INJECTION, SOLUTION INTRAVENOUS ONCE
Status: COMPLETED | OUTPATIENT
Start: 2024-12-29 | End: 2024-12-29

## 2024-12-29 RX ORDER — MORPHINE SULFATE 4 MG/ML
4 INJECTION, SOLUTION INTRAMUSCULAR; INTRAVENOUS ONCE
Status: COMPLETED | OUTPATIENT
Start: 2024-12-29 | End: 2024-12-29

## 2024-12-29 RX ORDER — DICYCLOMINE HCL 20 MG
20 TABLET ORAL ONCE
Status: COMPLETED | OUTPATIENT
Start: 2024-12-29 | End: 2024-12-29

## 2024-12-29 RX ORDER — DICYCLOMINE HCL 20 MG
20 TABLET ORAL 2 TIMES DAILY
Qty: 20 TABLET | Refills: 0 | Status: SHIPPED | OUTPATIENT
Start: 2024-12-29 | End: 2025-01-02

## 2024-12-29 RX ADMIN — MORPHINE SULFATE 4 MG: 4 INJECTION INTRAVENOUS at 21:12

## 2024-12-29 RX ADMIN — IOHEXOL 100 ML: 350 INJECTION, SOLUTION INTRAVENOUS at 22:16

## 2024-12-29 RX ADMIN — DICYCLOMINE HYDROCHLORIDE 20 MG: 20 TABLET ORAL at 23:41

## 2024-12-29 RX ADMIN — FAMOTIDINE 20 MG: 10 INJECTION, SOLUTION INTRAVENOUS at 21:11

## 2024-12-29 RX ADMIN — SODIUM CHLORIDE 1000 ML: 0.9 INJECTION, SOLUTION INTRAVENOUS at 21:12

## 2024-12-30 ENCOUNTER — NURSE TRIAGE (OUTPATIENT)
Age: 64
End: 2024-12-30

## 2024-12-30 LAB
ATRIAL RATE: 90 BPM
P AXIS: 74 DEGREES
PR INTERVAL: 118 MS
QRS AXIS: 77 DEGREES
QRSD INTERVAL: 88 MS
QT INTERVAL: 346 MS
QTC INTERVAL: 423 MS
T WAVE AXIS: 76 DEGREES
VENTRICULAR RATE: 90 BPM

## 2024-12-30 PROCEDURE — 93010 ELECTROCARDIOGRAM REPORT: CPT | Performed by: STUDENT IN AN ORGANIZED HEALTH CARE EDUCATION/TRAINING PROGRAM

## 2024-12-30 NOTE — ED PROVIDER NOTES
Time reflects when diagnosis was documented in both MDM as applicable and the Disposition within this note       Time User Action Codes Description Comment    12/29/2024 11:38 PM Cathleen Le Add [R10.9] Abdominal pain           ED Disposition       ED Disposition   Discharge    Condition   Stable    Date/Time   Sun Dec 29, 2024 11:38 PM    Comment   Felipe Cotterd III discharge to home/self care.                   Assessment & Plan       Medical Decision Making  Amount and/or Complexity of Data Reviewed  Labs: ordered.  Radiology: ordered.    Risk  Prescription drug management.      Labs show mild leukocytosis, otherwise unremarkable and reassuring.  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  Patient able to tolerate p.o., will discharge with return precautions.       Medications   sodium chloride 0.9 % bolus 1,000 mL (0 mL Intravenous Stopped 12/29/24 2228)   Famotidine (PF) (PEPCID) injection 20 mg (20 mg Intravenous Given 12/29/24 2111)   morphine injection 4 mg (4 mg Intravenous Given 12/29/24 2112)   iohexol (OMNIPAQUE) 350 MG/ML injection (MULTI-DOSE) 100 mL (100 mL Intravenous Given 12/29/24 2216)   dicyclomine (BENTYL) tablet 20 mg (20 mg Oral Given 12/29/24 2341)       ED Risk Strat Scores                          SBIRT 20yo+      Flowsheet Row Most Recent Value   Initial Alcohol Screen: US AUDIT-C     1. How often do you have a drink containing alcohol? 0 Filed at: 12/29/2024 2027   2. How many drinks containing alcohol do you have on a typical day you are drinking?  0 Filed at: 12/29/2024 2027   3a. Male UNDER 65: How often do you have five or more drinks on one occasion? 0 Filed at: 12/29/2024 2027   3b. FEMALE Any Age, or MALE 65+: How often do you have 4 or more drinks on one occassion? 0 Filed at: 12/29/2024 2027   Audit-C Score 0 Filed at: 12/29/2024 2027   LAZARA: How many times in the past year have you...    Used an illegal drug or used a prescription medication for  non-medical reasons? Never Filed at: 12/29/2024 2027                            History of Present Illness       Chief Complaint   Patient presents with    Abdominal Pain     Pt reports pain in stomach that has been going on for a while, but got worse friday. Pt reports picking something up and getting pain from right sided abdomen to left side and around into his back. Pt reports having hernias, and recently getting a colonoscopy. Pt reports the provider gave him bentyl for the pain. Pt report diarrhea the other day just seen for colitis. Pt denies urinary issues.        Past Medical History:   Diagnosis Date    COPD (chronic obstructive pulmonary disease) (HCC)     Diverticulitis of colon     Erectile dysfunction     Hernia of abdominal cavity     Liver cyst       Past Surgical History:   Procedure Laterality Date    NOSE SURGERY        History reviewed. No pertinent family history.   Social History     Tobacco Use    Smoking status: Every Day     Current packs/day: 1.50     Average packs/day: 1.5 packs/day for 50.0 years (75.0 ttl pk-yrs)     Types: Cigarettes    Smokeless tobacco: Never   Vaping Use    Vaping status: Never Used   Substance Use Topics    Alcohol use: Never    Drug use: No      E-Cigarette/Vaping    E-Cigarette Use Never User       E-Cigarette/Vaping Substances    Nicotine No     THC No     CBD No     Flavoring No     Other No     Unknown No       I have reviewed and agree with the history as documented.     HPI  64-year-old male presents with abdominal pain for the past 2 days.  He states that it started after he lifted something.  Located in his left upper quadrant.  Has history of colitis, recent colonoscopy performed.  He denies nausea/vomiting, did have diarrhea which improved.  No blood in stool, fevers.  Review of Systems   Constitutional:  Negative for chills and fever.   HENT:  Negative for dental problem and ear pain.    Eyes:  Negative for pain and redness.   Respiratory:  Negative for  cough and shortness of breath.    Cardiovascular:  Negative for chest pain and palpitations.   Gastrointestinal:  Positive for abdominal pain. Negative for nausea.   Endocrine: Negative for polydipsia and polyphagia.   Genitourinary:  Negative for dysuria and frequency.   Musculoskeletal:  Negative for arthralgias and joint swelling.   Skin:  Negative for color change and rash.   Neurological:  Negative for dizziness and headaches.   Psychiatric/Behavioral:  Negative for behavioral problems and confusion.    All other systems reviewed and are negative.          Objective       ED Triage Vitals [12/29/24 2023]   Temperature Pulse Blood Pressure Respirations SpO2 Patient Position - Orthostatic VS   97.5 °F (36.4 °C) (!) 109 163/88 20 96 % Sitting      Temp Source Heart Rate Source BP Location FiO2 (%) Pain Score    Oral Monitor Left arm -- 3      Vitals      Date and Time Temp Pulse SpO2 Resp BP Pain Score FACES Pain Rating User   12/29/24 2023 97.5 °F (36.4 °C) 109 96 % 20 163/88 3 -- NEPTALI            Physical Exam  Vitals and nursing note reviewed.   Constitutional:       General: He is not in acute distress.     Appearance: He is well-developed. He is not diaphoretic.   HENT:      Head: Atraumatic.      Right Ear: External ear normal.      Left Ear: External ear normal.      Nose: Nose normal.   Eyes:      Conjunctiva/sclera: Conjunctivae normal.      Pupils: Pupils are equal, round, and reactive to light.   Neck:      Vascular: No JVD.   Cardiovascular:      Rate and Rhythm: Normal rate and regular rhythm.      Heart sounds: Normal heart sounds. No murmur heard.  Pulmonary:      Effort: Pulmonary effort is normal. No respiratory distress.      Breath sounds: Normal breath sounds. No wheezing.   Abdominal:      General: Bowel sounds are normal. There is no distension.      Palpations: Abdomen is soft.      Tenderness: There is abdominal tenderness in the left upper quadrant.   Musculoskeletal:         General: Normal  range of motion.      Cervical back: Normal range of motion and neck supple.   Skin:     General: Skin is warm and dry.      Capillary Refill: Capillary refill takes less than 2 seconds.   Neurological:      Mental Status: He is alert and oriented to person, place, and time.      Cranial Nerves: No cranial nerve deficit.   Psychiatric:         Behavior: Behavior normal.         Results Reviewed       Procedure Component Value Units Date/Time    HS Troponin I 4hr [[id number]]     Lab Status: No result Specimen: Blood     Urine Microscopic [[id number]]  (Normal) Collected: 12/29/24 2234    Lab Status: Final result Specimen: Urine, Clean Catch Updated: 12/29/24 2245     RBC, UA 1-2 /hpf      WBC, UA None Seen /hpf      Epithelial Cells None Seen /hpf      Bacteria, UA None Seen /hpf     UA w Reflex to Microscopic w Reflex to Culture [[id number]]  (Abnormal) Collected: 12/29/24 2234    Lab Status: Final result Specimen: Urine, Clean Catch Updated: 12/29/24 2244     Color, UA Colorless     Clarity, UA Clear     Specific Gravity, UA 1.028     pH, UA 6.0     Leukocytes, UA Negative     Nitrite, UA Negative     Protein, UA Negative mg/dl      Glucose, UA Negative mg/dl      Ketones, UA Negative mg/dl      Urobilinogen, UA <2.0 mg/dl      Bilirubin, UA Negative     Occult Blood, UA Trace    HS Troponin 0hr (reflex protocol) [[id number]]  (Normal) Collected: 12/29/24 2156    Lab Status: Final result Specimen: Blood from Arm, Right Updated: 12/29/24 2227     hs TnI 0hr 3 ng/L     HS Troponin I 2hr [[id number]]     Lab Status: No result Specimen: Blood     Comprehensive metabolic panel [[id number]]  (Abnormal) Collected: 12/29/24 2046    Lab Status: Final result Specimen: Blood from Arm, Right Updated: 12/29/24 2119     Sodium 138 mmol/L      Potassium 4.0 mmol/L      Chloride 104 mmol/L      CO2 27 mmol/L      ANION GAP 7 mmol/L      BUN 19 mg/dL      Creatinine 1.31 mg/dL      Glucose 97 mg/dL      Calcium 9.7 mg/dL      AST 15 U/L       ALT 16 U/L      Alkaline Phosphatase 51 U/L      Total Protein 6.7 g/dL      Albumin 4.3 g/dL      Total Bilirubin 0.48 mg/dL      eGFR 57 ml/min/1.73sq m     Narrative:      [employer] guidelines for Chronic Kidney Disease (CKD):     Stage 1 with normal or high GFR (GFR > 90 mL/min/1.73 square meters)    Stage 2 Mild CKD (GFR = 60-89 mL/min/1.73 square meters)    Stage 3A Moderate CKD (GFR = 45-59 mL/min/1.73 square meters)    Stage 3B Moderate CKD (GFR = 30-44 mL/min/1.73 square meters)    Stage 4 Severe CKD (GFR = 15-29 mL/min/1.73 square meters)    Stage 5 End Stage CKD (GFR <15 mL/min/1.73 square meters)  Note: GFR calculation is accurate only with a steady state creatinine    Lipase [[id number]]  (Normal) Collected: 12/29/24 2046    Lab Status: Final result Specimen: Blood from Arm, Right Updated: 12/29/24 2119     Lipase 24 u/L     CBC and differential [[id number]]  (Abnormal) Collected: 12/29/24 2046    Lab Status: Final result Specimen: Blood from Arm, Right Updated: 12/29/24 2100     WBC 12.44 Thousand/uL      RBC 5.21 Million/uL      Hemoglobin 16.1 g/dL      Hematocrit 47.1 %      MCV 90 fL      MCH 30.9 pg      MCHC 34.2 g/dL      RDW 13.2 %      MPV 9.1 fL      Platelets 303 Thousands/uL      nRBC 0 /100 WBCs      Segmented % 71 %      Immature Grans % 1 %      Lymphocytes % 15 %      Monocytes % 10 %      Eosinophils Relative 2 %      Basophils Relative 1 %      Absolute Neutrophils 8.92 Thousands/µL      Absolute Immature Grans 0.11 Thousand/uL      Absolute Lymphocytes 1.91 Thousands/µL      Absolute Monocytes 1.18 Thousand/µL      Eosinophils Absolute 0.26 Thousand/µL      Basophils Absolute 0.06 Thousands/µL             CT abdomen pelvis with contrast    (Results Pending)       Procedures    ED Medication and Procedure Management   Prior to Admission Medications   Prescriptions Last Dose Informant Patient Reported? Taking?   albuterol (2.5 mg/3 mL) 0.083 % nebulizer  solution   No No   Sig: Take 3 mL (2.5 mg total) by nebulization every 6 (six) hours as needed for wheezing   albuterol (PROVENTIL HFA,VENTOLIN HFA) 90 mcg/act inhaler   No No   Sig: Inhale 2 puffs every 4 (four) hours as needed for wheezing   benzonatate (TESSALON) 200 MG capsule   Yes No   Sig: TAKE 1 CAPSULE (200 MG TOTAL) BY MOUTH 3 (THREE) TIMES A DAY AS NEEDED FOR COUGH.   dicyclomine (BENTYL) 20 mg tablet   No No   Sig: TAKE 1 TABLET (20 MG TOTAL) BY MOUTH EVERY 6 HOURS AS NEEDED FOR ABDOMINAL PAIN   oxygen gas   Yes No   Sig: Inhale continuous as needed   predniSONE 10 mg tablet   No No   Sig: Please take by mouth 5 tablets on days 1&2, 4 tabs on days 3&4, 3 tabs on days 5&6, 2 tabs on days 7&8, 1 tab on days 9&10.   promethazine-dextromethorphan (PHENERGAN-DM) 6.25-15 mg/5 mL oral syrup   No No   Sig: Take 1.25 mL by mouth 4 (four) times a day as needed for cough   umeclidinium-vilanterol (Anoro Ellipta) 62.5-25 MCG/INH inhaler  Self Yes No   Sig: Inhale 1 puff daily Per Pt takes as needed only      Facility-Administered Medications: None     Discharge Medication List as of 12/29/2024 11:38 PM        START taking these medications    Details   !! dicyclomine (BENTYL) 20 mg tablet Take 1 tablet (20 mg total) by mouth 2 (two) times a day, Starting Sun 12/29/2024, Normal       !! - Potential duplicate medications found. Please discuss with provider.        CONTINUE these medications which have NOT CHANGED    Details   albuterol (2.5 mg/3 mL) 0.083 % nebulizer solution Take 3 mL (2.5 mg total) by nebulization every 6 (six) hours as needed for wheezing, Starting Sat 6/1/2024, Until Sun 6/1/2025 at 2359, Normal      albuterol (PROVENTIL HFA,VENTOLIN HFA) 90 mcg/act inhaler Inhale 2 puffs every 4 (four) hours as needed for wheezing, Starting Sat 6/1/2024, Until Sun 6/1/2025 at 2359, Normal      benzonatate (TESSALON) 200 MG capsule TAKE 1 CAPSULE (200 MG TOTAL) BY MOUTH 3 (THREE) TIMES A DAY AS NEEDED FOR COUGH.,  Historical Med      !! dicyclomine (BENTYL) 20 mg tablet TAKE 1 TABLET (20 MG TOTAL) BY MOUTH EVERY 6 HOURS AS NEEDED FOR ABDOMINAL PAIN, Normal      oxygen gas Inhale continuous as needed, Historical Med      predniSONE 10 mg tablet Please take by mouth 5 tablets on days 1&2, 4 tabs on days 3&4, 3 tabs on days 5&6, 2 tabs on days 7&8, 1 tab on days 9&10., Normal      promethazine-dextromethorphan (PHENERGAN-DM) 6.25-15 mg/5 mL oral syrup Take 1.25 mL by mouth 4 (four) times a day as needed for cough, Starting Tue 12/10/2024, Normal      umeclidinium-vilanterol (Anoro Ellipta) 62.5-25 MCG/INH inhaler Inhale 1 puff daily Per Pt takes as needed only, Starting Wed 9/1/2021, Historical Med       !! - Potential duplicate medications found. Please discuss with provider.        No discharge procedures on file.  ED SEPSIS DOCUMENTATION   Time reflects when diagnosis was documented in both MDM as applicable and the Disposition within this note       Time User Action Codes Description Comment    12/29/2024 11:38 PM Cathleen Le Add [R10.9] Abdominal pain                  Cathleen Le MD  12/30/24 0017

## 2024-12-30 NOTE — TELEPHONE ENCOUNTER
"  Last OV:4/25/24 LLQ pain, diverticulitis, abnormal CT of liver, chronic Hep B   Last Colonoscopy: normal       Labs:see yesterdays lab in ER   Imaging: CT A/P 12/29/24  1. No acute abnormality in the abdomen or pelvis.     2. Fluid-filled mid to distal appendix without findings of acute appendicitis, suggesting a mucocele, stable compared to the prior study, but new since 4/11/2024.         Next OV: 1/6/25    Pt. Seen in ER yesterday for abdominal pain that started on Friday, RLQ pain radiating into LLQ and into back, recent colonoscopy was normal, hx of diverticulitis and colitis, had CT in ER which showed no acute abdominal or pelvis abnormality, but did show  Fluid-filled mid to distal appendix without findings of acute appendicitis, suggesting a mucocele, stable compared to the prior study, but new since 4/11/2024. Pt. Denies fever or blood in stool, taking Bentyl with no relief and was prescribed more bentyl by ER, pt. Is not using Tylenol otc to see if that helps, pain is constant, pt. Has not eaten very mcuh the last few days, denies n/v, pt. Was tolerating po fluids in ER anf was d/c home, pt. Has f/u in office on 1/6/25, pt. Last moved bowels yesterday, feels constipated, advised to use Miralax 1 capful daily, stay hydrated and follow liquid diet for the next 48 hours, please advise if pt. Needs a sooner urgent appointment or any other recommendations        Reason for Disposition   MODERATE pain (e.g., interferes with normal activities) that comes and goes (cramps) lasts > 24 hours  (Exception: Pain with Vomiting or Diarrhea - see that Protocol.)    Answer Assessment - Initial Assessment Questions  1. LOCATION: \"Where does it hurt?\"       LLQ pain   2. RADIATION: \"Does the pain shoot anywhere else?\" (e.g., chest, back)      Into back   3. ONSET: \"When did the pain begin?\" (Minutes, hours or days ago)       Few days ago   4. SUDDEN: \"Gradual or sudden onset?\"      Gradual  5. PATTERN \"Does the pain come " "and go, or is it constant?\"      Constant   6. SEVERITY: \"How bad is the pain?\"  (e.g., Scale 1-10; mild, moderate, or severe)      Moderate/severity   7. RECURRENT SYMPTOM: \"Have you ever had this type of stomach pain before?\" If Yes, ask: \"When was the last time?\" and \"What happened that time?\"       Yes, recently had colitis and has hx of diverticulitis   8. CAUSE: \"What do you think is causing the stomach pain?\"      Unsure, was seen in ER yesterday and they found nothing wrong   9. RELIEVING/AGGRAVATING FACTORS: \"What makes it better or worse?\" (e.g., antacids, bending or twisting motion, bowel movement)      Moving bowels helps the pain improve   10. OTHER SYMPTOMS: \"Do you have any other symptoms?\" (e.g., back pain, diarrhea, fever, urination pain, vomiting)        Denies    Protocols used: Abdominal Pain - Male-Adult-[place]    "

## 2025-01-01 ENCOUNTER — HOSPITAL ENCOUNTER (OUTPATIENT)
Facility: HOSPITAL | Age: 65
Setting detail: OBSERVATION
Discharge: HOME | End: 2025-01-02
Attending: INTERNAL MEDICINE | Admitting: INTERNAL MEDICINE
Payer: COMMERCIAL

## 2025-01-01 ENCOUNTER — APPOINTMENT (EMERGENCY)
Dept: RADIOLOGY | Facility: HOSPITAL | Age: 65
End: 2025-01-01
Payer: COMMERCIAL

## 2025-01-01 DIAGNOSIS — J44.1 COPD EXACERBATION (HCC): Primary | ICD-10-CM

## 2025-01-01 DIAGNOSIS — Z13.9 ENCOUNTER FOR SCREENING INVOLVING SOCIAL DETERMINANTS OF HEALTH (SDOH): ICD-10-CM

## 2025-01-01 DIAGNOSIS — R09.02 HYPOXIA: ICD-10-CM

## 2025-01-01 DIAGNOSIS — R06.02 SHORTNESS OF BREATH: ICD-10-CM

## 2025-01-01 LAB
ALBUMIN SERPL BCG-MCNC: 4.5 G/DL (ref 3.5–5)
ALP SERPL-CCNC: 53 U/L (ref 34–104)
ALT SERPL W P-5'-P-CCNC: 13 U/L (ref 7–52)
ANION GAP SERPL CALCULATED.3IONS-SCNC: 7 MMOL/L (ref 4–13)
APTT PPP: 29 SECONDS (ref 23–34)
AST SERPL W P-5'-P-CCNC: 15 U/L (ref 13–39)
BASOPHILS # BLD AUTO: 0.05 THOUSANDS/ΜL (ref 0–0.1)
BASOPHILS NFR BLD AUTO: 0 % (ref 0–1)
BILIRUB SERPL-MCNC: 0.49 MG/DL (ref 0.2–1)
BNP SERPL-MCNC: 22 PG/ML (ref 0–100)
BUN SERPL-MCNC: 17 MG/DL (ref 5–25)
CALCIUM SERPL-MCNC: 9.3 MG/DL (ref 8.4–10.2)
CARDIAC TROPONIN I PNL SERPL HS: 4 NG/L (ref ?–50)
CHLORIDE SERPL-SCNC: 105 MMOL/L (ref 96–108)
CO2 SERPL-SCNC: 28 MMOL/L (ref 21–32)
CREAT SERPL-MCNC: 1.23 MG/DL (ref 0.6–1.3)
EOSINOPHIL # BLD AUTO: 0.61 THOUSAND/ΜL (ref 0–0.61)
EOSINOPHIL NFR BLD AUTO: 5 % (ref 0–6)
ERYTHROCYTE [DISTWIDTH] IN BLOOD BY AUTOMATED COUNT: 13.2 % (ref 11.6–15.1)
FLUAV AG UPPER RESP QL IA.RAPID: NEGATIVE
FLUBV AG UPPER RESP QL IA.RAPID: NEGATIVE
GFR SERPL CREATININE-BSD FRML MDRD: 61 ML/MIN/1.73SQ M
GLUCOSE SERPL-MCNC: 92 MG/DL (ref 65–140)
HCT VFR BLD AUTO: 47.5 % (ref 36.5–49.3)
HGB BLD-MCNC: 15.5 G/DL (ref 12–17)
IMM GRANULOCYTES # BLD AUTO: 0.08 THOUSAND/UL (ref 0–0.2)
IMM GRANULOCYTES NFR BLD AUTO: 1 % (ref 0–2)
INR PPP: 0.88 (ref 0.85–1.19)
LYMPHOCYTES # BLD AUTO: 1.71 THOUSANDS/ΜL (ref 0.6–4.47)
LYMPHOCYTES NFR BLD AUTO: 15 % (ref 14–44)
MCH RBC QN AUTO: 30.2 PG (ref 26.8–34.3)
MCHC RBC AUTO-ENTMCNC: 32.6 G/DL (ref 31.4–37.4)
MCV RBC AUTO: 92 FL (ref 82–98)
MONOCYTES # BLD AUTO: 1.17 THOUSAND/ΜL (ref 0.17–1.22)
MONOCYTES NFR BLD AUTO: 10 % (ref 4–12)
NEUTROPHILS # BLD AUTO: 8.05 THOUSANDS/ΜL (ref 1.85–7.62)
NEUTS SEG NFR BLD AUTO: 69 % (ref 43–75)
NRBC BLD AUTO-RTO: 0 /100 WBCS
PLATELET # BLD AUTO: 263 THOUSANDS/UL (ref 149–390)
PMV BLD AUTO: 9.2 FL (ref 8.9–12.7)
POTASSIUM SERPL-SCNC: 4.2 MMOL/L (ref 3.5–5.3)
PROT SERPL-MCNC: 7.1 G/DL (ref 6.4–8.4)
PROTHROMBIN TIME: 12.6 SECONDS (ref 12.3–15)
RBC # BLD AUTO: 5.14 MILLION/UL (ref 3.88–5.62)
SARS-COV+SARS-COV-2 AG RESP QL IA.RAPID: NEGATIVE
SODIUM SERPL-SCNC: 140 MMOL/L (ref 135–147)
WBC # BLD AUTO: 11.67 THOUSAND/UL (ref 4.31–10.16)

## 2025-01-01 PROCEDURE — 96375 TX/PRO/DX INJ NEW DRUG ADDON: CPT

## 2025-01-01 PROCEDURE — 93005 ELECTROCARDIOGRAM TRACING: CPT

## 2025-01-01 PROCEDURE — 87811 SARS-COV-2 COVID19 W/OPTIC: CPT | Performed by: INTERNAL MEDICINE

## 2025-01-01 PROCEDURE — 36415 COLL VENOUS BLD VENIPUNCTURE: CPT

## 2025-01-01 PROCEDURE — 85730 THROMBOPLASTIN TIME PARTIAL: CPT

## 2025-01-01 PROCEDURE — 85610 PROTHROMBIN TIME: CPT

## 2025-01-01 PROCEDURE — 94760 N-INVAS EAR/PLS OXIMETRY 1: CPT

## 2025-01-01 PROCEDURE — 85025 COMPLETE CBC W/AUTO DIFF WBC: CPT

## 2025-01-01 PROCEDURE — 99285 EMERGENCY DEPT VISIT HI MDM: CPT

## 2025-01-01 PROCEDURE — 71046 X-RAY EXAM CHEST 2 VIEWS: CPT

## 2025-01-01 PROCEDURE — 83880 ASSAY OF NATRIURETIC PEPTIDE: CPT

## 2025-01-01 PROCEDURE — 94664 DEMO&/EVAL PT USE INHALER: CPT

## 2025-01-01 PROCEDURE — 84484 ASSAY OF TROPONIN QUANT: CPT

## 2025-01-01 PROCEDURE — 96365 THER/PROPH/DIAG IV INF INIT: CPT

## 2025-01-01 PROCEDURE — 87040 BLOOD CULTURE FOR BACTERIA: CPT

## 2025-01-01 PROCEDURE — 87804 INFLUENZA ASSAY W/OPTIC: CPT | Performed by: INTERNAL MEDICINE

## 2025-01-01 PROCEDURE — 94640 AIRWAY INHALATION TREATMENT: CPT

## 2025-01-01 PROCEDURE — 80053 COMPREHEN METABOLIC PANEL: CPT

## 2025-01-01 PROCEDURE — 94644 CONT INHLJ TX 1ST HOUR: CPT

## 2025-01-01 RX ORDER — METHYLPREDNISOLONE SODIUM SUCCINATE 125 MG/2ML
125 INJECTION, POWDER, LYOPHILIZED, FOR SOLUTION INTRAMUSCULAR; INTRAVENOUS ONCE
Status: COMPLETED | OUTPATIENT
Start: 2025-01-01 | End: 2025-01-01

## 2025-01-01 RX ORDER — ALBUTEROL SULFATE 5 MG/ML
10 SOLUTION RESPIRATORY (INHALATION) ONCE
Status: COMPLETED | OUTPATIENT
Start: 2025-01-01 | End: 2025-01-01

## 2025-01-01 RX ADMIN — ALBUTEROL SULFATE 10 MG: 2.5 SOLUTION RESPIRATORY (INHALATION) at 21:09

## 2025-01-01 RX ADMIN — CEFTRIAXONE SODIUM 1000 MG: 10 INJECTION, POWDER, FOR SOLUTION INTRAVENOUS at 23:26

## 2025-01-01 RX ADMIN — IPRATROPIUM BROMIDE 1 MG: 0.5 SOLUTION RESPIRATORY (INHALATION) at 21:10

## 2025-01-01 RX ADMIN — METHYLPREDNISOLONE SODIUM SUCCINATE 125 MG: 125 INJECTION, POWDER, FOR SOLUTION INTRAMUSCULAR; INTRAVENOUS at 21:15

## 2025-01-02 ENCOUNTER — TRANSITIONAL CARE MANAGEMENT (OUTPATIENT)
Dept: INTERNAL MEDICINE CLINIC | Facility: CLINIC | Age: 65
End: 2025-01-02

## 2025-01-02 VITALS
OXYGEN SATURATION: 97 % | DIASTOLIC BLOOD PRESSURE: 75 MMHG | BODY MASS INDEX: 26.9 KG/M2 | WEIGHT: 177.47 LBS | HEIGHT: 68 IN | TEMPERATURE: 98.1 F | HEART RATE: 97 BPM | SYSTOLIC BLOOD PRESSURE: 120 MMHG | RESPIRATION RATE: 16 BRPM

## 2025-01-02 LAB
2HR DELTA HS TROPONIN: -1 NG/L
4HR DELTA HS TROPONIN: -1 NG/L
ATRIAL RATE: 102 BPM
CARDIAC TROPONIN I PNL SERPL HS: 3 NG/L (ref ?–50)
CARDIAC TROPONIN I PNL SERPL HS: 3 NG/L (ref ?–50)
P AXIS: 78 DEGREES
PR INTERVAL: 128 MS
QRS AXIS: 76 DEGREES
QRSD INTERVAL: 84 MS
QT INTERVAL: 334 MS
QTC INTERVAL: 435 MS
T WAVE AXIS: 77 DEGREES
VENTRICULAR RATE: 102 BPM

## 2025-01-02 PROCEDURE — 99223 1ST HOSP IP/OBS HIGH 75: CPT | Performed by: INTERNAL MEDICINE

## 2025-01-02 PROCEDURE — 99239 HOSP IP/OBS DSCHRG MGMT >30: CPT | Performed by: NURSE PRACTITIONER

## 2025-01-02 PROCEDURE — 93010 ELECTROCARDIOGRAM REPORT: CPT | Performed by: STUDENT IN AN ORGANIZED HEALTH CARE EDUCATION/TRAINING PROGRAM

## 2025-01-02 PROCEDURE — 94664 DEMO&/EVAL PT USE INHALER: CPT

## 2025-01-02 PROCEDURE — 36415 COLL VENOUS BLD VENIPUNCTURE: CPT

## 2025-01-02 PROCEDURE — 84484 ASSAY OF TROPONIN QUANT: CPT

## 2025-01-02 PROCEDURE — 94640 AIRWAY INHALATION TREATMENT: CPT

## 2025-01-02 PROCEDURE — 96367 TX/PROPH/DG ADDL SEQ IV INF: CPT

## 2025-01-02 PROCEDURE — 94760 N-INVAS EAR/PLS OXIMETRY 1: CPT

## 2025-01-02 RX ORDER — AZITHROMYCIN 500 MG/1
500 TABLET, FILM COATED ORAL DAILY
Qty: 3 TABLET | Refills: 0 | Status: SHIPPED | OUTPATIENT
Start: 2025-01-02 | End: 2025-01-05

## 2025-01-02 RX ORDER — LEVALBUTEROL INHALATION SOLUTION 1.25 MG/3ML
1.25 SOLUTION RESPIRATORY (INHALATION)
Status: DISCONTINUED | OUTPATIENT
Start: 2025-01-02 | End: 2025-01-02

## 2025-01-02 RX ORDER — NICOTINE 21 MG/24HR
1 PATCH, TRANSDERMAL 24 HOURS TRANSDERMAL DAILY
Status: DISCONTINUED | OUTPATIENT
Start: 2025-01-02 | End: 2025-01-02 | Stop reason: HOSPADM

## 2025-01-02 RX ORDER — IPRATROPIUM BROMIDE AND ALBUTEROL SULFATE 2.5; .5 MG/3ML; MG/3ML
3 SOLUTION RESPIRATORY (INHALATION) EVERY 6 HOURS PRN
Status: DISCONTINUED | OUTPATIENT
Start: 2025-01-02 | End: 2025-01-02 | Stop reason: HOSPADM

## 2025-01-02 RX ORDER — LEVALBUTEROL INHALATION SOLUTION 1.25 MG/3ML
1.25 SOLUTION RESPIRATORY (INHALATION)
Status: DISCONTINUED | OUTPATIENT
Start: 2025-01-02 | End: 2025-01-02 | Stop reason: HOSPADM

## 2025-01-02 RX ORDER — ENOXAPARIN SODIUM 100 MG/ML
40 INJECTION SUBCUTANEOUS DAILY
Status: DISCONTINUED | OUTPATIENT
Start: 2025-01-02 | End: 2025-01-02 | Stop reason: HOSPADM

## 2025-01-02 RX ORDER — PREDNISONE 20 MG/1
TABLET ORAL
Qty: 23 TABLET | Refills: 0 | Status: SHIPPED | OUTPATIENT
Start: 2025-01-02 | End: 2025-01-16

## 2025-01-02 RX ORDER — METHYLPREDNISOLONE SODIUM SUCCINATE 40 MG/ML
40 INJECTION, POWDER, LYOPHILIZED, FOR SOLUTION INTRAMUSCULAR; INTRAVENOUS EVERY 8 HOURS SCHEDULED
Status: DISCONTINUED | OUTPATIENT
Start: 2025-01-02 | End: 2025-01-02 | Stop reason: HOSPADM

## 2025-01-02 RX ORDER — IPRATROPIUM BROMIDE AND ALBUTEROL SULFATE 2.5; .5 MG/3ML; MG/3ML
3 SOLUTION RESPIRATORY (INHALATION)
Status: DISCONTINUED | OUTPATIENT
Start: 2025-01-02 | End: 2025-01-02

## 2025-01-02 RX ADMIN — IPRATROPIUM BROMIDE 0.5 MG: 0.5 SOLUTION RESPIRATORY (INHALATION) at 07:16

## 2025-01-02 RX ADMIN — LEVALBUTEROL HYDROCHLORIDE 1.25 MG: 1.25 SOLUTION RESPIRATORY (INHALATION) at 07:16

## 2025-01-02 RX ADMIN — AZITHROMYCIN MONOHYDRATE 500 MG: 500 INJECTION, POWDER, LYOPHILIZED, FOR SOLUTION INTRAVENOUS at 00:28

## 2025-01-02 RX ADMIN — METHYLPREDNISOLONE SODIUM SUCCINATE 40 MG: 40 INJECTION, POWDER, FOR SOLUTION INTRAMUSCULAR; INTRAVENOUS at 05:38

## 2025-01-02 NOTE — H&P
Assessment & Plan  COPD (chronic obstructive pulmonary disease) (HCC)  -COPD exacerbation  -Improved with DuoNebs and steroids in the ER saturation still 88 to 89% with ambulation  -Increasing oxygen requirements 2 to 4 L today  -Will place on Solu-Medrol scheduled  -Placed on DuoNebs scheduled  -Placed on adjunctive azithromycin.  -Chest x-ray negative no evidence of infection    DVT prophylaxis Lovenox 40 a day      Chief Complaint:   Shortness of breath    History of Present Illness:    Felipe Sanders III is a 64 y.o. male who presents with patient generally on 2 L of oxygen at home history of COPD.  Comes in with worsening shortness of breath dependent on 4 L here.  White count normal chest x-ray normal.  After receiving DuoNebs and Solu-Medrol somewhat improved however ambulatory saturation 88%.  Still dependent on 4 L.  No cough no production.  Chest x-ray no significant abnormality.  Hospital service called for inpatient admission for COPD.  No fevers no chills no diarrhea does complain of some associated  chest pain.  Initial troponin EKG normal.  Resting bed comfortably at this point no other constitutional symptoms.  No abdominal pain no diarrhea  Review of Systems:    All other systems reviewed and negative    Past Medical and Surgical History:     Past Medical History:   Diagnosis Date    COPD (chronic obstructive pulmonary disease) (HCC)     Diverticulitis of colon     Erectile dysfunction     Hernia of abdominal cavity     Liver cyst        Past Surgical History:   Procedure Laterality Date    NOSE SURGERY         Meds/Allergies:    Prior to Admission medications    Medication Sig Start Date End Date Taking? Authorizing Provider   albuterol (2.5 mg/3 mL) 0.083 % nebulizer solution Take 3 mL (2.5 mg total) by nebulization every 6 (six) hours as needed for wheezing 6/1/24 6/1/25 Yes Johanne Nunez MD   albuterol (PROVENTIL HFA,VENTOLIN HFA) 90 mcg/act inhaler Inhale 2 puffs every 4 (four) hours as  needed for wheezing 6/1/24 6/1/25 Yes Johanne Nunez MD   benzonatate (TESSALON) 200 MG capsule TAKE 1 CAPSULE (200 MG TOTAL) BY MOUTH 3 (THREE) TIMES A DAY AS NEEDED FOR COUGH. 10/14/24  Yes Historical Provider, MD   dicyclomine (BENTYL) 20 mg tablet TAKE 1 TABLET (20 MG TOTAL) BY MOUTH EVERY 6 HOURS AS NEEDED FOR ABDOMINAL PAIN 11/25/24  Yes Cheri Marquez PA-C   dicyclomine (BENTYL) 20 mg tablet Take 1 tablet (20 mg total) by mouth 2 (two) times a day 12/29/24  Yes Cathleen Le MD   oxygen gas Inhale continuous as needed   Yes Historical Provider, MD   predniSONE 10 mg tablet Please take by mouth 5 tablets on days 1&2, 4 tabs on days 3&4, 3 tabs on days 5&6, 2 tabs on days 7&8, 1 tab on days 9&10. 7/15/24  Yes Emil Silverman MD   promethazine-dextromethorphan (PHENERGAN-DM) 6.25-15 mg/5 mL oral syrup Take 1.25 mL by mouth 4 (four) times a day as needed for cough 12/10/24  Yes Emil Silverman MD   umeclidinium-vilanterol (Anoro Ellipta) 62.5-25 MCG/INH inhaler Inhale 1 puff daily Per Pt takes as needed only 9/1/21  Yes Historical Provider, MD       Allergies: No Known Allergies    Social History:     Marital Status: /Civil Union     Social History     Substance and Sexual Activity   Alcohol Use Never     Social History     Tobacco Use   Smoking Status Every Day    Current packs/day: 1.50    Average packs/day: 1.5 packs/day for 50.0 years (75.0 ttl pk-yrs)    Types: Cigarettes   Smokeless Tobacco Never     Social History     Substance and Sexual Activity   Drug Use No       Family History:    Reviewed. Noncontributing    Physical Exam:     Vitals:   Blood Pressure: 97/51 (01/02/25 0322)  Pulse: 93 (01/02/25 0322)  Temperature: 98.1 °F (36.7 °C) (01/01/25 2020)  Temp Source: Oral (01/01/25 2020)  Respirations: 16 (01/02/25 0322)  SpO2: 93 % (01/02/25 0322)    Physical Exam:      Vitals:   Blood Pressure: 151/83 (01/05/24 1655)  Pulse: 88 (01/05/24 1655)  Temperature: 98.2 °F (36.8 °C)  (01/05/24 1114)  Temp Source: Oral (01/05/24 1114)  Respirations: 18 (01/05/24 1655)  SpO2: 92 % (01/05/24 1655)     Physical Exam mild wheezing the bilateral bases  Vitals and nursing note reviewed.   Constitutional:       General: She is not in acute distress.    HENT:      Right Ear: Ear canal normal. There is no impacted cerumen.      Left Ear: Ear canal normal. There is no impacted cerumen.      Ears:      Mouth/Throat:      Mouth: Mucous membranes are moist.   Eyes:      General: No scleral icterus.     Extraocular Movements: Extraocular movements intact.      Pupils: Pupils are equal, round, and reactive to light.   Neck:      Vascular: No carotid bruit.   Cardiovascular:      Rate and Rhythm: Normal rate and regular rhythm.      Pulses: Normal pulses.      Heart sounds: Normal heart sounds. No murmur heard.     No friction rub. No gallop.   Pulmonary:      Effort: Pulmonary effort is normal. No respiratory distress.      Breath sounds: No wheezing or rhonchi.   Abdominal:      General: Abdomen is flat. There is no distension.      Tenderness: There is no abdominal tenderness. There is no right CVA tenderness or left CVA tenderness.   Musculoskeletal:         General: No swelling or tenderness. Normal range of motion.      Cervical back: Normal range of motion. No tenderness.      Right lower leg: No edema.      Left lower leg: No edema.   Lymphadenopathy:      Cervical: No cervical adenopathy.   Skin:     General: Skin is warm and dry.   Neurological:      General: No focal deficit present.      Mental Status: She is alert and oriented to person, place, and time.      Cranial Nerves: No cranial nerve deficit.      Sensory: No sensory deficit.      Motor: No weakness.       Additional Data:     Lab Results: Results Review Statement: I personally reviewed the following image studies/reports in PACS and discussed pertinent findings with Radiology: chest xray. My interpretation of the radiology images/reports  is:  .    Results from last 7 days   Lab Units 01/01/25 2114   WBC Thousand/uL 11.67*   HEMOGLOBIN g/dL 15.5   HEMATOCRIT % 47.5   PLATELETS Thousands/uL 263   SEGS PCT % 69   LYMPHO PCT % 15   MONO PCT % 10   EOS PCT % 5     Results from last 7 days   Lab Units 01/01/25 2114   SODIUM mmol/L 140   POTASSIUM mmol/L 4.2   CHLORIDE mmol/L 105   CO2 mmol/L 28   BUN mg/dL 17   CREATININE mg/dL 1.23   ANION GAP mmol/L 7   CALCIUM mg/dL 9.3   ALBUMIN g/dL 4.5   TOTAL BILIRUBIN mg/dL 0.49   ALK PHOS U/L 53   ALT U/L 13   AST U/L 15   GLUCOSE RANDOM mg/dL 92     Results from last 7 days   Lab Units 01/01/25 2114   INR  0.88                   Imaging:     XR chest 2 views   ED Interpretation by Petar Whittington PA-C (01/02 0134)   No acute cardiopulmonary disease                  ** Please Note: This note has been constructed using a voice recognition system. **

## 2025-01-02 NOTE — RESPIRATORY THERAPY NOTE
RT Protocol Note  Felipe Sanders III 64 y.o. male MRN: [id number]  Unit/Bed#: -01 Encounter: [id number]    Assessment    Active Problems:    COPD (chronic obstructive pulmonary disease) (HCC)      Home Pulmonary Medications:     01/02/25 0346   Respiratory Protocol   Protocol Initiated? Yes   Protocol Selection Respiratory   Language Barrier? No   Medical & Social History Reviewed? Yes   Diagnostic Studies Reviewed? Yes   Physical Assessment Performed? Yes   Respiratory Plan Mild Distress pathway   Respiratory Assessment   Assessment Type Assess only   General Appearance Awake;Alert   Respiratory Pattern Dyspnea with exertion   Chest Assessment Chest expansion symmetrical   Bilateral Breath Sounds Diminished;Coarse   Location Specific No   Resp Comments Pt uses inhalers and nebs at home. Pt also use home O2 and has a hx of COPD. pt uses nebs and inhalers as needed. will order  BID nmebs   O2 Device NC   Additional Assessments   Pulse 103   Respirations 20   SpO2 95 %            Past Medical History:   Diagnosis Date    COPD (chronic obstructive pulmonary disease) (HCC)     Diverticulitis of colon     Erectile dysfunction     Hernia of abdominal cavity     Liver cyst      Social History     Socioeconomic History    Marital status: /Civil Union     Spouse name: None    Number of children: None    Years of education: None    Highest education level: None   Occupational History    None   Tobacco Use    Smoking status: Every Day     Current packs/day: 1.50     Average packs/day: 1.5 packs/day for 50.0 years (75.0 ttl pk-yrs)     Types: Cigarettes    Smokeless tobacco: Never   Vaping Use    Vaping status: Never Used   Substance and Sexual Activity    Alcohol use: Never    Drug use: No    Sexual activity: Yes   Other Topics Concern    None   Social History Narrative    None     Social Drivers of Health     Financial Resource Strain: Not on file   Food Insecurity: Food Insecurity Present (1/2/2025)    Nursing  - Inadequate Food Risk Classification     Worried About Running Out of Food in the Last Year: Not on file     Ran Out of Food in the Last Year: Not on file     Ran Out of Food in the Last Year: 2   Transportation Needs: Unmet Transportation Needs (2025)    Nursing - Transportation Risk Classification     Lack of Transportation: Not on file     Lack of Transportation: 1   Physical Activity: Not on file   Stress: Not on file   Social Connections: Not on file   Intimate Partner Violence: Unknown (2025)    Nursing IPS     Feels Physically and Emotionally Safe: Not on file     Physically Hurt by Someone: Not on file     Humiliated or Emotionally Abused by Someone: Not on file     Physically Hurt by Someone: 2     Hurt or Threatened by Someone: 2   Housing Stability: At Risk (2025)    Nursing: Inadequate Housing Risk Classification     Has Housing: Not on file     Worried About Losing Housing: Not on file     Unable to Get Utilities: Not on file     Unable to Pay for Housing in the Last Year: 1     Has Housin       Subjective         Objective    Physical Exam:   Assessment Type: Assess only  General Appearance: Awake, Alert  Respiratory Pattern: Dyspnea with exertion  Chest Assessment: Chest expansion symmetrical  Bilateral Breath Sounds: Diminished, Coarse  Location Specific: No  O2 Device: NC    Vitals:  Blood pressure 97/51, pulse 103, temperature 98.1 °F (36.7 °C), temperature source Oral, resp. rate 20, SpO2 95%.          Imaging and other studies: Results Review Statement: No pertinent imaging studies reviewed.    O2 Device: NC     Plan    Respiratory Plan: Mild Distress pathway        Resp Comments: Pt uses inhalers and nebs at home. Pt also use home O2 and has a hx of COPD. pt uses nebs and inhalers as needed. will order  BID nmebs

## 2025-01-02 NOTE — DISCHARGE SUMMARY
[facility]  Discharge Summary  Name: Felipe Sanders III I MRN: [id number]  Unit/Bed#: -01I Date of Admission: 1/1/2025   Date of Service: 1/1/2025  I Hospital Day: 0    * COPD (chronic obstructive pulmonary disease) ([facility])  Assessment & Plan  COPD exacerbation  Improved with DuoNebs and steroids in the ER saturation still 88 to 89% with ambulation  Increasing oxygen requirements 2 to 4 L today  Patient reports being back on his baseline oxygen overall feels better and would like to discharge home  Patient made aware that this is the third exacerbation of his symptoms patient reports understanding  Patient given 14-day of prednisone taper and 3 days of azithromycin 500 mg  Continue Tessalon Perles and promethazine with codeine at home  Continue home oxygen use at 2 L  Recommend outpatient follow-up with PCP and pulmonology        Medical Problems       Resolved Problems  Date Reviewed: 12/5/2024   None       Discharging Physician / Practitioner: CHELSEA Mooney  PCP: Emil Silverman MD  Admission Date:   Admission Orders (From admission, onward)       Ordered        01/02/25 0159  Place in Observation  Once                          Discharge Date: 01/02/25    Consultations During Hospital Stay:  IP CONSULT TO CASE MANAGEMENT    Procedures Performed:   None    Significant Findings / Test Results:   XR chest 2 views  Result Date: 1/2/2025  No acute cardiopulmonary disease. Workstation performed: [id number]     CT abdomen pelvis with contrast  Result Date: 12/30/2024  1. No acute abnormality in the abdomen or pelvis. 2. Fluid-filled mid to distal appendix without findings of acute appendicitis, suggesting a mucocele, stable compared to the prior study, but new since 4/11/2024. Findings are consistent with the preliminary report from [facility] which was provided shortly after completion of the exam. Workstation performed: [id number]         Incidental Findings:  "  None      Test Results Pending at Discharge (will require follow up):   Blood cultures x 2     Outpatient Tests Requested:  PCP and pulmonology    Complications: None    Reason for Admission: Exacerbation of COPD versus emphysema symptoms    Hospital Course:   Felipe Sanders III is a 64 y.o. male patient with  has a past medical history of COPD (chronic obstructive pulmonary disease) (HCC), Diverticulitis of colon, Erectile dysfunction, Hernia of abdominal cavity, and Liver cyst. who originally presented to the hospital on 1/1/2025 due to Shortness of Breath (Pt reports SOB since last night. Wears 2-3 ltrs O2 intermittently, but has turned it up to 4ltrs himself due to sob. Pt sates \"I need prednisone\" ).  Patient was utilizing above baseline oxygen with increased work of breathing patient reports this is his third exacerbation but 1 provider only gave him a Medrol Dosepak which he never works for him per the patient.  Patient came in to get some aggressive IV steroids and requested discharge home being that he feels much better.  Patient reports he is having some social issues he is being evicted from his apartment and needs to get his things but overall does state he feels better and would prefer to discharge today.  Patient agreeable to a longer steroid taper along with some azithromycin and following up with his PCP and pulmonology.  Patient requested to see case management for possible outpatient resourcing.        Please see above list of diagnoses and related plan for additional information.     Condition at Discharge: stable    Discharge Day Visit / Exam:   Subjective:    Patient overall sitting up and was requesting discharge states he feels much better and wants to go home.  Did talk about that this was the third exacerbation has had within a month but reports the last provider only gave a Medrol Dosepak which did not relieve his symptoms.  Patient reports he feels much better with just once a longer " "steroid taper further reports that he is being evicted from his apartment and needs to get his things out of there and has help to do so today.  Patient is overall stable agree with discharge although patient understands he needs to follow-up with PCP and pulmonology.  Vitals: Blood Pressure: 120/75 (01/02/25 0725)  Pulse: 97 (01/02/25 0725)  Temperature: 98.1 °F (36.7 °C) (01/01/25 2020)  Temp Source: Oral (01/02/25 0322)  Respirations: 16 (01/02/25 0725)  Height: 5' 8\" (172.7 cm) (01/02/25 0900)  Weight - Scale: 80.5 kg (177 lb 7.5 oz) (01/02/25 0355)  SpO2: 97 % (01/02/25 0725)  Exam:   Physical Exam  Vitals and nursing note reviewed.   Constitutional:       General: He is not in acute distress.     Appearance: He is well-developed.   HENT:      Head: Normocephalic and atraumatic.   Eyes:      Conjunctiva/sclera: Conjunctivae normal.   Cardiovascular:      Rate and Rhythm: Normal rate and regular rhythm.      Heart sounds: No murmur heard.  Pulmonary:      Effort: Pulmonary effort is normal. No tachypnea, accessory muscle usage or respiratory distress.      Breath sounds: Examination of the right-upper field reveals wheezing. Examination of the left-upper field reveals wheezing. Examination of the right-lower field reveals decreased breath sounds. Examination of the left-lower field reveals decreased breath sounds. Decreased breath sounds and wheezing present.   Abdominal:      Palpations: Abdomen is soft.      Tenderness: There is no abdominal tenderness.   Musculoskeletal:         General: No swelling.      Cervical back: Neck supple.   Skin:     General: Skin is warm and dry.      Capillary Refill: Capillary refill takes less than 2 seconds.   Neurological:      Mental Status: He is alert and oriented to person, place, and time.   Psychiatric:         Mood and Affect: Mood normal.           Discussion with Family: Patient declined call to .     Discharge instructions/Information to patient and " family:   See after visit summary for information provided to patient and family.      Provisions for Follow-Up Care:  See after visit summary for information related to follow-up care and any pertinent home health orders.      Mobility at time of Discharge:   Basic Mobility Inpatient Raw Score: 24  JH-HLM Goal: 8: Walk 250 feet or more  JH-HLM Achieved: 8: Walk 250 feet ot more  HLM Goal achieved. Continue to encourage appropriate mobility.     Disposition:   Home    Planned Readmission: none     Discharge Statement:  I spent 38 minutes discharging the patient. This time was spent on the day of discharge. I had direct contact with the patient on the day of discharge. Greater than 50% of the total time was spent examining patient, answering all patient questions, arranging and discussing plan of care with patient as well as directly providing post-discharge instructions.  Additional time then spent on discharge activities.    Discharge Medications:  See after visit summary for reconciled discharge medications provided to patient and/or family.      **Please Note: This note may have been constructed using a voice recognition system**

## 2025-01-02 NOTE — ASSESSMENT & PLAN NOTE
-COPD exacerbation  -Improved with DuoNebs and steroids in the ER saturation still 88 to 89% with ambulation  -Increasing oxygen requirements 2 to 4 L today  -Will place on Solu-Medrol scheduled  -Placed on DuoNebs scheduled  -Placed on adjunctive azithromycin.  -Chest x-ray negative no evidence of infection    DVT prophylaxis Lovenox 40 a day

## 2025-01-02 NOTE — RESPIRATORY THERAPY NOTE
RT Protocol Note  Felipe Sanders III 64 y.o. male MRN: [id number]  Unit/Bed#: -01 Encounter: [id number]    Assessment    Active Problems:    COPD (chronic obstructive pulmonary disease) (HCC)      Home Pulmonary Medications:         Past Medical History:   Diagnosis Date    COPD (chronic obstructive pulmonary disease) (HCC)     Diverticulitis of colon     Erectile dysfunction     Hernia of abdominal cavity     Liver cyst      Social History     Socioeconomic History    Marital status: /Civil Union     Spouse name: None    Number of children: None    Years of education: None    Highest education level: None   Occupational History    None   Tobacco Use    Smoking status: Every Day     Current packs/day: 1.50     Average packs/day: 1.5 packs/day for 50.0 years (75.0 ttl pk-yrs)     Types: Cigarettes    Smokeless tobacco: Never   Vaping Use    Vaping status: Never Used   Substance and Sexual Activity    Alcohol use: Never    Drug use: No    Sexual activity: Yes   Other Topics Concern    None   Social History Narrative    None     Social Drivers of Health     Financial Resource Strain: Not on file   Food Insecurity: Food Insecurity Present (1/2/2025)    Nursing - Inadequate Food Risk Classification     Worried About Running Out of Food in the Last Year: Not on file     Ran Out of Food in the Last Year: Not on file     Ran Out of Food in the Last Year: 2   Transportation Needs: Unmet Transportation Needs (1/2/2025)    Nursing - Transportation Risk Classification     Lack of Transportation: Not on file     Lack of Transportation: 1   Physical Activity: Not on file   Stress: Not on file   Social Connections: Not on file   Intimate Partner Violence: Unknown (1/2/2025)    Nursing IPS     Feels Physically and Emotionally Safe: Not on file     Physically Hurt by Someone: Not on file     Humiliated or Emotionally Abused by Someone: Not on file     Physically Hurt by Someone: 2     Hurt or Threatened by Someone: 2  "  Housing Stability: At Risk (2025)    Nursing: Inadequate Housing Risk Classification     Has Housing: Not on file     Worried About Losing Housing: Not on file     Unable to Get Utilities: Not on file     Unable to Pay for Housing in the Last Year: 1     Has Housin       Subjective         Objective    Physical Exam:   Assessment Type: (P) During-treatment  General Appearance: (P) Awake, Alert  Respiratory Pattern: (P) Normal  Chest Assessment: (P) Chest expansion symmetrical  Bilateral Breath Sounds: (P) Diminished  Location Specific: No  Cough: (P) None  O2 Device: (P) nc    Vitals:  Blood pressure 97/51, pulse 103, temperature 98.1 °F (36.7 °C), temperature source Oral, resp. rate 20, height 5' 8\" (1.727 m), weight 80.5 kg (177 lb 7.5 oz), SpO2 93%.          Imaging and other studies: Results Review Statement: No pertinent imaging studies reviewed.    O2 Device: (P) nc     Plan    Respiratory Plan: Mild Distress pathway        Resp Comments: (P) Pt resting comfortably and in no apparent distress.  Will cont w/ current therapy.   "

## 2025-01-02 NOTE — CASE MANAGEMENT
Case Management Discharge Planning Note    Patient name Felipe Sanders III  Location /-01 MRN [id number]  : 1960 Date 2025       Current Admission Date: 2025  Current Admission Diagnosis:COPD (chronic obstructive pulmonary disease) (HCC)  Patient Active Problem List    Diagnosis Date Noted Date Diagnosed    Left upper quadrant abdominal pain 2024     Colitis 2024     Diarrhea 2024     History of diverticulitis 2024     Tobacco dependence due to cigarettes 2021     Chronic viral hepatitis B without delta agent and without coma (HCC) 2021     Right inguinal hernia 2021     Other emphysema (HCC) 2021     COPD (chronic obstructive pulmonary disease) (HCC) 2020     Tobacco abuse disorder 2020     Tobacco use 2020       LOS (days): 0  Geometric Mean LOS (GMLOS) (days):   Days to GMLOS:     OBJECTIVE:            Current admission status: Observation   Preferred Pharmacy:   [id number] - [place] - [address]  [address]  [facility] [address]  Phone: [phone] Fax: [phone]    Primary Care Provider: Emil Silverman MD    Primary Insurance: [employer]  Secondary Insurance:     DISCHARGE DETAILS:  As per SLIM request, patient asked for resources to assist with housing rent payments. CM contacted patient to inquire about resource needs. Patient reported that he has received a 30 day notice of eviction. Patient states his intention to pay back rent. CM recommended [facility] Assistance Office and \[facility] [place] [facility]\"" for assistance. CM printed and gave a list of housing financial resources and shelters to patient at bedside.

## 2025-01-02 NOTE — NURSING NOTE
"Patient has refused to have labs drawn, stated \"I am only here so they can monitor my breathing and am leaving in the morning.\", RN educated the patient on each blood work ordered but patient still refusing.  "

## 2025-01-02 NOTE — PLAN OF CARE
Problem: PAIN - ADULT  Goal: Verbalizes/displays adequate comfort level or baseline comfort level  Description: Interventions:  - Encourage patient to monitor pain and request assistance  - Assess pain using appropriate pain scale  - Administer analgesics based on type and severity of pain and evaluate response  - Implement non-pharmacological measures as appropriate and evaluate response  - Consider cultural and social influences on pain and pain management  - Notify physician/advanced practitioner if interventions unsuccessful or patient reports new pain  Outcome: Progressing     Problem: INFECTION - ADULT  Goal: Absence or prevention of progression during hospitalization  Description: INTERVENTIONS:  - Assess and monitor for signs and symptoms of infection  - Monitor lab/diagnostic results  - Monitor all insertion sites, i.e. indwelling lines, tubes, and drains  - Monitor endotracheal if appropriate and nasal secretions for changes in amount and color  - Rumsey appropriate cooling/warming therapies per order  - Administer medications as ordered  - Instruct and encourage patient and family to use good hand hygiene technique  - Identify and instruct in appropriate isolation precautions for identified infection/condition  Outcome: Progressing  Goal: Absence of fever/infection during neutropenic period  Description: INTERVENTIONS:  - Monitor WBC    Outcome: Progressing     Problem: SAFETY ADULT  Goal: Patient will remain free of falls  Description: INTERVENTIONS:  - Educate patient/family on patient safety including physical limitations  - Instruct patient to call for assistance with activity   - Consult OT/PT to assist with strengthening/mobility   - Keep Call bell within reach  - Keep bed low and locked with side rails adjusted as appropriate  - Keep care items and personal belongings within reach  - Initiate and maintain comfort rounds  - Make Fall Risk Sign visible to staff  - Offer Toileting every 2 Hours,  in advance of need  - Initiate/Maintain bed / chair alarm  - Obtain necessary fall risk management equipment: call bell within reach   - Apply yellow socks and bracelet for high fall risk patients  - Consider moving patient to room near nurses station  Outcome: Progressing  Goal: Maintain or return to baseline ADL function  Description: INTERVENTIONS:  -  Assess patient's ability to carry out ADLs; assess patient's baseline for ADL function and identify physical deficits which impact ability to perform ADLs (bathing, care of mouth/teeth, toileting, grooming, dressing, etc.)  - Assess/evaluate cause of self-care deficits   - Assess range of motion  - Assess patient's mobility; develop plan if impaired  - Assess patient's need for assistive devices and provide as appropriate  - Encourage maximum independence but intervene and supervise when necessary  - Involve family in performance of ADLs  - Assess for home care needs following discharge   - Consider OT consult to assist with ADL evaluation and planning for discharge  - Provide patient education as appropriate  Outcome: Progressing  Goal: Maintains/Returns to pre admission functional level  Description: INTERVENTIONS:  - Perform AM-PAC 6 Click Basic Mobility/ Daily Activity assessment daily.  - Set and communicate daily mobility goal to care team and patient/family/caregiver.   - Collaborate with rehabilitation services on mobility goals if consulted  - Perform Range of Motion 4 times a day.  - Reposition patient every 2 hours.  - Dangle patient 3 times a day  - Stand patient 3 times a day  - Ambulate patient 3 times a day  - Out of bed to chair 3 times a day   - Out of bed for meals 3 times a day  - Out of bed for toileting  - Record patient progress and toleration of activity level   Outcome: Progressing     Problem: DISCHARGE PLANNING  Goal: Discharge to home or other facility with appropriate resources  Description: INTERVENTIONS:  - Identify barriers to  discharge w/patient and caregiver  - Arrange for needed discharge resources and transportation as appropriate  - Identify discharge learning needs (meds, wound care, etc.)  - Arrange for interpretive services to assist at discharge as needed  - Refer to Case Management Department for coordinating discharge planning if the patient needs post-hospital services based on physician/advanced practitioner order or complex needs related to functional status, cognitive ability, or social support system  Outcome: Progressing     Problem: Knowledge Deficit  Goal: Patient/family/caregiver demonstrates understanding of disease process, treatment plan, medications, and discharge instructions  Description: Complete learning assessment and assess knowledge base.  Interventions:  - Provide teaching at level of understanding  - Provide teaching via preferred learning methods  Outcome: Progressing

## 2025-01-02 NOTE — NURSING NOTE
"Pt approached with [facility] when asked if labs could be taken pt refused stating that \" Im only here overnight so they can make sure I'm breathing\". Pt educated on the importance of labs and still refused. Stating \" how come they couldn't get it downstairs with all the blood they took\". Nurse notified.   "

## 2025-01-02 NOTE — ASSESSMENT & PLAN NOTE
COPD exacerbation  Improved with DuoNebs and steroids in the ER saturation still 88 to 89% with ambulation  Increasing oxygen requirements 2 to 4 L today  Patient reports being back on his baseline oxygen overall feels better and would like to discharge home  Patient made aware that this is the third exacerbation of his symptoms patient reports understanding  Patient given 14-day of prednisone taper and 3 days of azithromycin 500 mg  Continue Tessalon Perles and promethazine with codeine at home  Continue home oxygen use at 2 L  Recommend outpatient follow-up with PCP and pulmonology

## 2025-01-02 NOTE — ED PROVIDER NOTES
Time reflects when diagnosis was documented in both MDM as applicable and the Disposition within this note       Time User Action Codes Description Comment    1/2/2025  1:34 AM Petar Whittington Add [J44.1] COPD exacerbation (HCC)     1/2/2025  1:50 AM Petar Whittington Add [R06.02] Shortness of breath     1/2/2025  1:50 AM Petar Whittington Add [R09.02] Hypoxia     1/2/2025  3:31 AM Diana Iniguez Add [Z13.9] Encounter for screening involving social determinants of health (SDoH)           ED Disposition       ED Disposition   Admit    Condition   Stable    Date/Time   Thu Jan 2, 2025  1:50 AM    Comment   Case was discussed with FAYE and the patient's admission status was agreed to be Admission Status: observation status to the service of Dr. Blunt .               Assessment & Plan       Medical Decision Making  This patient presents with symptoms most consistent with an acute COPD exacerbation. These constellation of symptoms are similar to prior exacerbations. The likely precipitant is acute respiratory infection. Low suspicion for alternate etiologies such as pneumothorax, acute PE, pneumonia. Presentation not consistent with other acute cardiopulmonary causes including ACS, CHF. Patient given ipratropium, albuterol, solumedrol here with slight improvement of symptoms. Patient has continued wheezing and shortness of breath. On ambulatory pulse trial patient became short of breath and SPO2 down to 88% on 4L NC. Patient wears 2L NC at home, is requiring more O2 than his baseline and is still symptomatic. Patient given Rocephin and azithromycin. Discussed with SLIM and will admit for continued treatment and monitoring.     Problems Addressed:  COPD exacerbation (HCC): acute illness or injury  Hypoxia: acute illness or injury  Shortness of breath: acute illness or injury    Amount and/or Complexity of Data Reviewed  Labs: ordered. Decision-making details documented in ED Course.  Radiology: ordered and independent  interpretation performed.    Risk  Prescription drug management.  Decision regarding hospitalization.        ED Course as of 01/02/25 0641   Thu Jan 02, 2025   0016 Delta 2hr hsTnI: -1       Medications   albuterol inhalation solution 10 mg (10 mg Nebulization Given 1/1/25 2109)   ipratropium (ATROVENT) 0.02 % inhalation solution 1 mg (1 mg Nebulization Given 1/1/25 2110)   methylPREDNISolone sodium succinate (Solu-MEDROL) injection 125 mg (125 mg Intravenous Given 1/1/25 2115)   ceftriaxone (ROCEPHIN) 1 g/50 mL in dextrose IVPB (0 mg Intravenous Stopped 1/2/25 0022)   azithromycin (ZITHROMAX) 500 mg in sodium chloride 0.9% 250mL IVPB 500 mg (0 mg Intravenous Stopped 1/2/25 0128)       ED Risk Strat Scores   HEART Risk Score      Flowsheet Row Most Recent Value   Heart Score Risk Calculator    History 1 Filed at: 01/02/2025 0641   ECG 1 Filed at: 01/02/2025 0641   Age 1 Filed at: 01/02/2025 0641   Risk Factors 1 Filed at: 01/02/2025 0641   Troponin 0 Filed at: 01/02/2025 0641   HEART Score 4 Filed at: 01/02/2025 0641          HEART Risk Score      Flowsheet Row Most Recent Value   Heart Score Risk Calculator    History 1 Filed at: 01/02/2025 0641   ECG 1 Filed at: 01/02/2025 0641   Age 1 Filed at: 01/02/2025 0641   Risk Factors 1 Filed at: 01/02/2025 0641   Troponin 0 Filed at: 01/02/2025 0641   HEART Score 4 Filed at: 01/02/2025 0641                            SBIRT 20yo+      Flowsheet Row Most Recent Value   Initial Alcohol Screen: US AUDIT-C     1. How often do you have a drink containing alcohol? 0 Filed at: 01/01/2025 2021   2. How many drinks containing alcohol do you have on a typical day you are drinking?  0 Filed at: 01/01/2025 2021   3a. Male UNDER 65: How often do you have five or more drinks on one occasion? 0 Filed at: 01/01/2025 2021   3b. FEMALE Any Age, or MALE 65+: How often do you have 4 or more drinks on one occassion? 0 Filed at: 01/01/2025 2022   Audit-C Score 0 Filed at: 01/01/2025 2022       "                      History of Present Illness       Chief Complaint   Patient presents with    Shortness of Breath     Pt reports SOB since last night. Wears 2-3 ltrs O2 intermittently, but has turned it up to 4ltrs himself due to sob. Pt sates \"I need prednisone\"        Past Medical History:   Diagnosis Date    COPD (chronic obstructive pulmonary disease) (HCC)     Diverticulitis of colon     Erectile dysfunction     Hernia of abdominal cavity     Liver cyst       Past Surgical History:   Procedure Laterality Date    NOSE SURGERY        History reviewed. No pertinent family history.   Social History     Tobacco Use    Smoking status: Every Day     Current packs/day: 1.50     Average packs/day: 1.5 packs/day for 50.0 years (75.0 ttl pk-yrs)     Types: Cigarettes    Smokeless tobacco: Never   Vaping Use    Vaping status: Never Used   Substance Use Topics    Alcohol use: Never    Drug use: No      E-Cigarette/Vaping    E-Cigarette Use Never User       E-Cigarette/Vaping Substances    Nicotine No     THC No     CBD No     Flavoring No     Other No     Unknown No       I have reviewed and agree with the history as documented.     The patient is a 64 y.o. male with a history of COPD, diverticulitis of colon, erectile dysfunction, hernia of abdominal cavity, liver cyst who presents to [facility] with a chief complaint of shortness of breath. Symptoms began over the last week but worsened even more today and have been constant since onset. His pain is currently rated as a 6/10 in severity and described as chest tightness without radiation. Associated symptoms include cough and wheezing. Symptoms are aggravated with exertion and alleviating factors include none noted. The patient denies fever, chills, night sweats, syncope, dizziness, numbness, tingling hemoptysis, hematemesis, leg pain or swelling, falls, trauma, nausea, vomiting, diarrhea. No other reported symptoms at this time.  Patient denies " allergies to anything            History provided by:  Patient   used: No    Shortness of Breath  Associated symptoms: cough and wheezing    Associated symptoms: no abdominal pain, no chest pain, no ear pain, no fever, no headaches, no rash, no sore throat and no vomiting        Review of Systems   Constitutional:  Negative for chills and fever.   HENT:  Negative for ear pain and sore throat.    Eyes:  Negative for pain and visual disturbance.   Respiratory:  Positive for cough, shortness of breath and wheezing.    Cardiovascular:  Negative for chest pain and palpitations.   Gastrointestinal:  Negative for abdominal pain and vomiting.   Genitourinary:  Negative for dysuria and hematuria.   Musculoskeletal:  Negative for arthralgias and back pain.   Skin:  Negative for color change and rash.   Neurological:  Negative for dizziness, seizures, syncope, facial asymmetry, light-headedness, numbness and headaches.   All other systems reviewed and are negative.          Objective       ED Triage Vitals   Temperature Pulse Blood Pressure Respirations SpO2 Patient Position - Orthostatic VS   01/01/25 2020 01/01/25 2020 01/01/25 2020 01/01/25 2020 01/01/25 2020 01/01/25 2020   98.1 °F (36.7 °C) (!) 114 142/88 20 91 % Sitting      Temp Source Heart Rate Source BP Location FiO2 (%) Pain Score    01/01/25 2020 01/01/25 2020 01/01/25 2020 -- 01/02/25 0322    Oral Monitor Left arm  No Pain      Vitals      Date and Time Temp Pulse SpO2 Resp BP Pain Score FACES Pain Rating User   01/02/25 0346 -- 103 95 % 20 -- -- -- MS   01/02/25 0322 -- -- -- -- -- No Pain -- BC   01/02/25 0322 -- 93 93 % 16 97/51 -- -- DII   01/02/25 0100 -- 101 94 % 20 113/66 -- -- SL   01/02/25 0028 -- -- -- saturation 89-91 on 4l nc with ambulation pt with no c/o. SOB noted with ambulation -- -- -- --    01/01/25 2340 -- 109 94 % 20 104/55 -- --    01/01/25 2110 -- -- 96 % -- -- -- -- MS   01/01/25 2020 98.1 °F (36.7 °C) 114 91 % 20  142/88 -- -- ML            Physical Exam  Vitals reviewed.   Constitutional:       General: He is not in acute distress.     Appearance: He is not toxic-appearing.   HENT:      Head: Normocephalic.      Mouth/Throat:      Mouth: Mucous membranes are moist.   Eyes:      Pupils: Pupils are equal, round, and reactive to light.   Cardiovascular:      Rate and Rhythm: Tachycardia present.   Pulmonary:      Effort: Pulmonary effort is normal. Tachypnea present.      Breath sounds: Wheezing present. No rhonchi or rales.   Abdominal:      General: Bowel sounds are normal.      Palpations: Abdomen is soft. There is no hepatomegaly or mass.   Musculoskeletal:      Cervical back: Normal range of motion.      Right lower leg: No tenderness. No edema.      Left lower leg: No tenderness.   Skin:     General: Skin is warm and dry.      Capillary Refill: Capillary refill takes less than 2 seconds.      Coloration: Skin is not cyanotic or pale.      Findings: No ecchymosis, erythema or rash.   Neurological:      General: No focal deficit present.      Mental Status: He is alert and oriented to person, place, and time.         Results Reviewed       Procedure Component Value Units Date/Time    HS Troponin I 4hr [[id number]]  (Normal) Collected: 01/02/25 0208    Lab Status: Final result Specimen: Blood from Arm, Right Updated: 01/02/25 0234     hs TnI 4hr 3 ng/L      Delta 4hr hsTnI -1 ng/L     HS Troponin I 2hr [[id number]]  (Normal) Collected: 01/01/25 2320    Lab Status: Final result Specimen: Blood from Arm, Left Updated: 01/02/25 0015     hs TnI 2hr 3 ng/L      Delta 2hr hsTnI -1 ng/L     Blood culture [[id number]] Collected: 01/01/25 2321    Lab Status: In process Specimen: Blood from Arm, Right Updated: 01/01/25 2349    Blood culture [[id number]] Collected: 01/01/25 2320    Lab Status: In process Specimen: Blood from Arm, Left Updated: 01/01/25 2349    HS Troponin 0hr (reflex protocol) [[id number]]  (Normal) Collected: 01/01/25 2114     Lab Status: Final result Specimen: Blood from Arm, Right Updated: 01/01/25 2159     hs TnI 0hr 4 ng/L     B-Type Natriuretic Peptide(BNP) [[id number]]  (Normal) Collected: 01/01/25 2114    Lab Status: Final result Specimen: Blood from Arm, Right Updated: 01/01/25 2159     BNP 22 pg/mL     Comprehensive metabolic panel [[id number]] Collected: 01/01/25 2114    Lab Status: Final result Specimen: Blood from Arm, Right Updated: 01/01/25 2152     Sodium 140 mmol/L      Potassium 4.2 mmol/L      Chloride 105 mmol/L      CO2 28 mmol/L      ANION GAP 7 mmol/L      BUN 17 mg/dL      Creatinine 1.23 mg/dL      Glucose 92 mg/dL      Calcium 9.3 mg/dL      AST 15 U/L      ALT 13 U/L      Alkaline Phosphatase 53 U/L      Total Protein 7.1 g/dL      Albumin 4.5 g/dL      Total Bilirubin 0.49 mg/dL      eGFR 61 ml/min/1.73sq m     Narrative:      National Kidney Disease Foundation guidelines for Chronic Kidney Disease (CKD):     Stage 1 with normal or high GFR (GFR > 90 mL/min/1.73 square meters)    Stage 2 Mild CKD (GFR = 60-89 mL/min/1.73 square meters)    Stage 3A Moderate CKD (GFR = 45-59 mL/min/1.73 square meters)    Stage 3B Moderate CKD (GFR = 30-44 mL/min/1.73 square meters)    Stage 4 Severe CKD (GFR = 15-29 mL/min/1.73 square meters)    Stage 5 End Stage CKD (GFR <15 mL/min/1.73 square meters)  Note: GFR calculation is accurate only with a steady state creatinine    Protime-INR [[id number]]  (Normal) Collected: 01/01/25 2114    Lab Status: Final result Specimen: Blood from Arm, Right Updated: 01/01/25 2138     Protime 12.6 seconds      INR 0.88    Narrative:      INR Therapeutic Range    Indication                                             INR Range      Atrial Fibrillation                                               2.0-3.0  Hypercoagulable State                                    2.0.2.3  Left Ventricular Asist Device                            2.0-3.0  Mechanical Heart Valve                                  -     Aortic(with afib, MI, embolism, HF, LA enlargement,    and/or coagulopathy)                                     2.0-3.0 (2.5-3.5)     Mitral                                                             2.5-3.5  Prosthetic/Bioprosthetic Heart Valve               2.0-3.0  Venous thromboembolism (VTE: VT, PE        2.0-3.0    APTT [[id number]]  (Normal) Collected: 01/01/25 2114    Lab Status: Final result Specimen: Blood from Arm, Right Updated: 01/01/25 2138     PTT 29 seconds     CBC and differential [[id number]]  (Abnormal) Collected: 01/01/25 2114    Lab Status: Final result Specimen: Blood from Arm, Right Updated: 01/01/25 2122     WBC 11.67 Thousand/uL      RBC 5.14 Million/uL      Hemoglobin 15.5 g/dL      Hematocrit 47.5 %      MCV 92 fL      MCH 30.2 pg      MCHC 32.6 g/dL      RDW 13.2 %      MPV 9.2 fL      Platelets 263 Thousands/uL      nRBC 0 /100 WBCs      Segmented % 69 %      Immature Grans % 1 %      Lymphocytes % 15 %      Monocytes % 10 %      Eosinophils Relative 5 %      Basophils Relative 0 %      Absolute Neutrophils 8.05 Thousands/µL      Absolute Immature Grans 0.08 Thousand/uL      Absolute Lymphocytes 1.71 Thousands/µL      Absolute Monocytes 1.17 Thousand/µL      Eosinophils Absolute 0.61 Thousand/µL      Basophils Absolute 0.05 Thousands/µL     FLU/COVID Rapid Antigen (30 min. TAT) - Preferred screening test in ED [[id number]]  (Normal) Collected: 01/01/25 2023    Lab Status: Final result Specimen: Nares from Nose Updated: 01/01/25 2056     SARS COV Rapid Antigen Negative     Influenza A Rapid Antigen Negative     Influenza B Rapid Antigen Negative    Narrative:      This test has been performed using the [employer] Kati 2 FLU+SARS Antigen test under the Emergency Use Authorization (EUA). This test has been validated by the  and verified by the performing laboratory. The Kati uses lateral flow immunofluorescent sandwich assay to detect SARS-COV, Influenza A and Influenza B Antigen.      The [employer] Kati 2 SARS Antigen test does not differentiate between SARS-CoV and SARS-CoV-2.     Negative results are presumptive and may be confirmed with a molecular assay, if necessary, for patient management. Negative results do not rule out SARS-CoV-2 or influenza infection and should not be used as the sole basis for treatment or patient management decisions. A negative test result may occur if the level of antigen in a sample is below the limit of detection of this test.     Positive results are indicative of the presence of viral antigens, but do not rule out bacterial infection or co-infection with other viruses.     All test results should be used as an adjunct to clinical observations and other information available to the provider.    FOR PEDIATRIC PATIENTS - copy/paste COVID Guidelines URL to browser: [email or web address]            XR chest 2 views   ED Interpretation by Petar Whittington PA-C (01/02 0134)   No acute cardiopulmonary disease          Procedures    ED Medication and Procedure Management   Prior to Admission Medications   Prescriptions Last Dose Informant Patient Reported? Taking?   albuterol (2.5 mg/3 mL) 0.083 % nebulizer solution 1/2/2025 Morning  No Yes   Sig: Take 3 mL (2.5 mg total) by nebulization every 6 (six) hours as needed for wheezing   albuterol (PROVENTIL HFA,VENTOLIN HFA) 90 mcg/act inhaler 1/2/2025 Morning  No Yes   Sig: Inhale 2 puffs every 4 (four) hours as needed for wheezing   benzonatate (TESSALON) 200 MG capsule 1/2/2025 Morning  Yes Yes   Sig: TAKE 1 CAPSULE (200 MG TOTAL) BY MOUTH 3 (THREE) TIMES A DAY AS NEEDED FOR COUGH.   dicyclomine (BENTYL) 20 mg tablet 1/2/2025 Morning  No Yes   Sig: TAKE 1 TABLET (20 MG TOTAL) BY MOUTH EVERY 6 HOURS AS NEEDED FOR ABDOMINAL PAIN   dicyclomine (BENTYL) 20 mg tablet 1/2/2025 Morning  No Yes   Sig: Take 1 tablet (20 mg total) by mouth 2 (two) times a day   oxygen gas 1/2/2025  Morning  Yes Yes   Sig: Inhale continuous as needed   predniSONE 10 mg tablet 1/1/2025  No Yes   Sig: Please take by mouth 5 tablets on days 1&2, 4 tabs on days 3&4, 3 tabs on days 5&6, 2 tabs on days 7&8, 1 tab on days 9&10.   promethazine-dextromethorphan (PHENERGAN-DM) 6.25-15 mg/5 mL oral syrup 1/2/2025 Morning  No Yes   Sig: Take 1.25 mL by mouth 4 (four) times a day as needed for cough   umeclidinium-vilanterol (Anoro Ellipta) 62.5-25 MCG/INH inhaler 1/2/2025 Morning Self Yes Yes   Sig: Inhale 1 puff daily Per Pt takes as needed only      Facility-Administered Medications: None     Current Discharge Medication List        CONTINUE these medications which have NOT CHANGED    Details   albuterol (2.5 mg/3 mL) 0.083 % nebulizer solution Take 3 mL (2.5 mg total) by nebulization every 6 (six) hours as needed for wheezing  Qty: 75 mL, Refills: 0    Associated Diagnoses: Pneumonia      albuterol (PROVENTIL HFA,VENTOLIN HFA) 90 mcg/act inhaler Inhale 2 puffs every 4 (four) hours as needed for wheezing  Qty: 6.7 g, Refills: 0    Associated Diagnoses: Pneumonia      benzonatate (TESSALON) 200 MG capsule TAKE 1 CAPSULE (200 MG TOTAL) BY MOUTH 3 (THREE) TIMES A DAY AS NEEDED FOR COUGH.      !! dicyclomine (BENTYL) 20 mg tablet TAKE 1 TABLET (20 MG TOTAL) BY MOUTH EVERY 6 HOURS AS NEEDED FOR ABDOMINAL PAIN  Qty: 120 tablet, Refills: 5    Associated Diagnoses: Left lower quadrant abdominal pain; History of diverticulitis      !! dicyclomine (BENTYL) 20 mg tablet Take 1 tablet (20 mg total) by mouth 2 (two) times a day  Qty: 20 tablet, Refills: 0    Associated Diagnoses: Abdominal pain      oxygen gas Inhale continuous as needed      predniSONE 10 mg tablet Please take by mouth 5 tablets on days 1&2, 4 tabs on days 3&4, 3 tabs on days 5&6, 2 tabs on days 7&8, 1 tab on days 9&10.  Qty: 30 tablet, Refills: 0    Associated Diagnoses: Pneumonia      promethazine-dextromethorphan (PHENERGAN-DM) 6.25-15 mg/5 mL oral syrup Take  1.25 mL by mouth 4 (four) times a day as needed for cough  Qty: 118 mL, Refills: 0    Associated Diagnoses: Pulmonary emphysema, unspecified emphysema type (HCC)      umeclidinium-vilanterol (Anoro Ellipta) 62.5-25 MCG/INH inhaler Inhale 1 puff daily Per Pt takes as needed only       !! - Potential duplicate medications found. Please discuss with provider.        No discharge procedures on file.  ED SEPSIS DOCUMENTATION   Time reflects when diagnosis was documented in both MDM as applicable and the Disposition within this note       Time User Action Codes Description Comment    1/2/2025  1:34 AM Petar Whittington Add [J44.1] COPD exacerbation (HCC)     1/2/2025  1:50 AM Petar Whittington Add [R06.02] Shortness of breath     1/2/2025  1:50 AM Petar Whittington Add [R09.02] Hypoxia     1/2/2025  3:31 AM Diana Iniguez Add [Z13.9] Encounter for screening involving social determinants of health (SDoH)                  Petar Whittington PA-C  01/02/25 0641

## 2025-01-03 ENCOUNTER — OFFICE VISIT (OUTPATIENT)
Dept: INTERNAL MEDICINE CLINIC | Facility: CLINIC | Age: 65
End: 2025-01-03
Payer: COMMERCIAL

## 2025-01-03 ENCOUNTER — TELEPHONE (OUTPATIENT)
Age: 65
End: 2025-01-03

## 2025-01-03 ENCOUNTER — OFFICE VISIT (OUTPATIENT)
Dept: GASTROENTEROLOGY | Facility: CLINIC | Age: 65
End: 2025-01-03
Payer: COMMERCIAL

## 2025-01-03 VITALS
WEIGHT: 178 LBS | BODY MASS INDEX: 26.98 KG/M2 | HEART RATE: 96 BPM | HEIGHT: 68 IN | OXYGEN SATURATION: 92 % | SYSTOLIC BLOOD PRESSURE: 132 MMHG | DIASTOLIC BLOOD PRESSURE: 84 MMHG

## 2025-01-03 VITALS
WEIGHT: 176 LBS | BODY MASS INDEX: 26.67 KG/M2 | SYSTOLIC BLOOD PRESSURE: 122 MMHG | HEART RATE: 94 BPM | DIASTOLIC BLOOD PRESSURE: 82 MMHG | OXYGEN SATURATION: 93 % | HEIGHT: 68 IN | TEMPERATURE: 97.4 F

## 2025-01-03 DIAGNOSIS — K58.1 IRRITABLE BOWEL SYNDROME WITH CONSTIPATION: ICD-10-CM

## 2025-01-03 DIAGNOSIS — Z72.0 TOBACCO USE: ICD-10-CM

## 2025-01-03 DIAGNOSIS — K58.1 IRRITABLE BOWEL SYNDROME WITH CONSTIPATION: Primary | ICD-10-CM

## 2025-01-03 DIAGNOSIS — J44.1 CHRONIC OBSTRUCTIVE PULMONARY DISEASE WITH ACUTE EXACERBATION (HCC): Primary | ICD-10-CM

## 2025-01-03 PROCEDURE — 99213 OFFICE O/P EST LOW 20 MIN: CPT | Performed by: PHYSICIAN ASSISTANT

## 2025-01-03 PROCEDURE — 99495 TRANSJ CARE MGMT MOD F2F 14D: CPT

## 2025-01-03 RX ORDER — LINACLOTIDE 72 UG/1
1 CAPSULE, GELATIN COATED ORAL DAILY
Qty: 14 CAPSULE | Refills: 0 | Status: SHIPPED | OUTPATIENT
Start: 2025-01-03 | End: 2025-01-03 | Stop reason: SDUPTHER

## 2025-01-03 RX ORDER — LINACLOTIDE 72 UG/1
1 CAPSULE, GELATIN COATED ORAL DAILY
Qty: 30 CAPSULE | Refills: 0 | Status: SHIPPED | OUTPATIENT
Start: 2025-01-03

## 2025-01-03 NOTE — PROGRESS NOTES
Transition of Care Visit  Name: Felipe Sanders III      : 1960      MRN: [id number]  Encounter Provider: CHELSEA Rodríguez  Encounter Date: 1/3/2025   Encounter department: [facility] [place]    Assessment & Plan  Chronic obstructive pulmonary disease with acute exacerbation (HCC)  Follows with pulmonology, does not have an appointment scheduled at this time, recently saw them . Encouraged making a follow up appointment with his pulmonologist. Taking his anoro ellipta and albuterol PRN. Discharged with prednisone taper and azithromycin. Taking cough medicine as needed. He uses his O2 2-3L which is his baseline when he feels short of breath, he does not wear it at all times.       Tobacco use  Patient admits to have no plans of quitting.  Encouraged the importance of smoking cessation.  He does not smoke while he is wearing his oxygen.  I enforced the importance of not doing so.            History of Present Illness   {?Quick Links Encounters * My Last Note * Last Note in Specialty * Snapshot * Since Last Visit * History :81025}  Transitional Care Management Review:   Felipe Sanders III is a 64 y.o. male here for TCM follow up.     During the TCM phone call patient stated:  TCM Call     Date and time call was made  2025  2:18 PM    Hospital care reviewed  Other diagnostic studies pending    Patient was hospitialized at  [facility]    Date of Admission  25    Date of discharge  25    Diagnosis  COPD (chronic obstructive pulmonary disease)    Disposition  Home    Current Symptoms  Shortness of breath    Shortness of breath severity  Mild    Middle abdominal pain severity  Mild      TCM Call     Post hospital issues  None    Should patient be enrolled in anticoag monitoring?  No    Scheduled for follow up?  Yes    Patients specialists  Other (comment)    Other specialists names  gastroenterology    Did you obtain your prescribed medications  Yes    Do  you need help managing your prescriptions or medications  No    Is transportation to your appointment needed  No    I have advised the patient to call PCP with any new or worsening symptoms  JUAN Shields    Living Arrangements  Family members    Are you recieving any outpatient services  No    Are you recieving home care services  No    Are you using any community resources  No    Current waiver services  No    Have you fallen in the last 12 months  No    Interperter language line needed  No    Counseling  Patient    Counseling topics  patient and family education; Risk factor reduction        Felipe has been having repeated COPD exacerbations. He was admitted to the hospital for observation from January 1 January 2.  He is here for follow-up.      Review of Systems   Constitutional:  Negative for chills, fatigue and fever.   HENT:  Negative for congestion, ear pain, rhinorrhea, sinus pressure and sore throat.    Eyes:  Negative for pain and visual disturbance.   Respiratory:  Positive for shortness of breath. Negative for cough and chest tightness.    Cardiovascular:  Negative for chest pain, palpitations and leg swelling.   Gastrointestinal:  Negative for abdominal pain, constipation, diarrhea, nausea and vomiting.   Endocrine: Negative.    Genitourinary:  Negative for dysuria, frequency, hematuria and urgency.   Musculoskeletal:  Negative for arthralgias, back pain and myalgias.   Skin:  Negative for color change and rash.   Allergic/Immunologic: Negative.    Neurological:  Negative for dizziness, seizures, syncope, light-headedness and headaches.   Hematological: Negative.    Psychiatric/Behavioral: Negative.     All other systems reviewed and are negative.    Objective {?Quick Links Trend Vitals * Enter New Vitals * Results Review * Timeline (Adult) * Labs * Imaging * Cardiology * Procedures * Lung Cancer Screening * Surgical eConsent :82551}  /84 (BP Location: Right arm, Patient Position: Sitting,  "Cuff Size: Standard)   Pulse 96   Ht 5' 8\" (1.727 m)   Wt 80.7 kg (178 lb)   SpO2 92%   BMI 27.06 kg/m²     Physical Exam  Vitals and nursing note reviewed.   Constitutional:       General: He is not in acute distress.     Appearance: Normal appearance. He is well-developed.   HENT:      Head: Normocephalic and atraumatic.      Right Ear: Tympanic membrane normal.      Left Ear: Tympanic membrane normal.      Nose: Nose normal.      Mouth/Throat:      Pharynx: Oropharynx is clear.   Eyes:      Conjunctiva/sclera: Conjunctivae normal.   Cardiovascular:      Rate and Rhythm: Normal rate and regular rhythm.      Heart sounds: No murmur heard.  Pulmonary:      Effort: Pulmonary effort is normal.      Breath sounds: Decreased breath sounds present.   Abdominal:      General: Bowel sounds are normal.      Palpations: Abdomen is soft.      Tenderness: There is no abdominal tenderness.   Musculoskeletal:         General: No swelling.      Cervical back: Normal range of motion and neck supple.   Skin:     General: Skin is warm and dry.      Capillary Refill: Capillary refill takes less than 2 seconds.   Neurological:      Mental Status: He is alert and oriented to person, place, and time.   Psychiatric:         Mood and Affect: Mood normal.       Medications have been reviewed by provider in current encounter      "

## 2025-01-03 NOTE — ASSESSMENT & PLAN NOTE
Follows with pulmonology, does not have an appointment scheduled at this time, recently saw them 12/18. Encouraged making a follow up appointment with his pulmonologist. Taking his anoro ellipta and albuterol PRN. Discharged with prednisone taper and azithromycin. Taking cough medicine as needed. He uses his O2 2-3L which is his baseline when he feels short of breath, he does not wear it at all times.

## 2025-01-03 NOTE — ASSESSMENT & PLAN NOTE
Patient admits to have no plans of quitting.  Encouraged the importance of smoking cessation.  He does not smoke while he is wearing his oxygen.  I enforced the importance of not doing so.

## 2025-01-03 NOTE — TELEPHONE ENCOUNTER
Please clarify script for Linzess 72 for 14 days. Please advise the PA team.  PA started but not yet submitted.  Thank you

## 2025-01-03 NOTE — PROGRESS NOTES
"Name: Felipe Sanders III      : 1960      MRN: [id number]  Encounter Provider: Heidy Anderson PA-C  Encounter Date: 1/3/2025   Encounter department: [facility] [place]  :  Assessment & Plan  Irritable bowel syndrome with constipation    Orders:    linaCLOtide (Linzess) 72 MCG CAPS; Take 1 capsule by mouth daily    hyoscyamine (LEVSIN/SL) 0.125 mg SL tablet; Take 1 tablet (0.125 mg total) by mouth every 4 (four) hours as needed for cramping    Patient did not wish to make a follow-up appointment or have second opinion with another one of my attending physicians at this time since he feels the outcome \"is the same anyway.\"  I asked him to call us regarding how he is doing on the medication.  Patient is aware of recall colonoscopy in 1 year.      History of Present Illness   HPI  Felipe Sanders III is a 64 y.o. male with history of COPD on oxygen intermittently and chronic abdominal pain who presents for follow-up after he was recently hospitalized in December for COPD exacerbation and colitis on imaging.  Patient reports that at baseline he has been experiencing left lower quadrant pain for 4 years without improvement in symptoms.  Patient reports that he has bloating and never feels fully evacuated.  He is an everyday smoker.    I asked the patient if he would like a second opinion with another one of our physicians, and the patient stated that he was not interested in 1 at this time.  Patient reports that he was upset that he was told that his appendix was inflamed even though he has been experiencing pain on the left side not the right side.  CT report is as follows:\"1. No acute abnormality in the abdomen or pelvis. 2. Fluid-filled mid to distal appendix without findings of acute appendicitis, suggesting a mucocele, stable compared to the prior study, but new since 2024.\"    Patient recently underwent colonoscopy with Dr. White in December, which was poor prep.  " "Recommended a 1 year recall.      Review of Systems       Objective   /82 (BP Location: Right arm, Patient Position: Sitting, Cuff Size: Standard)   Pulse 94   Temp (!) 97.4 °F (36.3 °C) (Tympanic)   Ht 5' 8\" (1.727 m)   Wt 79.8 kg (176 lb)   SpO2 93%   BMI 26.76 kg/m²      Physical Exam      "

## 2025-01-05 LAB
BACTERIA BLD CULT: NORMAL
BACTERIA BLD CULT: NORMAL

## 2025-01-06 NOTE — TELEPHONE ENCOUNTER
PA for LINZESS APPROVED     Date(s) approved 1/6/25-1/6/26    Case #    Patient advised by          []Corporahart Message  [x]Phone call   []LMOM  []L/M to call office as no active Communication consent on file  []Unable to leave detailed message as VM not approved on Communication consent       Pharmacy advised by    [x]Fax  []Phone call    Approval letter scanned into Media Yes

## 2025-01-06 NOTE — TELEPHONE ENCOUNTER
PA for Linzess 72 mcg    SUBMITTED to [employer]    via    [x][employer]-Case ID #     [id number]    Payer: [email or web address]   Phone: [phone]     [place]    All office notes, labs and other pertaining documents and studies sent. Clinical questions answered. Awaiting determination from insurance company.     Turnaround time for your insurance to make a decision on your Prior Authorization can take 7-21 business days.

## 2025-01-07 LAB
BACTERIA BLD CULT: NORMAL
BACTERIA BLD CULT: NORMAL

## 2025-01-27 ENCOUNTER — NURSE TRIAGE (OUTPATIENT)
Age: 65
End: 2025-01-27

## 2025-01-27 ENCOUNTER — TELEPHONE (OUTPATIENT)
Age: 65
End: 2025-01-27

## 2025-01-27 NOTE — TELEPHONE ENCOUNTER
"Abdominal pain cramping. \"Same issue\". States the blue pill (Bentyl) does not work. Pt has not started Levsin prescribed at last OV. Pt will try.  "

## 2025-01-27 NOTE — TELEPHONE ENCOUNTER
Regarding: abdominal pain  ----- Message from Em [employer] sent at 1/27/2025 11:13 AM EST -----  Pt called to speak to a nurse to ask what he can take or do for the abdominal pain he is in. I made the pt an appt for 1/29/25 but he would like to know what he can do until then. Please reach back out to the pt to discuss

## 2025-01-27 NOTE — TELEPHONE ENCOUNTER
Last OV 1/3/25 Heidy Anderson PA-C    Reason for Disposition  • Message left on unidentified voice mail. Phone number verified.    Protocols used: No Contact or Duplicate Contact Call-Adult-[place]

## 2025-01-30 ENCOUNTER — TELEPHONE (OUTPATIENT)
Dept: GASTROENTEROLOGY | Facility: CLINIC | Age: 65
End: 2025-01-30

## 2025-02-03 ENCOUNTER — TELEPHONE (OUTPATIENT)
Age: 65
End: 2025-02-03

## 2025-02-03 NOTE — TELEPHONE ENCOUNTER
"Patient having a lot of on going abd pain.    States that sometimes it's 8/10.     Currently taking no medications for the constipation. State that those meds didn't do nothing for me.   Has Linzess prescribed but \" I'm not taking that shit\"  Patient also did not take hyoscyamine.   States that he just wants to know what causing the pain not keep taking medication.  Patient made aware that constipation can cause significant abd pain and that he needs to get the stools out to relieve the pain.   Patient was encouraged to take Miralax which he was unsure how to mix or take.  Instructions gone over with patient.  Patient also instructed to increase his fiber and his water intake.     Patient instructed to take Miralax 1 capful 1-2 times a day as needed for a complete bowel movement every 2-3 days.   Call with any questions or concerns or you are unable to move your bowels.      "

## 2025-02-03 NOTE — TELEPHONE ENCOUNTER
PT called in stating he's in extreme abdominal pain near his ribs. Pt denies vomiting or diarrhea. Pt states nothing makes it better and he hasn't taken medications for pain. Call transferred to [facility] Triage line

## 2025-02-04 ENCOUNTER — APPOINTMENT (OUTPATIENT)
Dept: LAB | Facility: HOSPITAL | Age: 65
End: 2025-02-04
Payer: COMMERCIAL

## 2025-02-04 ENCOUNTER — HOSPITAL ENCOUNTER (EMERGENCY)
Facility: HOSPITAL | Age: 65
Discharge: HOME | End: 2025-02-04
Attending: EMERGENCY MEDICINE
Payer: COMMERCIAL

## 2025-02-04 ENCOUNTER — APPOINTMENT (EMERGENCY)
Dept: CT IMAGING | Facility: HOSPITAL | Age: 65
End: 2025-02-04
Payer: COMMERCIAL

## 2025-02-04 VITALS
RESPIRATION RATE: 22 BRPM | OXYGEN SATURATION: 93 % | HEART RATE: 101 BPM | TEMPERATURE: 99.4 F | DIASTOLIC BLOOD PRESSURE: 77 MMHG | SYSTOLIC BLOOD PRESSURE: 118 MMHG

## 2025-02-04 DIAGNOSIS — K58.1 IRRITABLE BOWEL SYNDROME WITH CONSTIPATION: ICD-10-CM

## 2025-02-04 DIAGNOSIS — R10.9 ABDOMINAL PAIN: ICD-10-CM

## 2025-02-04 DIAGNOSIS — B18.1 CHRONIC VIRAL HEPATITIS B WITHOUT DELTA AGENT AND WITHOUT COMA (HCC): ICD-10-CM

## 2025-02-04 DIAGNOSIS — K59.00 CONSTIPATION: Primary | ICD-10-CM

## 2025-02-04 DIAGNOSIS — K58.1 IRRITABLE BOWEL SYNDROME WITH CONSTIPATION: Primary | ICD-10-CM

## 2025-02-04 LAB
ALBUMIN SERPL BCG-MCNC: 4.5 G/DL (ref 3.5–5)
ALP SERPL-CCNC: 48 U/L (ref 34–104)
ALT SERPL W P-5'-P-CCNC: 19 U/L (ref 7–52)
ANION GAP SERPL CALCULATED.3IONS-SCNC: 10 MMOL/L (ref 4–13)
APTT PPP: 26 SECONDS (ref 23–34)
AST SERPL W P-5'-P-CCNC: 15 U/L (ref 13–39)
BASOPHILS # BLD MANUAL: 0 THOUSAND/UL (ref 0–0.1)
BASOPHILS NFR MAR MANUAL: 0 % (ref 0–1)
BILIRUB DIRECT SERPL-MCNC: 0.09 MG/DL (ref 0–0.2)
BILIRUB SERPL-MCNC: 0.64 MG/DL (ref 0.2–1)
BUN SERPL-MCNC: 25 MG/DL (ref 5–25)
CALCIUM SERPL-MCNC: 9.9 MG/DL (ref 8.4–10.2)
CHLORIDE SERPL-SCNC: 102 MMOL/L (ref 96–108)
CO2 SERPL-SCNC: 27 MMOL/L (ref 21–32)
CREAT SERPL-MCNC: 0.98 MG/DL (ref 0.6–1.3)
EOSINOPHIL # BLD MANUAL: 0 THOUSAND/UL (ref 0–0.4)
EOSINOPHIL NFR BLD MANUAL: 0 % (ref 0–6)
ERYTHROCYTE [DISTWIDTH] IN BLOOD BY AUTOMATED COUNT: 13.3 % (ref 11.6–15.1)
ERYTHROCYTE [DISTWIDTH] IN BLOOD BY AUTOMATED COUNT: 13.3 % (ref 11.6–15.1)
GFR SERPL CREATININE-BSD FRML MDRD: 81 ML/MIN/1.73SQ M
GLUCOSE SERPL-MCNC: 131 MG/DL (ref 65–140)
HCT VFR BLD AUTO: 50 % (ref 36.5–49.3)
HCT VFR BLD AUTO: 51.8 % (ref 36.5–49.3)
HGB BLD-MCNC: 17 G/DL (ref 12–17)
HGB BLD-MCNC: 17.3 G/DL (ref 12–17)
INR PPP: 0.86 (ref 0.85–1.19)
LACTATE SERPL-SCNC: 1.7 MMOL/L (ref 0.5–2)
LIPASE SERPL-CCNC: 21 U/L (ref 11–82)
LYMPHOCYTES # BLD AUTO: 0.49 THOUSAND/UL (ref 0.6–4.47)
LYMPHOCYTES # BLD AUTO: 2 % (ref 14–44)
MCH RBC QN AUTO: 30.3 PG (ref 26.8–34.3)
MCH RBC QN AUTO: 30.4 PG (ref 26.8–34.3)
MCHC RBC AUTO-ENTMCNC: 33.4 G/DL (ref 31.4–37.4)
MCHC RBC AUTO-ENTMCNC: 34 G/DL (ref 31.4–37.4)
MCV RBC AUTO: 89 FL (ref 82–98)
MCV RBC AUTO: 91 FL (ref 82–98)
MONOCYTES # BLD AUTO: 0.99 THOUSAND/UL (ref 0–1.22)
MONOCYTES NFR BLD: 4 % (ref 4–12)
NEUTROPHILS # BLD MANUAL: 23.17 THOUSAND/UL (ref 1.85–7.62)
NEUTS SEG NFR BLD AUTO: 94 % (ref 43–75)
PLATELET # BLD AUTO: 326 THOUSANDS/UL (ref 149–390)
PLATELET # BLD AUTO: 336 THOUSANDS/UL (ref 149–390)
PLATELET BLD QL SMEAR: ADEQUATE
PMV BLD AUTO: 9.1 FL (ref 8.9–12.7)
PMV BLD AUTO: 9.4 FL (ref 8.9–12.7)
POTASSIUM SERPL-SCNC: 4.5 MMOL/L (ref 3.5–5.3)
PROT SERPL-MCNC: 7.2 G/DL (ref 6.4–8.4)
PROTHROMBIN TIME: 12.4 SECONDS (ref 12.3–15)
RBC # BLD AUTO: 5.6 MILLION/UL (ref 3.88–5.62)
RBC # BLD AUTO: 5.71 MILLION/UL (ref 3.88–5.62)
RBC MORPH BLD: PRESENT
SODIUM SERPL-SCNC: 139 MMOL/L (ref 135–147)
WBC # BLD AUTO: 22.26 THOUSAND/UL (ref 4.31–10.16)
WBC # BLD AUTO: 24.65 THOUSAND/UL (ref 4.31–10.16)

## 2025-02-04 PROCEDURE — 85027 COMPLETE CBC AUTOMATED: CPT

## 2025-02-04 PROCEDURE — 85730 THROMBOPLASTIN TIME PARTIAL: CPT | Performed by: EMERGENCY MEDICINE

## 2025-02-04 PROCEDURE — 80048 BASIC METABOLIC PNL TOTAL CA: CPT | Performed by: EMERGENCY MEDICINE

## 2025-02-04 PROCEDURE — 93005 ELECTROCARDIOGRAM TRACING: CPT

## 2025-02-04 PROCEDURE — 36415 COLL VENOUS BLD VENIPUNCTURE: CPT

## 2025-02-04 PROCEDURE — 85610 PROTHROMBIN TIME: CPT | Performed by: EMERGENCY MEDICINE

## 2025-02-04 PROCEDURE — 80076 HEPATIC FUNCTION PANEL: CPT | Performed by: EMERGENCY MEDICINE

## 2025-02-04 PROCEDURE — 87517 HEPATITIS B DNA QUANT: CPT

## 2025-02-04 PROCEDURE — 85007 BL SMEAR W/DIFF WBC COUNT: CPT | Performed by: EMERGENCY MEDICINE

## 2025-02-04 PROCEDURE — 99285 EMERGENCY DEPT VISIT HI MDM: CPT

## 2025-02-04 PROCEDURE — 74176 CT ABD & PELVIS W/O CONTRAST: CPT

## 2025-02-04 PROCEDURE — 83690 ASSAY OF LIPASE: CPT | Performed by: EMERGENCY MEDICINE

## 2025-02-04 PROCEDURE — 85027 COMPLETE CBC AUTOMATED: CPT | Performed by: EMERGENCY MEDICINE

## 2025-02-04 PROCEDURE — 83605 ASSAY OF LACTIC ACID: CPT | Performed by: EMERGENCY MEDICINE

## 2025-02-04 PROCEDURE — 99284 EMERGENCY DEPT VISIT MOD MDM: CPT | Performed by: EMERGENCY MEDICINE

## 2025-02-04 NOTE — TELEPHONE ENCOUNTER
Pt calling in, reports he went to see pulmonologist and had x ray done that showed constipation. He has started miralax but not taking linzess.   He report stools are dark black. No iron or pepto use. I advised ED for black stool- unsure if pt will go.

## 2025-02-04 NOTE — TELEPHONE ENCOUNTER
Pt calling because he had labs drawn and is waiting at hospital until results are back in case he needs to be admitted.  He would like provider to call as soon as results are back.       normal latch/lips widely flanged

## 2025-02-04 NOTE — ED PROVIDER NOTES
Time reflects when diagnosis was documented in both MDM as applicable and the Disposition within this note       Time User Action Codes Description Comment    2/4/2025  4:56 PM Arsalan Jimenes Add [K59.00] Constipation     2/4/2025  4:56 PM Arsalan Jimenes Add [R10.9] Abdominal pain           ED Disposition       ED Disposition   Discharge    Condition   Stable    Date/Time   Tue Feb 4, 2025  5:19 PM    Comment   Felipe Sanders III discharge to home/self care.                   Assessment & Plan       Medical Decision Making  Problems Addressed:  Constipation: chronic illness or injury    Amount and/or Complexity of Data Reviewed  Labs: ordered.  Radiology: ordered.    Risk  Prescription drug management.        ED Course as of 02/04/25 1757   Tue Feb 04, 2025   1538 Leukocytosis 2/2 recent steroid use       Medications - No data to display    ED Risk Strat Scores                                              History of Present Illness       Chief Complaint   Patient presents with    Shortness of Breath     States pulmonology gave him a prednisone shot yesterday but he can't get rx filled. Presents SOB wears 3 L NC as needed.        Past Medical History:   Diagnosis Date    COPD (chronic obstructive pulmonary disease) (HCC)     Diverticulitis of colon     Erectile dysfunction     Hernia of abdominal cavity     Liver cyst       Past Surgical History:   Procedure Laterality Date    NOSE SURGERY        No family history on file.   Social History     Tobacco Use    Smoking status: Every Day     Current packs/day: 1.50     Average packs/day: 1.5 packs/day for 50.0 years (75.0 ttl pk-yrs)     Types: Cigarettes    Smokeless tobacco: Never   Vaping Use    Vaping status: Never Used   Substance Use Topics    Alcohol use: Never    Drug use: No      E-Cigarette/Vaping    E-Cigarette Use Never User       E-Cigarette/Vaping Substances    Nicotine No     THC No     CBD No     Flavoring No     Other No     Unknown No       I have reviewed  and agree with the history as documented.     63 yo male with LUQ abdominal pain and constipation. No fever or vomiting. No trauma. No syncope. No cp or SOB aside from baseline dyspnea and chronic oxygen use. Was started on abx a few days ago for L sided abdominal pain and clinically suspected diverticulitis, broke out in erythematous rash after abx and was therefore started on prednisone. Hypoxic on arrival here, his oxygen compressor stopped working shortly before arrival to ED.         Review of Systems   Gastrointestinal:  Positive for abdominal pain and constipation.           Objective       ED Triage Vitals   Temperature Pulse Blood Pressure Respirations SpO2 Patient Position - Orthostatic VS   02/04/25 1511 02/04/25 1459 02/04/25 1500 02/04/25 1459 02/04/25 1459 --   99.4 °F (37.4 °C) (!) 133 139/87 22 (!) 85 %       Temp Source Heart Rate Source BP Location FiO2 (%) Pain Score    02/04/25 1511 02/04/25 1459 -- -- --    Oral Monitor         Vitals      Date and Time Temp Pulse SpO2 Resp BP Pain Score FACES Pain Rating User   02/04/25 1600 -- 101 93 % 22 118/77 -- -- JOGRE   02/04/25 1511 99.4 °F (37.4 °C) -- -- -- -- -- -- STEFFEN   02/04/25 1500 -- -- -- -- 139/87 -- -- STEFFEN   02/04/25 1459 -- 133 85 % 22 -- -- -- STEFFEN            Physical Exam  Vitals and nursing note reviewed.   Constitutional:       General: He is not in acute distress.     Appearance: He is well-developed. He is not ill-appearing, toxic-appearing or diaphoretic.   HENT:      Head: Normocephalic and atraumatic.      Mouth/Throat:      Mouth: Mucous membranes are moist.      Pharynx: Oropharynx is clear.   Eyes:      Conjunctiva/sclera: Conjunctivae normal.      Pupils: Pupils are equal, round, and reactive to light.   Neck:      Vascular: No JVD.   Cardiovascular:      Rate and Rhythm: Normal rate and regular rhythm.      Pulses: Normal pulses.      Heart sounds: Normal heart sounds. No murmur heard.     No friction rub. No gallop.   Pulmonary:       Effort: Pulmonary effort is normal. Tachypnea present. No respiratory distress.      Breath sounds: Normal breath sounds. No stridor. No wheezing or rales.   Abdominal:      General: There is no distension.      Palpations: Abdomen is soft.      Tenderness: There is no abdominal tenderness. There is no guarding or rebound.   Musculoskeletal:         General: No swelling, tenderness, deformity or signs of injury. Normal range of motion.      Cervical back: Normal range of motion and neck supple. No rigidity.   Skin:     General: Skin is warm and dry.      Capillary Refill: Capillary refill takes less than 2 seconds.      Coloration: Skin is not jaundiced or pale.      Findings: No bruising or erythema.   Neurological:      General: No focal deficit present.      Mental Status: He is alert and oriented to person, place, and time.      Cranial Nerves: No cranial nerve deficit.      Sensory: No sensory deficit.      Motor: No weakness or abnormal muscle tone.      Coordination: Coordination normal.      Gait: Gait normal.         Results Reviewed       Procedure Component Value Units Date/Time    RBC Morphology Reflex Test [[id number]] Collected: 02/04/25 1522    Lab Status: Final result Specimen: Blood from Arm, Right Updated: 02/04/25 1701    CBC and differential [[id number]]  (Abnormal) Collected: 02/04/25 1522    Lab Status: Final result Specimen: Blood from Arm, Right Updated: 02/04/25 1619     WBC 24.65 Thousand/uL      RBC 5.60 Million/uL      Hemoglobin 17.0 g/dL      Hematocrit 50.0 %      MCV 89 fL      MCH 30.4 pg      MCHC 34.0 g/dL      RDW 13.3 %      MPV 9.1 fL      Platelets 326 Thousands/uL     Narrative:      This is an appended report.  These results have been appended to a previously verified report.    Manual Differential(PHLEBS Do Not Order) [[id number]]  (Abnormal) Collected: 02/04/25 1522    Lab Status: Final result Specimen: Blood from Arm, Right Updated: 02/04/25 1619     Segmented % 94 %       Lymphocytes % 2 %      Monocytes % 4 %      Eosinophils % 0 %      Basophils % 0 %      Absolute Neutrophils 23.17 Thousand/uL      Absolute Lymphocytes 0.49 Thousand/uL      Absolute Monocytes 0.99 Thousand/uL      Absolute Eosinophils 0.00 Thousand/uL      Absolute Basophils 0.00 Thousand/uL      Total Counted --     RBC Morphology Present     Platelet Estimate Adequate    Lactic acid, plasma (w/reflex if result > 2.0) [[id number]]  (Normal) Collected: 02/04/25 1522    Lab Status: Final result Specimen: Blood from Arm, Right Updated: 02/04/25 1604     LACTIC ACID 1.7 mmol/L     Narrative:      Result may be elevated if tourniquet was used during collection.    Basic metabolic panel [[id number]] Collected: 02/04/25 1522    Lab Status: Final result Specimen: Blood from Arm, Right Updated: 02/04/25 1559     Sodium 139 mmol/L      Potassium 4.5 mmol/L      Chloride 102 mmol/L      CO2 27 mmol/L      ANION GAP 10 mmol/L      BUN 25 mg/dL      Creatinine 0.98 mg/dL      Glucose 131 mg/dL      Calcium 9.9 mg/dL      eGFR 81 ml/min/1.73sq m     Narrative:      National Kidney Disease Foundation guidelines for Chronic Kidney Disease (CKD):     Stage 1 with normal or high GFR (GFR > 90 mL/min/1.73 square meters)    Stage 2 Mild CKD (GFR = 60-89 mL/min/1.73 square meters)    Stage 3A Moderate CKD (GFR = 45-59 mL/min/1.73 square meters)    Stage 3B Moderate CKD (GFR = 30-44 mL/min/1.73 square meters)    Stage 4 Severe CKD (GFR = 15-29 mL/min/1.73 square meters)    Stage 5 End Stage CKD (GFR <15 mL/min/1.73 square meters)  Note: GFR calculation is accurate only with a steady state creatinine    Hepatic function panel [[id number]]  (Normal) Collected: 02/04/25 1522    Lab Status: Final result Specimen: Blood from Arm, Right Updated: 02/04/25 1559     Total Bilirubin 0.64 mg/dL      Bilirubin, Direct 0.09 mg/dL      Alkaline Phosphatase 48 U/L      AST 15 U/L      ALT 19 U/L      Total Protein 7.2 g/dL      Albumin 4.5 g/dL      Lipase [[id number]]  (Normal) Collected: 02/04/25 1522    Lab Status: Final result Specimen: Blood from Arm, Right Updated: 02/04/25 1559     Lipase 21 u/L     Protime-INR [[id number]]  (Normal) Collected: 02/04/25 1522    Lab Status: Final result Specimen: Blood from Arm, Right Updated: 02/04/25 1547     Protime 12.4 seconds      INR 0.86    Narrative:      INR Therapeutic Range    Indication                                             INR Range      Atrial Fibrillation                                               2.0-3.0  Hypercoagulable State                                    2.0.2.3  Left Ventricular Asist Device                            2.0-3.0  Mechanical Heart Valve                                  -    Aortic(with afib, MI, embolism, HF, LA enlargement,    and/or coagulopathy)                                     2.0-3.0 (2.5-3.5)     Mitral                                                             2.5-3.5  Prosthetic/Bioprosthetic Heart Valve               2.0-3.0  Venous thromboembolism (VTE: VT, PE        2.0-3.0    APTT [[id number]]  (Normal) Collected: 02/04/25 1522    Lab Status: Final result Specimen: Blood from Arm, Right Updated: 02/04/25 1547     PTT 26 seconds             CT abdomen pelvis wo contrast   Final Interpretation by Stewart Rucker MD (02/04 1701)      No acute inflammatory process identified in the abdomen or pelvis.      Again noted is a fluid distended appendix measuring up to 1.4 cm without periappendiceal inflammation. Findings could represent an appendiceal mucocele. Surgical consultation recommended.      Stable liver hemangiomata.      Workstation performed: [id number]             Procedures    ED Medication and Procedure Management   Prior to Admission Medications   Prescriptions Last Dose Informant Patient Reported? Taking?   albuterol (2.5 mg/3 mL) 0.083 % nebulizer solution  Self No No   Sig: Take 3 mL (2.5 mg total) by nebulization every 6 (six) hours as needed for  wheezing   albuterol (PROVENTIL HFA,VENTOLIN HFA) 90 mcg/act inhaler  Self No No   Sig: Inhale 2 puffs every 4 (four) hours as needed for wheezing   benzonatate (TESSALON) 200 MG capsule  Self Yes No   Sig: TAKE 1 CAPSULE (200 MG TOTAL) BY MOUTH 3 (THREE) TIMES A DAY AS NEEDED FOR COUGH.   hyoscyamine (LEVSIN/SL) 0.125 mg SL tablet   No No   Sig: Take 1 tablet (0.125 mg total) by mouth every 4 (four) hours as needed for cramping   linaCLOtide (Linzess) 72 MCG CAPS   No No   Sig: Take 1 capsule by mouth daily   oxygen gas  Self Yes No   Sig: Inhale continuous as needed   promethazine-dextromethorphan (PHENERGAN-DM) 6.25-15 mg/5 mL oral syrup  Self No No   Sig: Take 1.25 mL by mouth 4 (four) times a day as needed for cough   umeclidinium-vilanterol (Anoro Ellipta) 62.5-25 MCG/INH inhaler  Self Yes No   Sig: Inhale 1 puff daily Per Pt takes as needed only      Facility-Administered Medications: None     Discharge Medication List as of 2/4/2025  5:19 PM        CONTINUE these medications which have NOT CHANGED    Details   albuterol (2.5 mg/3 mL) 0.083 % nebulizer solution Take 3 mL (2.5 mg total) by nebulization every 6 (six) hours as needed for wheezing, Starting Sat 6/1/2024, Until Sun 6/1/2025 at 2359, Normal      albuterol (PROVENTIL HFA,VENTOLIN HFA) 90 mcg/act inhaler Inhale 2 puffs every 4 (four) hours as needed for wheezing, Starting Sat 6/1/2024, Until Sun 6/1/2025 at 2359, Normal      benzonatate (TESSALON) 200 MG capsule TAKE 1 CAPSULE (200 MG TOTAL) BY MOUTH 3 (THREE) TIMES A DAY AS NEEDED FOR COUGH., Historical Med      hyoscyamine (LEVSIN/SL) 0.125 mg SL tablet Take 1 tablet (0.125 mg total) by mouth every 4 (four) hours as needed for cramping, Starting Fri 1/3/2025, Normal      linaCLOtide (Linzess) 72 MCG CAPS Take 1 capsule by mouth daily, Starting Fri 1/3/2025, Normal      oxygen gas Inhale continuous as needed, Historical Med      promethazine-dextromethorphan (PHENERGAN-DM) 6.25-15 mg/5 mL oral syrup  Take 1.25 mL by mouth 4 (four) times a day as needed for cough, Starting Tue 12/10/2024, Normal      umeclidinium-vilanterol (Anoro Ellipta) 62.5-25 MCG/INH inhaler Inhale 1 puff daily Per Pt takes as needed only, Starting Wed 9/1/2021, Historical Med           No discharge procedures on file.  ED SEPSIS DOCUMENTATION   Time reflects when diagnosis was documented in both MDM as applicable and the Disposition within this note       Time User Action Codes Description Comment    2/4/2025  4:56 PM Arsalan Jimenes Add [K59.00] Constipation     2/4/2025  4:56 PM Arsalan Jimenes Add [R10.9] Abdominal pain                  Arsalan Jimenes MD  02/04/25 0976

## 2025-02-05 ENCOUNTER — NURSE TRIAGE (OUTPATIENT)
Age: 65
End: 2025-02-05

## 2025-02-05 LAB
ATRIAL RATE: 116 BPM
HBV DNA SERPL NAA+PROBE-ACNC: NOT DETECTED [IU]/ML
P AXIS: 86 DEGREES
PR INTERVAL: 114 MS
QRS AXIS: 81 DEGREES
QRSD INTERVAL: 88 MS
QT INTERVAL: 312 MS
QTC INTERVAL: 433 MS
T WAVE AXIS: 35 DEGREES
VENTRICULAR RATE: 116 BPM

## 2025-02-05 PROCEDURE — 93010 ELECTROCARDIOGRAM REPORT: CPT | Performed by: INTERNAL MEDICINE

## 2025-02-05 NOTE — TELEPHONE ENCOUNTER
"Reason for Disposition   Health information question, no triage required and triager able to answer question    Answer Assessment - Initial Assessment Questions  1. REASON FOR CALL: \"What is the main reason for your call?\" or \"How can I best help you?\"    SPOKE WITH PT, REVIEWED DIRECTIONS FOR GOLYTELY ORDERED AT ER VISIT. ALL QUESTIONS ANSWERED.    Protocols used: Information Only Call - No Triage-Adult-[place]    "

## 2025-02-06 ENCOUNTER — NURSE TRIAGE (OUTPATIENT)
Age: 65
End: 2025-02-06

## 2025-02-06 NOTE — TELEPHONE ENCOUNTER
"LOV 01/03/25    Pt reports persistent constipation and LUQ abdominal pain, 5/10, despite completing 1/2 of the GoLytely a few minutes. Golytely was ordered by the ED 02/04. He reports he is bloated and passing stool the size of a cigarette. He has not tried the ordered Linzess 72 mcg due to side effects he read about. He states he does not want to take any more pills. Denies N/V, fever or other symptoms. Pt is unsure when he last had a normal BM. Informed pt the bowel prep may take some time to work. He states he does not want to try any other medications. Informed pt I would forward his concerns to the provider for further recommendations.      Reason for Disposition   MILD constipation    Answer Assessment - Initial Assessment Questions  1. STOOL PATTERN OR FREQUENCY: \"How often do you have a bowel movement (BM)?\"  (Normal range: 3 times a day to every 3 days)  \"When was your last BM?\"        unsure  2. STRAINING: \"Do you have to strain to have a BM?\"       -  3. ONSET: \"When did the constipation begin?\"      chronic  4. RECTAL PAIN: \"Does your rectum hurt when the stool comes out?\" If Yes, ask: \"Do you have hemorrhoids? How bad is the pain?\"  (Scale 1-10; or mild, moderate, severe)      denies  5. BM COMPOSITION: \"Are the stools hard?\"       Cigarette in size     14. OTHER SYMPTOMS: \"Do you have any other symptoms?\" (e.g., abdomen pain, bloating, fever, vomiting)        bloat    Protocols used: Constipation-Adult-[place]    "

## 2025-02-07 NOTE — TELEPHONE ENCOUNTER
Pt calling in, he wanted to know if is working on 2nd half of the golytely bowel prep today and is feel better. He is having Bms.

## 2025-02-18 ENCOUNTER — OFFICE VISIT (OUTPATIENT)
Dept: INTERNAL MEDICINE CLINIC | Facility: CLINIC | Age: 65
End: 2025-02-18
Payer: COMMERCIAL

## 2025-02-18 ENCOUNTER — NURSE TRIAGE (OUTPATIENT)
Age: 65
End: 2025-02-18

## 2025-02-18 VITALS
HEIGHT: 68 IN | DIASTOLIC BLOOD PRESSURE: 70 MMHG | HEART RATE: 109 BPM | RESPIRATION RATE: 20 BRPM | BODY MASS INDEX: 26.98 KG/M2 | SYSTOLIC BLOOD PRESSURE: 110 MMHG | OXYGEN SATURATION: 94 % | WEIGHT: 178 LBS

## 2025-02-18 DIAGNOSIS — J43.9 PULMONARY EMPHYSEMA, UNSPECIFIED EMPHYSEMA TYPE (HCC): Primary | ICD-10-CM

## 2025-02-18 PROCEDURE — 99213 OFFICE O/P EST LOW 20 MIN: CPT | Performed by: PHYSICIAN ASSISTANT

## 2025-02-18 RX ORDER — PREDNISONE 10 MG/1
TABLET ORAL
Qty: 30 TABLET | Refills: 0 | Status: SHIPPED | OUTPATIENT
Start: 2025-02-18

## 2025-02-18 NOTE — PROGRESS NOTES
Name: Felipe Sanders III      : 1960      MRN: [id number]  Encounter Provider: Felipe Overton PA-C  Encounter Date: 2025   Encounter department: [facility] INTERNAL MEDICINE [place]  :  Assessment & Plan  Pulmonary emphysema, unspecified emphysema type (HCC)    Orders:    predniSONE 10 mg tablet; 5 tabs daily x 2 days then 4 tabs daily x 2 days then 3 daily x 2 days then 2 tabs daily x 2 days the one tab daily x 2 days then stop.          Depression Screening and Follow-up Plan: Patient was screened for depression during today's encounter. They screened negative with a PHQ-2 score of 0.        History of Present Illness   Acute visit    Patient asked to be seen due to increased shortness of breath and wheezing.  He attempted to get in touch with his pulmonologist this morning however no appointments were available.  Patient also is stating that he has been in [facility] ER and [facility] ER earlier in the month.  Flatplate x-rays of his abdomen at [facility] indicated fecal impaction, CT of the abdomen and pelvis done the following day at [facility] indicated possible appendiceal mucocele.  Patient still uncomfortable but points to the left lower quadrant not the right lower quadrant.  Continues to smoke.      Review of Systems   Constitutional:  Negative for activity change, chills, fatigue and fever.   HENT:  Negative for congestion.    Eyes:  Negative for discharge.   Respiratory:  Positive for cough, shortness of breath and wheezing. Negative for chest tightness.    Cardiovascular:  Negative for chest pain, palpitations and leg swelling.   Gastrointestinal:  Positive for constipation. Negative for abdominal pain, blood in stool, diarrhea, nausea and vomiting.   Genitourinary:  Negative for difficulty urinating.   Musculoskeletal:  Negative for arthralgias and myalgias.   Skin:  Negative for rash.   Allergic/Immunologic: Negative for immunocompromised state.   Neurological:  Negative for  "dizziness, syncope, weakness, light-headedness and headaches.   Hematological:  Negative for adenopathy. Does not bruise/bleed easily.   Psychiatric/Behavioral:  Negative for dysphoric mood, sleep disturbance and suicidal ideas. The patient is not nervous/anxious.        Objective   /70 (BP Location: Left arm, Patient Position: Sitting, Cuff Size: Adult)   Pulse (!) 109   Resp 20   Ht 5' 8\" (1.727 m)   Wt 80.7 kg (178 lb)   SpO2 94%   BMI 27.06 kg/m²      Physical Exam  Vitals and nursing note reviewed.   Constitutional:       General: He is not in acute distress.     Appearance: Normal appearance. He is well-developed. He is not ill-appearing.      Comments: Unkept appearing   HENT:      Head: Normocephalic.   Eyes:      Extraocular Movements: Extraocular movements intact.      Pupils: Pupils are equal, round, and reactive to light.   Neck:      Thyroid: No thyromegaly.      Vascular: No carotid bruit or JVD.   Cardiovascular:      Rate and Rhythm: Normal rate and regular rhythm.      Heart sounds: Normal heart sounds.   Pulmonary:      Effort: Pulmonary effort is normal. No accessory muscle usage or respiratory distress.      Comments: Markedly decreased breath sounds throughout.  Prolonged expiratory phase with some expiratory wheezing in all fields.  Forced expiration increases wheezing and causes coughing spasms.  No crackles audible.  Chest:      Chest wall: No tenderness.   Abdominal:      Palpations: Abdomen is soft.      Tenderness: There is no abdominal tenderness.   Musculoskeletal:         General: No swelling.      Cervical back: Normal range of motion and neck supple.      Right lower leg: No edema.      Left lower leg: No edema.   Lymphadenopathy:      Cervical: No cervical adenopathy.   Skin:     General: Skin is warm and dry.      Findings: No rash.   Neurological:      General: No focal deficit present.      Mental Status: He is alert and oriented to person, place, and time. Mental " status is at baseline.   Psychiatric:         Mood and Affect: Mood normal.         Behavior: Behavior normal.         Thought Content: Thought content normal.

## 2025-02-18 NOTE — TELEPHONE ENCOUNTER
"Patient requests office visit for today. He has COPD, requests prescriptions for prednisone and possibly antibiotics. He states he has increased work of breathing and chest tightness due to chest infection. He declined to provide additional information about his symptoms.    Appointment scheduled for today.      Reason for Disposition   Longstanding difficulty breathing (e.g., CHF, COPD, emphysema) and worse than normal    Answer Assessment - Initial Assessment Questions  1. RESPIRATORY STATUS: \"Describe your breathing?\" (e.g., wheezing, shortness of breath, unable to speak, severe coughing)       Shortness of breath, tightness    2. LUNG HISTORY: \"Do you have any history of lung disease?\"  (e.g., pulmonary embolus, asthma, emphysema)      COPD    Protocols used: Breathing Difficulty-Adult-OH    "

## 2025-02-18 NOTE — PATIENT INSTRUCTIONS
Start steroid taper.  Follow up as scheduled.  Follow up w specialists as scheduled.  Continue Miralax.

## 2025-02-18 NOTE — ASSESSMENT & PLAN NOTE
Orders:    predniSONE 10 mg tablet; 5 tabs daily x 2 days then 4 tabs daily x 2 days then 3 daily x 2 days then 2 tabs daily x 2 days the one tab daily x 2 days then stop.

## 2025-03-06 ENCOUNTER — TELEPHONE (OUTPATIENT)
Dept: INTERNAL MEDICINE CLINIC | Facility: CLINIC | Age: 65
End: 2025-03-06

## 2025-03-06 NOTE — LETTER
Date: 3/6/2025    Juror# [id number]    To whom it may concern:     This letter is to certify that Felipe Sanders III is a patient under my care. Due to his medical condition, I feel that he is unable to serve on Jury Duty at this time.     If you have any further questions, please feel free to contact my office.          Sincerely,        Emil Silverman MD

## 2025-03-06 NOTE — TELEPHONE ENCOUNTER
Patient would like to know if you could right a letter excusing him from Jury duty? Patient is to appear by 3/18/25    Please advise........    Please fax letter to [phone]  [facility]  Jury Management Dept.    Juror # [id number]  Electronic Signature # [id number]

## 2025-03-08 ENCOUNTER — HOSPITAL ENCOUNTER (EMERGENCY)
Facility: HOSPITAL | Age: 65
Discharge: HOME | End: 2025-03-08
Attending: EMERGENCY MEDICINE | Admitting: EMERGENCY MEDICINE
Payer: COMMERCIAL

## 2025-03-08 ENCOUNTER — APPOINTMENT (EMERGENCY)
Dept: RADIOLOGY | Facility: HOSPITAL | Age: 65
End: 2025-03-08
Payer: COMMERCIAL

## 2025-03-08 VITALS
OXYGEN SATURATION: 93 % | TEMPERATURE: 98.2 F | WEIGHT: 180.78 LBS | HEART RATE: 101 BPM | SYSTOLIC BLOOD PRESSURE: 140 MMHG | RESPIRATION RATE: 16 BRPM | DIASTOLIC BLOOD PRESSURE: 83 MMHG | BODY MASS INDEX: 27.49 KG/M2

## 2025-03-08 DIAGNOSIS — J44.1 COPD EXACERBATION (HCC): Primary | ICD-10-CM

## 2025-03-08 PROCEDURE — 94640 AIRWAY INHALATION TREATMENT: CPT

## 2025-03-08 PROCEDURE — 71045 X-RAY EXAM CHEST 1 VIEW: CPT

## 2025-03-08 PROCEDURE — 99285 EMERGENCY DEPT VISIT HI MDM: CPT

## 2025-03-08 PROCEDURE — 99284 EMERGENCY DEPT VISIT MOD MDM: CPT | Performed by: EMERGENCY MEDICINE

## 2025-03-08 PROCEDURE — 93005 ELECTROCARDIOGRAM TRACING: CPT

## 2025-03-08 RX ORDER — PREDNISONE 20 MG/1
40 TABLET ORAL DAILY
Qty: 8 TABLET | Refills: 0 | Status: SHIPPED | OUTPATIENT
Start: 2025-03-08 | End: 2025-03-12

## 2025-03-08 RX ORDER — IPRATROPIUM BROMIDE AND ALBUTEROL SULFATE 2.5; .5 MG/3ML; MG/3ML
3 SOLUTION RESPIRATORY (INHALATION) 4 TIMES DAILY
Qty: 180 ML | Refills: 0 | Status: SHIPPED | OUTPATIENT
Start: 2025-03-08 | End: 2025-03-12

## 2025-03-08 RX ORDER — IPRATROPIUM BROMIDE AND ALBUTEROL SULFATE 2.5; .5 MG/3ML; MG/3ML
3 SOLUTION RESPIRATORY (INHALATION) ONCE
Status: COMPLETED | OUTPATIENT
Start: 2025-03-08 | End: 2025-03-08

## 2025-03-08 RX ORDER — PREDNISONE 20 MG/1
40 TABLET ORAL ONCE
Status: COMPLETED | OUTPATIENT
Start: 2025-03-08 | End: 2025-03-08

## 2025-03-08 RX ADMIN — IPRATROPIUM BROMIDE AND ALBUTEROL SULFATE 3 ML: 2.5; .5 SOLUTION RESPIRATORY (INHALATION) at 18:54

## 2025-03-08 RX ADMIN — PREDNISONE 40 MG: 20 TABLET ORAL at 18:54

## 2025-03-08 NOTE — ED PROVIDER NOTES
Time reflects when diagnosis was documented in both MDM as applicable and the Disposition within this note       Time User Action Codes Description Comment    3/8/2025  7:12 PM Cathleen Le Add [J44.1] COPD exacerbation (HCC)           ED Disposition       ED Disposition   Discharge    Condition   Stable    Date/Time   Sat Mar 8, 2025  7:12 PM    Comment   Felipe Sanders III discharge to home/self care.                   Assessment & Plan       Medical Decision Making  Amount and/or Complexity of Data Reviewed  Radiology: ordered and independent interpretation performed.    Risk  Prescription drug management.      Patient clinically with COPD exacerbation, he is not hypoxic on room air and has oxygen at home as needed.  He came to the ED because his nebulizer machine is broken and he would like a new one in addition he is requesting steroids.  He is improved with treatment, will have patient follow-up and return for worsening.       Medications   ipratropium-albuterol (DUO-NEB) 0.5-2.5 mg/3 mL inhalation solution 3 mL (3 mL Nebulization Given 3/8/25 1854)   predniSONE tablet 40 mg (40 mg Oral Given 3/8/25 1854)       ED Risk Strat Scores                            SBIRT 22yo+      Flowsheet Row Most Recent Value   Initial Alcohol Screen: US AUDIT-C     1. How often do you have a drink containing alcohol? 0 Filed at: 03/08/2025 1900   2. How many drinks containing alcohol do you have on a typical day you are drinking?  0 Filed at: 03/08/2025 1900   3a. Male UNDER 65: How often do you have five or more drinks on one occasion? 0 Filed at: 03/08/2025 1900   Audit-C Score 0 Filed at: 03/08/2025 1900   LAZARA: How many times in the past year have you...    Used an illegal drug or used a prescription medication for non-medical reasons? Never Filed at: 03/08/2025 1900                            History of Present Illness       Chief Complaint   Patient presents with    Shortness of Breath     C/o increasing sob, states he has  COPD/emphysema.         Past Medical History:   Diagnosis Date    COPD (chronic obstructive pulmonary disease) (HCC)     Diverticulitis of colon     Erectile dysfunction     Hernia of abdominal cavity     Liver cyst       Past Surgical History:   Procedure Laterality Date    NOSE SURGERY        History reviewed. No pertinent family history.   Social History     Tobacco Use    Smoking status: Every Day     Current packs/day: 1.50     Average packs/day: 1.5 packs/day for 50.0 years (75.0 ttl pk-yrs)     Types: Cigarettes    Smokeless tobacco: Never   Vaping Use    Vaping status: Never Used   Substance Use Topics    Alcohol use: Never    Drug use: No      E-Cigarette/Vaping    E-Cigarette Use Never User       E-Cigarette/Vaping Substances    Nicotine No     THC No     CBD No     Flavoring No     Other No     Unknown No       I have reviewed and agree with the history as documented.     HPI  64-year-old male with past medical history of COPD on nasal cannula oxygen at home presents with request for new nebulizer machine.  He states that his machine broke and he has not been able to use it.  He has chronic shortness of breath and cough but feels that it is worse since he has not been able to use his nebulizer.  No chest pain.  Review of Systems   Constitutional:  Negative for chills and fever.   HENT:  Negative for dental problem and ear pain.    Eyes:  Negative for pain and redness.   Respiratory:  Positive for cough, shortness of breath and wheezing.    Cardiovascular:  Negative for chest pain and palpitations.   Gastrointestinal:  Negative for abdominal pain and nausea.   Endocrine: Negative for polydipsia and polyphagia.   Genitourinary:  Negative for dysuria and frequency.   Musculoskeletal:  Negative for arthralgias and joint swelling.   Skin:  Negative for color change and rash.   Neurological:  Negative for dizziness and headaches.   Psychiatric/Behavioral:  Negative for behavioral problems and confusion.    All  other systems reviewed and are negative.          Objective       ED Triage Vitals [03/08/25 1821]   Temperature Pulse Blood Pressure Respirations SpO2 Patient Position - Orthostatic VS   98.2 °F (36.8 °C) (!) 106 131/75 18 95 % Sitting      Temp Source Heart Rate Source BP Location FiO2 (%) Pain Score    Temporal Monitor Left arm -- --      Vitals      Date and Time Temp Pulse SpO2 Resp BP Pain Score FACES Pain Rating User   03/08/25 1845 -- 101 93 % 16 140/83 -- -- KM   03/08/25 1821 98.2 °F (36.8 °C) 106 95 % 18 131/75 -- -- MS            Physical Exam  Vitals and nursing note reviewed.   Constitutional:       General: He is not in acute distress.     Appearance: He is well-developed. He is not diaphoretic.   HENT:      Head: Atraumatic.      Right Ear: External ear normal.      Left Ear: External ear normal.      Nose: Nose normal.   Eyes:      Conjunctiva/sclera: Conjunctivae normal.      Pupils: Pupils are equal, round, and reactive to light.   Neck:      Vascular: No JVD.   Cardiovascular:      Rate and Rhythm: Normal rate and regular rhythm.      Heart sounds: Normal heart sounds. No murmur heard.  Pulmonary:      Effort: Pulmonary effort is normal. No respiratory distress.      Breath sounds: Wheezing present.   Abdominal:      General: Bowel sounds are normal. There is no distension.      Palpations: Abdomen is soft.      Tenderness: There is no abdominal tenderness.   Musculoskeletal:         General: Normal range of motion.      Cervical back: Normal range of motion and neck supple.   Skin:     General: Skin is warm and dry.      Capillary Refill: Capillary refill takes less than 2 seconds.   Neurological:      Mental Status: He is alert and oriented to person, place, and time.      Cranial Nerves: No cranial nerve deficit.   Psychiatric:         Behavior: Behavior normal.         Results Reviewed       None            XR chest 1 view portable   ED Interpretation by Cathleen Le MD (03/08 1925)   No  acute abnormality          Procedures    ED Medication and Procedure Management   Prior to Admission Medications   Prescriptions Last Dose Informant Patient Reported? Taking?   albuterol (2.5 mg/3 mL) 0.083 % nebulizer solution  Self No No   Sig: Take 3 mL (2.5 mg total) by nebulization every 6 (six) hours as needed for wheezing   albuterol (PROVENTIL HFA,VENTOLIN HFA) 90 mcg/act inhaler  Self No No   Sig: Inhale 2 puffs every 4 (four) hours as needed for wheezing   benzonatate (TESSALON) 200 MG capsule  Self Yes No   Sig: TAKE 1 CAPSULE (200 MG TOTAL) BY MOUTH 3 (THREE) TIMES A DAY AS NEEDED FOR COUGH.   hyoscyamine (LEVSIN/SL) 0.125 mg SL tablet   No No   Sig: Take 1 tablet (0.125 mg total) by mouth every 4 (four) hours as needed for cramping   linaCLOtide (Linzess) 72 MCG CAPS   No No   Sig: Take 1 capsule by mouth daily   Patient not taking: Reported on 2025   oxygen gas  Self Yes No   Sig: Inhale continuous as needed   polyethylene glycol (GOLYTELY) 4000 mL solution   No No   Sig: Take 4,000 mL by mouth once for 1 dose   predniSONE 10 mg tablet   No No   Si tabs daily x 2 days then 4 tabs daily x 2 days then 3 daily x 2 days then 2 tabs daily x 2 days the one tab daily x 2 days then stop.   promethazine-dextromethorphan (PHENERGAN-DM) 6.25-15 mg/5 mL oral syrup  Self No No   Sig: Take 1.25 mL by mouth 4 (four) times a day as needed for cough   umeclidinium-vilanterol (Anoro Ellipta) 62.5-25 MCG/INH inhaler  Self Yes No   Sig: Inhale 1 puff daily Per Pt takes as needed only      Facility-Administered Medications: None     Discharge Medication List as of 3/8/2025  7:13 PM        START taking these medications    Details   ipratropium-albuterol (DUO-NEB) 0.5-2.5 mg/3 mL nebulizer solution Take 3 mL by nebulization 4 (four) times a day, Starting Sat 3/8/2025, Normal      !! predniSONE 20 mg tablet Take 2 tablets (40 mg total) by mouth daily for 4 days, Starting Sat 3/8/2025, Until Wed 3/12/2025, Normal        !! - Potential duplicate medications found. Please discuss with provider.        CONTINUE these medications which have NOT CHANGED    Details   albuterol (2.5 mg/3 mL) 0.083 % nebulizer solution Take 3 mL (2.5 mg total) by nebulization every 6 (six) hours as needed for wheezing, Starting Sat 6/1/2024, Until Sun 6/1/2025 at 2359, Normal      albuterol (PROVENTIL HFA,VENTOLIN HFA) 90 mcg/act inhaler Inhale 2 puffs every 4 (four) hours as needed for wheezing, Starting Sat 6/1/2024, Until Sun 6/1/2025 at 2359, Normal      benzonatate (TESSALON) 200 MG capsule TAKE 1 CAPSULE (200 MG TOTAL) BY MOUTH 3 (THREE) TIMES A DAY AS NEEDED FOR COUGH., Historical Med      hyoscyamine (LEVSIN/SL) 0.125 mg SL tablet Take 1 tablet (0.125 mg total) by mouth every 4 (four) hours as needed for cramping, Starting Fri 1/3/2025, Normal      linaCLOtide (Linzess) 72 MCG CAPS Take 1 capsule by mouth daily, Starting Fri 1/3/2025, Normal      oxygen gas Inhale continuous as needed, Historical Med      polyethylene glycol (GOLYTELY) 4000 mL solution Take 4,000 mL by mouth once for 1 dose, Starting Tue 2/4/2025, Normal      !! predniSONE 10 mg tablet 5 tabs daily x 2 days then 4 tabs daily x 2 days then 3 daily x 2 days then 2 tabs daily x 2 days the one tab daily x 2 days then stop., Normal      promethazine-dextromethorphan (PHENERGAN-DM) 6.25-15 mg/5 mL oral syrup Take 1.25 mL by mouth 4 (four) times a day as needed for cough, Starting Tue 12/10/2024, Normal      umeclidinium-vilanterol (Anoro Ellipta) 62.5-25 MCG/INH inhaler Inhale 1 puff daily Per Pt takes as needed only, Starting Wed 9/1/2021, Historical Med       !! - Potential duplicate medications found. Please discuss with provider.        No discharge procedures on file.  ED SEPSIS DOCUMENTATION   Time reflects when diagnosis was documented in both MDM as applicable and the Disposition within this note       Time User Action Codes Description Comment    3/8/2025  7:12 PM Rey  Cathleen Add [J44.1] COPD exacerbation (HCC)                  Cathleen Le MD  03/08/25 2030

## 2025-03-09 LAB
ATRIAL RATE: 106 BPM
P AXIS: 78 DEGREES
PR INTERVAL: 114 MS
QRS AXIS: 81 DEGREES
QRSD INTERVAL: 96 MS
QT INTERVAL: 312 MS
QTC INTERVAL: 414 MS
T WAVE AXIS: 75 DEGREES
VENTRICULAR RATE: 106 BPM

## 2025-03-09 PROCEDURE — 93010 ELECTROCARDIOGRAM REPORT: CPT | Performed by: INTERNAL MEDICINE

## 2025-03-12 ENCOUNTER — OFFICE VISIT (OUTPATIENT)
Dept: GASTROENTEROLOGY | Facility: CLINIC | Age: 65
End: 2025-03-12
Payer: COMMERCIAL

## 2025-03-12 ENCOUNTER — HOSPITAL ENCOUNTER (OUTPATIENT)
Dept: CT IMAGING | Facility: HOSPITAL | Age: 65
Discharge: HOME | End: 2025-03-12
Payer: COMMERCIAL

## 2025-03-12 ENCOUNTER — TELEPHONE (OUTPATIENT)
Dept: INTERNAL MEDICINE CLINIC | Facility: CLINIC | Age: 65
End: 2025-03-12

## 2025-03-12 VITALS
OXYGEN SATURATION: 95 % | HEART RATE: 97 BPM | TEMPERATURE: 97.2 F | BODY MASS INDEX: 27.28 KG/M2 | WEIGHT: 180 LBS | DIASTOLIC BLOOD PRESSURE: 78 MMHG | HEIGHT: 68 IN | SYSTOLIC BLOOD PRESSURE: 130 MMHG

## 2025-03-12 DIAGNOSIS — R10.32 LEFT LOWER QUADRANT ABDOMINAL PAIN: ICD-10-CM

## 2025-03-12 DIAGNOSIS — R93.2 ABNORMAL CT OF LIVER: ICD-10-CM

## 2025-03-12 DIAGNOSIS — Z87.19 HISTORY OF DIVERTICULITIS: ICD-10-CM

## 2025-03-12 DIAGNOSIS — R10.32 LEFT LOWER QUADRANT ABDOMINAL PAIN: Primary | ICD-10-CM

## 2025-03-12 PROCEDURE — 74176 CT ABD & PELVIS W/O CONTRAST: CPT

## 2025-03-12 PROCEDURE — 99213 OFFICE O/P EST LOW 20 MIN: CPT | Performed by: PHYSICIAN ASSISTANT

## 2025-03-12 RX ORDER — DICYCLOMINE HCL 20 MG
20 TABLET ORAL EVERY 6 HOURS
Qty: 60 TABLET | Refills: 3 | Status: SHIPPED | OUTPATIENT
Start: 2025-03-12 | End: 2025-03-13

## 2025-03-12 RX ADMIN — IOHEXOL 50 ML: 240 INJECTION, SOLUTION INTRATHECAL; INTRAVASCULAR; INTRAVENOUS; ORAL at 16:49

## 2025-03-12 NOTE — PROGRESS NOTES
Name: Felipe Sanders III      : 1960      MRN: [id number]  Encounter Provider: Karis Bhat PA-C  Encounter Date: 3/12/2025   Encounter department: [facility] GASTROENTEROLOGY [facility] [place]  :  Assessment & Plan  Left lower quadrant abdominal pain  64-year-old male who presents with left-sided abdominal pain and significant change in bowel habits.  Patient with a history of an inflamed appendix and diverticulitis.    Will plan stat CT of the abdomen pelvis.    Will update laboratories to include ESR, CRP, CMP, CBC.  Of note patient's white blood cell count in February was over 20,000.    Will begin Bentyl 20 mg 4 times a day.    Would recommend plenty of water intake and low fiber diet.    History of diverticulitis    Abnormal CT of liver        History of Present Illness   Felipe Sanders III is a 64 y.o. male who presents to the GI clinic today for an urgent visit due to left-sided abdominal pain.  Patient reports that he has been suffering with ongoing left-sided abdominal pain for the past several days with associated episodes of constipation that have now turned in to what sounds like overflow diarrhea.  Patient reports 15 bowel movements yesterday.  Patient denies any melena or rectal bleeding.  Patient reports that he did go for an ultrasound today at [facility].  Patient is reporting that nothing seems to be helping his pain and nothing is aggravating the pain it is just constant and persistent.  Patient does have a history of diverticulitis.  Patient does also have a history of an inflamed appendix.  Patient's last CAT scan that he had performed in February did show evidence of irritation and inflammation of his appendix.  Patient's last colonoscopy in 2024 was incomplete.  HPI    Review of Systems A complete review of systems is negative other than that noted above in the HPI.      Current Outpatient Medications   Medication Sig Dispense Refill    amoxicillin-clavulanate  "(AUGMENTIN) 875-125 mg per tablet       dicyclomine (BENTYL) 20 mg tablet Take 1 tablet (20 mg total) by mouth every 6 (six) hours 60 tablet 3    oxygen gas Inhale continuous as needed      predniSONE 20 mg tablet Take 2 tablets (40 mg total) by mouth daily for 4 days 8 tablet 0    polyethylene glycol (GOLYTELY) 4000 mL solution Take 4,000 mL by mouth once for 1 dose 4000 mL 0     No current facility-administered medications for this visit.     Objective   /78 (BP Location: Left arm, Patient Position: Sitting, Cuff Size: Standard)   Pulse 97   Temp (!) 97.2 °F (36.2 °C) (Temporal)   Ht 5' 8\" (1.727 m)   Wt 81.6 kg (180 lb)   SpO2 95%   BMI 27.37 kg/m²     Physical Exam       Lab Results: I personally reviewed relevant lab results.       Results for orders placed during the hospital encounter of 12/10/24    Colonoscopy    Impression  The cecum, ascending colon, hepatic flexure, transverse colon, splenic flexure, descending colon, sigmoid colon, rectosigmoid, rectum and anal region appeared normal.        RECOMMENDATION:  Repeat colonoscopy in 1 year, due: 12/10/2025  Inadequate bowel preparation              Vincenzo White, DO  FACG, FACP        "

## 2025-03-12 NOTE — TELEPHONE ENCOUNTER
Patient called the RX Refill Line. Message is being forwarded to the office.     Patient called and wanted to see if the doctor looked at the sonogram of his appendix. Needs to go for a CatScan at 4:30 this afternoon    Please contact patient at [phone]

## 2025-03-12 NOTE — TELEPHONE ENCOUNTER
"Patient called back asking if Dr. Silverman can review the results. He states he needs to go for a CT scan and doesn't understand why he needs it \"because if something was really wrong an ultrasound would show it\"   "

## 2025-03-12 NOTE — TELEPHONE ENCOUNTER
Patient called office to follow up. Patient stated he saw Gastro today and they want him to do a STAT CT scan. Patient stated that he doesn't think he needs it.     Patient insistent on Dr. Silverman to look at the US. Warm transferred to [facility] for further assistance.

## 2025-03-12 NOTE — TELEPHONE ENCOUNTER
Spoke with patient and advised we would recommend to follow through with what GI is recommending at this time. I advised him if he has further questions as to why testing and medications were ordered by them, that he should direct them to GI. Patient verbalized understanding.

## 2025-03-13 ENCOUNTER — TELEPHONE (OUTPATIENT)
Age: 65
End: 2025-03-13

## 2025-03-13 DIAGNOSIS — Z87.19 HISTORY OF DIVERTICULITIS: ICD-10-CM

## 2025-03-13 DIAGNOSIS — R10.32 LEFT LOWER QUADRANT ABDOMINAL PAIN: Primary | ICD-10-CM

## 2025-03-13 DIAGNOSIS — R93.2 ABNORMAL CT OF LIVER: ICD-10-CM

## 2025-03-13 RX ORDER — DICYCLOMINE HCL 20 MG
20 TABLET ORAL EVERY 6 HOURS
Qty: 360 TABLET | Refills: 1 | Status: SHIPPED | OUTPATIENT
Start: 2025-03-13

## 2025-03-13 NOTE — TELEPHONE ENCOUNTER
Pt calling x 2 today for CT results. Informed pt this was forwarded to the provider for review. Office will return call with provider's interpretation when it becomes available. Pt verbalized understanding.

## 2025-03-13 NOTE — TELEPHONE ENCOUNTER
Patient calling in to see if his CT scan was back yet.   Aware that the results are back but a provider has to review it.

## 2025-03-13 NOTE — TELEPHONE ENCOUNTER
Pt calling x 3 today to ask if provider reviewed results.  Advised that message was sent.  Provider may be in procedures or with pts. Will send another message.

## 2025-03-14 ENCOUNTER — RESULTS FOLLOW-UP (OUTPATIENT)
Dept: GASTROENTEROLOGY | Facility: CLINIC | Age: 65
End: 2025-03-14

## 2025-03-14 NOTE — TELEPHONE ENCOUNTER
"Called pt and stated that he is feeling \"the same\"   (Bloating and pain in stomach)  He's also requesting  the results from CT scan and sonogram.  "

## 2025-03-14 NOTE — TELEPHONE ENCOUNTER
Pt calling back.  Questioning why he is being referred to surgery if CT showed nothing.  Reviewed provider notes and advised that they saw that appendix had some changes and want to make sure that this is not a cause of pt's pain, that may require surgical intervention.  Pt verbalizes understanding.

## 2025-03-14 NOTE — TELEPHONE ENCOUNTER
Called pt and relayed CT Scan result and a referral for Gen. Surgery.  Pt voiced understanding.    Routing to Gen. Surgery to schedule and OV.

## 2025-03-14 NOTE — TELEPHONE ENCOUNTER
----- Message from Karis Bhat PA-C sent at 3/13/2025  1:31 PM EDT -----  Please inform patient that his CT shows nothing acute but shows the appendiceal mucosal changes- can we please have patient follow up with surgery- can we please enter referral! TY  ----- Message -----  From: Kimber, Radiology Results In  Sent: 3/12/2025   5:23 PM EDT  To: Karis Bhat PA-C

## 2025-03-19 ENCOUNTER — APPOINTMENT (EMERGENCY)
Dept: RADIOLOGY | Facility: HOSPITAL | Age: 65
End: 2025-03-19
Payer: COMMERCIAL

## 2025-03-19 ENCOUNTER — HOSPITAL ENCOUNTER (EMERGENCY)
Facility: HOSPITAL | Age: 65
Discharge: HOME | End: 2025-03-19
Attending: EMERGENCY MEDICINE | Admitting: EMERGENCY MEDICINE
Payer: COMMERCIAL

## 2025-03-19 ENCOUNTER — TELEPHONE (OUTPATIENT)
Age: 65
End: 2025-03-19

## 2025-03-19 VITALS
HEART RATE: 97 BPM | SYSTOLIC BLOOD PRESSURE: 107 MMHG | TEMPERATURE: 98.3 F | DIASTOLIC BLOOD PRESSURE: 66 MMHG | OXYGEN SATURATION: 97 % | RESPIRATION RATE: 22 BRPM

## 2025-03-19 DIAGNOSIS — J44.1 COPD EXACERBATION (HCC): ICD-10-CM

## 2025-03-19 DIAGNOSIS — R09.1 PLEURISY: Primary | ICD-10-CM

## 2025-03-19 LAB
ALBUMIN SERPL BCG-MCNC: 4.3 G/DL (ref 3.5–5)
ALP SERPL-CCNC: 56 U/L (ref 34–104)
ALT SERPL W P-5'-P-CCNC: 17 U/L (ref 7–52)
ANION GAP SERPL CALCULATED.3IONS-SCNC: 11 MMOL/L (ref 4–13)
AST SERPL W P-5'-P-CCNC: 14 U/L (ref 13–39)
BASE EX.OXY STD BLDV CALC-SCNC: 89.4 % (ref 60–80)
BASE EXCESS BLDV CALC-SCNC: -0.4 MMOL/L
BASOPHILS # BLD AUTO: 0.03 THOUSANDS/ÂΜL (ref 0–0.1)
BASOPHILS NFR BLD AUTO: 0 % (ref 0–1)
BILIRUB SERPL-MCNC: 0.81 MG/DL (ref 0.2–1)
BUN SERPL-MCNC: 24 MG/DL (ref 5–25)
CALCIUM SERPL-MCNC: 9.6 MG/DL (ref 8.4–10.2)
CHLORIDE SERPL-SCNC: 101 MMOL/L (ref 96–108)
CO2 SERPL-SCNC: 25 MMOL/L (ref 21–32)
CREAT SERPL-MCNC: 1.19 MG/DL (ref 0.6–1.3)
D DIMER PPP FEU-MCNC: 0.28 UG/ML FEU
EOSINOPHIL # BLD AUTO: 0.01 THOUSAND/ÂΜL (ref 0–0.61)
EOSINOPHIL NFR BLD AUTO: 0 % (ref 0–6)
ERYTHROCYTE [DISTWIDTH] IN BLOOD BY AUTOMATED COUNT: 14 % (ref 11.6–15.1)
FLUAV AG UPPER RESP QL IA.RAPID: NEGATIVE
FLUBV AG UPPER RESP QL IA.RAPID: NEGATIVE
GFR SERPL CREATININE-BSD FRML MDRD: 64 ML/MIN/1.73SQ M
GLUCOSE SERPL-MCNC: 215 MG/DL (ref 65–140)
HCO3 BLDV-SCNC: 23.3 MMOL/L (ref 24–30)
HCT VFR BLD AUTO: 48.9 % (ref 36.5–49.3)
HGB BLD-MCNC: 16.5 G/DL (ref 12–17)
IMM GRANULOCYTES # BLD AUTO: 0.18 THOUSAND/UL (ref 0–0.2)
IMM GRANULOCYTES NFR BLD AUTO: 1 % (ref 0–2)
LYMPHOCYTES # BLD AUTO: 1.06 THOUSANDS/ÂΜL (ref 0.6–4.47)
LYMPHOCYTES NFR BLD AUTO: 6 % (ref 14–44)
MCH RBC QN AUTO: 31 PG (ref 26.8–34.3)
MCHC RBC AUTO-ENTMCNC: 33.7 G/DL (ref 31.4–37.4)
MCV RBC AUTO: 92 FL (ref 82–98)
MONOCYTES # BLD AUTO: 0.95 THOUSAND/ÂΜL (ref 0.17–1.22)
MONOCYTES NFR BLD AUTO: 5 % (ref 4–12)
NEUTROPHILS # BLD AUTO: 15.47 THOUSANDS/ÂΜL (ref 1.85–7.62)
NEUTS SEG NFR BLD AUTO: 88 % (ref 43–75)
NRBC BLD AUTO-RTO: 0 /100 WBCS
O2 CT BLDV-SCNC: 21.8 ML/DL
PCO2 BLDV: 35.9 MM HG (ref 42–50)
PH BLDV: 7.43 [PH] (ref 7.3–7.4)
PLATELET # BLD AUTO: 331 THOUSANDS/UL (ref 149–390)
PMV BLD AUTO: 9 FL (ref 8.9–12.7)
PO2 BLDV: 65 MM HG (ref 35–45)
POTASSIUM SERPL-SCNC: 4.2 MMOL/L (ref 3.5–5.3)
PROT SERPL-MCNC: 6.9 G/DL (ref 6.4–8.4)
RBC # BLD AUTO: 5.33 MILLION/UL (ref 3.88–5.62)
SARS-COV+SARS-COV-2 AG RESP QL IA.RAPID: NEGATIVE
SODIUM SERPL-SCNC: 137 MMOL/L (ref 135–147)
WBC # BLD AUTO: 17.7 THOUSAND/UL (ref 4.31–10.16)

## 2025-03-19 PROCEDURE — 85025 COMPLETE CBC W/AUTO DIFF WBC: CPT | Performed by: EMERGENCY MEDICINE

## 2025-03-19 PROCEDURE — 87804 INFLUENZA ASSAY W/OPTIC: CPT | Performed by: EMERGENCY MEDICINE

## 2025-03-19 PROCEDURE — 87811 SARS-COV-2 COVID19 W/OPTIC: CPT | Performed by: EMERGENCY MEDICINE

## 2025-03-19 PROCEDURE — 80053 COMPREHEN METABOLIC PANEL: CPT | Performed by: EMERGENCY MEDICINE

## 2025-03-19 PROCEDURE — 93005 ELECTROCARDIOGRAM TRACING: CPT

## 2025-03-19 PROCEDURE — 82805 BLOOD GASES W/O2 SATURATION: CPT | Performed by: EMERGENCY MEDICINE

## 2025-03-19 PROCEDURE — 99285 EMERGENCY DEPT VISIT HI MDM: CPT | Performed by: EMERGENCY MEDICINE

## 2025-03-19 PROCEDURE — 85379 FIBRIN DEGRADATION QUANT: CPT | Performed by: EMERGENCY MEDICINE

## 2025-03-19 PROCEDURE — 71046 X-RAY EXAM CHEST 2 VIEWS: CPT

## 2025-03-19 PROCEDURE — 99285 EMERGENCY DEPT VISIT HI MDM: CPT

## 2025-03-19 PROCEDURE — 96374 THER/PROPH/DIAG INJ IV PUSH: CPT

## 2025-03-19 PROCEDURE — 36415 COLL VENOUS BLD VENIPUNCTURE: CPT | Performed by: EMERGENCY MEDICINE

## 2025-03-19 RX ORDER — METHYLPREDNISOLONE SODIUM SUCCINATE 40 MG/ML
40 INJECTION, POWDER, LYOPHILIZED, FOR SOLUTION INTRAMUSCULAR; INTRAVENOUS ONCE
Status: COMPLETED | OUTPATIENT
Start: 2025-03-19 | End: 2025-03-19

## 2025-03-19 RX ORDER — SODIUM CHLORIDE FOR INHALATION 0.9 %
12 VIAL, NEBULIZER (ML) INHALATION ONCE
Status: DISCONTINUED | OUTPATIENT
Start: 2025-03-19 | End: 2025-03-19

## 2025-03-19 RX ORDER — IPRATROPIUM BROMIDE AND ALBUTEROL SULFATE 2.5; .5 MG/3ML; MG/3ML
3 SOLUTION RESPIRATORY (INHALATION) ONCE
Status: COMPLETED | OUTPATIENT
Start: 2025-03-19 | End: 2025-03-19

## 2025-03-19 RX ORDER — ALBUTEROL SULFATE 5 MG/ML
10 SOLUTION RESPIRATORY (INHALATION) ONCE
Status: DISCONTINUED | OUTPATIENT
Start: 2025-03-19 | End: 2025-03-19

## 2025-03-19 RX ADMIN — METHYLPREDNISOLONE SODIUM SUCCINATE 40 MG: 40 INJECTION, POWDER, FOR SOLUTION INTRAMUSCULAR; INTRAVENOUS at 16:53

## 2025-03-19 RX ADMIN — IPRATROPIUM BROMIDE AND ALBUTEROL SULFATE 3 ML: 2.5; .5 SOLUTION RESPIRATORY (INHALATION) at 17:54

## 2025-03-19 NOTE — DISCHARGE INSTRUCTIONS
You were seen and evaluated today for cough, chest pain.    Please continue all previously prescribed medications from your pulmonary specialist including levaquin and prednisone.   Follow up with your PCP as discussed.   RETURN TO THE EMERGENCY DEPARTMENT if you develop new or worsening symptoms and are unable to see your PCP.

## 2025-03-19 NOTE — TELEPHONE ENCOUNTER
FOLLOW UP: [phone]    REASON FOR CONVERSATION: Appointment    SYMPTOMS: PT reports that he is on steroid and antibiotic treatment by lung doctor and cannot have surgery tomorrow.     OTHER: Reviewed scheduled office visit tomorrow with  Dr. Lee and updated that appointment is a consult and he will not have surgery. PT verbalized understanding and agreeable to plan.    DISPOSITION: Information or Advice Only Call

## 2025-03-19 NOTE — ED PROVIDER NOTES
Time reflects when diagnosis was documented in both MDM as applicable and the Disposition within this note       Time User Action Codes Description Comment    3/19/2025  6:45 PM Kaila Ma Add [R09.1] Pleurisy     3/19/2025  6:46 PM Kaila Ma Add [J44.1] COPD exacerbation (HCC)           ED Disposition       ED Disposition   Discharge    Condition   Stable    Date/Time   Wed Mar 19, 2025  6:45 PM    Comment   Felipe Sanders III discharge to home/self care.                   Assessment & Plan       Medical Decision Making  Patient is a pleasant 64-year-old male who presents to the emergency department with a complaint of shortness of breath.  He states he was seen by his pulmonologist today and states he was referred for further workup.  He was already prescribed antibiotics and steroids, however has not yet gone to the pharmacy.  Will attempt to locate outside records as his physician is not a [facility] physician.  He states that he has been increasingly short of breath after initially improving following an ED visit approximately 10 days ago.  He states that he has subsequently developed worsening cough, mid thoracic back pain, and persistent shortness of breath.  Reports a remote history of pneumonia and states this feels very similar.  Chest x-ray to evaluate for underlying pneumonia.  Due to significant tachypnea and tachycardia on arrival, though I suspect COPD, will obtain D-dimer for PE screening. Dispo/need for admission pending clinical response to nebulizer and xr results.     Amount and/or Complexity of Data Reviewed  External Data Reviewed: notes.     Details: Additional history obtained via review of the patient's chart.  He was seen by pulmonary today.  He was diagnosed with bronchitis and started on prednisone, as well as Levaquin.  His PCP recommended allergy testing.    Labs: ordered. Decision-making details documented in ED Course.  Radiology: ordered and independent  interpretation performed. Decision-making details documented in ED Course.     Details: Questionable subtle right-sided infiltrate, already on antibiotics  ECG/medicine tests: independent interpretation performed.    Risk  Prescription drug management.  Decision regarding hospitalization.        ED Course as of 03/19/25 2256   Wed Mar 19, 2025   1633 CT abdomen pelvis wo contrast (3/12/25 1722)   1652 Respirations(!): 26   1652 Pulse(!): 125   1652 SpO2: 96 %  On home 3L   1659 CBC and differential(!)  Recently completed steroids, may be confounder for leukocytosis   1714 Respirations: 22   1714 Pulse: 105   1802 D-dimer, quantitative       Medications   methylPREDNISolone sodium succinate (Solu-MEDROL) injection 40 mg (40 mg Intravenous Given 3/19/25 1653)   ipratropium-albuterol (DUO-NEB) 0.5-2.5 mg/3 mL inhalation solution 3 mL (3 mL Nebulization Given 3/19/25 1754)       ED Risk Strat Scores                        PERC Rule for PE      Flowsheet Row Most Recent Value   PERC Rule for PE    Age >=50 1 Filed at: 03/19/2025 1718   HR >=100 1 Filed at: 03/19/2025 1718   O2 Sat on room air < 95% 1 Filed at: 03/19/2025 1718   History of PE or DVT 0 Filed at: 03/19/2025 1718   Recent trauma or surgery 0 Filed at: 03/19/2025 1718   Hemoptysis 0 Filed at: 03/19/2025 1718   Exogenous estrogen 0 Filed at: 03/19/2025 1718   Unilateral leg swelling 0 Filed at: 03/19/2025 1718   PERC Rule for PE Results 3 Filed at: 03/19/2025 1718            SBIRT 22yo+      Flowsheet Row Most Recent Value   Initial Alcohol Screen: US AUDIT-C     1. How often do you have a drink containing alcohol? 0 Filed at: 03/19/2025 1712   2. How many drinks containing alcohol do you have on a typical day you are drinking?  0 Filed at: 03/19/2025 1712   3a. Male UNDER 65: How often do you have five or more drinks on one occasion? 0 Filed at: 03/19/2025 1712   Audit-C Score 0 Filed at: 03/19/2025 1712   LAZARA: How many times in the past year have you...     Used an illegal drug or used a prescription medication for non-medical reasons? Never Filed at: 03/19/2025 1712            Wells' Criteria for PE      Flowsheet Row Most Recent Value   Wells' Criteria for PE    Clinical signs and symptoms of DVT 0 Filed at: 03/19/2025 1718   PE is primary diagnosis or equally likely 0 Filed at: 03/19/2025 1718   HR >100 1.5 Filed at: 03/19/2025 1718   Immobilization at least 3 days or Surgery in the previous 4 weeks 0 Filed at: 03/19/2025 1718   Previous, objectively diagnosed PE or DVT 0 Filed at: 03/19/2025 1718   Hemoptysis 0 Filed at: 03/19/2025 1718   Malignancy with treatment within 6 months or palliative 0 Filed at: 03/19/2025 1718   Yasmany' Criteria Total 1.5 Filed at: 03/19/2025 1718                        History of Present Illness       Chief Complaint   Patient presents with    Shortness of Breath     Pt c/o SOB on exertion for 2 days ago, with pain in the back. Pt wears 3L chronically for COPD. Pt is concerned for pneumonia.        Past Medical History:   Diagnosis Date    COPD (chronic obstructive pulmonary disease) (HCC)     Diverticulitis of colon     Erectile dysfunction     Hernia of abdominal cavity     Liver cyst       Past Surgical History:   Procedure Laterality Date    NOSE SURGERY        History reviewed. No pertinent family history.   Social History     Tobacco Use    Smoking status: Every Day     Current packs/day: 1.50     Average packs/day: 1.5 packs/day for 50.0 years (75.0 ttl pk-yrs)     Types: Cigarettes    Smokeless tobacco: Never   Vaping Use    Vaping status: Never Used   Substance Use Topics    Alcohol use: Never    Drug use: No      E-Cigarette/Vaping    E-Cigarette Use Never User       E-Cigarette/Vaping Substances    Nicotine No     THC No     CBD No     Flavoring No     Other No     Unknown No       I have reviewed and agree with the history as documented.     Patient presents to the ED stating he was sent by his PCP for an  xray          Review of Systems   Constitutional:  Negative for chills and fever.   HENT:  Negative for congestion, rhinorrhea and sore throat.    Eyes:  Negative for discharge and redness.   Respiratory:  Positive for cough, shortness of breath and wheezing.    Cardiovascular:  Negative for chest pain and palpitations.   Gastrointestinal:  Negative for abdominal pain, nausea and vomiting.   Genitourinary:  Negative for frequency, hematuria and urgency.   Skin:  Negative for rash and wound.   Neurological:  Negative for seizures and syncope.   Psychiatric/Behavioral:  Negative for behavioral problems and confusion.            Objective       ED Triage Vitals [03/19/25 1623]   Temperature Pulse Blood Pressure Respirations SpO2 Patient Position - Orthostatic VS   98.3 °F (36.8 °C) (!) 125 148/79 (!) 26 96 % Sitting      Temp Source Heart Rate Source BP Location FiO2 (%) Pain Score    Oral Monitor Left arm -- --      Vitals      Date and Time Temp Pulse SpO2 Resp BP Pain Score FACES Pain Rating User   03/19/25 1800 -- 97 97 % 22 107/66 -- -- RS   03/19/25 1700 -- 105 97 % 22 122/95 -- -- VMW   03/19/25 1648 -- 113 95 % 23 114/65 -- -- KM   03/19/25 1623 98.3 °F (36.8 °C) 125 96 % 26 148/79 -- -- GB            Physical Exam  Vitals and nursing note reviewed.   Constitutional:       General: He is not in acute distress.     Appearance: Normal appearance.   HENT:      Head: Normocephalic and atraumatic.      Right Ear: External ear normal.      Left Ear: External ear normal.      Nose: Nose normal.   Cardiovascular:      Rate and Rhythm: Regular rhythm. Tachycardia present.   Pulmonary:      Effort: Pulmonary effort is normal.      Breath sounds: Decreased breath sounds and wheezing present.   Abdominal:      General: There is no distension.      Palpations: Abdomen is soft.      Tenderness: There is no abdominal tenderness.   Musculoskeletal:      Right lower leg: No edema.      Left lower leg: No edema.   Skin:      General: Skin is warm and dry.   Neurological:      General: No focal deficit present.      Mental Status: He is alert and oriented to person, place, and time. Mental status is at baseline.   Psychiatric:         Behavior: Behavior normal.         Results Reviewed       Procedure Component Value Units Date/Time    D-dimer, quantitative [[id number]]  (Normal) Collected: 03/19/25 1720    Lab Status: Final result Specimen: Blood from Arm, Left Updated: 03/19/25 1745     D-Dimer, Quant 0.28 ug/ml FEU     Narrative:      In the evaluation for possible pulmonary embolism, in the appropriate (Well's Score of 4 or less) patient, the age adjusted d-dimer cutoff for this patient can be calculated as:    Age x 0.01 (in ug/mL) for Age-adjusted D-dimer exclusion threshold for a patient over 50 years.    FLU/COVID Rapid Antigen (30 min. TAT) - Preferred screening test in ED [[id number]]  (Normal) Collected: 03/19/25 1648    Lab Status: Final result Specimen: Nares from Nose Updated: 03/19/25 1737     SARS COV Rapid Antigen Negative     Influenza A Rapid Antigen Negative     Influenza B Rapid Antigen Negative    Narrative:      This test has been performed using the Quidel Kati 2 FLU+SARS Antigen test under the Emergency Use Authorization (EUA). This test has been validated by the  and verified by the performing laboratory. The Kati uses lateral flow immunofluorescent sandwich assay to detect SARS-COV, Influenza A and Influenza B Antigen.     The Quidel Kati 2 SARS Antigen test does not differentiate between SARS-CoV and SARS-CoV-2.     Negative results are presumptive and may be confirmed with a molecular assay, if necessary, for patient management. Negative results do not rule out SARS-CoV-2 or influenza infection and should not be used as the sole basis for treatment or patient management decisions. A negative test result may occur if the level of antigen in a sample is below the limit of detection of this test.      Positive results are indicative of the presence of viral antigens, but do not rule out bacterial infection or co-infection with other viruses.     All test results should be used as an adjunct to clinical observations and other information available to the provider.    FOR PEDIATRIC PATIENTS - copy/paste COVID Guidelines URL to browser: [email or web address]    Comprehensive metabolic panel [[id number]]  (Abnormal) Collected: 03/19/25 1648    Lab Status: Final result Specimen: Blood from Arm, Left Updated: 03/19/25 1717     Sodium 137 mmol/L      Potassium 4.2 mmol/L      Chloride 101 mmol/L      CO2 25 mmol/L      ANION GAP 11 mmol/L      BUN 24 mg/dL      Creatinine 1.19 mg/dL      Glucose 215 mg/dL      Calcium 9.6 mg/dL      AST 14 U/L      ALT 17 U/L      Alkaline Phosphatase 56 U/L      Total Protein 6.9 g/dL      Albumin 4.3 g/dL      Total Bilirubin 0.81 mg/dL      eGFR 64 ml/min/1.73sq m     Narrative:      National Kidney Disease Foundation guidelines for Chronic Kidney Disease (CKD):     Stage 1 with normal or high GFR (GFR > 90 mL/min/1.73 square meters)    Stage 2 Mild CKD (GFR = 60-89 mL/min/1.73 square meters)    Stage 3A Moderate CKD (GFR = 45-59 mL/min/1.73 square meters)    Stage 3B Moderate CKD (GFR = 30-44 mL/min/1.73 square meters)    Stage 4 Severe CKD (GFR = 15-29 mL/min/1.73 square meters)    Stage 5 End Stage CKD (GFR <15 mL/min/1.73 square meters)  Note: GFR calculation is accurate only with a steady state creatinine    CBC and differential [[id number]]  (Abnormal) Collected: 03/19/25 1648    Lab Status: Final result Specimen: Blood from Arm, Left Updated: 03/19/25 1656     WBC 17.70 Thousand/uL      RBC 5.33 Million/uL      Hemoglobin 16.5 g/dL      Hematocrit 48.9 %      MCV 92 fL      MCH 31.0 pg      MCHC 33.7 g/dL      RDW 14.0 %      MPV 9.0 fL      Platelets 331 Thousands/uL      nRBC 0 /100 WBCs      Segmented % 88 %      Immature  Grans % 1 %      Lymphocytes % 6 %      Monocytes % 5 %      Eosinophils Relative 0 %      Basophils Relative 0 %      Absolute Neutrophils 15.47 Thousands/µL      Absolute Immature Grans 0.18 Thousand/uL      Absolute Lymphocytes 1.06 Thousands/µL      Absolute Monocytes 0.95 Thousand/µL      Eosinophils Absolute 0.01 Thousand/µL      Basophils Absolute 0.03 Thousands/µL     Blood gas, venous [[id number]]  (Abnormal) Collected: 03/19/25 1648    Lab Status: Final result Specimen: Blood from Arm, Left Updated: 03/19/25 1656     pH, Jevon 7.430     pCO2, Jevon 35.9 mm Hg      pO2, Jevon 65.0 mm Hg      HCO3, Jevon 23.3 mmol/L      Base Excess, Jevon -0.4 mmol/L      O2 Content, Jevon 21.8 ml/dL      O2 HGB, VENOUS 89.4 %             XR chest 2 views   Final Interpretation by Moi Mosquera MD (03/19 2021)      Stable COPD changes and chronic parenchymal scarring/fibrosis. No lobar airspace consolidation, pneumothorax, or significant pleural effusions.      Workstation performed: [id number]             Procedures    ED Medication and Procedure Management   Prior to Admission Medications   Prescriptions Last Dose Informant Patient Reported? Taking?   amoxicillin-clavulanate (AUGMENTIN) 875-125 mg per tablet   Yes No   dicyclomine (BENTYL) 20 mg tablet   No No   Sig: TAKE 1 TABLET BY MOUTH EVERY 6 HOURS.   oxygen gas  Self Yes No   Sig: Inhale continuous as needed   polyethylene glycol (GOLYTELY) 4000 mL solution   No No   Sig: Take 4,000 mL by mouth once for 1 dose      Facility-Administered Medications: None     Discharge Medication List as of 3/19/2025  6:50 PM        CONTINUE these medications which have NOT CHANGED    Details   amoxicillin-clavulanate (AUGMENTIN) 875-125 mg per tablet Historical Med      dicyclomine (BENTYL) 20 mg tablet TAKE 1 TABLET BY MOUTH EVERY 6 HOURS., Starting Thu 3/13/2025, Normal      oxygen gas Inhale continuous as needed, Historical Med      polyethylene glycol (GOLYTELY) 4000 mL solution Take 4,000  mL by mouth once for 1 dose, Starting Tue 2/4/2025, Normal           No discharge procedures on file.  ED SEPSIS DOCUMENTATION   Time reflects when diagnosis was documented in both MDM as applicable and the Disposition within this note       Time User Action Codes Description Comment    3/19/2025  6:45 PM Kaila Ma Add [R09.1] Pleurisy     3/19/2025  6:46 PM Kaila Ma Add [J44.1] COPD exacerbation (HCC)                  Kaila Ma MD  03/19/25 4968

## 2025-03-19 NOTE — ED NOTES
Per provider verbal order, heart neb is to be discontinued and duoneb is to be ordered instead.      Pinky Ramirez  03/19/25 7053

## 2025-03-20 ENCOUNTER — TELEPHONE (OUTPATIENT)
Age: 65
End: 2025-03-20

## 2025-03-20 ENCOUNTER — OFFICE VISIT (OUTPATIENT)
Dept: SURGERY | Facility: CLINIC | Age: 65
End: 2025-03-20
Payer: COMMERCIAL

## 2025-03-20 VITALS
SYSTOLIC BLOOD PRESSURE: 136 MMHG | RESPIRATION RATE: 20 BRPM | WEIGHT: 182.2 LBS | DIASTOLIC BLOOD PRESSURE: 80 MMHG | HEIGHT: 68 IN | BODY MASS INDEX: 27.61 KG/M2 | TEMPERATURE: 97.9 F | OXYGEN SATURATION: 93 % | HEART RATE: 111 BPM

## 2025-03-20 DIAGNOSIS — K38.8 MUCOCELE OF APPENDIX: Primary | ICD-10-CM

## 2025-03-20 DIAGNOSIS — Z87.19 HISTORY OF DIVERTICULITIS: ICD-10-CM

## 2025-03-20 DIAGNOSIS — R93.2 ABNORMAL CT OF LIVER: ICD-10-CM

## 2025-03-20 DIAGNOSIS — R10.32 LEFT LOWER QUADRANT ABDOMINAL PAIN: ICD-10-CM

## 2025-03-20 PROCEDURE — 99243 OFF/OP CNSLTJ NEW/EST LOW 30: CPT | Performed by: SURGERY

## 2025-03-20 NOTE — TELEPHONE ENCOUNTER
Patient calling to say that he is still having abdominal pain in LLQ 4-5/10 constant pain for 3 years. Normal bowel movements daily.  Denies other symptoms.  Was in ED and the ED doctor said the appendix issue would not be causing the pain.  Would like us to take a guess at what is going on.  Please advise.

## 2025-03-20 NOTE — PROGRESS NOTES
Name: Felipe Sanders III      : 1960      MRN: [id number]  Encounter Provider: Jeyson Lee MD  Encounter Date: 3/20/2025   Encounter department: [facility]  :  Assessment & Plan  Mucocele of appendix  Abnormal CT scan findings of the appendix, suspicious for mucocele     Patient has not been cleared by pulmonary due to continuous coughing, yellow sputum production without fever.  Since the patient had enlarged appendix on CT scan from  and it has not changed in size since then, I advised the patient to wait until he is cleared by pulmonary to proceed with appendectomy.  Severe COPD on O2, patient is on a transplant list  Smoker, 1-1/2 pack a day    History of Present Illness   HPI  Felipe Sanders III is a 64 y.o. male who presents to my office for follow-up.  The patient is known to me from prior office visit secondary to left lower quadrant abdominal pain which was workup in the past and it turned out to be most likely musculoskeletal in origin.  The patient still complains of failed from the left lower quadrant but his most recent CT scan of the abdomen and pelvis from 2025 showed enlarged appendix suspicious for mucocele of the appendix.  The patient denies having any fever, chills, nausea, vomiting, abdominal pain or any other constitutional symptoms.  The patient was seen by GI and referred to us for surgical evaluation.  His last colonoscopy was 2022 revealing no abnormalities.    Review of Systems  The rest of the review of system total of 10 were negative except for the HPI.    Pertinent Medical History       Medical History Reviewed by provider this encounter:  Tobacco  Allergies  Meds  Problems  Med Hx  Surg Hx  Fam Hx     .  Past Medical History   Past Medical History:   Diagnosis Date    COPD (chronic obstructive pulmonary disease) (HCC)     Diverticulitis of colon     Erectile dysfunction     Hernia of abdominal cavity     Liver  cyst      Past Surgical History:   Procedure Laterality Date    COLONOSCOPY      NOSE SURGERY       History reviewed. No pertinent family history.   reports that he has been smoking cigarettes. He has a 75 pack-year smoking history. He has been exposed to tobacco smoke. He has never used smokeless tobacco. He reports that he does not drink alcohol and does not use drugs.  Current Outpatient Medications   Medication Instructions    amoxicillin-clavulanate (AUGMENTIN) 875-125 mg per tablet     dicyclomine (BENTYL) 20 mg, Oral, Every 6 hours    oxygen gas Continuous PRN    polyethylene glycol (GOLYTELY) 4000 mL solution 4,000 mL, Oral, Once   No Known Allergies   Current Outpatient Medications on File Prior to Visit   Medication Sig Dispense Refill    dicyclomine (BENTYL) 20 mg tablet TAKE 1 TABLET BY MOUTH EVERY 6 HOURS. 360 tablet 1    oxygen gas Inhale continuous as needed      polyethylene glycol (GOLYTELY) 4000 mL solution Take 4,000 mL by mouth once for 1 dose 4000 mL 0    amoxicillin-clavulanate (AUGMENTIN) 875-125 mg per tablet  (Patient not taking: Reported on 3/20/2025)       Current Facility-Administered Medications on File Prior to Visit   Medication Dose Route Frequency Provider Last Rate Last Admin    [COMPLETED] ipratropium-albuterol (DUO-NEB) 0.5-2.5 mg/3 mL inhalation solution 3 mL  3 mL Nebulization Once Kaila Ma MD   3 mL at 03/19/25 1754    [COMPLETED] methylPREDNISolone sodium succinate (Solu-MEDROL) injection 40 mg  40 mg Intravenous Once Kaila Ma MD   40 mg at 03/19/25 1653    [DISCONTINUED] albuterol inhalation solution 10 mg  10 mg Nebulization Once Kaila Ma MD        [DISCONTINUED] ipratropium (ATROVENT) 0.02 % inhalation solution 1 mg  1 mg Nebulization Once Kaila Ma MD        [DISCONTINUED] sodium chloride 0.9 % inhalation solution 12 mL  12 mL Nebulization Once Kaila Ma MD          Social History     Tobacco Use    Smoking status:  "Every Day     Current packs/day: 1.50     Average packs/day: 1.5 packs/day for 50.0 years (75.0 ttl pk-yrs)     Types: Cigarettes     Passive exposure: Past    Smokeless tobacco: Never   Vaping Use    Vaping status: Never Used   Substance and Sexual Activity    Alcohol use: Never    Drug use: No    Sexual activity: Yes        Objective   /80 (BP Location: Left arm, Patient Position: Sitting, Cuff Size: Standard)   Pulse (!) 111   Temp 97.9 °F (36.6 °C)   Resp 20   Ht 5' 8\" (1.727 m)   Wt 82.6 kg (182 lb 3.2 oz)   SpO2 93% Comment: 3lpm  BMI 27.70 kg/m²      Physical Exam  Vitals and nursing note reviewed.   Constitutional:       General: He is not in acute distress.     Comments: Patient on O2 via nasal cannula, 3 L   Cardiovascular:      Rate and Rhythm: Regular rhythm. Tachycardia present.   Pulmonary:      Breath sounds: Wheezing and rhonchi present.   Abdominal:      Palpations: Abdomen is soft. There is no mass.      Comments: Still with left lower quadrant tenderness, unchanged even after flexing the abdominal wall muscles.   Skin:     General: Skin is warm.      Coloration: Skin is not jaundiced.      Findings: No erythema or rash.   Neurological:      Mental Status: He is alert and oriented to person, place, and time.      Cranial Nerves: No cranial nerve deficit.   Psychiatric:         Mood and Affect: Mood normal.         Behavior: Behavior normal.         "

## 2025-03-21 ENCOUNTER — OFFICE VISIT (OUTPATIENT)
Dept: INTERNAL MEDICINE CLINIC | Facility: CLINIC | Age: 65
End: 2025-03-21
Payer: COMMERCIAL

## 2025-03-21 VITALS
OXYGEN SATURATION: 95 % | RESPIRATION RATE: 17 BRPM | SYSTOLIC BLOOD PRESSURE: 136 MMHG | DIASTOLIC BLOOD PRESSURE: 82 MMHG | HEART RATE: 98 BPM | WEIGHT: 180 LBS | BODY MASS INDEX: 28.25 KG/M2 | HEIGHT: 67 IN

## 2025-03-21 DIAGNOSIS — R10.32 LEFT LOWER QUADRANT ABDOMINAL PAIN: ICD-10-CM

## 2025-03-21 DIAGNOSIS — J43.9 PULMONARY EMPHYSEMA, UNSPECIFIED EMPHYSEMA TYPE (HCC): Primary | ICD-10-CM

## 2025-03-21 LAB
ATRIAL RATE: 123 BPM
P AXIS: 82 DEGREES
PR INTERVAL: 114 MS
QRS AXIS: 85 DEGREES
QRSD INTERVAL: 74 MS
QT INTERVAL: 310 MS
QTC INTERVAL: 443 MS
T WAVE AXIS: 78 DEGREES
VENTRICULAR RATE: 123 BPM

## 2025-03-21 PROCEDURE — 93010 ELECTROCARDIOGRAM REPORT: CPT | Performed by: INTERNAL MEDICINE

## 2025-03-21 PROCEDURE — 99214 OFFICE O/P EST MOD 30 MIN: CPT

## 2025-03-21 RX ORDER — ALBUTEROL SULFATE 0.83 MG/ML
2.5 SOLUTION RESPIRATORY (INHALATION) EVERY 6 HOURS PRN
COMMUNITY
Start: 2025-03-20 | End: 2026-03-20

## 2025-03-21 NOTE — PROGRESS NOTES
Name: Felipe Sanders III      : 1960      MRN: [id number]  Encounter Provider: CHELSEA Rodríguez  Encounter Date: 3/21/2025   Encounter department: [facility] INTERNAL MEDICINE [place]  :  Assessment & Plan  Pulmonary emphysema, unspecified emphysema type (HCC)  Last PFT showed FEV1 51% which is graded GOLD 2.  He is due to have another PFT completed.  Patient will be following with pulmonology.  He states his cough is slightly improving, he does not get into coughing fits as frequently.  Encouraged cessation of smoking.       Left lower quadrant abdominal pain  Patient has followed with GI as well as general surgery in regards to this pain.  GI had sent him for a stat CT of the abdomen pelvis, ultrasound of the abdomen, as well as labs and Bentyl for the pain.  Ultrasound showed hepatic steatosis and meningioma.  CT showed appendiceal mucocele, no evidence of appendicitis.  General surgery was seen yesterday for assessment of the mucocele of the appendix.  Patient needs to be cleared by pulmonology to have the appendix removed.  General surgery feels that the pain is musculoskeletal in origin.  Patient would not like to try a muscle relaxer.  Encouraged use of Tylenol, stay well-hydrated.  Okay to use the occasional ibuprofen for his more difficult days with the pain, use sparingly.              History of Present Illness {?Quick Links Encounters * My Last Note * Last Note in Specialty * Snapshot * Since Last Visit * History :67640}  Felipe is here today in the office with concerns for COPD.  He is interested in knowing what stage she is in.  He has reached out to his pulmonologist as well.  He is hoping to have an appendectomy completed but needs clearance from pulmonology.  He is recently been worked up for left lower quadrant pain.  Ultrasound of the abdomen, CTA of the abdomen pelvis, chest x-ray were all completed.  He is still experiencing this discomfort as well as into his back.  It is  "painful when he presses on it.  He has not tried taking anything for the pain.  He has an appointment with his PCP on 4/3.      Review of Systems   Constitutional:  Negative for chills and fever.   HENT:  Positive for congestion. Negative for ear pain, rhinorrhea, sinus pressure and sore throat.    Eyes:  Negative for pain and visual disturbance.   Respiratory:  Positive for cough. Negative for chest tightness and shortness of breath.    Cardiovascular:  Negative for chest pain, palpitations and leg swelling.   Gastrointestinal:  Positive for abdominal pain. Negative for constipation, diarrhea, nausea and vomiting.   Endocrine: Negative.    Genitourinary:  Negative for dysuria, frequency and hematuria.   Musculoskeletal:  Positive for back pain and myalgias. Negative for arthralgias.   Skin:  Negative for color change and rash.   Allergic/Immunologic: Negative.    Neurological:  Negative for dizziness, seizures, syncope, light-headedness and headaches.   Hematological: Negative.    Psychiatric/Behavioral: Negative.     All other systems reviewed and are negative.      Objective {?Quick Links Trend Vitals * Enter New Vitals * Results Review * Timeline (Adult) * Labs * Imaging * Cardiology * Procedures * Lung Cancer Screening * Surgical eConsent :80491}  /82 (BP Location: Left arm, Patient Position: Sitting, Cuff Size: Adult)   Pulse 98   Resp 17   Ht 5' 7\" (1.702 m)   Wt 81.6 kg (180 lb)   SpO2 95%   BMI 28.19 kg/m²      Physical Exam  Vitals and nursing note reviewed.   Constitutional:       General: He is not in acute distress.     Appearance: He is well-developed.   Cardiovascular:      Rate and Rhythm: Normal rate and regular rhythm.      Pulses: Normal pulses.      Heart sounds: Normal heart sounds. No murmur heard.  Pulmonary:      Effort: Pulmonary effort is normal. No respiratory distress.      Breath sounds: Decreased breath sounds present.   Abdominal:      General: Bowel sounds are normal.      " Palpations: Abdomen is soft.      Tenderness: There is abdominal tenderness in the left upper quadrant and left lower quadrant. There is no right CVA tenderness or left CVA tenderness.   Musculoskeletal:         General: No swelling.      Cervical back: Neck supple.      Thoracic back: Spasms and tenderness present.   Skin:     General: Skin is warm and dry.   Neurological:      Mental Status: He is alert and oriented to person, place, and time.   Psychiatric:         Mood and Affect: Mood normal.

## 2025-03-21 NOTE — ASSESSMENT & PLAN NOTE
Last PFT showed FEV1 51% which is graded GOLD 2.  He is due to have another PFT completed.  Patient will be following with pulmonology.  He states his cough is slightly improving, he does not get into coughing fits as frequently.  Encouraged cessation of smoking.

## 2025-03-23 ENCOUNTER — APPOINTMENT (EMERGENCY)
Dept: CT IMAGING | Facility: HOSPITAL | Age: 65
End: 2025-03-23
Payer: COMMERCIAL

## 2025-03-23 ENCOUNTER — HOSPITAL ENCOUNTER (OUTPATIENT)
Facility: HOSPITAL | Age: 65
Setting detail: OBSERVATION
Discharge: HOME | End: 2025-03-24
Attending: STUDENT IN AN ORGANIZED HEALTH CARE EDUCATION/TRAINING PROGRAM | Admitting: INTERNAL MEDICINE
Payer: COMMERCIAL

## 2025-03-23 DIAGNOSIS — K35.80 ACUTE APPENDICITIS, UNSPECIFIED ACUTE APPENDICITIS TYPE: ICD-10-CM

## 2025-03-23 DIAGNOSIS — Z13.9 ENCOUNTER FOR SCREENING INVOLVING SOCIAL DETERMINANTS OF HEALTH (SDOH): ICD-10-CM

## 2025-03-23 DIAGNOSIS — R10.9 ABDOMINAL PAIN: ICD-10-CM

## 2025-03-23 DIAGNOSIS — S05.00XA CORNEAL ABRASION: Primary | ICD-10-CM

## 2025-03-23 DIAGNOSIS — Z72.0 TOBACCO USE: ICD-10-CM

## 2025-03-23 DIAGNOSIS — K38.8 MUCOCELE OF APPENDIX: ICD-10-CM

## 2025-03-23 DIAGNOSIS — R10.12 LEFT UPPER QUADRANT ABDOMINAL PAIN: ICD-10-CM

## 2025-03-23 DIAGNOSIS — J44.9 CHRONIC OBSTRUCTIVE PULMONARY DISEASE, UNSPECIFIED COPD TYPE (HCC): ICD-10-CM

## 2025-03-23 LAB
ALBUMIN SERPL BCG-MCNC: 4.1 G/DL (ref 3.5–5)
ALP SERPL-CCNC: 51 U/L (ref 34–104)
ALT SERPL W P-5'-P-CCNC: 17 U/L (ref 7–52)
ANION GAP SERPL CALCULATED.3IONS-SCNC: 8 MMOL/L (ref 4–13)
AST SERPL W P-5'-P-CCNC: 16 U/L (ref 13–39)
BASOPHILS # BLD AUTO: 0.03 THOUSANDS/ÂΜL (ref 0–0.1)
BASOPHILS NFR BLD AUTO: 0 % (ref 0–1)
BILIRUB SERPL-MCNC: 0.48 MG/DL (ref 0.2–1)
BUN SERPL-MCNC: 21 MG/DL (ref 5–25)
CALCIUM SERPL-MCNC: 9 MG/DL (ref 8.4–10.2)
CARDIAC TROPONIN I PNL SERPL HS: 6 NG/L (ref ?–50)
CHLORIDE SERPL-SCNC: 103 MMOL/L (ref 96–108)
CO2 SERPL-SCNC: 28 MMOL/L (ref 21–32)
CREAT SERPL-MCNC: 1.08 MG/DL (ref 0.6–1.3)
EOSINOPHIL # BLD AUTO: 0.07 THOUSAND/ÂΜL (ref 0–0.61)
EOSINOPHIL NFR BLD AUTO: 0 % (ref 0–6)
ERYTHROCYTE [DISTWIDTH] IN BLOOD BY AUTOMATED COUNT: 13.6 % (ref 11.6–15.1)
FLUAV RNA RESP QL NAA+PROBE: NEGATIVE
FLUBV RNA RESP QL NAA+PROBE: NEGATIVE
GFR SERPL CREATININE-BSD FRML MDRD: 72 ML/MIN/1.73SQ M
GLUCOSE SERPL-MCNC: 142 MG/DL (ref 65–140)
HCT VFR BLD AUTO: 48 % (ref 36.5–49.3)
HGB BLD-MCNC: 16.4 G/DL (ref 12–17)
IMM GRANULOCYTES # BLD AUTO: 0.18 THOUSAND/UL (ref 0–0.2)
IMM GRANULOCYTES NFR BLD AUTO: 1 % (ref 0–2)
LACTATE SERPL-SCNC: 1.6 MMOL/L (ref 0.5–2)
LIPASE SERPL-CCNC: 46 U/L (ref 11–82)
LYMPHOCYTES # BLD AUTO: 1.73 THOUSANDS/ÂΜL (ref 0.6–4.47)
LYMPHOCYTES NFR BLD AUTO: 11 % (ref 14–44)
MCH RBC QN AUTO: 30.9 PG (ref 26.8–34.3)
MCHC RBC AUTO-ENTMCNC: 34.2 G/DL (ref 31.4–37.4)
MCV RBC AUTO: 90 FL (ref 82–98)
MONOCYTES # BLD AUTO: 1.18 THOUSAND/ÂΜL (ref 0.17–1.22)
MONOCYTES NFR BLD AUTO: 8 % (ref 4–12)
NEUTROPHILS # BLD AUTO: 12.57 THOUSANDS/ÂΜL (ref 1.85–7.62)
NEUTS SEG NFR BLD AUTO: 80 % (ref 43–75)
NRBC BLD AUTO-RTO: 0 /100 WBCS
PLATELET # BLD AUTO: 327 THOUSANDS/UL (ref 149–390)
PMV BLD AUTO: 8.8 FL (ref 8.9–12.7)
POTASSIUM SERPL-SCNC: 3.9 MMOL/L (ref 3.5–5.3)
PROCALCITONIN SERPL-MCNC: <0.05 NG/ML
PROT SERPL-MCNC: 6.5 G/DL (ref 6.4–8.4)
RBC # BLD AUTO: 5.31 MILLION/UL (ref 3.88–5.62)
RSV RNA RESP QL NAA+PROBE: NEGATIVE
SARS-COV-2 RNA RESP QL NAA+PROBE: NEGATIVE
SODIUM SERPL-SCNC: 139 MMOL/L (ref 135–147)
WBC # BLD AUTO: 15.76 THOUSAND/UL (ref 4.31–10.16)

## 2025-03-23 PROCEDURE — 83690 ASSAY OF LIPASE: CPT | Performed by: STUDENT IN AN ORGANIZED HEALTH CARE EDUCATION/TRAINING PROGRAM

## 2025-03-23 PROCEDURE — 96361 HYDRATE IV INFUSION ADD-ON: CPT

## 2025-03-23 PROCEDURE — 87186 SC STD MICRODIL/AGAR DIL: CPT | Performed by: STUDENT IN AN ORGANIZED HEALTH CARE EDUCATION/TRAINING PROGRAM

## 2025-03-23 PROCEDURE — 80053 COMPREHEN METABOLIC PANEL: CPT | Performed by: STUDENT IN AN ORGANIZED HEALTH CARE EDUCATION/TRAINING PROGRAM

## 2025-03-23 PROCEDURE — 84484 ASSAY OF TROPONIN QUANT: CPT | Performed by: STUDENT IN AN ORGANIZED HEALTH CARE EDUCATION/TRAINING PROGRAM

## 2025-03-23 PROCEDURE — 99285 EMERGENCY DEPT VISIT HI MDM: CPT | Performed by: STUDENT IN AN ORGANIZED HEALTH CARE EDUCATION/TRAINING PROGRAM

## 2025-03-23 PROCEDURE — 84145 PROCALCITONIN (PCT): CPT | Performed by: STUDENT IN AN ORGANIZED HEALTH CARE EDUCATION/TRAINING PROGRAM

## 2025-03-23 PROCEDURE — 83605 ASSAY OF LACTIC ACID: CPT | Performed by: STUDENT IN AN ORGANIZED HEALTH CARE EDUCATION/TRAINING PROGRAM

## 2025-03-23 PROCEDURE — 99285 EMERGENCY DEPT VISIT HI MDM: CPT

## 2025-03-23 PROCEDURE — 36415 COLL VENOUS BLD VENIPUNCTURE: CPT | Performed by: STUDENT IN AN ORGANIZED HEALTH CARE EDUCATION/TRAINING PROGRAM

## 2025-03-23 PROCEDURE — 96375 TX/PRO/DX INJ NEW DRUG ADDON: CPT

## 2025-03-23 PROCEDURE — 93005 ELECTROCARDIOGRAM TRACING: CPT

## 2025-03-23 PROCEDURE — 0241U HB NFCT DS VIR RESP RNA 4 TRGT: CPT | Performed by: STUDENT IN AN ORGANIZED HEALTH CARE EDUCATION/TRAINING PROGRAM

## 2025-03-23 PROCEDURE — 71260 CT THORAX DX C+: CPT

## 2025-03-23 PROCEDURE — 87154 CUL TYP ID BLD PTHGN 6+ TRGT: CPT | Performed by: STUDENT IN AN ORGANIZED HEALTH CARE EDUCATION/TRAINING PROGRAM

## 2025-03-23 PROCEDURE — 85025 COMPLETE CBC W/AUTO DIFF WBC: CPT | Performed by: STUDENT IN AN ORGANIZED HEALTH CARE EDUCATION/TRAINING PROGRAM

## 2025-03-23 PROCEDURE — 96365 THER/PROPH/DIAG IV INF INIT: CPT

## 2025-03-23 PROCEDURE — 87040 BLOOD CULTURE FOR BACTERIA: CPT | Performed by: STUDENT IN AN ORGANIZED HEALTH CARE EDUCATION/TRAINING PROGRAM

## 2025-03-23 PROCEDURE — 87077 CULTURE AEROBIC IDENTIFY: CPT | Performed by: STUDENT IN AN ORGANIZED HEALTH CARE EDUCATION/TRAINING PROGRAM

## 2025-03-23 PROCEDURE — 74177 CT ABD & PELVIS W/CONTRAST: CPT

## 2025-03-23 RX ORDER — ERYTHROMYCIN 5 MG/G
0.5 OINTMENT OPHTHALMIC ONCE
Status: COMPLETED | OUTPATIENT
Start: 2025-03-23 | End: 2025-03-23

## 2025-03-23 RX ORDER — TETRACAINE HYDROCHLORIDE 5 MG/ML
2 SOLUTION OPHTHALMIC ONCE
Status: COMPLETED | OUTPATIENT
Start: 2025-03-23 | End: 2025-03-23

## 2025-03-23 RX ORDER — KETOROLAC TROMETHAMINE 30 MG/ML
30 INJECTION, SOLUTION INTRAMUSCULAR; INTRAVENOUS ONCE
Status: COMPLETED | OUTPATIENT
Start: 2025-03-23 | End: 2025-03-23

## 2025-03-23 RX ORDER — IPRATROPIUM BROMIDE AND ALBUTEROL SULFATE 2.5; .5 MG/3ML; MG/3ML
3 SOLUTION RESPIRATORY (INHALATION) ONCE
Status: COMPLETED | OUTPATIENT
Start: 2025-03-23 | End: 2025-03-23

## 2025-03-23 RX ADMIN — SODIUM CHLORIDE 1000 ML: 0.9 INJECTION, SOLUTION INTRAVENOUS at 21:38

## 2025-03-23 RX ADMIN — TETRACAINE HYDROCHLORIDE 2 DROP: 5 SOLUTION OPHTHALMIC at 21:45

## 2025-03-23 RX ADMIN — CEFEPIME 2000 MG: 2 INJECTION, POWDER, FOR SOLUTION INTRAVENOUS at 23:42

## 2025-03-23 RX ADMIN — IPRATROPIUM BROMIDE AND ALBUTEROL SULFATE 3 ML: 2.5; .5 SOLUTION RESPIRATORY (INHALATION) at 22:14

## 2025-03-23 RX ADMIN — IOHEXOL 100 ML: 350 INJECTION, SOLUTION INTRAVENOUS at 21:50

## 2025-03-23 RX ADMIN — ERYTHROMYCIN 0.5 INCH: 5 OINTMENT OPHTHALMIC at 21:45

## 2025-03-23 RX ADMIN — FLUORESCEIN SODIUM 2 STRIP: 1 STRIP OPHTHALMIC at 21:44

## 2025-03-23 RX ADMIN — KETOROLAC TROMETHAMINE 30 MG: 30 INJECTION, SOLUTION INTRAMUSCULAR; INTRAVENOUS at 21:45

## 2025-03-24 ENCOUNTER — TRANSITIONAL CARE MANAGEMENT (OUTPATIENT)
Dept: INTERNAL MEDICINE CLINIC | Facility: CLINIC | Age: 65
End: 2025-03-24

## 2025-03-24 VITALS
HEIGHT: 68 IN | HEART RATE: 95 BPM | RESPIRATION RATE: 17 BRPM | DIASTOLIC BLOOD PRESSURE: 85 MMHG | TEMPERATURE: 97.9 F | BODY MASS INDEX: 26.73 KG/M2 | OXYGEN SATURATION: 92 % | SYSTOLIC BLOOD PRESSURE: 128 MMHG | WEIGHT: 176.4 LBS

## 2025-03-24 PROBLEM — K38.8 MUCOCELE OF APPENDIX: Status: ACTIVE | Noted: 2025-03-24

## 2025-03-24 LAB
2HR DELTA HS TROPONIN: 3 NG/L
ATRIAL RATE: 111 BPM
CARDIAC TROPONIN I PNL SERPL HS: 9 NG/L (ref ?–50)
P AXIS: 79 DEGREES
PR INTERVAL: 116 MS
QRS AXIS: 85 DEGREES
QRSD INTERVAL: 84 MS
QT INTERVAL: 310 MS
QTC INTERVAL: 421 MS
T WAVE AXIS: 71 DEGREES
VENTRICULAR RATE: 111 BPM

## 2025-03-24 PROCEDURE — 99244 OFF/OP CNSLTJ NEW/EST MOD 40: CPT | Performed by: STUDENT IN AN ORGANIZED HEALTH CARE EDUCATION/TRAINING PROGRAM

## 2025-03-24 PROCEDURE — 99223 1ST HOSP IP/OBS HIGH 75: CPT | Performed by: INTERNAL MEDICINE

## 2025-03-24 PROCEDURE — 96375 TX/PRO/DX INJ NEW DRUG ADDON: CPT

## 2025-03-24 PROCEDURE — 84484 ASSAY OF TROPONIN QUANT: CPT | Performed by: STUDENT IN AN ORGANIZED HEALTH CARE EDUCATION/TRAINING PROGRAM

## 2025-03-24 PROCEDURE — 93010 ELECTROCARDIOGRAM REPORT: CPT | Performed by: INTERNAL MEDICINE

## 2025-03-24 PROCEDURE — 99236 HOSP IP/OBS SAME DATE HI 85: CPT | Performed by: INTERNAL MEDICINE

## 2025-03-24 PROCEDURE — 36415 COLL VENOUS BLD VENIPUNCTURE: CPT | Performed by: STUDENT IN AN ORGANIZED HEALTH CARE EDUCATION/TRAINING PROGRAM

## 2025-03-24 PROCEDURE — 94664 DEMO&/EVAL PT USE INHALER: CPT

## 2025-03-24 RX ORDER — IPRATROPIUM BROMIDE AND ALBUTEROL SULFATE 2.5; .5 MG/3ML; MG/3ML
3 SOLUTION RESPIRATORY (INHALATION)
Status: DISCONTINUED | OUTPATIENT
Start: 2025-03-24 | End: 2025-03-24

## 2025-03-24 RX ORDER — ONDANSETRON 2 MG/ML
4 INJECTION INTRAMUSCULAR; INTRAVENOUS EVERY 6 HOURS PRN
Status: DISCONTINUED | OUTPATIENT
Start: 2025-03-24 | End: 2025-03-24 | Stop reason: HOSPADM

## 2025-03-24 RX ORDER — ACETAMINOPHEN 10 MG/ML
1000 INJECTION, SOLUTION INTRAVENOUS ONCE
Status: COMPLETED | OUTPATIENT
Start: 2025-03-24 | End: 2025-03-24

## 2025-03-24 RX ORDER — FLUTICASONE FUROATE, UMECLIDINIUM BROMIDE AND VILANTEROL TRIFENATATE 200; 62.5; 25 UG/1; UG/1; UG/1
1 POWDER RESPIRATORY (INHALATION) DAILY
Qty: 60 BLISTER | Refills: 1 | Status: SHIPPED | OUTPATIENT
Start: 2025-03-24

## 2025-03-24 RX ORDER — ALBUTEROL SULFATE 0.83 MG/ML
2.5 SOLUTION RESPIRATORY (INHALATION) EVERY 4 HOURS PRN
Status: DISCONTINUED | OUTPATIENT
Start: 2025-03-24 | End: 2025-03-24 | Stop reason: HOSPADM

## 2025-03-24 RX ORDER — NICOTINE 21 MG/24HR
1 PATCH, TRANSDERMAL 24 HOURS TRANSDERMAL DAILY
Status: DISCONTINUED | OUTPATIENT
Start: 2025-03-24 | End: 2025-03-24 | Stop reason: HOSPADM

## 2025-03-24 RX ORDER — NICOTINE 21 MG/24HR
1 PATCH, TRANSDERMAL 24 HOURS TRANSDERMAL DAILY
Qty: 28 PATCH | Refills: 0 | Status: SHIPPED | OUTPATIENT
Start: 2025-03-25

## 2025-03-24 RX ORDER — BUDESONIDE 0.5 MG/2ML
0.5 INHALANT ORAL
Status: DISCONTINUED | OUTPATIENT
Start: 2025-03-24 | End: 2025-03-24

## 2025-03-24 RX ORDER — NICOTINE 21 MG/24HR
21 PATCH, TRANSDERMAL 24 HOURS TRANSDERMAL ONCE
Status: DISCONTINUED | OUTPATIENT
Start: 2025-03-24 | End: 2025-03-24 | Stop reason: HOSPADM

## 2025-03-24 RX ORDER — FLUTICASONE FUROATE AND VILANTEROL 200; 25 UG/1; UG/1
1 POWDER RESPIRATORY (INHALATION) DAILY
Status: DISCONTINUED | OUTPATIENT
Start: 2025-03-24 | End: 2025-03-24 | Stop reason: HOSPADM

## 2025-03-24 RX ORDER — ENOXAPARIN SODIUM 100 MG/ML
40 INJECTION SUBCUTANEOUS DAILY
Status: DISCONTINUED | OUTPATIENT
Start: 2025-03-24 | End: 2025-03-24 | Stop reason: HOSPADM

## 2025-03-24 RX ORDER — FORMOTEROL FUMARATE 20 UG/2ML
20 SOLUTION RESPIRATORY (INHALATION)
Status: DISCONTINUED | OUTPATIENT
Start: 2025-03-24 | End: 2025-03-24

## 2025-03-24 RX ORDER — METRONIDAZOLE 500 MG/100ML
500 INJECTION, SOLUTION INTRAVENOUS EVERY 12 HOURS
Status: DISCONTINUED | OUTPATIENT
Start: 2025-03-24 | End: 2025-03-24 | Stop reason: HOSPADM

## 2025-03-24 RX ORDER — SODIUM CHLORIDE AND POTASSIUM CHLORIDE 150; 900 MG/100ML; MG/100ML
125 INJECTION, SOLUTION INTRAVENOUS CONTINUOUS
Status: DISPENSED | OUTPATIENT
Start: 2025-03-24 | End: 2025-03-24

## 2025-03-24 RX ADMIN — SODIUM CHLORIDE AND POTASSIUM CHLORIDE 125 ML/HR: .9; .15 SOLUTION INTRAVENOUS at 02:10

## 2025-03-24 RX ADMIN — ENOXAPARIN SODIUM 40 MG: 40 INJECTION SUBCUTANEOUS at 08:36

## 2025-03-24 RX ADMIN — PREDNISONE 30 MG: 20 TABLET ORAL at 11:45

## 2025-03-24 RX ADMIN — ACETAMINOPHEN 1000 MG: 10 INJECTION INTRAVENOUS at 00:34

## 2025-03-24 RX ADMIN — FLUTICASONE FUROATE AND VILANTEROL TRIFENATATE 1 PUFF: 200; 25 POWDER RESPIRATORY (INHALATION) at 14:18

## 2025-03-24 RX ADMIN — METRONIDAZOLE 500 MG: 500 INJECTION, SOLUTION INTRAVENOUS at 02:10

## 2025-03-24 RX ADMIN — CEFTRIAXONE SODIUM 1000 MG: 10 INJECTION, POWDER, FOR SOLUTION INTRAVENOUS at 08:34

## 2025-03-24 NOTE — ASSESSMENT & PLAN NOTE
CT shows mid to distal appendix distention with fluid and debris measuring 12 mm in diameter with mild mucosal hyperenhancement and minimal adjacent mesenteric stranding  Findings suggest acute uncomplicated appendicitis  Recently seen in Dr Lee's office and recommended to be cleared and optimized by pulmonary prior to proceeding with surgery   Discussed with surgery,   No acute surgical intervention  Continue course of abx  Follow up outpatient

## 2025-03-24 NOTE — ASSESSMENT & PLAN NOTE
Patient with multiple recent exacerbations at least 4 just this year alone, most recently was given steroids and antibiotics on March 19  Previous PFT done in 2023 showed moderate to severe obstructive lung disease, increased lung volumes secondary to air trapping/hyperinflation, moderately decreased DLCO  Currently had been on Anoro with albuterol as needed  He was not using Anoro consistently, spoke with him about the importance of being compliant with long-acting bronchodilators  Would benefit from triple therapy given his multiple recent exacerbations, would discharge home on Trelegy 200    ARISCAT score would put him at high risk for pulmonary complications for planned surgery  He is not optimized from a pulmonary standpoint given multiple exacerbations and currently on prednisone taper  If surgery is not emergent and can be delayed, would further optimize his pulmonary status as an outpatient  Will switch him from Anoro onto Trelegy to give him triple therapy  He has been following with [facility] pulmonology, was due to follow-up there within the week for clearance for surgery

## 2025-03-24 NOTE — DISCHARGE SUMMARY
Discharge Summary - Hospitalist   Name: Felipe Sanders III 64 y.o. male I MRN: [id number]  Unit/Bed#: -01 I Date of Admission: 3/23/2025   Date of Service: 3/24/2025 I Hospital Day: 0     Assessment & Plan  Left lower quadrant abdominal pain  Acute uncomplicated appendicitis involving mid to distal appendix  Prior CT studies reveal same  COPD (chronic obstructive pulmonary disease) (HCC)  Chronic cough  Current 2-3 PPD smoker  Encourage smoking cessation  Pulmonology consult   Prior PFT FEV1 51%  Tobacco abuse disorder  Encouraged cessation  NRT as needed  Mucocele of appendix  CT shows mid to distal appendix distention with fluid and debris measuring 12 mm in diameter with mild mucosal hyperenhancement and minimal adjacent mesenteric stranding  Findings suggest acute uncomplicated appendicitis  Recently seen in Dr Lee's office and recommended to be cleared and optimized by pulmonary prior to proceeding with surgery   Discussed with surgery,   No acute surgical intervention  Continue course of abx  Follow up outpatient        Discharging Physician / Practitioner: Teresa Ochoa PA-C  PCP: Emil Silverman MD  Admission Date:   Admission Orders (From admission, onward)       Ordered        03/24/25 0108  Place in Observation  Once                          Discharge Date: 03/24/25    Consultations During Hospital Stay:  IP CONSULT TO ACUTE CARE SURGERY  IP CONSULT TO CASE MANAGEMENT  IP CONSULT TO PULMONOLOGY    Procedures Performed:   None    Significant Findings / Test Results:   CT chest abdomen pelvis w contrast Result Date: 3/23/2025  1.  No acute intrathoracic abnormalities with findings detailed above. 2.  No thoracic aortic aneurysm or dissection. No acute pulmonary embolism to the segmental level. 3.  Severe emphysema is again seen. No evidence for pneumonia or pleural/pericardial effusions. 4.  Findings consistent with acute uncomplicated appendicitis involving the mid to distal appendix as  "detailed above. 5.  No evidence for bowel obstruction, obstructive uropathy, free air, or free fluid. 6.  A 1.4 cm peripherally enhancing lesion in the inferior right hepatic lobe likely represent small cavernous hemangioma and stable compared to a prior CT exam of 12/29/2024.      Recent Labs     03/23/25  2327 03/23/25  2336   BLOODCX Received in Microbiology Lab. Culture in Progress. Received in Microbiology Lab. Culture in Progress.       Incidental Findings:   None other than noted above; I reviewed the above mentioned incidental findings with the patient and/or family and they expressed understanding.    Test Results Pending at Discharge (will require follow up):   None     Outpatient Tests Requested:  None    Complications:  None    Reason for Admission: Back Pain (Pt c/o mid back pain per doctor said its \"his lungs\". Pt hx of COPD. Pt states burning sensation.)       Hospital Course:   Felipe Hart is a 64-year-old gentleman whose past medical history most significant for severe COPD and longstanding cigarette smoking, averages 2 to 3 packs/day cigarettes. He also reports longstanding abdominal pain for the past 2 to 3 years, with previous diagnosis of \"acute appendicitis\" via CT scan about a year ago. He follows outpatient with GI and general surgery. He also had CT 3/12 and 4/25, for which surgery feels the findings are more suggestive of mucocele of the appendix.     Patient denies any recent fever, chills, malaise, headache, neck stiffness, acute confusion, skin rash, shortness of breath, worsening cough, chest pain, palpitations, nausea, vomiting, diarrhea, dysuria or frequency. He reportedly presented to the ER for acute on chronic lower back/abdominal pain.     At the present time, he has been seen by surgery and pulmonology teams.  He is not a surgical candidate at this time, there is no indication for emergent surgery as it appears that this is a chronic finding.  He is not optimized from a " pulmonary standpoint yet to proceed with surgery.  He will follow-up outpatient with Dr. Lewis as well as Dr. Lee. Discussed with surgery who recommends course of Augmentin.     Please see above list of diagnoses and related plan for additional information.     Condition at Discharge: fair    Discharge Day Visit / Exam:   See progress note same day    Discussion with Family: Patient declined call to .     Discharge instructions/Information to patient and family:   See after visit summary for information provided to patient and family.      Provisions for Follow-Up Care:  See after visit summary for information related to follow-up care and any pertinent home health orders.       Mobility at time of Discharge:   Basic Mobility Inpatient Raw Score: 24  JH-HLM Goal: 8: Walk 250 feet or more  JH-HLM Achieved: 7: Walk 25 feet or more  HLM Goal NOT achieved. Continue to encourage mobility in post discharge setting.    Disposition:   Home    Planned Readmission: none     Discharge Statement:  I spent 40 minutes discharging the patient. This time was spent on the day of discharge. I had direct contact with the patient on the day of discharge. Greater than 50% of the total time was spent examining patient, answering all patient questions, arranging and discussing plan of care with patient as well as directly providing post-discharge instructions.  Additional time then spent on discharge activities.    Discharge Medications:  See after visit summary for reconciled discharge medications provided to patient and/or family.      **Please Note: This note may have been constructed using a voice recognition system**

## 2025-03-24 NOTE — ASSESSMENT & PLAN NOTE
I strongly emphasized to the patient the importance of stress smoking cessation.  Will order nicotine patch for his tobacco cravings.

## 2025-03-24 NOTE — HOSPITAL COURSE
"Felipe Hart is a 64-year-old gentleman whose past medical history most significant for severe COPD and longstanding cigarette smoking, averages 2 to 3 packs/day cigarettes. He also reports longstanding abdominal pain for the past 2 to 3 years, with previous diagnosis of \"acute appendicitis\" via CT scan about a year ago. He follows outpatient with GI and general surgery. He also had CT 3/12 and 4/25, for which surgery feels the findings are more suggestive of mucocele of the appendix.     Patient denies any recent fever, chills, malaise, headache, neck stiffness, acute confusion, skin rash, shortness of breath, worsening cough, chest pain, palpitations, nausea, vomiting, diarrhea, dysuria or frequency. He reportedly presented to the ER for acute on chronic lower back/abdominal pain.     At the present time, he has been seen by surgery and pulmonology teams.  He is not a surgical candidate at this time, there is no indication for emergent surgery as it appears that this is a chronic finding.  He is not optimized from a pulmonary standpoint yet to proceed with surgery.  He will follow-up outpatient with Dr. Lewis as well as Dr. Lee. Discussed with surgery who recommends course of Augmentin.   "

## 2025-03-24 NOTE — ASSESSMENT & PLAN NOTE
"CT suggesting \"acute uncomplicated appendicitis\".  Patient reports similar abdominal pain past 2 to 3 years, with previous diagnosis of \"an inflamed appendix\" and follows outpatient with GI & general surgery.  Abdominal exam currently benign, hence will hospitalize patient for overnight observation with general surgery consult in AM.  Keep n.p.o., give IV ceftriaxone & metronidazole as well as maintenance IV fluid, PRN parenteral narcotic and analgesic.  Timely appendectomy deferred to general surgery, pulmonary consult as needed; patient currently without cardiopulmonary complaints & not in any respiratory distress.  "

## 2025-03-24 NOTE — CASE MANAGEMENT
Case Management Progress Note    Patient name Felipe Sanders III  Location /-01 MRN [id number]  : 1960 Date 3/24/2025       LOS (days): 0  Geometric Mean LOS (GMLOS) (days):   Days to GMLOS:        OBJECTIVE:        Current admission status: Observation  Preferred Pharmacy:   [id number] - [place] - [address]  [address]  [facility] [address]  Phone: [phone] Fax: [phone]    Primary Care Provider: Emil Silverman MD    Primary Insurance: [employer]  Secondary Insurance:     PROGRESS NOTE:  Discussed patient's case in interdisciplinary rounds this morning with [facility] provider. Patient is medically cleared for discharge to home today. Patient has no CM needs. CM will continue to follow.

## 2025-03-24 NOTE — CONSULTS
Consultation - Pulmonology   Name: Felipe Sanders III 64 y.o. male I MRN: [id number]  Unit/Bed#: -01 I Date of Admission: 3/23/2025   Date of Service: 3/24/2025 I Hospital Day: 0   Inpatient consult to Pulmonology  Consult performed by: Luis Alberto Trivedi PA-C  Consult ordered by: Amee Puri PA-C        Physician Requesting Evaluation: Chaz DIA MD   Reason for Evaluation / Principal Problem: COPD, surgical optimization    Assessment & Plan  Chronic midline low back pain without sciatica  Patient has been undergoing workup for appendectomy  Came in with mid back pain which seems to be more related to his COPD exacerbation and coughing rather than appendix  COPD (chronic obstructive pulmonary disease) (HCC)  Patient with multiple recent exacerbations at least 4 just this year alone, most recently was given steroids and antibiotics on March 19  Previous PFT done in 2023 showed moderate to severe obstructive lung disease, increased lung volumes secondary to air trapping/hyperinflation, moderately decreased DLCO  Currently had been on Anoro with albuterol as needed  He was not using Anoro consistently, spoke with him about the importance of being compliant with long-acting bronchodilators  Would benefit from triple therapy given his multiple recent exacerbations, would discharge home on Trelegy 200    ARISCAT score would put him at high risk for pulmonary complications for planned surgery  He is not optimized from a pulmonary standpoint given multiple exacerbations and currently on prednisone taper  If surgery is not emergent and can be delayed, would further optimize his pulmonary status as an outpatient  Will switch him from Anoro onto Trelegy to give him triple therapy  He has been following with [facility] pulmonology, was due to follow-up there within the week for clearance for surgery  Tobacco abuse disorder  Patient has cut down significantly on his smoking, currently smoking a half a pack per  day down from 3 packs/day at the most  Continue to cut down on smoking especially given multiple exacerbations as well as for optimization for surgery.  Mucocele of appendix  Planned for surgery for appendix  I have discussed the above management plan in detail with the primary service.   Pulmonology service signing off.    History of Present Illness   Felipe Sanders III is a 64 y.o. male with more than 90 pack year smoking history with PMH of COPD who presents with complaint of mid back pain/burning. He has been treated recently for COPD exacerbation. He has had at least 4 exacerbations this year. He does get better with his breathing in between but symptoms return shortly after.  He is on Anoro but does not use it regularly. He has been using albuterol as needed.    He has had exacerbations at least monthly the last year or so.       Review of Systems   Constitutional: Negative.    HENT: Negative.     Respiratory:  Positive for cough, shortness of breath and wheezing.    Cardiovascular: Negative.    Gastrointestinal: Negative.    Genitourinary: Negative.    Musculoskeletal:  Positive for back pain.   Skin: Negative.    Allergic/Immunologic: Negative.    Neurological: Negative.    Psychiatric/Behavioral: Negative.         Historical Information     Current Facility-Administered Medications:     albuterol inhalation solution 2.5 mg, Q4H PRN    ceftriaxone (ROCEPHIN) 1 g/50 mL in dextrose IVPB, Daily, Last Rate: 1,000 mg (03/24/25 0834)    enoxaparin (LOVENOX) subcutaneous injection 40 mg, Daily    fluticasone-vilanterol 200-25 mcg/actuation 1 puff, Daily    ipratropium-albuterol (DUO-NEB) 0.5-2.5 mg/3 mL inhalation solution 3 mL, TID    melatonin tablet 6 mg, HS PRN    metroNIDAZOLE (FLAGYL) IVPB (premix) 500 mg 100 mL, Q12H, Last Rate: 500 mg (03/24/25 0210)    morphine injection 2 mg, Q4H PRN    nicotine (NICODERM CQ) 21 mg/24 hr TD 24 hr patch 1 patch, Daily    nicotine (NICODERM CQ) 21 mg/24 hr TD 24 hr patch 21  mg, Once    ondansetron (ZOFRAN) injection 4 mg, Q6H PRN    predniSONE tablet 30 mg, Daily  Prior to Admission Medications   Prescriptions Last Dose Informant Patient Reported? Taking?   albuterol (2.5 mg/3 mL) 0.083 % nebulizer solution 3/23/2025 Evening  Yes Yes   Sig: Inhale 2.5 mg every 6 (six) hours as needed   amoxicillin-clavulanate (AUGMENTIN) 875-125 mg per tablet Not Taking Self Yes No   Patient not taking: Reported on 3/20/2025   dicyclomine (BENTYL) 20 mg tablet 3/23/2025 Self No Yes   Sig: TAKE 1 TABLET BY MOUTH EVERY 6 HOURS.   oxygen gas 3/23/2025 Bedtime Self Yes Yes   Sig: Inhale continuous as needed   polyethylene glycol (GOLYTELY) 4000 mL solution  Self No No   Sig: Take 4,000 mL by mouth once for 1 dose      Facility-Administered Medications: None     Tobacco History: at least 90 pack year history  Occupational History:   Family History:History reviewed. No pertinent family history.    Objective :  Temp:  [97.9 °F (36.6 °C)-98.8 °F (37.1 °C)] 97.9 °F (36.6 °C)  HR:  [] 97  BP: ()/(56-88) 119/88  Resp:  [17-33] 18  SpO2:  [93 %-96 %] 94 %  O2 Device: None (Room air)    Physical Exam  Vitals reviewed.   Constitutional:       General: He is not in acute distress.     Appearance: Normal appearance. He is well-developed. He is not ill-appearing.   HENT:      Head: Normocephalic and atraumatic.      Mouth/Throat:      Pharynx: Oropharynx is clear.   Eyes:      Pupils: Pupils are equal, round, and reactive to light.   Cardiovascular:      Rate and Rhythm: Normal rate and regular rhythm.   Pulmonary:      Effort: Pulmonary effort is normal. No respiratory distress.      Breath sounds: Wheezing present. No decreased breath sounds, rhonchi or rales.   Abdominal:      General: Abdomen is flat. There is no distension.   Musculoskeletal:         General: Normal range of motion.      Cervical back: Normal range of motion.      Right lower leg: No edema.      Left lower leg: No edema.   Skin:      General: Skin is warm and dry.      Findings: No rash.   Neurological:      Mental Status: He is alert and oriented to person, place, and time.   Psychiatric:         Mood and Affect: Mood normal.         Behavior: Behavior normal.           Lab Results: I have reviewed the following results:  .     03/23/25  2136 03/24/25  0038   WBC 15.76*  --    HGB 16.4  --    HCT 48.0  --      --    SODIUM 139  --    K 3.9  --      --    CO2 28  --    BUN 21  --    CREATININE 1.08  --    GLUC 142*  --    AST 16  --    ALT 17  --    ALB 4.1  --    TBILI 0.48  --    ALKPHOS 51  --    HSTNI0 6  --    HSTNI2  --  9   LACTICACID 1.6  --      ABG: No new results in last 24 hours.    Imaging Results Review: I reviewed radiology reports from this admission including: CT chest and CT abdomen/pelvis.  Other Study Results Review: No additional pertinent studies reviewed.  PFT Results Reviewed: reviewed    VTE Prophylaxis: VTE covered by:  enoxaparin, Subcutaneous, 40 mg at 03/24/25 0874

## 2025-03-24 NOTE — RESPIRATORY THERAPY NOTE
03/24/25 1226   Respiratory Protocol   Protocol Initiated? Yes   Protocol Selection Respiratory   Language Barrier? No   Medical & Social History Reviewed? Yes   Diagnostic Studies Reviewed? Yes   Physical Assessment Performed? Yes   Respiratory Plan No distress/Pulmonary history   Respiratory Assessment   Assessment Type Assess only   General Appearance Awake   Respiratory Pattern Normal   Chest Assessment Chest expansion symmetrical   Bilateral Breath Sounds Diminished   Resp Comments pt w hx of COPD no indication for scheduled txs will cont w PRN, pulmonary on the case. deemed not in exacerbation recommened PRN nebs going forward w plans to start pt on trelegy upon dc   O2 Device ra   Additional Assessments   Pulse 95   Respirations 17   SpO2 92 %

## 2025-03-24 NOTE — ASSESSMENT & PLAN NOTE
Patient has been undergoing workup for appendectomy  Came in with mid back pain which seems to be more related to his COPD exacerbation and coughing rather than appendix

## 2025-03-24 NOTE — ED PROVIDER NOTES
Time reflects when diagnosis was documented in both MDM as applicable and the Disposition within this note       Time User Action Codes Description Comment    3/23/2025 11:15 PM Karen Reyessa M Add [R10.9] Abdominal pain     3/23/2025 11:15 PM Reyes, Jennie M Add [K37] Appendicitis     3/23/2025 11:15 PM Reyes, Jennie M Modify [K37] Appendicitis     3/23/2025 11:15 PM Eric Jennie M Remove [R10.9] Abdominal pain     3/23/2025 11:16 PM Reyes, Jennie M Add [S05.00XA] Corneal abrasion     3/24/2025 12:29 AM Reyes, Jennie M Modify [S05.00XA] Corneal abrasion     3/24/2025 12:29 AM Eric Jennie M Remove [K37] Appendicitis     3/24/2025 12:29 AM Eric Jennie M Add [R10.9] Abdominal pain           ED Disposition       ED Disposition   Admit    Condition   Stable    Date/Time   Sun Mar 23, 2025 11:24 PM    Comment   Case was discussed with hospitalist and the patient's admission status was agreed to be Admission Status: inpatient status to the service of DrEdgar  .               Assessment & Plan       Medical Decision Making  Differential corneal abrasion, diverticulitis, appendicitis, COPD exacerbation.    Patient is a 64-year-old male present emerged department no acute respiratory distress and vital signs show elevated heart rate.  Labs and imaging obtained.  CT suggestive of acute appendicitis.  Both eyes were stained and right eye shows a corneal abrasion.  Erythromycin placed in both eyes. Discussed case with general surgery.  General surgery feels less inclined that this is acute appendicitis but will be available for consult.  Discussed all test results and findings with patient at bedside.  Discussed plan for admission for further management and definitive treatment.    Amount and/or Complexity of Data Reviewed  External Data Reviewed: labs, radiology, ECG and notes.  Labs: ordered.  Radiology: ordered.  ECG/medicine tests: ordered and independent interpretation performed.    Risk  Prescription drug  "management.  Decision regarding hospitalization.         EKG rate 111, sinus tachycardia without signs of acute ischemic change.    Medications   acetaminophen (Ofirmev) injection 1,000 mg (has no administration in time range)   ipratropium-albuterol (DUO-NEB) 0.5-2.5 mg/3 mL inhalation solution 3 mL (3 mL Nebulization Given 3/23/25 2214)   sodium chloride 0.9 % bolus 1,000 mL (0 mL Intravenous Stopped 3/23/25 2312)   ketorolac (TORADOL) injection 30 mg (30 mg Intravenous Given 3/23/25 2145)   fluorescein sodium sterile ophthalmic strip 2 strip (2 strips Both Eyes Given 3/23/25 2144)   tetracaine 0.5 % ophthalmic solution 2 drop (2 drops Both Eyes Given 3/23/25 2145)   erythromycin (ILOTYCIN) 0.5 % ophthalmic ointment 0.5 inch (0.5 inches Both Eyes Given 3/23/25 2145)   iohexol (OMNIPAQUE) 350 MG/ML injection (MULTI-DOSE) 100 mL (100 mL Intravenous Given 3/23/25 2150)   cefepime (MAXIPIME) 2 g/50 mL dextrose IVPB (0 mg Intravenous Stopped 3/24/25 0012)       ED Risk Strat Scores                                                History of Present Illness       Chief Complaint   Patient presents with    Back Pain     Pt c/o mid back pain per doctor said its \"his lungs\". Pt hx of COPD. Pt states burning sensation.       Past Medical History:   Diagnosis Date    COPD (chronic obstructive pulmonary disease) (HCC)     Diverticulitis of colon     Erectile dysfunction     Hernia of abdominal cavity     Liver cyst       Past Surgical History:   Procedure Laterality Date    COLONOSCOPY      NOSE SURGERY        No family history on file.   Social History     Tobacco Use    Smoking status: Every Day     Current packs/day: 1.50     Average packs/day: 1.5 packs/day for 50.0 years (75.0 ttl pk-yrs)     Types: Cigarettes     Passive exposure: Past    Smokeless tobacco: Never   Vaping Use    Vaping status: Never Used   Substance Use Topics    Alcohol use: Never    Drug use: No      E-Cigarette/Vaping    E-Cigarette Use Never User     "   E-Cigarette/Vaping Substances    Nicotine No     THC No     CBD No     Flavoring No     Other No     Unknown No       I have reviewed and agree with the history as documented.     HPI    Patient is a 64-year-old male present emerged apartment for multiple concerns.  Reports he has been having low back pain and burning with coughing.  Patient also reports having abdominal discomfort that is worsened over the last few days.  Decreased oral intake.  No reported fevers or chills.  Denies any diarrhea.  Does report some constipation.  Additionally reports bilateral eye pain.  Does not remember any foreign body or notable discharge.  History of COPD.    Review of Systems   Constitutional:  Negative for chills and fever.   HENT:  Negative for ear pain and sore throat.    Eyes:  Positive for pain and redness. Negative for visual disturbance.   Respiratory:  Positive for cough. Negative for shortness of breath.    Cardiovascular:  Negative for chest pain and palpitations.   Gastrointestinal:  Positive for abdominal pain and constipation. Negative for vomiting.   Genitourinary:  Negative for dysuria and hematuria.   Musculoskeletal:  Negative for arthralgias and back pain.   Skin:  Negative for color change and rash.   Neurological:  Negative for seizures and syncope.   All other systems reviewed and are negative.          Objective       ED Triage Vitals [03/23/25 2039]   Temperature Pulse Blood Pressure Respirations SpO2 Patient Position - Orthostatic VS   98.8 °F (37.1 °C) (!) 124 156/76 22 96 % Sitting      Temp Source Heart Rate Source BP Location FiO2 (%) Pain Score    Temporal Monitor Left arm -- 5      Vitals      Date and Time Temp Pulse SpO2 Resp BP Pain Score FACES Pain Rating User   03/23/25 2330 -- 112 93 % 31 95/66 -- -- VC   03/23/25 2300 -- 108 93 % 17 109/66 No Pain -- VC   03/23/25 2145 -- -- -- -- -- 5 -- CA   03/23/25 2039 98.8 °F (37.1 °C) 124 96 % 22 156/76 5 -- HE            Physical Exam  Vitals and  nursing note reviewed.   Constitutional:       General: He is not in acute distress.     Appearance: He is well-developed.   HENT:      Head: Normocephalic and atraumatic.   Eyes:      Extraocular Movements: Extraocular movements intact.      Pupils: Pupils are equal, round, and reactive to light.      Comments: Bilateral eye injection.  Corneal abrasion noted in right eye.   Cardiovascular:      Rate and Rhythm: Normal rate and regular rhythm.      Heart sounds: No murmur heard.  Pulmonary:      Effort: Pulmonary effort is normal. No respiratory distress.      Breath sounds: Normal breath sounds.   Abdominal:      Palpations: Abdomen is soft.      Tenderness: There is abdominal tenderness.   Musculoskeletal:         General: No swelling.      Cervical back: Neck supple.   Skin:     General: Skin is warm and dry.      Capillary Refill: Capillary refill takes less than 2 seconds.   Neurological:      General: No focal deficit present.      Mental Status: He is alert and oriented to person, place, and time.   Psychiatric:         Mood and Affect: Mood normal.         Results Reviewed       Procedure Component Value Units Date/Time    Blood culture #2 [[id number]] Collected: 03/23/25 2327    Lab Status: In process Specimen: Blood from Arm, Right Updated: 03/23/25 2348    Blood culture #1 [[id number]] Collected: 03/23/25 2336    Lab Status: In process Specimen: Blood from Arm, Right Updated: 03/23/25 2347    HS Troponin I 4hr [[id number]]     Lab Status: No result Specimen: Blood     FLU/RSV/COVID - if FLU/RSV clinically relevant (2hr TAT) [[id number]]  (Normal) Collected: 03/23/25 2136    Lab Status: Final result Specimen: Nares from Nose Updated: 03/23/25 2225     SARS-CoV-2 Negative     INFLUENZA A PCR Negative     INFLUENZA B PCR Negative     RSV PCR Negative    Narrative:      This test has been performed using the CoV-2/Flu/RSV plus assay on the [employer] GeneXpert platform. This test has been validated by the   and verified by the performing laboratory.     This test is designed to amplify and detect the following: nucleocapsid (N), envelope (E), and RNA-dependent RNA polymerase (RdRP) genes of the SARS-CoV-2 genome; matrix (M), basic polymerase (PB2), and acidic protein (PA) segments of the influenza A genome; matrix (M) and non-structural protein (NS) segments of the influenza B genome, and the nucleocapsid genes of RSV A and RSV B.     Positive results are indicative of the presence of Flu A, Flu B, RSV, and/or SARS-CoV-2 RNA. Positive results for SARS-CoV-2 or suspected novel influenza should be reported to state, local, or federal health departments according to local reporting requirements.      All results should be assessed in conjunction with clinical presentation and other laboratory markers for clinical management.     FOR PEDIATRIC PATIENTS - copy/paste COVID Guidelines URL to browser: [email or web address]       Procalcitonin [[id number]]  (Normal) Collected: 03/23/25 2136    Lab Status: Final result Specimen: Blood from Arm, Left Updated: 03/23/25 2220     Procalcitonin <0.05 ng/ml     HS Troponin I 2hr [[id number]]     Lab Status: No result Specimen: Blood     HS Troponin 0hr (reflex protocol) [[id number]]  (Normal) Collected: 03/23/25 2136    Lab Status: Final result Specimen: Blood from Arm, Left Updated: 03/23/25 2216     hs TnI 0hr 6 ng/L     Lactic acid, plasma (w/reflex if result > 2.0) [[id number]]  (Normal) Collected: 03/23/25 2136    Lab Status: Final result Specimen: Blood from Arm, Left Updated: 03/23/25 2215     LACTIC ACID 1.6 mmol/L     Narrative:      Result may be elevated if tourniquet was used during collection.    Comprehensive metabolic panel [[id number]]  (Abnormal) Collected: 03/23/25 2136    Lab Status: Final result Specimen: Blood from Arm, Left Updated: 03/23/25 2210     Sodium 139 mmol/L      Potassium 3.9 mmol/L       Chloride 103 mmol/L      CO2 28 mmol/L      ANION GAP 8 mmol/L      BUN 21 mg/dL      Creatinine 1.08 mg/dL      Glucose 142 mg/dL      Calcium 9.0 mg/dL      AST 16 U/L      ALT 17 U/L      Alkaline Phosphatase 51 U/L      Total Protein 6.5 g/dL      Albumin 4.1 g/dL      Total Bilirubin 0.48 mg/dL      eGFR 72 ml/min/1.73sq m     Narrative:      National Kid[employer] Disease Foundation guidelines for Chronic Kidney Disease (CKD):     Stage 1 with normal or high GFR (GFR > 90 mL/min/1.73 square meters)    Stage 2 Mild CKD (GFR = 60-89 mL/min/1.73 square meters)    Stage 3A Moderate CKD (GFR = 45-59 mL/min/1.73 square meters)    Stage 3B Moderate CKD (GFR = 30-44 mL/min/1.73 square meters)    Stage 4 Severe CKD (GFR = 15-29 mL/min/1.73 square meters)    Stage 5 End Stage CKD (GFR <15 mL/min/1.73 square meters)  Note: GFR calculation is accurate only with a steady state creatinine    Lipase [[id number]]  (Normal) Collected: 03/23/25 2136    Lab Status: Final result Specimen: Blood from Arm, Left Updated: 03/23/25 2210     Lipase 46 u/L     CBC and differential [[id number]]  (Abnormal) Collected: 03/23/25 2136    Lab Status: Final result Specimen: Blood from Arm, Left Updated: 03/23/25 2147     WBC 15.76 Thousand/uL      RBC 5.31 Million/uL      Hemoglobin 16.4 g/dL      Hematocrit 48.0 %      MCV 90 fL      MCH 30.9 pg      MCHC 34.2 g/dL      RDW 13.6 %      MPV 8.8 fL      Platelets 327 Thousands/uL      nRBC 0 /100 WBCs      Segmented % 80 %      Immature Grans % 1 %      Lymphocytes % 11 %      Monocytes % 8 %      Eosinophils Relative 0 %      Basophils Relative 0 %      Absolute Neutrophils 12.57 Thousands/µL      Absolute Immature Grans 0.18 Thousand/uL      Absolute Lymphocytes 1.73 Thousands/µL      Absolute Monocytes 1.18 Thousand/µL      Eosinophils Absolute 0.07 Thousand/µL      Basophils Absolute 0.03 Thousands/µL             CT chest abdomen pelvis w contrast   Final Interpretation by Moi Mosquera MD (03/23  2970)      1.  No acute intrathoracic abnormalities with findings detailed above.   2.  No thoracic aortic aneurysm or dissection. No acute pulmonary embolism to the segmental level.   3.  Severe emphysema is again seen. No evidence for pneumonia or pleural/pericardial effusions.   4.  Findings consistent with acute uncomplicated appendicitis involving the mid to distal appendix as detailed above.   5.  No evidence for bowel obstruction, obstructive uropathy, free air, or free fluid.   6.  A 1.4 cm peripherally enhancing lesion in the inferior right hepatic lobe likely represent small cavernous hemangioma and stable compared to a prior CT exam of 12/29/2024.         Findings discussed with Dr. Reyes      Workstation performed: [id number]             Procedures    ED Medication and Procedure Management   Prior to Admission Medications   Prescriptions Last Dose Informant Patient Reported? Taking?   albuterol (2.5 mg/3 mL) 0.083 % nebulizer solution   Yes No   Sig: Inhale 2.5 mg every 6 (six) hours as needed   amoxicillin-clavulanate (AUGMENTIN) 875-125 mg per tablet  Self Yes No   Patient not taking: Reported on 3/20/2025   dicyclomine (BENTYL) 20 mg tablet  Self No No   Sig: TAKE 1 TABLET BY MOUTH EVERY 6 HOURS.   oxygen gas  Self Yes No   Sig: Inhale continuous as needed   polyethylene glycol (GOLYTELY) 4000 mL solution  Self No No   Sig: Take 4,000 mL by mouth once for 1 dose      Facility-Administered Medications: None     Patient's Medications   Discharge Prescriptions    No medications on file     No discharge procedures on file.  ED SEPSIS DOCUMENTATION   Time reflects when diagnosis was documented in both MDM as applicable and the Disposition within this note       Time User Action Codes Description Comment    3/23/2025 11:15 PM Jennie Reyes Add [R10.9] Abdominal pain     3/23/2025 11:15 PM Jennie Reyes Add [K37] Appendicitis     3/23/2025 11:15 PM Jennie Reyes Modify [K37] Appendicitis     3/23/2025  11:15 PM Reyes, Jennie M Remove [R10.9] Abdominal pain     3/23/2025 11:16 PM ReyesJennie M Add [S05.00XA] Corneal abrasion     3/24/2025 12:29 AM Eric Jennie M Modify [S05.00XA] Corneal abrasion     3/24/2025 12:29 AM Jennie Reyes M Remove [K37] Appendicitis     3/24/2025 12:29 AM Jennie Reyes Add [R10.9] Abdominal pain                  Jennie Reyes, DO  03/26/25 1430

## 2025-03-24 NOTE — PLAN OF CARE
Problem: CARDIOVASCULAR - ADULT  Goal: Maintains optimal cardiac output and hemodynamic stability  Description: INTERVENTIONS:- Monitor I/O, vital signs and rhythm- Monitor for S/S and trends of decreased cardiac output- Administer and titrate ordered vasoactive medications to optimize hemodynamic stability- Assess quality of pulses, skin color and temperature- Assess for signs of decreased coronary artery perfusion- Instruct patient to report change in severity of symptoms  Outcome: Progressing     Problem: RESPIRATORY - ADULT  Goal: Achieves optimal ventilation and oxygenation  Description: INTERVENTIONS:- Assess for changes in respiratory status- Assess for changes in mentation and behavior- Position to facilitate oxygenation and minimize respiratory effort- Oxygen administered by appropriate delivery if ordered- Initiate smoking cessation education as indicated- Encourage broncho-pulmonary hygiene including cough, deep breathe, Incentive Spirometry- Assess the need for suctioning and aspirate as needed- Assess and instruct to report SOB or any respiratory difficulty- Respiratory Therapy support as indicated  Outcome: Progressing     Problem: GASTROINTESTINAL - ADULT  Goal: Minimal or absence of nausea and/or vomiting  Description: INTERVENTIONS:- Administer IV fluids if ordered to ensure adequate hydration- Maintain NPO status until nausea and vomiting are resolved- Nasogastric tube if ordered- Administer ordered antiemetic medications as needed- Provide nonpharmacologic comfort measures as appropriate- Advance diet as tolerated, if ordered- Consider nutrition services referral to assist patient with adequate nutrition and appropriate food choices  Outcome: Progressing

## 2025-03-24 NOTE — ASSESSMENT & PLAN NOTE
Likely musculoskeletal in origin, pt had prior workup and negative vor intra abdominal pathology, history of diverticulitis, no evidence on CT. Tenderness in LLQ over the rectus muscle with raising his head off the bed.

## 2025-03-24 NOTE — H&P
"H&P - Internal Medicine   Name: Felipe Sanders III 64 y.o. male I MRN: [id number]  Unit/Bed#: ED 22 I Date of Admission: 3/23/2025   Date of Service: 3/24/2025 I Hospital Day: 0     Assessment & Plan  Left lower quadrant abdominal pain  CT suggesting \"acute uncomplicated appendicitis\".  Patient reports similar abdominal pain past 2 to 3 years, with previous diagnosis of \"an inflamed appendix\" and follows outpatient with GI & general surgery.  Abdominal exam currently benign, hence will hospitalize patient for overnight observation with general surgery consult in AM.  Keep n.p.o., give IV ceftriaxone & metronidazole as well as maintenance IV fluid, PRN parenteral narcotic and analgesic.  Timely appendectomy deferred to general surgery, pulmonary consult as needed; patient currently without cardiopulmonary complaints & not in any respiratory distress.  Chronic midline low back pain without sciatica  Patient currently denies back pain.  Continue as needed NSAID.  COPD (chronic obstructive pulmonary disease) (HCC)  Not acutely decompensated.  Resume usual inhaled bronchodilator regimen, incentive spirometry.  Tobacco abuse disorder  I strongly emphasized to the patient the importance of stress smoking cessation.  Will order nicotine patch for his tobacco cravings.  History of diverticulitis      Code Status: Prior  Admission Status: OBSERVATION  Disposition: Patient requires Med Surg with Telemetry    Admit to team: SOD TEAM A    History of Present Illness   64-year-old gentleman whose past medical history is remarkable for severe COPD and longstanding cigarette smoking.  He smokes on average 2 to 3 packs/day cigarettes but denies alcohol abuse or illicit drug use.  He reports longstanding abdominal pain for the past 2 to 3 years, with previous diagnosis of acute appendicitis via CT scan about a year ago.  He follows outpatient with GI and general surgery.  He had an outpatient colonoscopy a few months ago which he " "reports was \"negative\".  Patient denies any recent fever, chills, malaise, headache, neck stiffness, acute confusion, skin rash, shortness of breath, worsening cough, chest pain, palpitations, nausea, vomiting, diarrhea, dysuria or frequency.  He tells me he sought medical attention at this facility's ED tonight due to a flareup of his chronic lower back pain.  On further questioning, he admitted to worsening left lower quadrant abdominal pain over the past few days.  ED vital signs upon arrival included , /76, temperature 37.1 °C.  Pertinent ED labs included serum sodium 139, potassium 3.9, BUN 21, creatinine 1.08, glucose 142, serial troponin negative, LFTs within normal limits, lipase 46, WBC 15.76, lactate 1.6, hemoglobin 16.4, platelet count 327.  CT abdomen/pelvis with IV contrast was remarkable for severe emphysema and findings consistent with acute uncomplicated appendicitis.  The ED physician consulted the on-call general surgeon who felt surgery was not warranted at this time.  Patient hospitalization to the internal medicine service was subsequently sought.  He received at the ED IV cefepime, IV Tylenol, IV Toradol, DuoNeb treatment.    A 10+ point systems review was obtained and is as above, otherwise negative.    Review of Systems  Medical History Review: I have reviewed the patient's PMH, PSH, Social History, Family History, Meds, and Allergies     Objective :  Temp:  [98.8 °F (37.1 °C)] 98.8 °F (37.1 °C)  HR:  [101-124] 101  BP: ()/(57-76) 115/57  Resp:  [17-33] 33  SpO2:  [93 %-96 %] 94 %  O2 Device: None (Room air)    Intake & Output:  I/O         03/22 0701  03/23 0700 03/23 0701  03/24 0700    IV Piggyback  1150    Total Intake  1150    Net  +1150                Weights:        There is no height or weight on file to calculate BMI.  Weight (last 2 days)       None          Physical exam  Constitutional: In no overt respiratory distress  HEENT: Not pale or jaundiced  Neck: Supple, " no JVD  Respiratory: Clear lungs  Cardiovascular: S1-S2 audible regular  GI: Soft abdomen, nondistended, periumbilical to left lower quadrant tenderness on the palpation without rebound or guarding, bowel sounds present  Musculoskeletal: No pedal edema  Skin: No suspicious rash  Psych: Appropriately oriented in all spheres  Neuro: Awake, alert, verbally responsive and follows commands, moves all extremities equally, essentially nonfocal      Lab Results: I have reviewed the following results:  Recent Labs     03/23/25  2136 03/24/25  0038   WBC 15.76*  --    HGB 16.4  --    HCT 48.0  --      --    SODIUM 139  --    K 3.9  --      --    CO2 28  --    BUN 21  --    CREATININE 1.08  --    GLUC 142*  --    AST 16  --    ALT 17  --    ALB 4.1  --    TBILI 0.48  --    ALKPHOS 51  --    HSTNI0 6  --    HSTNI2  --  9   LACTICACID 1.6  --      Micro:  Lab Results   Component Value Date    BLOODCX No Growth After 5 Days. 01/01/2025    BLOODCX No Growth After 5 Days. 01/01/2025    BLOODCX No Growth After 5 Days. 12/04/2024    SPUTUMCULTUR 2+ Growth of 12/24/2021       Imaging Results Review: I reviewed radiology reports from this admission including: CT abdomen/pelvis.  Other Study Results Review: No additional pertinent studies reviewed.    Currently Ordered Meds:   Current Facility-Administered Medications:     albuterol inhalation solution 2.5 mg, Q4H PRN    ceftriaxone (ROCEPHIN) 1 g/50 mL in dextrose IVPB, Daily    ipratropium-albuterol (DUO-NEB) 0.5-2.5 mg/3 mL inhalation solution 3 mL, 4x Daily    melatonin tablet 6 mg, HS PRN    metroNIDAZOLE (FLAGYL) IVPB (premix) 500 mg 100 mL, Q12H    morphine injection 2 mg, Q4H PRN    nicotine (NICODERM CQ) 21 mg/24 hr TD 24 hr patch 21 mg, Once    sodium chloride 0.9 % with KCl 20 mEq/L infusion (premix), Continuous  VTE Pharmacologic Prophylaxis: Enoxaparin (Lovenox)  VTE Mechanical Prophylaxis: sequential compression device    Administrative Statements   I have  "spent a total time of 75 minutes in caring for this patient on the day of the visit/encounter including Impressions.  Portions of the record may have been created with voice recognition software.  Occasional wrong word or \"sound a like\" substitutions may have occurred due to the inherent limitations of voice recognition software.  Read the chart carefully and recognize, using context, where substitutions have occurred.  ==  Juliano De Anda MD  [facility]    "

## 2025-03-24 NOTE — ASSESSMENT & PLAN NOTE
Patient has cut down significantly on his smoking, currently smoking a half a pack per day down from 3 packs/day at the most  Continue to cut down on smoking especially given multiple exacerbations as well as for optimization for surgery.

## 2025-03-24 NOTE — TELEPHONE ENCOUNTER
Pt schedule to Noam Anderson PA-C on 05/06/2025 at 1:30 PM    meds verified with outpt pharmacy. adjustments made  pt currently calm, cooperative  no evidence of psychosis at this time, denies any hallucinations   pt gets weekly Prolixin 125 mg IM, case d/w psych by ACP, given one dose 1/17/23

## 2025-03-24 NOTE — ASSESSMENT & PLAN NOTE
Chronic cough  Current 2-3 PPD smoker  Encourage smoking cessation  Pulmonology consult   Prior PFT FEV1 51%

## 2025-03-24 NOTE — CONSULTS
Consultation - Surgery-General   Name: Felipe Sanders III 64 y.o. male I MRN: [id number]  Unit/Bed#: -01 I Date of Admission: 3/23/2025   Date of Service: 3/24/2025 I Hospital Day: 0   Inpatient consult to Acute Care Surgery  Consult performed by: Amee Puri PA-C  Consult ordered by: Juliano De Anda MD      Physician Requesting Evaluation: Chaz DIA MD   Reason for Evaluation / Principal Problem:   CT findings of appendicitis vs mucocele of the appendix       Assessment & Plan  Mucocele of appendix  ED CT The proximal appendix is of normal caliber. The mid to distal appendix is abnormally distended with fluid and debris measuring up to 12 mm in diameter with mild mucosal hyperenhancement and minimal adjacent mesenteric stranding. No   periappendiceal free air or abscess/fluid collection. Overall findings suggest acute uncomplicated appendicitis involving the mid to distal appendix.  Pt has had CT 3/12 and 4/25 demonstration same, this is suspicious for mucocele of appendix  He was seen recently in Dr. Lee's office and was recommended to be cleared and optimized by Pulmonary for Severe COPD, Chronic cough, Smoker of 2-3 PPD. Patient was referred to Transplant but he has not been evaluated. He continues to smoke.     -Consult Pulmonary Medicine, pt will need current PFTs  -cessation of smoking  -continue ceftriaxone and metronidazole   -pt is not a surgical candidate at this time. He is asymptomatic.       COPD (chronic obstructive pulmonary disease) (HCC)  Severe with chronic cough, currently smoking,   Prior PFT FEV1 51%.   Left lower quadrant abdominal pain  Likely musculoskeletal in origin, pt had prior workup and negative vor intra abdominal pathology, history of diverticulitis, no evidence on CT. Tenderness in LLQ over the rectus muscle with raising his head off the bed.     Chronic midline low back pain without sciatica  Per [facility]   Tobacco abuse disorder  Cessation       History of  Present Illness   Felipe Sanders III is a 64 y.o. male Severe COPD, Smoker, 2-3 ppd, pt is on transplant list. Chronic Cough, chronic Hepatitis B, Chronic Low Back Pain, h/o diverticulitis, with LLQ, diarrhea,   Colonoscopy 4/22, appeared normal with inadequate bowel prep, recommendation to repeat in 1 yr. H/o left upper abdominal pain, right inguinal hernia   Pt had internal medicine office visit 3/21/25, recommended to follow up with Pulmonary for current PFTs, these can only be done as outpatient            who presents with complaints upper back pain and cough, he was concerned that he had pneumonia.     Also complained of abdominal pain, present for the past 2-3 years, the pain was worse over the last few days. This pain is located in the LLQ. He states he has been coughing more and straining his abdominal muscles.  He denies any pain in the RLQ    CT ED 3/23 demonstrated proximal appendix normal caliper, mid to distal appendix is abnormally dilated with fluid and debris measuring up to 12 mm with mild mucosal hyper enhancement and minimal adjacent mesenteric stranding. No free air, abscess or fluid collection. Overall findings suggestive of uncomplicated appendicitis of the mid to distal appendix.  3/12 CT results are similar with notation of suggestive of a appendiceal mucocele, and no evidence of acute appendicitis,  This was demonstrated on CT 2/25 and 12/29/24,         Review of Systems  Per HPI     Objective :  Temp:  [97.9 °F (36.6 °C)-98.8 °F (37.1 °C)] 97.9 °F (36.6 °C)  HR:  [] 97  BP: ()/(56-88) 119/88  Resp:  [17-33] 18  SpO2:  [93 %-96 %] 94 %  O2 Device: None (Room air)      Physical Exam  Cardiovascular:      Rate and Rhythm: Tachycardia present.      Heart sounds: Normal heart sounds.   Pulmonary:      Effort: Respiratory distress present.      Breath sounds: Wheezing and rhonchi present.      Comments: Course rhonchi, wheezing, this did not clear with cough, decreased breaths sounds  throughout all lung fields,  cough is loose non productive  Abdominal:      General: There is distension.      Tenderness: There is abdominal tenderness. There is guarding and rebound.      Comments: Abdomen is soft, tenderness of the left lower rectus muscle with lifting head off the bed and stressing the musculature. No tenderness at rest, no mass, organomegaly  No tenderness in RLQ, no massed, organomegaly  NBS  Abdomen is soft throughout    Musculoskeletal:      Right lower leg: No edema.      Left lower leg: No edema.      Comments: Tenderness of the right lower rectus muscle with raising head from the bed    Skin:     General: Skin is warm and dry.   Neurological:      General: No focal deficit present.      Mental Status: He is alert and oriented to person, place, and time.   Psychiatric:         Mood and Affect: Mood normal.         Behavior: Behavior normal.         Lab Results: I have reviewed the following results:  Recent Labs     03/23/25 2136 03/24/25  0038   WBC 15.76*  --    HGB 16.4  --    HCT 48.0  --      --    SODIUM 139  --    K 3.9  --      --    CO2 28  --    BUN 21  --    CREATININE 1.08  --    GLUC 142*  --    AST 16  --    ALT 17  --    ALB 4.1  --    TBILI 0.48  --    ALKPHOS 51  --    HSTNI0 6  --    HSTNI2  --  9   LACTICACID 1.6  --        Amee RUTH

## 2025-03-24 NOTE — PROGRESS NOTES
Progress Note - Hospitalist   Name: Felipe Sanders III 64 y.o. male I MRN: [id number]  Unit/Bed#: -01 I Date of Admission: 3/23/2025   Date of Service: 3/24/2025 I Hospital Day: 0     Assessment & Plan  Left lower quadrant abdominal pain  Acute uncomplicated appendicitis involving mid to distal appendix  Prior CT studies reveal same  COPD (chronic obstructive pulmonary disease) (HCC)  Chronic cough  Current 2-3 PPD smoker  Encourage smoking cessation  Pulmonology consult   Prior PFT FEV1 51%  Tobacco abuse disorder  Encouraged cessation  NRT as needed  Mucocele of appendix  CT shows mid to distal appendix distention with fluid and debris measuring 12 mm in diameter with mild mucosal hyperenhancement and minimal adjacent mesenteric stranding  Findings suggest acute uncomplicated appendicitis  Recently seen in Dr Lee's office and recommended to be cleared and optimized by pulmonary prior to proceeding with surgery   Discussed with surgery,   No acute surgical intervention  Continue course of abx  Follow up outpatient     VTE Pharmacologic Prophylaxis: VTE Score: 3 Moderate Risk (Score 3-4) - Pharmacological DVT Prophylaxis Ordered: enoxaparin (Lovenox).    Mobility:   Basic Mobility Inpatient Raw Score: 24  JH-HLM Goal: 8: Walk 250 feet or more  JH-HLM Achieved: 7: Walk 25 feet or more  JH-HLM Goal NOT achieved. Continue with multidisciplinary rounding and encourage appropriate mobility to improve upon JH-HLM goals.    Patient Centered Rounds: I performed bedside rounds with nursing staff today.   Discussions with Specialists or Other Care Team Provider: general surgery, pulmonology, CM    Education and Discussions with Family / Patient: Updated  (friend) at bedside.    Current Length of Stay: 0 day(s)  Current Patient Status: Observation   Certification Statement:  patient is admitted for LLQ and back pain, really acute on chronic issue  Discharge Plan: Anticipate discharge later today or  tomorrow to home.    Code Status: Level 1 - Full Code    Subjective   Patient is alert and oriented. Denies any overnight events. Remains NPO.   Rates LLQ abdominal pain at 1/10. Encouraged out of bed and ambulating    Review of Symptoms  Constitutional: Negative fever, chills  HEENT: Negative  Pulmonary: Negative cough, shortness of breath  Cardiovascular: Negative chest pain, palpitations, diaphoresis  Gastrointestinal: Negative nausea, vomiting, diarrhea  Abdominal: LLQ tenderness  Neurological: Negative seizure, dizziness, tremors  Musculoskeletal: Positive chronic back pain, negative leg pain or swelling  All other systems reviewed and are negative  Objective :  Temp:  [97.9 °F (36.6 °C)-98.8 °F (37.1 °C)] 97.9 °F (36.6 °C)  HR:  [] 95  BP: ()/() 128/85  Resp:  [17-33] 17  SpO2:  [92 %-97 %] 92 %  O2 Device: None (Room air)    Body mass index is 26.82 kg/m².     Input and Output Summary (last 24 hours):     Intake/Output Summary (Last 24 hours) at 3/24/2025 1344  Last data filed at 3/24/2025 1130  Gross per 24 hour   Intake 1150 ml   Output --   Net 1150 ml       Physical Exam  Vitals and nursing note reviewed.   Constitutional:       General: He is not in acute distress.     Appearance: Normal appearance. He is well-developed.   HENT:      Head: Normocephalic and atraumatic.   Eyes:      Conjunctiva/sclera: Conjunctivae normal.      Pupils: Pupils are equal, round, and reactive to light.   Cardiovascular:      Rate and Rhythm: Normal rate and regular rhythm.      Pulses: Normal pulses.      Heart sounds: Normal heart sounds. No murmur heard.     No friction rub. No gallop.   Pulmonary:      Effort: Pulmonary effort is normal.      Breath sounds: Normal breath sounds.   Abdominal:      General: Bowel sounds are normal.      Tenderness: There is abdominal tenderness in the left lower quadrant.   Musculoskeletal:      Right lower leg: No edema.      Left lower leg: No edema.   Skin:     General:  Skin is warm and dry.      Capillary Refill: Capillary refill takes less than 2 seconds.   Neurological:      General: No focal deficit present.      Mental Status: He is alert and oriented to person, place, and time.   Psychiatric:         Mood and Affect: Mood normal.         Behavior: Behavior normal.           Lines/Drains:        Telemetry:  Telemetry Orders (From admission, onward)               24 Hour Telemetry Monitoring  Continuous x 24 Hours (Telem)        Question:  Reason for 24 Hour Telemetry  Answer:  Patients with morris/yuval/endocarditis; cardiac contusion                     Telemetry Reviewed: Normal Sinus Rhythm  Indication for Continued Telemetry Use: No indication for continued use. Will discontinue.                Lab Results: I have reviewed the following results:   Results from last 7 days   Lab Units 03/23/25  2136   WBC Thousand/uL 15.76*   HEMOGLOBIN g/dL 16.4   HEMATOCRIT % 48.0   PLATELETS Thousands/uL 327   SEGS PCT % 80*   LYMPHO PCT % 11*   MONO PCT % 8   EOS PCT % 0     Results from last 7 days   Lab Units 03/23/25  2136   SODIUM mmol/L 139   POTASSIUM mmol/L 3.9   CHLORIDE mmol/L 103   CO2 mmol/L 28   BUN mg/dL 21   CREATININE mg/dL 1.08   ANION GAP mmol/L 8   CALCIUM mg/dL 9.0   ALBUMIN g/dL 4.1   TOTAL BILIRUBIN mg/dL 0.48   ALK PHOS U/L 51   ALT U/L 17   AST U/L 16   GLUCOSE RANDOM mg/dL 142*                 Results from last 7 days   Lab Units 03/23/25  2136   LACTIC ACID mmol/L 1.6   PROCALCITONIN ng/ml <0.05       Recent Cultures (last 7 days):   Results from last 7 days   Lab Units 03/23/25  2336 03/23/25  2327   BLOOD CULTURE  Received in Microbiology Lab. Culture in Progress. Received in Microbiology Lab. Culture in Progress.       Imaging Results Review: I reviewed radiology reports from this admission including: chest xray, CT chest, and CT abdomen/pelvis.  Other Study Results Review: No additional pertinent studies reviewed.    Last 24 Hours Medication List:     Current  Facility-Administered Medications:     albuterol inhalation solution 2.5 mg, Q4H PRN    ceftriaxone (ROCEPHIN) 1 g/50 mL in dextrose IVPB, Daily, Last Rate: 1,000 mg (03/24/25 0834)    enoxaparin (LOVENOX) subcutaneous injection 40 mg, Daily    fluticasone-vilanterol 200-25 mcg/actuation 1 puff, Daily    melatonin tablet 6 mg, HS PRN    metroNIDAZOLE (FLAGYL) IVPB (premix) 500 mg 100 mL, Q12H, Last Rate: 500 mg (03/24/25 0210)    morphine injection 2 mg, Q4H PRN    nicotine (NICODERM CQ) 21 mg/24 hr TD 24 hr patch 1 patch, Daily    nicotine (NICODERM CQ) 21 mg/24 hr TD 24 hr patch 21 mg, Once    ondansetron (ZOFRAN) injection 4 mg, Q6H PRN    predniSONE tablet 30 mg, Daily    Administrative Statements   Today, Patient Was Seen By: Teresa Ochoa PA-C  I have spent a total time of 40 minutes in caring for this patient on the day of the visit/encounter including Importance of tx compliance, Documenting in the medical record, Reviewing/placing orders in the medical record (including tests, medications, and/or procedures), Obtaining or reviewing history  , and Communicating with other healthcare professionals .    **Please Note: This note may have been constructed using a voice recognition system.**

## 2025-03-24 NOTE — ASSESSMENT & PLAN NOTE
ED CT The proximal appendix is of normal caliber. The mid to distal appendix is abnormally distended with fluid and debris measuring up to 12 mm in diameter with mild mucosal hyperenhancement and minimal adjacent mesenteric stranding. No   periappendiceal free air or abscess/fluid collection. Overall findings suggest acute uncomplicated appendicitis involving the mid to distal appendix.  Pt has had CT 3/12 and 4/25 demonstration same, this is suspicious for mucocele of appendix  He was seen recently in Dr. Lee's office and was recommended to be cleared and optimized by Pulmonary for Severe COPD, Chronic cough, Smoker of 2-3 PPD. Patient was referred to Transplant but he has not been evaluated. He continues to smoke.     -Consult Pulmonary Medicine, pt will need current PFTs  -cessation of smoking  -continue ceftriaxone and metronidazole   -pt is not a surgical candidate at this time. He is asymptomatic.

## 2025-03-25 ENCOUNTER — RESULTS FOLLOW-UP (OUTPATIENT)
Dept: EMERGENCY DEPT | Facility: HOSPITAL | Age: 65
End: 2025-03-25

## 2025-03-25 NOTE — RESULT ENCOUNTER NOTE
Was admitted to [facility] was discharged on Augmentin. Awaiting culture and sensitivities.

## 2025-03-27 LAB
BACTERIA BLD CULT: ABNORMAL
BACTERIA BLD CULT: ABNORMAL
GRAM STN SPEC: ABNORMAL
GRAM STN SPEC: ABNORMAL
MECA+MECC ISLT/SPM QL: DETECTED
S EPIDERMIDIS DNA BLD POS QL NAA+NON-PRB: DETECTED

## 2025-03-28 NOTE — PROGRESS NOTES
I set him up on 4/4 for annual phys but maybe he's a sheila instead?    You should be able to reach him on phone , I got him earlier  for sheila note leodan

## 2025-04-04 ENCOUNTER — OFFICE VISIT (OUTPATIENT)
Dept: INTERNAL MEDICINE CLINIC | Facility: CLINIC | Age: 65
End: 2025-04-04
Payer: COMMERCIAL

## 2025-04-04 VITALS
SYSTOLIC BLOOD PRESSURE: 152 MMHG | WEIGHT: 181 LBS | HEART RATE: 107 BPM | OXYGEN SATURATION: 95 % | DIASTOLIC BLOOD PRESSURE: 90 MMHG | HEIGHT: 68 IN | BODY MASS INDEX: 27.43 KG/M2

## 2025-04-04 DIAGNOSIS — Z78.9 ON SUPPLEMENTAL OXYGEN BY NASAL CANNULA: ICD-10-CM

## 2025-04-04 DIAGNOSIS — J43.9 PULMONARY EMPHYSEMA, UNSPECIFIED EMPHYSEMA TYPE (HCC): ICD-10-CM

## 2025-04-04 DIAGNOSIS — F17.210 TOBACCO DEPENDENCE DUE TO CIGARETTES: ICD-10-CM

## 2025-04-04 DIAGNOSIS — R10.32 LEFT LOWER QUADRANT ABDOMINAL PAIN: Primary | ICD-10-CM

## 2025-04-04 PROCEDURE — 99495 TRANSJ CARE MGMT MOD F2F 14D: CPT

## 2025-04-04 RX ORDER — PREDNISONE 10 MG/1
TABLET ORAL
COMMUNITY
Start: 2025-04-03

## 2025-04-04 RX ORDER — ROPINIROLE 1 MG/1
1 TABLET, FILM COATED ORAL 3 TIMES DAILY
COMMUNITY
Start: 2025-04-03 | End: 2025-05-03

## 2025-04-04 NOTE — ASSESSMENT & PLAN NOTE
Wears his home oxygen at night at times.  Discussed the dangers of smoking while wearing his oxygen.  Patient states he is well aware, when he first received his oxygen he was smoking a cigarette and experienced a spark.  He has not done this since.

## 2025-04-04 NOTE — ASSESSMENT & PLAN NOTE
CT findings consistent with acute uncomplicated appendicitis.  He has been seen by surgery inpatient as well as outpatient, recommend pulmonary clearance and optimization prior to proceeding with surgery nonurgently.  He currently does not have any follow-up with general surgery.  He has been following with pulmonology outpatient.

## 2025-04-04 NOTE — PROGRESS NOTES
"Transition of Care Visit:  Name: Felipe Sanders III      : 1960      MRN: [id number]  Encounter Provider: CHELSEA Rodríguez  Encounter Date: 2025   Encounter department: [facility] INTERNAL MEDICINE [place]    Assessment & Plan  Left lower quadrant abdominal pain  CT findings consistent with acute uncomplicated appendicitis.  He has been seen by surgery inpatient as well as outpatient, recommend pulmonary clearance and optimization prior to proceeding with surgery nonurgently.  He currently does not have any follow-up with general surgery.  He has been following with pulmonology outpatient.       Pulmonary emphysema, unspecified emphysema type (HCC)  Patient has been experiencing multiple exacerbations lately, he has been on oral steroids and antibiotics prior to admission, Augmentin given at discharge and continued steroid dosing.  During admission they had switched his Anoro to Trelegy.  Patient states that he is not taking his Trelegy, discussed the importance of its use and appropriate consistent use.  Patient directed to rinse his mouth after. Unsure if he will comply stating \"it did nothing.\"  He is also looking for medication to decrease his phlegm production, discussed his pulmonary disease. He is still smoking. He has seen his pulmonologist since discharge and had a PFT completed on 3/25.  FEV1 decreased to 44%, previously 51% in .        Tobacco dependence due to cigarettes  Encouraged patient to accomplish complete cessation, discussed the importance of doing so.  He is currently smoking at least 1 pack/day plus.  He is not interested in stopping, stating \"we all die at some point.\"       On supplemental oxygen by nasal cannula  Wears his home oxygen at night at times.  Discussed the dangers of smoking while wearing his oxygen.  Patient states he is well aware, when he first received his oxygen he was smoking a cigarette and experienced a spark.  He has not done this since.   " "         History of Present Illness   {?Quick Links Encounters * My Last Note * Last Note in Specialty * Snapshot * Since Last Visit * History :99792}  Transitional Care Management Review:   Felipe Sanders III is a 64 y.o. male here for TCM follow up.     During the TCM phone call patient stated:  TCM Call (since 3/21/2025)     Date and time call was made  3/24/2025  3:10 PM    Hospital care reviewed  Records reviewed    Patient was hospitial[facility] at  [facility]    Date of Admission  03/23/25    Date of discharge  03/24/25    Diagnosis  Left lower quadrant abdominal pain    Disposition  Home    Were the patients medications reviewed and updated  Yes    Current Symptoms  Shortness of breath      TCM Call (since 3/21/2025)     Post hospital issues  None    Scheduled for follow up?  Yes    Patients specialists  Pulmonlolgist    Did you obtain your prescribed medications  Yes    Do you need help managing your prescriptions or medications  No    Is transportation to your appointment needed  No    I have advised the patient to call PCP with any new or worsening symptoms  Anisa SANFORD CCM    Living Arrangements  Family members    Support System  Family    Are you recieving home care services  No        Felipe is here today for follow-up from hospital admission.  He presented to the emergency room on 3/23 and kept overnight until 3/24.  He presented with complaints of abdominal pain, he has been into see general surgery as well as pulmonology outpatient attempting to be cleared for a scheduled appendectomy.  He was discaharged with a course of Augmentin, continue steroid taper, switch from his Anoro to Trelegy.  Pulmonology has deemed him not medically cleared until reassessment of his pulmonary status after PFT.  He had completed this PFT on 3/25 and reviewed this with his Pulmonologist. In the office today Lynette states that he is not taking the Trelegy because \"everyone tells me not to take that crap\" though he could " "not specify who told him this, but he is taking the oral prednisone. He is currently smoking at least 1 pack per day, sometimes more. He has a stressful life situation at home with his wife. He wears his O2 at night \"sometimes,\" he does not wear his oxygen while smoking.         Review of Systems   Constitutional:  Negative for chills and fever.   HENT:  Negative for congestion, ear pain, rhinorrhea, sinus pressure and sore throat.    Eyes:  Negative for pain and visual disturbance.   Respiratory:  Positive for cough. Negative for chest tightness and shortness of breath.    Cardiovascular:  Negative for chest pain, palpitations and leg swelling.   Gastrointestinal:  Positive for abdominal pain. Negative for constipation, diarrhea, nausea and vomiting.   Endocrine: Negative.    Genitourinary:  Negative for dysuria, frequency and hematuria.   Musculoskeletal:  Negative for arthralgias, back pain and myalgias.   Skin:  Negative for color change and rash.   Allergic/Immunologic: Negative.    Neurological:  Negative for dizziness, seizures, syncope, light-headedness and headaches.   Hematological: Negative.    Psychiatric/Behavioral: Negative.     All other systems reviewed and are negative.    Objective {?Quick Links Trend Vitals * Enter New Vitals * Results Review * Timeline (Adult) * Labs * Imaging * Cardiology * Procedures * Lung Cancer Screening * Surgical eConsent :20250}  /90 (BP Location: Left arm, Patient Position: Sitting, Cuff Size: Standard)   Pulse (!) 107   Ht 5' 8\" (1.727 m)   Wt 82.1 kg (181 lb)   SpO2 95%   BMI 27.52 kg/m²     Physical Exam  Vitals and nursing note reviewed.   Constitutional:       General: He is not in acute distress.     Appearance: He is well-developed.   Cardiovascular:      Rate and Rhythm: Normal rate and regular rhythm.      Pulses: Normal pulses.      Heart sounds: Normal heart sounds. No murmur heard.  Pulmonary:      Effort: Pulmonary effort is normal. No respiratory " distress.      Breath sounds: Decreased breath sounds present.   Abdominal:      General: Bowel sounds are normal.      Palpations: Abdomen is soft.      Tenderness: There is no abdominal tenderness.   Musculoskeletal:         General: No swelling.      Cervical back: Neck supple.   Skin:     General: Skin is warm and dry.   Neurological:      Mental Status: He is alert and oriented to person, place, and time.   Psychiatric:         Mood and Affect: Mood normal.       Medications have been reviewed by provider in current encounter

## 2025-04-04 NOTE — ASSESSMENT & PLAN NOTE
"Patient has been experiencing multiple exacerbations lately, he has been on oral steroids and antibiotics prior to admission, Augmentin given at discharge and continued steroid dosing.  During admission they had switched his Anoro to Trelegy.  Patient states that he is not taking his Trelegy, discussed the importance of its use and appropriate consistent use.  Patient directed to rinse his mouth after. Unsure if he will comply stating \"it did nothing.\"  He is also looking for medication to decrease his phlegm production, discussed his pulmonary disease. He is still smoking. He has seen his pulmonologist since discharge and had a PFT completed on 3/25.  FEV1 decreased to 44%, previously 51% in 2023.      "

## 2025-04-04 NOTE — ASSESSMENT & PLAN NOTE
"Encouraged patient to accomplish complete cessation, discussed the importance of doing so.  He is currently smoking at least 1 pack/day plus.  He is not interested in stopping, stating \"we all die at some point.\"     "

## 2025-04-14 ENCOUNTER — HOSPITAL ENCOUNTER (EMERGENCY)
Facility: HOSPITAL | Age: 65
Discharge: HOME | End: 2025-04-15
Attending: EMERGENCY MEDICINE
Payer: COMMERCIAL

## 2025-04-14 ENCOUNTER — APPOINTMENT (EMERGENCY)
Dept: CT IMAGING | Facility: HOSPITAL | Age: 65
End: 2025-04-14
Payer: COMMERCIAL

## 2025-04-14 DIAGNOSIS — K59.00 CONSTIPATION: ICD-10-CM

## 2025-04-14 DIAGNOSIS — R10.9 ABDOMINAL PAIN: Primary | ICD-10-CM

## 2025-04-14 LAB
ALBUMIN SERPL BCG-MCNC: 4.1 G/DL (ref 3.5–5)
ALP SERPL-CCNC: 52 U/L (ref 34–104)
ALT SERPL W P-5'-P-CCNC: 17 U/L (ref 7–52)
ANION GAP SERPL CALCULATED.3IONS-SCNC: 8 MMOL/L (ref 4–13)
AST SERPL W P-5'-P-CCNC: 15 U/L (ref 13–39)
BASOPHILS # BLD AUTO: 0.04 THOUSANDS/ÂΜL (ref 0–0.1)
BASOPHILS NFR BLD AUTO: 0 % (ref 0–1)
BILIRUB SERPL-MCNC: 0.46 MG/DL (ref 0.2–1)
BUN SERPL-MCNC: 18 MG/DL (ref 5–25)
CALCIUM SERPL-MCNC: 8.9 MG/DL (ref 8.4–10.2)
CHLORIDE SERPL-SCNC: 105 MMOL/L (ref 96–108)
CO2 SERPL-SCNC: 29 MMOL/L (ref 21–32)
CREAT SERPL-MCNC: 1.03 MG/DL (ref 0.6–1.3)
EOSINOPHIL # BLD AUTO: 0.04 THOUSAND/ÂΜL (ref 0–0.61)
EOSINOPHIL NFR BLD AUTO: 0 % (ref 0–6)
ERYTHROCYTE [DISTWIDTH] IN BLOOD BY AUTOMATED COUNT: 14 % (ref 11.6–15.1)
GFR SERPL CREATININE-BSD FRML MDRD: 76 ML/MIN/1.73SQ M
GLUCOSE SERPL-MCNC: 131 MG/DL (ref 65–140)
HCT VFR BLD AUTO: 43.4 % (ref 36.5–49.3)
HGB BLD-MCNC: 14.8 G/DL (ref 12–17)
IMM GRANULOCYTES # BLD AUTO: 0.17 THOUSAND/UL (ref 0–0.2)
IMM GRANULOCYTES NFR BLD AUTO: 1 % (ref 0–2)
LACTATE SERPL-SCNC: 1.6 MMOL/L (ref 0.5–2)
LIPASE SERPL-CCNC: 31 U/L (ref 11–82)
LYMPHOCYTES # BLD AUTO: 1.33 THOUSANDS/ÂΜL (ref 0.6–4.47)
LYMPHOCYTES NFR BLD AUTO: 10 % (ref 14–44)
MCH RBC QN AUTO: 31.1 PG (ref 26.8–34.3)
MCHC RBC AUTO-ENTMCNC: 34.1 G/DL (ref 31.4–37.4)
MCV RBC AUTO: 91 FL (ref 82–98)
MONOCYTES # BLD AUTO: 1.07 THOUSAND/ÂΜL (ref 0.17–1.22)
MONOCYTES NFR BLD AUTO: 8 % (ref 4–12)
NEUTROPHILS # BLD AUTO: 10.96 THOUSANDS/ÂΜL (ref 1.85–7.62)
NEUTS SEG NFR BLD AUTO: 81 % (ref 43–75)
NRBC BLD AUTO-RTO: 0 /100 WBCS
PLATELET # BLD AUTO: 311 THOUSANDS/UL (ref 149–390)
PMV BLD AUTO: 8.8 FL (ref 8.9–12.7)
POTASSIUM SERPL-SCNC: 3.9 MMOL/L (ref 3.5–5.3)
PROT SERPL-MCNC: 6.1 G/DL (ref 6.4–8.4)
RBC # BLD AUTO: 4.76 MILLION/UL (ref 3.88–5.62)
SODIUM SERPL-SCNC: 142 MMOL/L (ref 135–147)
WBC # BLD AUTO: 13.61 THOUSAND/UL (ref 4.31–10.16)

## 2025-04-14 PROCEDURE — 83605 ASSAY OF LACTIC ACID: CPT

## 2025-04-14 PROCEDURE — 36415 COLL VENOUS BLD VENIPUNCTURE: CPT

## 2025-04-14 PROCEDURE — 85025 COMPLETE CBC W/AUTO DIFF WBC: CPT

## 2025-04-14 PROCEDURE — 87040 BLOOD CULTURE FOR BACTERIA: CPT

## 2025-04-14 PROCEDURE — 80053 COMPREHEN METABOLIC PANEL: CPT

## 2025-04-14 PROCEDURE — 99284 EMERGENCY DEPT VISIT MOD MDM: CPT

## 2025-04-14 PROCEDURE — 99285 EMERGENCY DEPT VISIT HI MDM: CPT

## 2025-04-14 PROCEDURE — 74177 CT ABD & PELVIS W/CONTRAST: CPT

## 2025-04-14 PROCEDURE — 83690 ASSAY OF LIPASE: CPT

## 2025-04-14 RX ORDER — KETOROLAC TROMETHAMINE 30 MG/ML
15 INJECTION, SOLUTION INTRAMUSCULAR; INTRAVENOUS ONCE
Status: COMPLETED | OUTPATIENT
Start: 2025-04-14 | End: 2025-04-15

## 2025-04-14 NOTE — Clinical Note
Felipe Sanders III was seen and treated in our emergency department on 4/14/2025.                Diagnosis:     Felipe  is off the rest of the shift today, may return to work on return date.    He may return on this date: 04/17/2025         If you have any questions or concerns, please don't hesitate to call.      Zhanna Layton PA-C    ______________________________           _______________          _______________  Hospital Representative                              Date                                Time

## 2025-04-15 ENCOUNTER — TELEPHONE (OUTPATIENT)
Age: 65
End: 2025-04-15

## 2025-04-15 VITALS
OXYGEN SATURATION: 96 % | BODY MASS INDEX: 27.3 KG/M2 | TEMPERATURE: 97.6 F | DIASTOLIC BLOOD PRESSURE: 67 MMHG | RESPIRATION RATE: 18 BRPM | HEART RATE: 100 BPM | HEIGHT: 68 IN | SYSTOLIC BLOOD PRESSURE: 116 MMHG | WEIGHT: 180.12 LBS

## 2025-04-15 PROCEDURE — 96365 THER/PROPH/DIAG IV INF INIT: CPT

## 2025-04-15 PROCEDURE — 96375 TX/PRO/DX INJ NEW DRUG ADDON: CPT

## 2025-04-15 RX ORDER — POLYETHYLENE GLYCOL 3350 17 G/17G
17 POWDER, FOR SOLUTION ORAL DAILY
Qty: 170 G | Refills: 0 | Status: SHIPPED | OUTPATIENT
Start: 2025-04-15 | End: 2025-05-02

## 2025-04-15 RX ORDER — DOCUSATE SODIUM 100 MG/1
100 CAPSULE, LIQUID FILLED ORAL 2 TIMES DAILY
Qty: 20 CAPSULE | Refills: 0 | Status: SHIPPED | OUTPATIENT
Start: 2025-04-15 | End: 2025-05-02

## 2025-04-15 RX ORDER — POLYETHYLENE GLYCOL 3350 17 G/17G
17 POWDER, FOR SOLUTION ORAL ONCE
Status: COMPLETED | OUTPATIENT
Start: 2025-04-15 | End: 2025-04-15

## 2025-04-15 RX ORDER — DOCUSATE SODIUM 100 MG/1
100 CAPSULE, LIQUID FILLED ORAL ONCE
Status: COMPLETED | OUTPATIENT
Start: 2025-04-15 | End: 2025-04-15

## 2025-04-15 RX ADMIN — IOHEXOL 100 ML: 350 INJECTION, SOLUTION INTRAVENOUS at 00:00

## 2025-04-15 RX ADMIN — SODIUM CHLORIDE 1000 ML: 0.9 INJECTION, SOLUTION INTRAVENOUS at 00:20

## 2025-04-15 RX ADMIN — KETOROLAC TROMETHAMINE 15 MG: 30 INJECTION, SOLUTION INTRAMUSCULAR; INTRAVENOUS at 00:21

## 2025-04-15 RX ADMIN — PIPERACILLIN AND TAZOBACTAM 4.5 G: 36; 4.5 INJECTION, POWDER, FOR SOLUTION INTRAVENOUS at 00:42

## 2025-04-15 RX ADMIN — DOCUSATE SODIUM 100 MG: 100 CAPSULE, LIQUID FILLED ORAL at 01:29

## 2025-04-15 RX ADMIN — POLYETHYLENE GLYCOL 3350 17 G: 17 POWDER, FOR SOLUTION ORAL at 01:29

## 2025-04-15 NOTE — ED PROVIDER NOTES
Time reflects when diagnosis was documented in both MDM as applicable and the Disposition within this note       Time User Action Codes Description Comment    4/15/2025  1:21 AM Zhanna Layton Add [R10.9] Abdominal pain     4/15/2025  1:21 AM Zhanna Layton Add [K59.00] Constipation           ED Disposition       ED Disposition   Discharge    Condition   Stable    Date/Time   Tue Apr 15, 2025  1:21 AM    Comment   Felipe Sanders III discharge to home/self care.                   Assessment & Plan       Medical Decision Making  Differential diagnosis includes but is not limited to appendicitis, perforated viscus, diverticulitis, SBO, ischemic bowel, constipation, etc.    Tachycardic on arrival.  Otherwise, VSS and patient well-appearing throughout ER course.  Leukocytosis, 13.61- improved from previous labs.  Patient initially met SIRS criteria secondary to tachycardia and leukocytosis-blood cultures ordered and dose of IV Zosyn ordered.  Patient did have recent hospitalization for acute uncomplicated appendicitis, however, no surgical intervention was performed due to patient's COPD and needing pulmonology clearance.  Patient still has not had surgical intervention.  Otherwise, labs stable.  Normal lactic acid.  No acute CT findings.  Improvement of symptoms on re-evaluation. Symptoms most likely secondary to constipation.  Offered patient soapsuds enema, however, patient would prefer p.o. Colace and MiraLAX.  Provided patient education and discussed return precautions.  Patient to follow-up with GI, referral placed.  Patient to follow-up with his PCP and return to the ER for any worsening symptoms.  All questions and concerns addressed and answered appropriately.  Patient verbalizes understanding and agrees to discharge plan.  Patient also provided with written discharge instructions.    Amount and/or Complexity of Data Reviewed  Labs: ordered. Decision-making details documented in ED Course.  Radiology: ordered.  Decision-making details documented in ED Course.    Risk  OTC drugs.  Prescription drug management.        ED Course as of 04/15/25 0616   Mon Apr 14, 2025   2337 WBC(!): 13.61  Improved from x3 weeks ago. However, pt does meet SIRS d/t tachycardia and leukocyotsis. Due to known appendicitis and pending appendectomy, will obtain blood cx, give fluids, and give 1 dose of IV Zosyn in the ER to cover for potential infectious etiology.   2350 LACTIC ACID: 1.6   Tue Apr 15, 2025   0112 CT abdomen pelvis with contrast  IMPRESSION:     No CT evidence of acute findings        0118 On reevaluation, patient reports symptom improvement. Patient resting comfortably in no acute distress. Discussed ED course of labs and imaging with patient.  Patient reports feeling constipated, offered enema.  Patient reports he would prefer p.o. medications for constipation.  Will place orders for MiraLAX and Colace.        Medications   sodium chloride 0.9 % bolus 1,000 mL (0 mL Intravenous Stopped 4/15/25 0120)   ketorolac (TORADOL) injection 15 mg (15 mg Intravenous Given 4/15/25 0021)   piperacillin-tazobactam (ZOSYN) IVPB 4.5 g (0 g Intravenous Stopped 4/15/25 0112)   iohexol (OMNIPAQUE) 350 MG/ML injection (MULTI-DOSE) 100 mL (100 mL Intravenous Given 4/15/25 0000)   polyethylene glycol (MIRALAX) packet 17 g (17 g Oral Given 4/15/25 0129)   docusate sodium (COLACE) capsule 100 mg (100 mg Oral Given 4/15/25 0129)       ED Risk Strat Scores                    No data recorded        SBIRT 22yo+      Flowsheet Row Most Recent Value   Initial Alcohol Screen: US AUDIT-C     1. How often do you have a drink containing alcohol? 0 Filed at: 04/14/2025 2302   2. How many drinks containing alcohol do you have on a typical day you are drinking?  0 Filed at: 04/14/2025 2302   3a. Male UNDER 65: How often do you have five or more drinks on one occasion? 0 Filed at: 04/14/2025 2302   3b. FEMALE Any Age, or MALE 65+: How often do you have 4 or more  drinks on one occassion? 0 Filed at: 04/14/2025 2302   Audit-C Score 0 Filed at: 04/14/2025 2302   LAZARA: How many times in the past year have you...    Used an illegal drug or used a prescription medication for non-medical reasons? Never Filed at: 04/14/2025 2302                            History of Present Illness       Chief Complaint   Patient presents with    Abdominal Pain     Pt reports left sided abd pain since last night. Pt denies N/V/D. Pt reports constipation        Past Medical History:   Diagnosis Date    COPD (chronic obstructive pulmonary disease) (HCC)     Diverticulitis of colon     Erectile dysfunction     Hernia of abdominal cavity     Liver cyst       Past Surgical History:   Procedure Laterality Date    COLONOSCOPY      NOSE SURGERY        History reviewed. No pertinent family history.   Social History     Tobacco Use    Smoking status: Every Day     Current packs/day: 1.50     Average packs/day: 1.5 packs/day for 50.0 years (75.0 ttl pk-yrs)     Types: Cigarettes     Passive exposure: Past    Smokeless tobacco: Never   Vaping Use    Vaping status: Never Used   Substance Use Topics    Alcohol use: Never    Drug use: No      E-Cigarette/Vaping    E-Cigarette Use Never User       E-Cigarette/Vaping Substances    Nicotine No     THC No     CBD No     Flavoring No     Other No     Unknown No       I have reviewed and agree with the history as documented.     Patient is a 64-year-old male who presents to the emergency room for abdominal pain.  Patient reports left lower quadrant pain that started yesterday and has been constant.  Pain has been persistent x2 days.  Reports pain radiates across his abdomen.  Denies any other symptoms. Denies urinary symptoms.  Last bowel movement today and hard. Patient reports associated constipation.  Patient able to pass gas.  Denies history of abdominal surgery.  Patient was recently admitted 03/23-03/24 for mucocele of appendix and LLQ pain.  Per chart review, no  acute surgical intervention was performed during hospitalization as patient had to have pulmonary clearance prior to appendectomy.  Patient reports that he has followed up with his pulmonologist, office visit 04/03 with [facility] pulmonology.      Abdominal Pain  Associated symptoms: constipation    Associated symptoms: no chest pain, no chills, no cough, no diarrhea, no dysuria, no fever, no hematuria, no nausea, no shortness of breath, no sore throat and no vomiting        Review of Systems   Constitutional:  Negative for chills and fever.   HENT:  Negative for ear pain, sore throat, trouble swallowing and voice change.    Eyes:  Negative for pain and visual disturbance.   Respiratory:  Negative for cough and shortness of breath.    Cardiovascular:  Negative for chest pain and palpitations.   Gastrointestinal:  Positive for abdominal pain and constipation. Negative for blood in stool, diarrhea, nausea and vomiting.   Genitourinary:  Negative for dysuria, flank pain and hematuria.   Musculoskeletal:  Negative for arthralgias and back pain.   Skin:  Negative for color change and rash.   Neurological:  Negative for seizures, syncope and headaches.   Psychiatric/Behavioral:  Negative for confusion.    All other systems reviewed and are negative.          Objective       ED Triage Vitals   Temperature Pulse Blood Pressure Respirations SpO2 Patient Position - Orthostatic VS   04/14/25 2259 04/14/25 2259 04/14/25 2259 04/14/25 2259 04/14/25 2259 04/14/25 2259   97.6 °F (36.4 °C) (!) 112 129/82 20 98 % Sitting      Temp Source Heart Rate Source BP Location FiO2 (%) Pain Score    04/14/25 2259 04/14/25 2259 04/14/25 2259 -- 04/15/25 0021    Temporal Monitor Left arm  3      Vitals      Date and Time Temp Pulse SpO2 Resp BP Pain Score FACES Pain Rating User   04/15/25 0134 -- 100 96 % -- 116/67 -- --    04/15/25 0111 -- 100 93 % 18 106/67 -- --    04/15/25 0021 -- -- -- -- -- 3 --    04/14/25 2259 97.6 °F (36.4  °C) 112 98 % 20 129/82 -- -- LA            Physical Exam  Vitals and nursing note reviewed.   Constitutional:       General: He is not in acute distress.     Appearance: Normal appearance. He is well-developed.   HENT:      Head: Normocephalic and atraumatic.   Eyes:      Conjunctiva/sclera: Conjunctivae normal.   Cardiovascular:      Rate and Rhythm: Normal rate and regular rhythm.      Pulses: Normal pulses.      Heart sounds: No murmur heard.  Pulmonary:      Effort: Pulmonary effort is normal. No respiratory distress.      Breath sounds: Normal breath sounds.   Abdominal:      Palpations: Abdomen is soft.      Tenderness: There is abdominal tenderness in the suprapubic area and left lower quadrant. There is no right CVA tenderness or left CVA tenderness.   Musculoskeletal:         General: No swelling.      Cervical back: Normal range of motion and neck supple.   Skin:     General: Skin is warm and dry.      Capillary Refill: Capillary refill takes less than 2 seconds.   Neurological:      General: No focal deficit present.      Mental Status: He is alert and oriented to person, place, and time.   Psychiatric:         Mood and Affect: Mood normal.         Results Reviewed       Procedure Component Value Units Date/Time    Blood culture #1 [[id number]] Collected: 04/14/25 2354    Lab Status: In process Specimen: Blood from Hand, Right Updated: 04/14/25 2357    Blood culture #2 [[id number]] Collected: 04/14/25 2354    Lab Status: In process Specimen: Blood from Hand, Left Updated: 04/14/25 2357    Comprehensive metabolic panel [[id number]]  (Abnormal) Collected: 04/14/25 2325    Lab Status: Final result Specimen: Blood from Arm, Right Updated: 04/14/25 2350     Sodium 142 mmol/L      Potassium 3.9 mmol/L      Chloride 105 mmol/L      CO2 29 mmol/L      ANION GAP 8 mmol/L      BUN 18 mg/dL      Creatinine 1.03 mg/dL      Glucose 131 mg/dL      Calcium 8.9 mg/dL      AST 15 U/L      ALT 17 U/L      Alkaline Phosphatase  52 U/L      Total Protein 6.1 g/dL      Albumin 4.1 g/dL      Total Bilirubin 0.46 mg/dL      eGFR 76 ml/min/1.73sq m     Narrative:      National Kid[employer] Disease Foundation guidelines for Chronic Kidney Disease (CKD):     Stage 1 with normal or high GFR (GFR > 90 mL/min/1.73 square meters)    Stage 2 Mild CKD (GFR = 60-89 mL/min/1.73 square meters)    Stage 3A Moderate CKD (GFR = 45-59 mL/min/1.73 square meters)    Stage 3B Moderate CKD (GFR = 30-44 mL/min/1.73 square meters)    Stage 4 Severe CKD (GFR = 15-29 mL/min/1.73 square meters)    Stage 5 End Stage CKD (GFR <15 mL/min/1.73 square meters)  Note: GFR calculation is accurate only with a steady state creatinine    Lipase [[id number]]  (Normal) Collected: 04/14/25 2325    Lab Status: Final result Specimen: Blood from Arm, Right Updated: 04/14/25 2350     Lipase 31 u/L     Lactic acid, plasma (w/reflex if result > 2.0) [[id number]]  (Normal) Collected: 04/14/25 2325    Lab Status: Final result Specimen: Blood from Arm, Right Updated: 04/14/25 2350     LACTIC ACID 1.6 mmol/L     Narrative:      Result may be elevated if tourniquet was used during collection.    CBC and differential [[id number]]  (Abnormal) Collected: 04/14/25 2325    Lab Status: Final result Specimen: Blood from Arm, Right Updated: 04/14/25 2335     WBC 13.61 Thousand/uL      RBC 4.76 Million/uL      Hemoglobin 14.8 g/dL      Hematocrit 43.4 %      MCV 91 fL      MCH 31.1 pg      MCHC 34.1 g/dL      RDW 14.0 %      MPV 8.8 fL      Platelets 311 Thousands/uL      nRBC 0 /100 WBCs      Segmented % 81 %      Immature Grans % 1 %      Lymphocytes % 10 %      Monocytes % 8 %      Eosinophils Relative 0 %      Basophils Relative 0 %      Absolute Neutrophils 10.96 Thousands/µL      Absolute Immature Grans 0.17 Thousand/uL      Absolute Lymphocytes 1.33 Thousands/µL      Absolute Monocytes 1.07 Thousand/µL      Eosinophils Absolute 0.04 Thousand/µL      Basophils Absolute 0.04 Thousands/µL             CT  abdomen pelvis with contrast   Final Interpretation by Marco Antonio Rogers DO (04/15 0057)      No CT evidence of acute findings         Workstation performed: [id number]             Procedures    ED Medication and Procedure Management   Prior to Admission Medications   Prescriptions Last Dose Informant Patient Reported? Taking?   albuterol (2.5 mg/3 mL) 0.083 % nebulizer solution   Yes No   Sig: Inhale 2.5 mg every 6 (six) hours as needed   dicyclomine (BENTYL) 20 mg tablet  Self No No   Sig: TAKE 1 TABLET BY MOUTH EVERY 6 HOURS.   fluticasone-umeclidinium-vilanterol (Trelegy Ellipta) 200-62.5-25 mcg/actuation AEPB inhaler   No No   Sig: Inhale 1 puff daily Rinse mouth after use.   Patient not taking: Reported on 4/4/2025   nicotine (NICODERM CQ) 21 mg/24 hr TD 24 hr patch   No No   Sig: Place 1 patch on the skin over 24 hours daily   Patient not taking: Reported on 4/4/2025   oxygen gas  Self Yes No   Sig: Inhale continuous as needed   polyethylene glycol (GOLYTELY) 4000 mL solution  Self No No   Sig: Take 4,000 mL by mouth once for 1 dose   predniSONE 10 mg tablet   Yes No   rOPINIRole (REQUIP) 1 mg tablet   Yes No   Sig: Take 1 mg by mouth Three times a day      Facility-Administered Medications: None     Discharge Medication List as of 4/15/2025  1:24 AM        START taking these medications    Details   docusate sodium (COLACE) 100 mg capsule Take 1 capsule (100 mg total) by mouth 2 (two) times a day for 10 days, Starting Tue 4/15/2025, Until Fri 4/25/2025, Normal      polyethylene glycol (MIRALAX) 17 g packet Take 17 g by mouth daily for 10 days, Starting Tue 4/15/2025, Until Fri 4/25/2025, Normal           CONTINUE these medications which have NOT CHANGED    Details   albuterol (2.5 mg/3 mL) 0.083 % nebulizer solution Inhale 2.5 mg every 6 (six) hours as needed, Starting Thu 3/20/2025, Until Fri 3/20/2026 at 2359, Historical Med      dicyclomine (BENTYL) 20 mg tablet TAKE 1 TABLET BY MOUTH EVERY 6 HOURS., Starting  Thu 3/13/2025, Normal      fluticasone-umeclidinium-vilanterol (Trelegy Ellipta) 200-62.5-25 mcg/actuation AEPB inhaler Inhale 1 puff daily Rinse mouth after use., Starting Mon 3/24/2025, Normal      nicotine (NICODERM CQ) 21 mg/24 hr TD 24 hr patch Place 1 patch on the skin over 24 hours daily, Starting Tue 3/25/2025, Normal      oxygen gas Inhale continuous as needed, Historical Med      polyethylene glycol (GOLYTELY) 4000 mL solution Take 4,000 mL by mouth once for 1 dose, Starting Tue 2/4/2025, Normal      predniSONE 10 mg tablet Historical Med      rOPINIRole (REQUIP) 1 mg tablet Take 1 mg by mouth Three times a day, Starting Thu 4/3/2025, Until Sat 5/3/2025, Historical Med             ED SEPSIS DOCUMENTATION   Time reflects when diagnosis was documented in both MDM as applicable and the Disposition within this note       Time User Action Codes Description Comment    4/15/2025  1:21 AM Zhanna Layton [R10.9] Abdominal pain     4/15/2025  1:21 AM Zhanna Layton [K59.00] Constipation                  Zhanna Layton PA-C  04/15/25 0616

## 2025-04-15 NOTE — TELEPHONE ENCOUNTER
"Pt calling in, he was in the ED yesterday and have GI appt tomorrow. He is asking for antibx, pain meds and if we can \"get rid of this\" problem he is having.   I explained he should continue miralax and colace as advised in the ED and follow up tomorrow as scheduled.     Pt did have IV antibx yesterday and per notes \"Symptoms most likely secondary to constipation\"   "

## 2025-04-15 NOTE — DISCHARGE INSTRUCTIONS
"CT, \"IMPRESSION:     No CT evidence of acute findings\".    Colace and MiraLAX as prescribed.  Follow-up with GI for constipation and abdominal pain.  Follow-up with your PCP and return to the ER for any worsening symptoms.  "

## 2025-04-16 ENCOUNTER — OFFICE VISIT (OUTPATIENT)
Dept: GASTROENTEROLOGY | Facility: CLINIC | Age: 65
End: 2025-04-16
Payer: COMMERCIAL

## 2025-04-16 VITALS
OXYGEN SATURATION: 94 % | SYSTOLIC BLOOD PRESSURE: 142 MMHG | WEIGHT: 180 LBS | HEIGHT: 68 IN | BODY MASS INDEX: 27.28 KG/M2 | HEART RATE: 80 BPM | TEMPERATURE: 97.6 F | DIASTOLIC BLOOD PRESSURE: 80 MMHG

## 2025-04-16 DIAGNOSIS — R10.84 GENERALIZED ABDOMINAL PAIN: Primary | ICD-10-CM

## 2025-04-16 DIAGNOSIS — K59.04 CHRONIC IDIOPATHIC CONSTIPATION: ICD-10-CM

## 2025-04-16 DIAGNOSIS — K37 APPENDICITIS, UNSPECIFIED APPENDICITIS TYPE: ICD-10-CM

## 2025-04-16 PROCEDURE — 99213 OFFICE O/P EST LOW 20 MIN: CPT | Performed by: PHYSICIAN ASSISTANT

## 2025-04-16 RX ORDER — PROMETHAZINE HYDROCHLORIDE AND CODEINE PHOSPHATE 6.25; 1 MG/5ML; MG/5ML
SOLUTION ORAL
COMMUNITY
Start: 2025-04-04

## 2025-04-16 NOTE — PROGRESS NOTES
Name: Felipe Sanders III      : 1960      MRN: [id number]  Encounter Provider: Cheri Marquez PA-C  Encounter Date: 2025   Encounter department: [facility] [place]  :  Assessment & Plan  Generalized abdominal pain    Chronic idiopathic constipation  These are chronic issues  He has been to the ER innumerable times for these issues  He has had multiple CT scans    He is taking Miralax and senna for his constipation with minimal relief  He has been prescribed Linzess but did not take this because he though the instructions said if he had constipation to avoid     He reports bloating    His pain is mostly on the left side but recently does radiate across his abdomen    Most recent ER visit was  with CTAP without actue findings    Advised bowel cleanse - given information on dulcolax/miralax purge  After this he should start Linzess 145mcg daily  He is agreeable    Colonoscopy in December of last year was normal but his prep was inadequate  Colonoscopy was advised in 1 year - 2025   We could plan for sooner evaluation if not improving  He would need a 2-day GoLytely prep    He has a great deal of stress in his life and he understands how this contributes to his symptoms         Appendicitis, unspecified appendicitis type  Multiple CT scans over the past 5 months have documented a dilated appendix with mucocele  He has not ever complained of RLQ pain  He was evaluated by surgery who advised appendectomy but wanted pulmonary clearance first  He has not obtained pulmonary clearance but does not want to pursue this any further  Discussed risks of appendicitis including rupture and peritonitis           History of Present Illness   HPI  Felipe Sanders III is a 64 y.o. male with a history of COPD, diverticulitis, appendicitis who presents for follow-up of abdominal pain and constipation.  Patient has complained of abdominal pain for many years.  He reports  that he is always tended to be constipated.  He always complains of bloating.  He has been to the emergency room a multitude of times with these complaints.  He was admitted in December with these complaints.  He underwent a colonoscopy in December which was grossly unremarkable however the prep was inadequate.  He has had a multitude of CT scans.  Most of the CT scans since December of last year have documented a dilated appendix with a mucocele.  He was evaluated by surgery however given his lack of right lower quadrant pain surgery was not felt to be emergent.  He was advised to get pulmonary clearance and then follow-up in the outpatient setting.  He has now  obtained pulmonary clearance from his outside pulmonologist however does not want to follow-up with surgery at this time.  He does not feel like his appendix is a source of any of his symptoms.  He denies any recent fevers, chills, nausea or vomiting.    He presents today with the complaint of abdominal pain, bloating and constipation.  He is currently taking Dulcolax and MiraLAX which are not helping.  He was prescribed Linzess at a previous appointment this year but never took it as he thought the instructions said not to take it if he was constipated.  He reports having infrequent small, hard stools.  He reports a consistent left-sided abdominal pain which can be quite severe.  He reports that the pain does radiate across his abdomen but never localizes in the right lower quadrant.  He has no rectal bleeding.  His last emergency room visit was on April 14, 2 days ago.  CT at that time was basically unremarkable.        Review of Systems  Medical History Reviewed by provider this encounter:  Problems     .  Past Medical History   Past Medical History:   Diagnosis Date    COPD (chronic obstructive pulmonary disease) (HCC)     Diverticulitis of colon     Erectile dysfunction     Hernia of abdominal cavity     Liver cyst      Past Surgical History:    Procedure Laterality Date    COLONOSCOPY      NOSE SURGERY       History reviewed. No pertinent family history.   reports that he has been smoking cigarettes. He has a 75 pack-year smoking history. He has been exposed to tobacco smoke. He has never used smokeless tobacco. He reports that he does not drink alcohol and does not use drugs.  Current Outpatient Medications   Medication Instructions    albuterol 2.5 mg, Inhalation, Every 6 hours PRN    dicyclomine (BENTYL) 20 mg, Oral, Every 6 hours    docusate sodium (COLACE) 100 mg, Oral, 2 times daily    fluticasone-umeclidinium-vilanterol (Trelegy Ellipta) 200-62.5-25 mcg/actuation AEPB inhaler 1 puff, Inhalation, Daily, Rinse mouth after use.    nicotine (NICODERM CQ) 21 mg/24 hr TD 24 hr patch 1 patch, Transdermal, Daily    oxygen gas Continuous PRN    polyethylene glycol (GOLYTELY) 4000 mL solution 4,000 mL, Oral, Once    polyethylene glycol (MIRALAX) 17 g, Oral, Daily    predniSONE 10 mg tablet     Promethazine-Codeine 6.25-10 MG/5ML SOLN TAKE 5 ML BY MOUTH EVERY 4 (FOUR) HOURS AS NEEDED FOR COUGH.    rOPINIRole (REQUIP) 1 mg, Oral, 3 times daily   No Known Allergies   Current Outpatient Medications on File Prior to Visit   Medication Sig Dispense Refill    albuterol (2.5 mg/3 mL) 0.083 % nebulizer solution Inhale 2.5 mg every 6 (six) hours as needed      dicyclomine (BENTYL) 20 mg tablet TAKE 1 TABLET BY MOUTH EVERY 6 HOURS. 360 tablet 1    docusate sodium (COLACE) 100 mg capsule Take 1 capsule (100 mg total) by mouth 2 (two) times a day for 10 days 20 capsule 0    oxygen gas Inhale continuous as needed      polyethylene glycol (GOLYTELY) 4000 mL solution Take 4,000 mL by mouth once for 1 dose 4000 mL 0    polyethylene glycol (MIRALAX) 17 g packet Take 17 g by mouth daily for 10 days 170 g 0    predniSONE 10 mg tablet       Promethazine-Codeine 6.25-10 MG/5ML SOLN TAKE 5 ML BY MOUTH EVERY 4 (FOUR) HOURS AS NEEDED FOR COUGH.      rOPINIRole (REQUIP) 1 mg tablet  "Take 1 mg by mouth Three times a day      fluticasone-umeclidinium-vilanterol (Trelegy Ellipta) 200-62.5-25 mcg/actuation AEPB inhaler Inhale 1 puff daily Rinse mouth after use. (Patient not taking: Reported on 4/4/2025) 60 blister 1    nicotine (NICODERM CQ) 21 mg/24 hr TD 24 hr patch Place 1 patch on the skin over 24 hours daily (Patient not taking: Reported on 4/4/2025) 28 patch 0     No current facility-administered medications on file prior to visit.      Social History     Tobacco Use    Smoking status: Every Day     Current packs/day: 1.50     Average packs/day: 1.5 packs/day for 50.0 years (75.0 ttl pk-yrs)     Types: Cigarettes     Passive exposure: Past    Smokeless tobacco: Never   Vaping Use    Vaping status: Never Used   Substance and Sexual Activity    Alcohol use: Never    Drug use: No    Sexual activity: Yes        Objective   /80 (BP Location: Right arm, Patient Position: Sitting, Cuff Size: Standard)   Pulse 80   Temp 97.6 °F (36.4 °C) (Temporal)   Ht 5' 8\" (1.727 m)   Wt 81.6 kg (180 lb)   SpO2 94%   BMI 27.37 kg/m²      Physical Exam  Vitals reviewed.   Constitutional:       Appearance: Normal appearance.   HENT:      Head: Normocephalic and atraumatic.   Eyes:      Extraocular Movements: Extraocular movements intact.      Pupils: Pupils are equal, round, and reactive to light.   Cardiovascular:      Rate and Rhythm: Normal rate and regular rhythm.   Abdominal:      General: Bowel sounds are normal. There is no distension.      Palpations: Abdomen is soft. There is no mass.      Tenderness: There is abdominal tenderness. There is no guarding or rebound.   Skin:     General: Skin is warm and dry.      Coloration: Skin is not jaundiced.   Neurological:      General: No focal deficit present.      Mental Status: He is alert and oriented to person, place, and time.           "

## 2025-04-20 LAB
BACTERIA BLD CULT: NORMAL
BACTERIA BLD CULT: NORMAL

## 2025-04-23 ENCOUNTER — TELEPHONE (OUTPATIENT)
Age: 65
End: 2025-04-23

## 2025-04-23 NOTE — TELEPHONE ENCOUNTER
"Patient states that he wants to talk to Cheri.  States that \" I still have pain, I did what she told me and I still have pain\"  Patient states that he has had BM's but the pain is still there.  States that the pain is a \" Steady 5\"   Patient was aware that if the pain is significant that he could go to the ER which he states \" all they do is give me needles and pills and send me home\"   Patient would like to speak with Cheri directly   "

## 2025-04-24 NOTE — TELEPHONE ENCOUNTER
I spoke with patient.  He continues with left-sided abdominal pain and constipation.  He has not yet started Linzess as he again states he was told not to take it if you have an obstruction.  I reassured patient he does not have a bowel obstruction and he has to take the Linzess.  He is going to start today and then let me know if the pain is not getting better.  We could also trial amitriptyline to help control his pain

## 2025-04-29 ENCOUNTER — TELEPHONE (OUTPATIENT)
Age: 65
End: 2025-04-29

## 2025-04-29 NOTE — TELEPHONE ENCOUNTER
Patient reports [facility] has turned off his electric due to nonpayment.     Patient needs electricity for his home oxygen and nebulizer. He has COPD.    Patient asks that PCP contact [facility] at [phone] to request that his electric be turned back on. His account number is [id number].

## 2025-04-30 ENCOUNTER — TELEPHONE (OUTPATIENT)
Age: 65
End: 2025-04-30

## 2025-04-30 DIAGNOSIS — H53.8 BLURRED VISION: Primary | ICD-10-CM

## 2025-04-30 NOTE — TELEPHONE ENCOUNTER
Patient called stating he would like a referral to see an Ophthalmologist. He states he has been having blurry vision in both eyes for some time and he would like to have it checked out.       Patient would appreciate a call back at [phone] when referral is placed.

## 2025-04-30 NOTE — TELEPHONE ENCOUNTER
LMOM advisng pt that typically in these situations, we will advise him to reach out to his insurance company to see who is in par with his plan. Once he obtainsw this information, we can update your referral.     Take care.

## 2025-05-02 ENCOUNTER — OFFICE VISIT (OUTPATIENT)
Dept: INTERNAL MEDICINE CLINIC | Facility: CLINIC | Age: 65
End: 2025-05-02
Payer: COMMERCIAL

## 2025-05-02 VITALS
HEIGHT: 68 IN | DIASTOLIC BLOOD PRESSURE: 74 MMHG | HEART RATE: 103 BPM | SYSTOLIC BLOOD PRESSURE: 118 MMHG | WEIGHT: 180 LBS | OXYGEN SATURATION: 95 % | TEMPERATURE: 97.6 F | BODY MASS INDEX: 27.28 KG/M2 | RESPIRATION RATE: 17 BRPM

## 2025-05-02 DIAGNOSIS — A69.20 ERYTHEMA MIGRANS (LYME DISEASE): Primary | ICD-10-CM

## 2025-05-02 PROCEDURE — 99213 OFFICE O/P EST LOW 20 MIN: CPT

## 2025-05-02 RX ORDER — IBUPROFEN 600 MG/1
600 TABLET, FILM COATED ORAL EVERY 6 HOURS PRN
Qty: 30 TABLET | Refills: 0 | Status: SHIPPED | OUTPATIENT
Start: 2025-05-02

## 2025-05-02 RX ORDER — DOXYCYCLINE HYCLATE 100 MG
100 TABLET ORAL 2 TIMES DAILY
Qty: 20 TABLET | Refills: 0 | Status: SHIPPED | OUTPATIENT
Start: 2025-05-02 | End: 2025-05-12

## 2025-05-02 NOTE — PROGRESS NOTES
Name: Felipe Sanders III      : 1960      MRN: [id number]  Encounter Provider: CHELSEA Rodríguez  Encounter Date: 2025   Encounter department: [facility]  :  Assessment & Plan  Erythema migrans (Lyme disease)  Bull's-eye rash present with a scabbed center where patient reports he removed the tick from.  Tick bite on the left knee, scabbed area visible, no rash present.  Patient does report arthralgias but unsure if this is just chronic in nature.  Denies fever.  Encouraged fluid intake with use of ibuprofen.  Orders:  •  doxycycline hyclate (VIBRA-TABS) 100 mg tablet; Take 1 tablet (100 mg total) by mouth 2 (two) times a day for 10 days  •  ibuprofen (MOTRIN) 600 mg tablet; Take 1 tablet (600 mg total) by mouth every 6 (six) hours as needed for mild pain           History of Present Illness {?Quick Links Encounters * My Last Note * Last Note in Specialty * Snapshot * Since Last Visit * History :52806}  Bryan is here today with complaints of a tick bit with rash. He cut the tick out from his right upper arm, been present for 2 days prior, he has noticed it but was unaware that it was a tick until today. There is a scabbed center with a classic bullseye rash surrounding the bite. He reports a tick he removed from his left knee as well, but there is no rash surrounding it. 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      Review of Systems   Constitutional:  Negative for chills, fatigue and fever.   HENT:  Negative for congestion, ear pain, rhinorrhea, sinus pressure and sore throat.    Eyes:  Negative for pain and visual disturbance.   Respiratory:  Positive for cough and shortness of breath. Negative for chest tightness.    Cardiovascular:  Negative for chest pain, palpitations and leg swelling.   Gastrointestinal:  Negative for abdominal pain, constipation, diarrhea, nausea and vomiting.   Endocrine: Negative.    Genitourinary:  Negative for dysuria,  "frequency and hematuria.   Musculoskeletal:  Positive for arthralgias and myalgias. Negative for back pain.   Skin:  Positive for rash. Negative for color change.   Allergic/Immunologic: Negative.    Neurological:  Negative for dizziness, seizures, syncope, light-headedness and headaches.   Hematological: Negative.    Psychiatric/Behavioral: Negative.     All other systems reviewed and are negative.      Objective {?Quick Links Trend Vitals * Enter New Vitals * Results Review * Timeline (Adult) * Labs * Imaging * Cardiology * Procedures * Lung Cancer Screening * Surgical eConsent :45380}  /74 (BP Location: Left arm, Patient Position: Sitting, Cuff Size: Adult)   Pulse 103   Temp 97.6 °F (36.4 °C) (Tympanic)   Resp 17   Ht 5' 8\" (1.727 m)   Wt 81.6 kg (180 lb)   SpO2 95%   BMI 27.37 kg/m²      Physical Exam  Vitals and nursing note reviewed.   Constitutional:       General: He is not in acute distress.     Appearance: He is well-developed.   Cardiovascular:      Rate and Rhythm: Normal rate and regular rhythm.      Pulses: Normal pulses.      Heart sounds: Normal heart sounds. No murmur heard.  Pulmonary:      Effort: Pulmonary effort is normal. No respiratory distress.      Breath sounds: Decreased breath sounds present.   Abdominal:      General: Bowel sounds are normal.      Palpations: Abdomen is soft.      Tenderness: There is no abdominal tenderness.   Musculoskeletal:         General: No swelling.      Cervical back: Normal range of motion and neck supple.   Skin:     General: Skin is warm and dry.      Findings: Rash present.          Neurological:      Mental Status: He is alert and oriented to person, place, and time.   Psychiatric:         Mood and Affect: Mood normal.         "

## 2025-05-07 ENCOUNTER — TELEPHONE (OUTPATIENT)
Age: 65
End: 2025-05-07

## 2025-05-07 DIAGNOSIS — R10.84 GENERALIZED ABDOMINAL PAIN: Primary | ICD-10-CM

## 2025-05-07 NOTE — TELEPHONE ENCOUNTER
Pt. Calling asking to speak with Cheri Marquez PA-C , advised that message would be sent to provider and have her reach out when available, pt. Did not want to speak with nurse

## 2025-05-07 NOTE — TELEPHONE ENCOUNTER
Spoke with patient.  Still has not tried Linzess.  Told her to start Linzess in the morning and I called in amitriptyline to start at bedtime.

## 2025-05-09 ENCOUNTER — TELEPHONE (OUTPATIENT)
Age: 65
End: 2025-05-09

## 2025-05-09 ENCOUNTER — HOSPITAL ENCOUNTER (EMERGENCY)
Facility: HOSPITAL | Age: 65
Discharge: HOME | End: 2025-05-09
Attending: EMERGENCY MEDICINE
Payer: COMMERCIAL

## 2025-05-09 ENCOUNTER — APPOINTMENT (EMERGENCY)
Dept: CT IMAGING | Facility: HOSPITAL | Age: 65
End: 2025-05-09
Payer: COMMERCIAL

## 2025-05-09 VITALS
HEART RATE: 82 BPM | DIASTOLIC BLOOD PRESSURE: 79 MMHG | TEMPERATURE: 98.2 F | SYSTOLIC BLOOD PRESSURE: 109 MMHG | OXYGEN SATURATION: 94 % | WEIGHT: 186.29 LBS | BODY MASS INDEX: 28.33 KG/M2 | RESPIRATION RATE: 17 BRPM

## 2025-05-09 DIAGNOSIS — R10.12 LEFT UPPER QUADRANT ABDOMINAL PAIN: ICD-10-CM

## 2025-05-09 DIAGNOSIS — R10.32 LLQ ABDOMINAL PAIN: Primary | ICD-10-CM

## 2025-05-09 LAB
2HR DELTA HS TROPONIN: -1 NG/L
ALBUMIN SERPL BCG-MCNC: 3.8 G/DL (ref 3.5–5)
ALP SERPL-CCNC: 53 U/L (ref 34–104)
ALT SERPL W P-5'-P-CCNC: 17 U/L (ref 7–52)
ANION GAP SERPL CALCULATED.3IONS-SCNC: 7 MMOL/L (ref 4–13)
AST SERPL W P-5'-P-CCNC: 11 U/L (ref 13–39)
BASOPHILS # BLD MANUAL: 0 THOUSAND/UL (ref 0–0.1)
BASOPHILS NFR MAR MANUAL: 0 % (ref 0–1)
BILIRUB SERPL-MCNC: 0.44 MG/DL (ref 0.2–1)
BILIRUB UR QL STRIP: NEGATIVE
BUN SERPL-MCNC: 30 MG/DL (ref 5–25)
CALCIUM SERPL-MCNC: 8.9 MG/DL (ref 8.4–10.2)
CARDIAC TROPONIN I PNL SERPL HS: 10 NG/L (ref ?–50)
CARDIAC TROPONIN I PNL SERPL HS: 11 NG/L (ref ?–50)
CHLORIDE SERPL-SCNC: 106 MMOL/L (ref 96–108)
CLARITY UR: CLEAR
CO2 SERPL-SCNC: 29 MMOL/L (ref 21–32)
COLOR UR: ABNORMAL
CREAT SERPL-MCNC: 1.18 MG/DL (ref 0.6–1.3)
EOSINOPHIL # BLD MANUAL: 0.42 THOUSAND/UL (ref 0–0.4)
EOSINOPHIL NFR BLD MANUAL: 3 % (ref 0–6)
ERYTHROCYTE [DISTWIDTH] IN BLOOD BY AUTOMATED COUNT: 13.9 % (ref 11.6–15.1)
GFR SERPL CREATININE-BSD FRML MDRD: 64 ML/MIN/1.73SQ M
GLUCOSE SERPL-MCNC: 89 MG/DL (ref 65–140)
GLUCOSE UR STRIP-MCNC: NEGATIVE MG/DL
HCT VFR BLD AUTO: 46.2 % (ref 36.5–49.3)
HGB BLD-MCNC: 15.2 G/DL (ref 12–17)
HGB UR QL STRIP.AUTO: NEGATIVE
KETONES UR STRIP-MCNC: NEGATIVE MG/DL
LACTATE SERPL-SCNC: 1.6 MMOL/L (ref 0.5–2)
LEUKOCYTE ESTERASE UR QL STRIP: NEGATIVE
LIPASE SERPL-CCNC: 44 U/L (ref 11–82)
LYMPHOCYTES # BLD AUTO: 25 % (ref 14–44)
LYMPHOCYTES # BLD AUTO: 3.54 THOUSAND/UL (ref 0.6–4.47)
MCH RBC QN AUTO: 31 PG (ref 26.8–34.3)
MCHC RBC AUTO-ENTMCNC: 32.9 G/DL (ref 31.4–37.4)
MCV RBC AUTO: 94 FL (ref 82–98)
MONOCYTES # BLD AUTO: 1.13 THOUSAND/UL (ref 0–1.22)
MONOCYTES NFR BLD: 8 % (ref 4–12)
MYELOCYTE ABSOLUTE CT: 0.57 THOUSAND/UL (ref 0–0.1)
MYELOCYTES NFR BLD MANUAL: 4 % (ref 0–1)
NEUTROPHILS # BLD MANUAL: 8.48 THOUSAND/UL (ref 1.85–7.62)
NEUTS BAND NFR BLD MANUAL: 4 % (ref 0–8)
NEUTS SEG NFR BLD AUTO: 56 % (ref 43–75)
NITRITE UR QL STRIP: NEGATIVE
PH UR STRIP.AUTO: 5.5 [PH]
PLATELET # BLD AUTO: 279 THOUSANDS/UL (ref 149–390)
PLATELET BLD QL SMEAR: ADEQUATE
PMV BLD AUTO: 9 FL (ref 8.9–12.7)
POTASSIUM SERPL-SCNC: 4 MMOL/L (ref 3.5–5.3)
PROT SERPL-MCNC: 6 G/DL (ref 6.4–8.4)
PROT UR STRIP-MCNC: NEGATIVE MG/DL
RBC # BLD AUTO: 4.91 MILLION/UL (ref 3.88–5.62)
RBC MORPH BLD: NORMAL
SODIUM SERPL-SCNC: 142 MMOL/L (ref 135–147)
SP GR UR STRIP.AUTO: >=1.05 (ref 1–1.03)
UROBILINOGEN UR STRIP-ACNC: <2 MG/DL
WBC # BLD AUTO: 14.14 THOUSAND/UL (ref 4.31–10.16)

## 2025-05-09 PROCEDURE — 85007 BL SMEAR W/DIFF WBC COUNT: CPT | Performed by: PHYSICIAN ASSISTANT

## 2025-05-09 PROCEDURE — 83605 ASSAY OF LACTIC ACID: CPT | Performed by: PHYSICIAN ASSISTANT

## 2025-05-09 PROCEDURE — 80053 COMPREHEN METABOLIC PANEL: CPT | Performed by: PHYSICIAN ASSISTANT

## 2025-05-09 PROCEDURE — 81003 URINALYSIS AUTO W/O SCOPE: CPT | Performed by: PHYSICIAN ASSISTANT

## 2025-05-09 PROCEDURE — 85027 COMPLETE CBC AUTOMATED: CPT | Performed by: PHYSICIAN ASSISTANT

## 2025-05-09 PROCEDURE — 36415 COLL VENOUS BLD VENIPUNCTURE: CPT | Performed by: PHYSICIAN ASSISTANT

## 2025-05-09 PROCEDURE — 99243 OFF/OP CNSLTJ NEW/EST LOW 30: CPT

## 2025-05-09 PROCEDURE — 84484 ASSAY OF TROPONIN QUANT: CPT | Performed by: PHYSICIAN ASSISTANT

## 2025-05-09 PROCEDURE — 99285 EMERGENCY DEPT VISIT HI MDM: CPT

## 2025-05-09 PROCEDURE — 96374 THER/PROPH/DIAG INJ IV PUSH: CPT

## 2025-05-09 PROCEDURE — 99285 EMERGENCY DEPT VISIT HI MDM: CPT | Performed by: PHYSICIAN ASSISTANT

## 2025-05-09 PROCEDURE — 74177 CT ABD & PELVIS W/CONTRAST: CPT

## 2025-05-09 PROCEDURE — 83690 ASSAY OF LIPASE: CPT | Performed by: PHYSICIAN ASSISTANT

## 2025-05-09 RX ORDER — HYDROMORPHONE HCL/PF 1 MG/ML
0.5 SYRINGE (ML) INJECTION ONCE
Status: COMPLETED | OUTPATIENT
Start: 2025-05-09 | End: 2025-05-09

## 2025-05-09 RX ADMIN — IOHEXOL 100 ML: 350 INJECTION, SOLUTION INTRAVENOUS at 09:14

## 2025-05-09 RX ADMIN — HYDROMORPHONE HYDROCHLORIDE 0.5 MG: 1 INJECTION, SOLUTION INTRAMUSCULAR; INTRAVENOUS; SUBCUTANEOUS at 08:52

## 2025-05-09 NOTE — ED PROVIDER NOTES
Time reflects when diagnosis was documented in both MDM as applicable and the Disposition within this note       Time User Action Codes Description Comment    5/9/2025 12:15 PM Barr Luis Add [R10.32] LLQ abdominal pain           ED Disposition       ED Disposition   Discharge    Condition   Stable    Date/Time   Fri May 9, 2025 12:14 PM    Comment   Felipe LEÓN Sanders III discharge to home/self care.                   Assessment & Plan       Medical Decision Making  64-year-old male presenting to the emergency room today for evaluation of abdominal pain in the left lower quadrant of the abdomen that has been going on for the last few days.  History of irritable bowel disease he states.  He has had some nausea without vomiting and having liquid like stools since yesterday.  He is currently on amoxicillin and doxycycline he reports, for Lyme disease.  He declines any hematochezia or melena.  The pain in the abdomen is only in the lower portion.  He denies any chest pain or shortness of breath associated with this.  The pain is more in the left lower quadrant and worse with palpation.  The rest of the abdominal exam is benign.  He reports having history of a dilated appendix in the past which she was post get follow-up for but never did.  Labs done on him today, he had a troponin level that trended downwards, reassuring to rule out myocardial ischemia, and he has no angina equivalent symptoms here today.  Metabolic panel negative for acute kidney injury with a normal GFR and creatinine.  His lipase is within normal limits no signs of pancreatitis.  Urinalysis shows high specific gravity, seen with dehydration so he is encouraged to orally hydrate which she is doing here without any vomiting.  No lactic acidosis with normal lactate level.  Mildly elevated white blood cell count of 14, normal H&H.  CT was done which shows mild dilation of the fluid-filled appendix similar to prior study from 2025 April 15.  This could  possibly be mucocele.  Discussed with surgery team who seen evaluate this patient and offered appendectomy here today in the ED for admission and he declined and will follow-up outpatient.  They are okay with him following up outpatient.  Clinic information was given to him and patient will follow up and be scheduled for outpatient follow-up.  Repeat abdominal exam prior to his discharge is benign without any tenderness elicited to palpation the entire abdomen.  He denies any chest pain or shortness of breath or any symptoms upon his discharge.  Well-appearing ambulatory normal gait in no acute distress.  Advised patient with his chronic inflammation of the appendix, worrisome for colon cancer versus appendecal carcinoma if not treated and he verbalized understanding knowing the risk of not receiving treatment     Amount and/or Complexity of Data Reviewed  Labs: ordered. Decision-making details documented in ED Course.  Radiology: ordered. Decision-making details documented in ED Course.    Risk  Prescription drug management.        ED Course as of 05/09/25 1229   Fri May 09, 2025   0810 CT abdomen pelvis with contrast  CT abdomen pelvis with IV contrast to rule out diverticulitis, abscess, perforation, colitis, bowel obstruction, patient states he is a history of something abnormal with his appendix that he did not have surgically removed.  So we will further assess this with a CT abdomen pelvis.  Also with a history of hernia.  Does not feel similar to this though in the past.  Unsure if this could be a hernia.  No direct trauma.  Has a history of IBS that is recently been diagnosed by the GI doctor, unsure if this could cause ablation of this.  History of liver disease   1119 Delta 2hr hsTnI: -1   1214 Noel MORA from surgery has seen and advises patient we have discussed the care disposition.  Patient was offered appendectomy here today patient declines would rather do some outpatient perspective.  He is called  the office on chart review does have an appointment with Dr. Lee.  Discussed with the emergency general surgery team and the recommendations are for discharge and outpatient follow-up.  He is well-appearing nontoxic.       Medications   HYDROmorphone (DILAUDID) injection 0.5 mg (0.5 mg Intravenous Given 5/9/25 7063)   iohexol (OMNIPAQUE) 350 MG/ML injection (MULTI-DOSE) 100 mL (100 mL Intravenous Given 5/9/25 1670)       ED Risk Strat Scores                    No data recorded                            History of Present Illness       Chief Complaint   Patient presents with    Abdominal Pain     Pt reports generalized abd pain over the last few days. Questionable hx of IBS. +nausea without vomiting. Loose stools yesterday.        Past Medical History:   Diagnosis Date    COPD (chronic obstructive pulmonary disease) (HCC)     Diverticulitis of colon     Erectile dysfunction     Hernia of abdominal cavity     Liver cyst       Past Surgical History:   Procedure Laterality Date    COLONOSCOPY      NOSE SURGERY        History reviewed. No pertinent family history.   Social History     Tobacco Use    Smoking status: Every Day     Current packs/day: 1.50     Average packs/day: 1.5 packs/day for 50.0 years (75.0 ttl pk-yrs)     Types: Cigarettes     Passive exposure: Past    Smokeless tobacco: Never   Vaping Use    Vaping status: Never Used   Substance Use Topics    Alcohol use: Never    Drug use: No      E-Cigarette/Vaping    E-Cigarette Use Never User       E-Cigarette/Vaping Substances    Nicotine No     THC No     CBD No     Flavoring No     Other No     Unknown No       I have reviewed and agree with the history as documented.       History provided by:  Patient and medical records  Abdominal Pain  Pain location:  LLQ  Pain quality: aching    Pain radiates to:  Does not radiate  Pain severity:  Mild  Chronicity:  New  Associated symptoms: constipation, diarrhea and nausea    Associated symptoms: no anorexia, no  belching, no chest pain, no chills, no cough, no fever, no hematochezia, no hematuria, no shortness of breath and no vomiting        Review of Systems   Constitutional:  Negative for activity change, appetite change, chills and fever.   Respiratory:  Negative for cough, chest tightness, shortness of breath and wheezing.    Cardiovascular:  Negative for chest pain and leg swelling.   Gastrointestinal:  Positive for abdominal distention, abdominal pain, constipation, diarrhea and nausea. Negative for anorexia, blood in stool, hematochezia and vomiting.   Genitourinary:  Negative for hematuria.   All other systems reviewed and are negative.          Objective       ED Triage Vitals   Temperature Pulse Blood Pressure Respirations SpO2 Patient Position - Orthostatic VS   05/09/25 0757 05/09/25 0757 05/09/25 0757 05/09/25 0757 05/09/25 0757 05/09/25 0757   98.2 °F (36.8 °C) 102 141/74 20 96 % Sitting      Temp Source Heart Rate Source BP Location FiO2 (%) Pain Score    05/09/25 0757 05/09/25 0757 05/09/25 0757 -- 05/09/25 0852    Oral Monitor Left arm  10 - Worst Possible Pain      Vitals      Date and Time Temp Pulse SpO2 Resp BP Pain Score FACES Pain Rating User   05/09/25 1115 -- 82 94 % 17 -- -- -- SB   05/09/25 1112 -- -- -- -- -- No Pain -- GP   05/09/25 1100 -- 83 96 % 18 109/79 3 -- MB   05/09/25 1000 -- 80 95 % 19 109/54 3 -- MB   05/09/25 0900 -- 88 96 % 18 135/78 10 - Worst Possible Pain -- MB   05/09/25 0852 -- -- -- -- -- 10 - Worst Possible Pain -- MB   05/09/25 0757 98.2 °F (36.8 °C) 102 96 % 20 141/74 -- -- EP            Physical Exam  Vitals and nursing note reviewed.   Constitutional:       General: He is not in acute distress.     Appearance: He is well-developed.   HENT:      Head: Normocephalic and atraumatic.   Eyes:      Conjunctiva/sclera: Conjunctivae normal.   Cardiovascular:      Rate and Rhythm: Normal rate and regular rhythm.   Pulmonary:      Effort: Pulmonary effort is normal. No respiratory  distress.   Abdominal:      Palpations: Abdomen is soft.      Tenderness: There is abdominal tenderness in the left lower quadrant. There is no right CVA tenderness, left CVA tenderness, guarding or rebound.   Musculoskeletal:         General: No swelling.      Cervical back: Neck supple.   Skin:     General: Skin is warm and dry.      Capillary Refill: Capillary refill takes less than 2 seconds.   Neurological:      Mental Status: He is alert.   Psychiatric:         Mood and Affect: Mood normal.         Results Reviewed       Procedure Component Value Units Date/Time    UA w Reflex to Microscopic w Reflex to Culture [[id number]]  (Abnormal) Collected: 05/09/25 1108    Lab Status: Final result Specimen: Urine, Clean Catch Updated: 05/09/25 1117     Color, UA Light Yellow     Clarity, UA Clear     Specific Gravity, UA >=1.050     pH, UA 5.5     Leukocytes, UA Negative     Nitrite, UA Negative     Protein, UA Negative mg/dl      Glucose, UA Negative mg/dl      Ketones, UA Negative mg/dl      Urobilinogen, UA <2.0 mg/dl      Bilirubin, UA Negative     Occult Blood, UA Negative    HS Troponin I 2hr [[id number]]  (Normal) Collected: 05/09/25 1049    Lab Status: Final result Specimen: Blood from Arm, Right Updated: 05/09/25 1116     hs TnI 2hr 10 ng/L      Delta 2hr hsTnI -1 ng/L     Manual Differential(PHLEBS Do Not Order) [[id number]]  (Abnormal) Collected: 05/09/25 0833    Lab Status: Final result Specimen: Blood from Arm, Left Updated: 05/09/25 0903     Segmented % 56 %      Bands % 4 %      Lymphocytes % 25 %      Monocytes % 8 %      Eosinophils % 3 %      Basophils % 0 %      Myelocytes % 4 %      Absolute Neutrophils 8.48 Thousand/uL      Absolute Lymphocytes 3.54 Thousand/uL      Absolute Monocytes 1.13 Thousand/uL      Absolute Eosinophils 0.42 Thousand/uL      Absolute Basophils 0.00 Thousand/uL      Absolute Myelocytes 0.57 Thousand/uL      Total Counted --     RBC Morphology Normal     Platelet Estimate Adequate     HS Troponin 0hr (reflex protocol) [[id number]]  (Normal) Collected: 05/09/25 0833    Lab Status: Final result Specimen: Blood from Arm, Left Updated: 05/09/25 0902     hs TnI 0hr 11 ng/L     Comprehensive metabolic panel [[id number]]  (Abnormal) Collected: 05/09/25 0833    Lab Status: Final result Specimen: Blood from Arm, Left Updated: 05/09/25 0854     Sodium 142 mmol/L      Potassium 4.0 mmol/L      Chloride 106 mmol/L      CO2 29 mmol/L      ANION GAP 7 mmol/L      BUN 30 mg/dL      Creatinine 1.18 mg/dL      Glucose 89 mg/dL      Calcium 8.9 mg/dL      AST 11 U/L      ALT 17 U/L      Alkaline Phosphatase 53 U/L      Total Protein 6.0 g/dL      Albumin 3.8 g/dL      Total Bilirubin 0.44 mg/dL      eGFR 64 ml/min/1.73sq m     Narrative:      National Kid[employer] Disease Foundation guidelines for Chronic Kidney Disease (CKD):     Stage 1 with normal or high GFR (GFR > 90 mL/min/1.73 square meters)    Stage 2 Mild CKD (GFR = 60-89 mL/min/1.73 square meters)    Stage 3A Moderate CKD (GFR = 45-59 mL/min/1.73 square meters)    Stage 3B Moderate CKD (GFR = 30-44 mL/min/1.73 square meters)    Stage 4 Severe CKD (GFR = 15-29 mL/min/1.73 square meters)    Stage 5 End Stage CKD (GFR <15 mL/min/1.73 square meters)  Note: GFR calculation is accurate only with a steady state creatinine    Lipase [[id number]]  (Normal) Collected: 05/09/25 0833    Lab Status: Final result Specimen: Blood from Arm, Left Updated: 05/09/25 0854     Lipase 44 u/L     Lactic acid, plasma (w/reflex if result > 2.0) [[id number]]  (Normal) Collected: 05/09/25 0833    Lab Status: Final result Specimen: Blood from Arm, Left Updated: 05/09/25 0853     LACTIC ACID 1.6 mmol/L     Narrative:      Result may be elevated if tourniquet was used during collection.    CBC and differential [[id number]]  (Abnormal) Collected: 05/09/25 0833    Lab Status: Final result Specimen: Blood from Arm, Left Updated: 05/09/25 0840     WBC 14.14 Thousand/uL      RBC 4.91 Million/uL       Hemoglobin 15.2 g/dL      Hematocrit 46.2 %      MCV 94 fL      MCH 31.0 pg      MCHC 32.9 g/dL      RDW 13.9 %      MPV 9.0 fL      Platelets 279 Thousands/uL     Narrative:      This is an appended report.  These results have been appended to a previously verified report.            CT abdomen pelvis with contrast   Final Interpretation by Андрей Peck MD (05/09 0929)      1.  No acute abdominopelvic pathology.   2.  Mild dilation of the fluid fluid-filled appendix is similar to April 15, 2025, though increased since July 7, 2022. The finding can be the normal appearance of the appendix for this patient, or can represent a mucocele. There is no acute    appendicitis. Outpatient surgical consultation is advised.   The study was marked in EPIC for immediate notification.      Workstation performed: [id number]             Procedures    ED Medication and Procedure Management   Prior to Admission Medications   Prescriptions Last Dose Informant Patient Reported? Taking?   Promethazine-Codeine 6.25-10 MG/5ML SOLN  Self Yes No   Sig: TAKE 5 ML BY MOUTH EVERY 4 (FOUR) HOURS AS NEEDED FOR COUGH.   albuterol (2.5 mg/3 mL) 0.083 % nebulizer solution  Self Yes No   Sig: Inhale 2.5 mg every 6 (six) hours as needed   amitriptyline (ELAVIL) 25 mg tablet   No No   Sig: Take 1 tablet (25 mg total) by mouth daily at bedtime   dicyclomine (BENTYL) 20 mg tablet  Self No No   Sig: TAKE 1 TABLET BY MOUTH EVERY 6 HOURS.   docusate sodium (COLACE) 100 mg capsule  Self No No   Sig: Take 1 capsule (100 mg total) by mouth 2 (two) times a day for 10 days   doxycycline hyclate (VIBRA-TABS) 100 mg tablet   No No   Sig: Take 1 tablet (100 mg total) by mouth 2 (two) times a day for 10 days   fluticasone-umeclidinium-vilanterol (Trelegy Ellipta) 200-62.5-25 mcg/actuation AEPB inhaler  Self No No   Sig: Inhale 1 puff daily Rinse mouth after use.   Patient not taking: Reported on 4/4/2025   ibuprofen (MOTRIN) 600 mg tablet   No No   Sig: Take  1 tablet (600 mg total) by mouth every 6 (six) hours as needed for mild pain   nicotine (NICODERM CQ) 21 mg/24 hr TD 24 hr patch  Self No No   Sig: Place 1 patch on the skin over 24 hours daily   Patient not taking: Reported on 4/4/2025   oxygen gas  Self Yes No   Sig: Inhale continuous as needed   polyethylene glycol (GOLYTELY) 4000 mL solution  Self No No   Sig: Take 4,000 mL by mouth once for 1 dose   polyethylene glycol (MIRALAX) 17 g packet  Self No No   Sig: Take 17 g by mouth daily for 10 days   predniSONE 10 mg tablet  Self Yes No   rOPINIRole (REQUIP) 1 mg tablet  Self Yes No   Sig: Take 1 mg by mouth Three times a day      Facility-Administered Medications: None     Discharge Medication List as of 5/9/2025 12:16 PM        CONTINUE these medications which have NOT CHANGED    Details   albuterol (2.5 mg/3 mL) 0.083 % nebulizer solution Inhale 2.5 mg every 6 (six) hours as needed, Starting Thu 3/20/2025, Until Fri 3/20/2026 at 2359, Historical Med      amitriptyline (ELAVIL) 25 mg tablet Take 1 tablet (25 mg total) by mouth daily at bedtime, Starting Wed 5/7/2025, Normal      dicyclomine (BENTYL) 20 mg tablet TAKE 1 TABLET BY MOUTH EVERY 6 HOURS., Starting Thu 3/13/2025, Normal      docusate sodium (COLACE) 100 mg capsule Take 1 capsule (100 mg total) by mouth 2 (two) times a day for 10 days, Starting Tue 4/15/2025, Until Fri 5/2/2025, Normal      doxycycline hyclate (VIBRA-TABS) 100 mg tablet Take 1 tablet (100 mg total) by mouth 2 (two) times a day for 10 days, Starting Fri 5/2/2025, Until Mon 5/12/2025, Normal      fluticasone-umeclidinium-vilanterol (Trelegy Ellipta) 200-62.5-25 mcg/actuation AEPB inhaler Inhale 1 puff daily Rinse mouth after use., Starting Mon 3/24/2025, Normal      ibuprofen (MOTRIN) 600 mg tablet Take 1 tablet (600 mg total) by mouth every 6 (six) hours as needed for mild pain, Starting Fri 5/2/2025, Normal      nicotine (NICODERM CQ) 21 mg/24 hr TD 24 hr patch Place 1 patch on the  skin over 24 hours daily, Starting Tue 3/25/2025, Normal      oxygen gas Inhale continuous as needed, Historical Med      polyethylene glycol (GOLYTELY) 4000 mL solution Take 4,000 mL by mouth once for 1 dose, Starting Tue 2/4/2025, Normal      polyethylene glycol (MIRALAX) 17 g packet Take 17 g by mouth daily for 10 days, Starting Tue 4/15/2025, Until Fri 5/2/2025, Normal      predniSONE 10 mg tablet Historical Med      Promethazine-Codeine 6.25-10 MG/5ML SOLN TAKE 5 ML BY MOUTH EVERY 4 (FOUR) HOURS AS NEEDED FOR COUGH., Historical Med      rOPINIRole (REQUIP) 1 mg tablet Take 1 mg by mouth Three times a day, Starting Thu 4/3/2025, Until Sat 5/3/2025, Historical Med             ED SEPSIS DOCUMENTATION   Time reflects when diagnosis was documented in both MDM as applicable and the Disposition within this note       Time User Action Codes Description Comment    5/9/2025 12:15 PM Luis Barr Add [R10.32] LLQ abdominal pain                  Luis Barr PA-C  05/09/25 0903

## 2025-05-09 NOTE — CONSULTS
Consult- General Surgery   Felipe Sanders III 64 y.o. male MRN: [id number]  Unit/Bed#: ED 28 Encounter: [id number]      Assessment & Plan     Felipe Sanders III is a 64 y.o. male with abdominal pain.    WBC chronically elevated 14 from 13.6 from 15.7 from 17.7  Currently on doxycycline for a tick bite and amoxicillin for lung disease treatment with prednisone    Plan:  No acute surgical intervention indicated at this time as patient reports abdominal pain has resolved and he does not want surgery at this time.  Patient will call the office to schedule outpatient laparoscopic appendectomy due to chronic dilation of appendix.  Remainder of care per ED    History of Present Illness     HPI:  Felipe Sanders III is a 64 y.o. male who presents with abdominal pain.  Patient reports he has chronic left upper quadrant abdominal pain for which she has seen GI and been to the ED multiple times for.  Reports starting amitriptyline yesterday and taking MiraLAX regularly for chronic constipation.  States his left upper quadrant abdominal pain became worse yesterday which is why he presented to the ED this morning.  However abdominal pain has resolved.  No symptoms at this time. The patient denies CP, SOB, palpitations, headache, fever, chills, unintentional weight loss, night sweats, nausea, vomiting, diarrhea.    Review of Systems   Constitutional:  Negative for chills and fever.   HENT:  Negative for ear pain and sore throat.    Eyes:  Negative for pain and visual disturbance.   Respiratory:  Negative for cough and shortness of breath.    Cardiovascular:  Negative for chest pain and palpitations.   Gastrointestinal:  Positive for abdominal pain (chronic LUQ pain) and constipation. Negative for diarrhea, nausea and vomiting.   Genitourinary:  Negative for dysuria and hematuria.   Musculoskeletal:  Negative for arthralgias and back pain.   Skin:  Negative for color change and rash.   Neurological:  Negative for seizures and  syncope.   All other systems reviewed and are negative.      Historical Information   Past Medical History:   Diagnosis Date    COPD (chronic obstructive pulmonary disease) (HCC)     Diverticulitis of colon     Erectile dysfunction     Hernia of abdominal cavity     Liver cyst      Past Surgical History:   Procedure Laterality Date    COLONOSCOPY      NOSE SURGERY       Social History   Social History     Substance and Sexual Activity   Alcohol Use Never     Social History     Substance and Sexual Activity   Drug Use No     Social History     Tobacco Use   Smoking Status Every Day    Current packs/day: 1.50    Average packs/day: 1.5 packs/day for 50.0 years (75.0 ttl pk-yrs)    Types: Cigarettes    Passive exposure: Past   Smokeless Tobacco Never     Family History: Family history non-contributory    Meds/Allergies   all medications and allergies reviewed  No Known Allergies    Objective   First Vitals:   Blood Pressure: 141/74 (05/09/25 0757)  Pulse: 102 (05/09/25 0757)  Temperature: 98.2 °F (36.8 °C) (05/09/25 0757)  Temp Source: Oral (05/09/25 0757)  Respirations: 20 (05/09/25 0757)  Weight - Scale: 84.5 kg (186 lb 4.6 oz) (05/09/25 0757)  SpO2: 96 % (05/09/25 0757)    Current Vitals:   Blood Pressure: 109/79 (05/09/25 1100)  Pulse: 82 (05/09/25 1115)  Temperature: 98.2 °F (36.8 °C) (05/09/25 0757)  Temp Source: Oral (05/09/25 1100)  Respirations: 17 (05/09/25 1115)  Weight - Scale: 84.5 kg (186 lb 4.6 oz) (05/09/25 0757)  SpO2: 94 % (05/09/25 1115)    No intake or output data in the 24 hours ending 05/09/25 1231    Invasive Devices       None                   Physical Exam  Vitals reviewed.   Constitutional:       General: He is not in acute distress.     Appearance: Normal appearance. He is obese. He is not ill-appearing or toxic-appearing.   HENT:      Head: Normocephalic and atraumatic.      Right Ear: External ear normal.      Left Ear: External ear normal.      Nose: Nose normal.      Mouth/Throat:       Mouth: Mucous membranes are moist.   Eyes:      General: No scleral icterus.        Right eye: No discharge.         Left eye: No discharge.      Conjunctiva/sclera: Conjunctivae normal.   Cardiovascular:      Rate and Rhythm: Normal rate and regular rhythm.   Pulmonary:      Effort: Pulmonary effort is normal. No respiratory distress.   Abdominal:      General: There is no distension.      Palpations: Abdomen is soft.      Tenderness: There is no abdominal tenderness. There is no guarding.   Musculoskeletal:         General: Normal range of motion.      Cervical back: Normal range of motion.   Skin:     General: Skin is warm.      Coloration: Skin is not jaundiced.   Neurological:      General: No focal deficit present.      Mental Status: He is alert and oriented to person, place, and time.   Psychiatric:         Mood and Affect: Mood normal.         Behavior: Behavior normal.         Thought Content: Thought content normal.         Judgment: Judgment normal.         Lab Results: I have personally reviewed pertinent lab results.    Recent Results (from the past 36 hours)   CBC and differential    Collection Time: 05/09/25  8:33 AM   Result Value Ref Range    WBC 14.14 (H) 4.31 - 10.16 Thousand/uL    RBC 4.91 3.88 - 5.62 Million/uL    Hemoglobin 15.2 12.0 - 17.0 g/dL    Hematocrit 46.2 36.5 - 49.3 %    MCV 94 82 - 98 fL    MCH 31.0 26.8 - 34.3 pg    MCHC 32.9 31.4 - 37.4 g/dL    RDW 13.9 11.6 - 15.1 %    MPV 9.0 8.9 - 12.7 fL    Platelets 279 149 - 390 Thousands/uL   Comprehensive metabolic panel    Collection Time: 05/09/25  8:33 AM   Result Value Ref Range    Sodium 142 135 - 147 mmol/L    Potassium 4.0 3.5 - 5.3 mmol/L    Chloride 106 96 - 108 mmol/L    CO2 29 21 - 32 mmol/L    ANION GAP 7 4 - 13 mmol/L    BUN 30 (H) 5 - 25 mg/dL    Creatinine 1.18 0.60 - 1.30 mg/dL    Glucose 89 65 - 140 mg/dL    Calcium 8.9 8.4 - 10.2 mg/dL    AST 11 (L) 13 - 39 U/L    ALT 17 7 - 52 U/L    Alkaline Phosphatase 53 34 - 104 U/L  "   Total Protein 6.0 (L) 6.4 - 8.4 g/dL    Albumin 3.8 3.5 - 5.0 g/dL    Total Bilirubin 0.44 0.20 - 1.00 mg/dL    eGFR 64 ml/min/1.73sq m   HS Troponin 0hr (reflex protocol)    Collection Time: 05/09/25  8:33 AM   Result Value Ref Range    hs TnI 0hr 11 \"Refer to ACS Flowchart\"- see link ng/L   Lipase    Collection Time: 05/09/25  8:33 AM   Result Value Ref Range    Lipase 44 11 - 82 u/L   Lactic acid, plasma (w/reflex if result > 2.0)    Collection Time: 05/09/25  8:33 AM   Result Value Ref Range    LACTIC ACID 1.6 0.5 - 2.0 mmol/L   Manual Differential(PHLEBS Do Not Order)    Collection Time: 05/09/25  8:33 AM   Result Value Ref Range    Segmented % 56 43 - 75 %    Bands % 4 0 - 8 %    Lymphocytes % 25 14 - 44 %    Monocytes % 8 4 - 12 %    Eosinophils % 3 0 - 6 %    Basophils % 0 0 - 1 %    Myelocytes % 4 (H) 0 - 1 %    Absolute Neutrophils 8.48 (H) 1.85 - 7.62 Thousand/uL    Absolute Lymphocytes 3.54 0.60 - 4.47 Thousand/uL    Absolute Monocytes 1.13 0.00 - 1.22 Thousand/uL    Absolute Eosinophils 0.42 (H) 0.00 - 0.40 Thousand/uL    Absolute Basophils 0.00 0.00 - 0.10 Thousand/uL    Absolute Myelocytes 0.57 (H) 0.00 - 0.10 Thousand/uL    Total Counted      RBC Morphology Normal     Platelet Estimate Adequate Adequate   HS Troponin I 2hr    Collection Time: 05/09/25 10:49 AM   Result Value Ref Range    hs TnI 2hr 10 \"Refer to ACS Flowchart\"- see link ng/L    Delta 2hr hsTnI -1 <20 ng/L   UA w Reflex to Microscopic w Reflex to Culture    Collection Time: 05/09/25 11:08 AM    Specimen: Urine, Clean Catch   Result Value Ref Range    Color, UA Light Yellow     Clarity, UA Clear     Specific Gravity, UA >=1.050 (H) 1.003 - 1.030    pH, UA 5.5 4.5, 5.0, 5.5, 6.0, 6.5, 7.0, 7.5, 8.0    Leukocytes, UA Negative Negative    Nitrite, UA Negative Negative    Protein, UA Negative Negative mg/dl    Glucose, UA Negative Negative mg/dl    Ketones, UA Negative Negative mg/dl    Urobilinogen, UA <2.0 <2.0 mg/dl mg/dl    " Bilirubin, UA Negative Negative    Occult Blood, UA Negative Negative     Imaging: Results Review Statement: I reviewed radiology reports from this admission including: CT abdomen/pelvis.  CT abdomen pelvis with contrast  Result Date: 5/9/2025  Impression: 1.  No acute abdominopelvic pathology. 2.  Mild dilation of the fluid fluid-filled appendix is similar to April 15, 2025, though increased since July 7, 2022. The finding can be the normal appearance of the appendix for this patient, or can represent a mucocele. There is no acute appendicitis. Outpatient surgical consultation is advised. The study was marked in EPIC for immediate notification. Workstation performed: [id number]       EKG, Pathology, and Other Studies: Results Review Statement: No pertinent imaging studies reviewed.

## 2025-05-09 NOTE — ED NOTES
This RN placed the patient on 2 L of oxygen due to his stats dropping to 87-88. Patient stated that he uses 2 L at home as well.      Landy Pappas RN  05/09/25 0982

## 2025-05-09 NOTE — TELEPHONE ENCOUNTER
Patient called in to schedule surgery. Advised he would need a follow up as it has been over 30 days since he was seen. He states he was at the er this morning and they wanted to do surgery right there but he declined because he was not prepared. They advised him at the ER to reach out to [facility] directly to schedule asap. I did make him aware it is possible he may still need to be seen again. He can be reached at [phone]

## 2025-05-09 NOTE — TELEPHONE ENCOUNTER
The patient called in and immediately disconnected the call, advising that he would call right back.

## 2025-05-12 ENCOUNTER — APPOINTMENT (EMERGENCY)
Dept: CT IMAGING | Facility: HOSPITAL | Age: 65
End: 2025-05-12
Payer: COMMERCIAL

## 2025-05-12 ENCOUNTER — HOSPITAL ENCOUNTER (EMERGENCY)
Facility: HOSPITAL | Age: 65
Discharge: HOME | End: 2025-05-12
Attending: EMERGENCY MEDICINE | Admitting: EMERGENCY MEDICINE
Payer: COMMERCIAL

## 2025-05-12 VITALS
SYSTOLIC BLOOD PRESSURE: 126 MMHG | HEART RATE: 93 BPM | TEMPERATURE: 98 F | RESPIRATION RATE: 20 BRPM | DIASTOLIC BLOOD PRESSURE: 63 MMHG | OXYGEN SATURATION: 97 % | BODY MASS INDEX: 27.28 KG/M2 | HEIGHT: 68 IN | WEIGHT: 180 LBS

## 2025-05-12 DIAGNOSIS — K59.00 CONSTIPATION: Primary | ICD-10-CM

## 2025-05-12 DIAGNOSIS — R10.32 LEFT LOWER QUADRANT ABDOMINAL PAIN: ICD-10-CM

## 2025-05-12 LAB
BASOPHILS # BLD AUTO: 0.05 THOUSANDS/ÂΜL (ref 0–0.1)
BASOPHILS NFR BLD AUTO: 0 % (ref 0–1)
EOSINOPHIL # BLD AUTO: 0.14 THOUSAND/ÂΜL (ref 0–0.61)
EOSINOPHIL NFR BLD AUTO: 1 % (ref 0–6)
ERYTHROCYTE [DISTWIDTH] IN BLOOD BY AUTOMATED COUNT: 13.9 % (ref 11.6–15.1)
HCT VFR BLD AUTO: 47.5 % (ref 36.5–49.3)
HGB BLD-MCNC: 16.2 G/DL (ref 12–17)
IMM GRANULOCYTES # BLD AUTO: 0.3 THOUSAND/UL (ref 0–0.2)
IMM GRANULOCYTES NFR BLD AUTO: 2 % (ref 0–2)
LYMPHOCYTES # BLD AUTO: 2.11 THOUSANDS/ÂΜL (ref 0.6–4.47)
LYMPHOCYTES NFR BLD AUTO: 13 % (ref 14–44)
MCH RBC QN AUTO: 32.2 PG (ref 26.8–34.3)
MCHC RBC AUTO-ENTMCNC: 34.1 G/DL (ref 31.4–37.4)
MCV RBC AUTO: 94 FL (ref 82–98)
MONOCYTES # BLD AUTO: 1.26 THOUSAND/ÂΜL (ref 0.17–1.22)
MONOCYTES NFR BLD AUTO: 8 % (ref 4–12)
NEUTROPHILS # BLD AUTO: 12.2 THOUSANDS/ÂΜL (ref 1.85–7.62)
NEUTS SEG NFR BLD AUTO: 76 % (ref 43–75)
NRBC BLD AUTO-RTO: 0 /100 WBCS
PLATELET # BLD AUTO: 312 THOUSANDS/UL (ref 149–390)
PMV BLD AUTO: 9.9 FL (ref 8.9–12.7)
RBC # BLD AUTO: 5.03 MILLION/UL (ref 3.88–5.62)
WBC # BLD AUTO: 16.06 THOUSAND/UL (ref 4.31–10.16)

## 2025-05-12 PROCEDURE — 74176 CT ABD & PELVIS W/O CONTRAST: CPT

## 2025-05-12 PROCEDURE — 99284 EMERGENCY DEPT VISIT MOD MDM: CPT | Performed by: EMERGENCY MEDICINE

## 2025-05-12 PROCEDURE — 85025 COMPLETE CBC W/AUTO DIFF WBC: CPT

## 2025-05-12 PROCEDURE — 99285 EMERGENCY DEPT VISIT HI MDM: CPT

## 2025-05-12 PROCEDURE — 36415 COLL VENOUS BLD VENIPUNCTURE: CPT

## 2025-05-12 NOTE — ED NOTES
Pt reports not wanting IV in AC. Attempted IVx2, unsuccessful. Pt requesting break at this time.      Kiersten Pink RN  05/12/25 1936

## 2025-05-12 NOTE — ED PROVIDER NOTES
Time reflects when diagnosis was documented in both MDM as applicable and the Disposition within this note       Time User Action Codes Description Comment    5/12/2025 11:26 PM Kaila Ma Add [K59.00] Constipation     5/12/2025 11:26 PM Kaila Ma Add [R10.32] Left lower quadrant abdominal pain           ED Disposition       ED Disposition   Discharge    Condition   Stable    Date/Time   Mon May 12, 2025 11:26 PM    Comment   Felipe Sanders III discharge to home/self care.                   Assessment & Plan       Medical Decision Making  Is a 64-year-old male who presents to the emergency department with persistent left-sided abdominal pain.  He has multiple emergency department workups, GI follow-up, and surgery follow-up.  Previously identified mucocele has been seen in the appendix, however has been manage not emergently with close follow-up.  Due to severe COPD, pulmonary clearance for the OR has been requested by the surgical team.  Patient has seen his pulmonary physician but has not obtained clearance or asked for clearance with regarding to surgery.  It is unclear what has changed about his pain today other than the left-sided pain was more severe and he reports a change in stool habits.  Patient does also have a history of diverticulosis and a remote history of diverticulitis.  These remain on the differential.  Unfortunately, due to his persistent report of worsening pain he will likely require repeat imaging though he has had multiple CT scans in a short period of time due to these complaints.    Amount and/or Complexity of Data Reviewed  Labs: ordered.  Radiology: ordered.        ED Course as of 05/12/25 2330   Mon May 12, 2025   1087 Patient has required multiple IV attempts, and multiple RN/staff members at bedside as he has had to be manually held for any attempt at IV placement due to continuously moving his arm. CT obtained dry due to poor patient cooperation with IV placement.         Medications - No data to display    ED Risk Strat Scores                    No data recorded                            History of Present Illness       Chief Complaint   Patient presents with    Abdominal Pain     Pt arrives c/o LUQ pain which radiates to R side of abdomen. Seen for same recently. Denies N/V/D       Past Medical History:   Diagnosis Date    COPD (chronic obstructive pulmonary disease) (HCC)     Diverticulitis of colon     Erectile dysfunction     Hernia of abdominal cavity     Liver cyst       Past Surgical History:   Procedure Laterality Date    COLONOSCOPY      NOSE SURGERY        No family history on file.   Social History     Tobacco Use    Smoking status: Every Day     Current packs/day: 1.50     Average packs/day: 1.5 packs/day for 50.0 years (75.0 ttl pk-yrs)     Types: Cigarettes     Passive exposure: Past    Smokeless tobacco: Never   Vaping Use    Vaping status: Never Used   Substance Use Topics    Alcohol use: Never    Drug use: No      E-Cigarette/Vaping    E-Cigarette Use Never User       E-Cigarette/Vaping Substances    Nicotine No     THC No     CBD No     Flavoring No     Other No     Unknown No       I have reviewed and agree with the history as documented.     Patient presents with left sided abdominal pain.           Review of Systems   Gastrointestinal:  Positive for abdominal pain.           Objective       ED Triage Vitals [05/12/25 1859]   Temperature Pulse Blood Pressure Respirations SpO2 Patient Position - Orthostatic VS   98 °F (36.7 °C) 93 126/63 20 97 % Sitting      Temp Source Heart Rate Source BP Location FiO2 (%) Pain Score    Temporal Monitor Left arm -- --      Vitals      Date and Time Temp Pulse SpO2 Resp BP Pain Score FACES Pain Rating User   05/12/25 1859 98 °F (36.7 °C) 93 97 % 20 126/63 -- -- RO            Physical Exam  Vitals and nursing note reviewed.   Constitutional:       General: He is not in acute distress.     Appearance: Normal appearance.    HENT:      Head: Normocephalic and atraumatic.      Right Ear: External ear normal.      Left Ear: External ear normal.      Nose: Nose normal.   Cardiovascular:      Rate and Rhythm: Normal rate and regular rhythm.   Pulmonary:      Effort: Pulmonary effort is normal.      Breath sounds: Normal breath sounds.   Abdominal:      General: There is no distension.      Palpations: Abdomen is soft.      Tenderness: There is abdominal tenderness in the suprapubic area and left lower quadrant. There is no guarding or rebound.   Musculoskeletal:      Right lower leg: No edema.      Left lower leg: No edema.   Skin:     General: Skin is warm and dry.   Neurological:      General: No focal deficit present.      Mental Status: He is alert and oriented to person, place, and time. Mental status is at baseline.   Psychiatric:         Behavior: Behavior normal.         Results Reviewed       Procedure Component Value Units Date/Time    Comprehensive metabolic panel [[id number]] Updated: 05/12/25 2147    Lab Status: No result Specimen: Blood from Arm, Left     Lipase [[id number]] Updated: 05/12/25 2147    Lab Status: No result Specimen: Blood from Arm, Left     CBC and differential [[id number]]  (Abnormal) Collected: 05/12/25 2123    Lab Status: Final result Specimen: Blood from Arm, Left Updated: 05/12/25 2129     WBC 16.06 Thousand/uL      RBC 5.03 Million/uL      Hemoglobin 16.2 g/dL      Hematocrit 47.5 %      MCV 94 fL      MCH 32.2 pg      MCHC 34.1 g/dL      RDW 13.9 %      MPV 9.9 fL      Platelets 312 Thousands/uL      nRBC 0 /100 WBCs      Segmented % 76 %      Immature Grans % 2 %      Lymphocytes % 13 %      Monocytes % 8 %      Eosinophils Relative 1 %      Basophils Relative 0 %      Absolute Neutrophils 12.20 Thousands/µL      Absolute Immature Grans 0.30 Thousand/uL      Absolute Lymphocytes 2.11 Thousands/µL      Absolute Monocytes 1.26 Thousand/µL      Eosinophils Absolute 0.14 Thousand/µL      Basophils Absolute  0.05 Thousands/µL     UA (URINE) with reflex to Scope [[id number]]     Lab Status: No result Specimen: Urine             CT abdomen pelvis wo contrast   Final Interpretation by Love Perez MD (05/12 2148)      No evidence of acute intra-abdominal or pelvic pathology. Constipation.      Stable fluid distention of the mid and distal appendix which may reflect a mucocele      Workstation performed: [id number]             Procedures    ED Medication and Procedure Management   Prior to Admission Medications   Prescriptions Last Dose Informant Patient Reported? Taking?   Promethazine-Codeine 6.25-10 MG/5ML SOLN  Self Yes No   Sig: TAKE 5 ML BY MOUTH EVERY 4 (FOUR) HOURS AS NEEDED FOR COUGH.   albuterol (2.5 mg/3 mL) 0.083 % nebulizer solution  Self Yes No   Sig: Inhale 2.5 mg every 6 (six) hours as needed   amitriptyline (ELAVIL) 25 mg tablet   No No   Sig: Take 1 tablet (25 mg total) by mouth daily at bedtime   dicyclomine (BENTYL) 20 mg tablet  Self No No   Sig: TAKE 1 TABLET BY MOUTH EVERY 6 HOURS.   docusate sodium (COLACE) 100 mg capsule  Self No No   Sig: Take 1 capsule (100 mg total) by mouth 2 (two) times a day for 10 days   doxycycline hyclate (VIBRA-TABS) 100 mg tablet   No No   Sig: Take 1 tablet (100 mg total) by mouth 2 (two) times a day for 10 days   fluticasone-umeclidinium-vilanterol (Trelegy Ellipta) 200-62.5-25 mcg/actuation AEPB inhaler  Self No No   Sig: Inhale 1 puff daily Rinse mouth after use.   Patient not taking: Reported on 4/4/2025   ibuprofen (MOTRIN) 600 mg tablet   No No   Sig: Take 1 tablet (600 mg total) by mouth every 6 (six) hours as needed for mild pain   nicotine (NICODERM CQ) 21 mg/24 hr TD 24 hr patch  Self No No   Sig: Place 1 patch on the skin over 24 hours daily   Patient not taking: Reported on 4/4/2025   oxygen gas  Self Yes No   Sig: Inhale continuous as needed   polyethylene glycol (GOLYTELY) 4000 mL solution  Self No No   Sig: Take 4,000 mL by mouth once for 1 dose    polyethylene glycol (MIRALAX) 17 g packet  Self No No   Sig: Take 17 g by mouth daily for 10 days   predniSONE 10 mg tablet  Self Yes No   rOPINIRole (REQUIP) 1 mg tablet  Self Yes No   Sig: Take 1 mg by mouth Three times a day      Facility-Administered Medications: None     Patient's Medications   Discharge Prescriptions    No medications on file     No discharge procedures on file.  ED SEPSIS DOCUMENTATION   Time reflects when diagnosis was documented in both MDM as applicable and the Disposition within this note       Time User Action Codes Description Comment    5/12/2025 11:26 PM Kaila Ma [K59.00] Constipation     5/12/2025 11:26 PM Kaila Ma [R10.32] Left lower quadrant abdominal pain                  Kaila Ma MD  05/12/25 9232     Transportation service

## 2025-05-12 NOTE — TELEPHONE ENCOUNTER
"FOLLOW UP: n/a    REASON FOR CONVERSATION: schedule surgery  and Abdominal Pain    SYMPTOMS: increased L>R lower abd pain this am, 6-7/10. Denies vomiting, endorses nausea & loose BM's.     PT seen in ED, advised that he has stool in colon and his appendix was inflamed. PT would like to schedule appendectomy ASAP.    OTHER: Advised PT that he would need office visit with Dr. Lee to schedule for appendectomy. Offered next available appointment on 6/5/25 and placement on cancellation waitlist. PT does not want to wait that long.     PT will go to ED tonight. PT disconnected call.    DISPOSITION: Go to ED/UCC Now (Or to Office with PCP Approval)    Reason for Disposition   MILD TO MODERATE constant pain lasting > 2 hours, and age > 60 years    Answer Assessment - Initial Assessment Questions  1. LOCATION: \"Where does it hurt?\"       Lower abd L>R  2. RADIATION: \"Does the pain shoot anywhere else?\" (e.g., chest, back)      denies  3. ONSET: \"When did the pain begin?\" (Minutes, hours or days ago)       Acute on chronic, this am  4. SUDDEN: \"Gradual or sudden onset?\"      sudden  5. PATTERN \"Does the pain come and go, or is it constant?\"      constant  6. SEVERITY: \"How bad is the pain?\"  (e.g., Scale 1-10; mild, moderate, or severe)      6-7/10  7. RECURRENT SYMPTOM: \"Have you ever had this type of stomach pain before?\" If Yes, ask: \"When was the last time?\" and \"What happened that time?\"       Yes, inflamed appendix  8. CAUSE: \"What do you think is causing the stomach pain?\"      Inflamed appendix  9. RELIEVING/AGGRAVATING FACTORS: \"What makes it better or worse?\" (e.g., antacids, bending or twisting motion, bowel movement)      nothing  10. OTHER SYMPTOMS: \"Do you have any other symptoms?\" (e.g., back pain, diarrhea, fever, urination pain, vomiting)        nausea    Protocols used: Abdominal Pain - Male-Adult-[place]    "

## 2025-05-12 NOTE — TELEPHONE ENCOUNTER
Spoke with pt and stated he is in a lot of pain. Informed pt that he should go to the ED to be evaluated based on his last imaging. Pt verbalized understanding and will go to ED

## 2025-05-12 NOTE — TELEPHONE ENCOUNTER
PT called back wanting to schedule surgery, but I explained he has to be seen in the office first since he hasn't been seen since March 20th.  The first appt Dr Lee currently has is 6/10 and pt does not want to schedule or wait.  He said he has to much pain so I advised he could go to the ED.  Pt had some questions also so I transferred him to [place] in triage to help him

## 2025-05-16 ENCOUNTER — OFFICE VISIT (OUTPATIENT)
Dept: URGENT CARE | Facility: CLINIC | Age: 65
End: 2025-05-16
Payer: COMMERCIAL

## 2025-05-16 ENCOUNTER — NURSE TRIAGE (OUTPATIENT)
Age: 65
End: 2025-05-16

## 2025-05-16 VITALS
DIASTOLIC BLOOD PRESSURE: 86 MMHG | HEART RATE: 104 BPM | OXYGEN SATURATION: 96 % | TEMPERATURE: 97 F | RESPIRATION RATE: 22 BRPM | SYSTOLIC BLOOD PRESSURE: 114 MMHG

## 2025-05-16 DIAGNOSIS — J43.8 OTHER EMPHYSEMA (HCC): ICD-10-CM

## 2025-05-16 DIAGNOSIS — J44.1 CHRONIC OBSTRUCTIVE PULMONARY DISEASE WITH ACUTE EXACERBATION (HCC): Primary | ICD-10-CM

## 2025-05-16 PROCEDURE — 99203 OFFICE O/P NEW LOW 30 MIN: CPT | Performed by: NURSE PRACTITIONER

## 2025-05-16 RX ORDER — AZITHROMYCIN 250 MG/1
TABLET, FILM COATED ORAL
Qty: 6 TABLET | Refills: 0 | Status: SHIPPED | OUTPATIENT
Start: 2025-05-16 | End: 2025-05-20

## 2025-05-16 RX ORDER — PREDNISONE 20 MG/1
40 TABLET ORAL DAILY
Qty: 10 TABLET | Refills: 0 | Status: SHIPPED | OUTPATIENT
Start: 2025-05-16 | End: 2025-05-21

## 2025-05-16 RX ORDER — UMECLIDINIUM BROMIDE AND VILANTEROL TRIFENATATE 62.5; 25 UG/1; UG/1
1 POWDER RESPIRATORY (INHALATION) DAILY
COMMUNITY
Start: 2025-05-05

## 2025-05-16 NOTE — PROGRESS NOTES
[facility]        NAME: Felipe Sanders III is a 64 y.o. male  : 1960    MRN: [id number]  DATE: May 16, 2025  TIME: 7:43 PM    Assessment and Plan   Chronic obstructive pulmonary disease with acute exacerbation (HCC) [J44.1]  1. Chronic obstructive pulmonary disease with acute exacerbation (HCC)  predniSONE 20 mg tablet    azithromycin (ZITHROMAX) 250 mg tablet      2. Other emphysema (HCC)  predniSONE 20 mg tablet    azithromycin (ZITHROMAX) 250 mg tablet        Advised to start z pack and prednisone as directed, take with food. Follow up with pulmonologist.     Patient Instructions       Follow up with PCP in 3-5 days.  Proceed to  ER if symptoms worsen.    If tests are performed, our office will contact you with results only if changes need to made to the care plan discussed with you at the visit. You can review your full results on [facility].    Chief Complaint     Chief Complaint   Patient presents with    Shortness of Breath     Patient here with shortness of breath for two days now. He states he needs prednisone. He thinks its cause of how humid it has been.          History of Present Illness       Hx copd, states he uses nebulizer daily, does not use daily inhalers, has oxygen at home but only using prn. Here requesting prednisone as he is more sob for the past few days. Is a current smoker.         Review of Systems   Review of Systems   Constitutional:  Negative for chills, fatigue and fever.   Respiratory:  Positive for cough, chest tightness, shortness of breath and wheezing.    Musculoskeletal:  Negative for arthralgias and myalgias.   Neurological:  Negative for dizziness and headaches.         Current Medications     Current Medications[1]    Current Allergies     Allergies as of 2025    (No Known Allergies)            The following portions of the patient's history were reviewed and updated as appropriate: allergies, current medications, past family history, past  medical history, past social history, past surgical history and problem list.     Past Medical History:   Diagnosis Date    COPD (chronic obstructive pulmonary disease) (HCC)     Diverticulitis of colon     Erectile dysfunction     Hernia of abdominal cavity     Liver cyst        Past Surgical History:   Procedure Laterality Date    COLONOSCOPY      NOSE SURGERY         History reviewed. No pertinent family history.      Medications have been verified.        Objective   /86   Pulse 104   Temp (!) 97 °F (36.1 °C)   Resp 22   SpO2 96%        Physical Exam     Physical Exam  Vitals and nursing note reviewed.   Constitutional:       General: He is not in acute distress.     Appearance: Normal appearance. He is not ill-appearing.   HENT:      Head: Normocephalic and atraumatic.      Right Ear: Tympanic membrane, ear canal and external ear normal.      Left Ear: Tympanic membrane, ear canal and external ear normal.      Nose: Rhinorrhea present. No congestion.      Mouth/Throat:      Mouth: Mucous membranes are moist.      Pharynx: No oropharyngeal exudate or posterior oropharyngeal erythema.     Eyes:      Pupils: Pupils are equal, round, and reactive to light.       Cardiovascular:      Rate and Rhythm: Normal rate and regular rhythm.      Pulses: Normal pulses.      Heart sounds: Normal heart sounds.   Pulmonary:      Effort: Pulmonary effort is normal.      Breath sounds: Wheezing present. No rhonchi.     Musculoskeletal:      Cervical back: Normal range of motion and neck supple.     Skin:     General: Skin is warm and dry.     Neurological:      Mental Status: He is alert.                        [1]   Current Outpatient Medications:     albuterol (2.5 mg/3 mL) 0.083 % nebulizer solution, Inhale 2.5 mg every 6 (six) hours as needed, Disp: , Rfl:     azithromycin (ZITHROMAX) 250 mg tablet, Take 2 tablets today then 1 tablet daily x 4 days, Disp: 6 tablet, Rfl: 0    dicyclomine (BENTYL) 20 mg tablet, TAKE 1  TABLET BY MOUTH EVERY 6 HOURS., Disp: 360 tablet, Rfl: 1    oxygen gas, Inhale continuous as needed, Disp: , Rfl:     polyethylene glycol (MIRALAX) 17 g packet, Take 17 g by mouth daily for 10 days, Disp: 170 g, Rfl: 0    predniSONE 20 mg tablet, Take 2 tablets (40 mg total) by mouth daily for 5 days, Disp: 10 tablet, Rfl: 0    Promethazine-Codeine 6.25-10 MG/5ML SOLN, , Disp: , Rfl:     amitriptyline (ELAVIL) 25 mg tablet, Take 1 tablet (25 mg total) by mouth daily at bedtime, Disp: 30 tablet, Rfl: 3    docusate sodium (COLACE) 100 mg capsule, Take 1 capsule (100 mg total) by mouth 2 (two) times a day for 10 days, Disp: 20 capsule, Rfl: 0    fluticasone-umeclidinium-vilanterol (Trelegy Ellipta) 200-62.5-25 mcg/actuation AEPB inhaler, Inhale 1 puff daily Rinse mouth after use. (Patient not taking: Reported on 5/16/2025), Disp: 60 blister, Rfl: 1    ibuprofen (MOTRIN) 600 mg tablet, Take 1 tablet (600 mg total) by mouth every 6 (six) hours as needed for mild pain (Patient not taking: Reported on 5/16/2025), Disp: 30 tablet, Rfl: 0    nicotine (NICODERM CQ) 21 mg/24 hr TD 24 hr patch, Place 1 patch on the skin over 24 hours daily (Patient not taking: Reported on 4/4/2025), Disp: 28 patch, Rfl: 0    polyethylene glycol (GOLYTELY) 4000 mL solution, Take 4,000 mL by mouth once for 1 dose, Disp: 4000 mL, Rfl: 0    predniSONE 10 mg tablet, , Disp: , Rfl:     rOPINIRole (REQUIP) 1 mg tablet, Take 1 mg by mouth in the morning and 1 mg at noon and 1 mg in the evening., Disp: , Rfl:     umeclidinium-vilanterol (Anoro Ellipta) 62.5-25 mcg/actuation inhaler, Inhale 1 puff daily (Patient not taking: Reported on 5/16/2025), Disp: , Rfl:

## 2025-05-16 NOTE — TELEPHONE ENCOUNTER
"FOLLOW UP: Patient will go to urgent care for prednisone request    REASON FOR CONVERSATION: Chest Pain and Shortness of Breath    SYMPTOMS: Shortness of breath ongoing for months     OTHER: N/A    DISPOSITION: Go to [facility]      Reason for Disposition   MILD difficulty breathing (e.g., minimal/no SOB at rest, SOB with walking, pulse < 100) of new-onset or worse than normal    Answer Assessment - Initial Assessment Questions  1. RESPIRATORY STATUS: \"Describe your breathing?\" (e.g., wheezing, shortness of breath, unable to speak, severe coughing)       Shortness of breath  2. ONSET: \"When did this breathing problem begin?\"       For a few months  3. PATTERN \"Does the difficult breathing come and go, or has it been constant since it started?\"       Comes and goes  4. SEVERITY: \"How bad is your breathing?\" (e.g., mild, moderate, severe)       mild  5. RECURRENT SYMPTOM: \"Have you had difficulty breathing before?\" If Yes, ask: \"When was the last time?\" and \"What happened that time?\"       Yes, ongoing  6. CARDIAC HISTORY: \"Do you have any history of heart disease?\" (e.g., heart attack, angina, bypass surgery, angioplasty)       denies  7. LUNG HISTORY: \"Do you have any history of lung disease?\"  (e.g., pulmonary embolus, asthma, emphysema)      COPD  8. CAUSE: \"What do you think is causing the breathing problem?\"       COPD flare  9. OTHER SYMPTOMS: \"Do you have any other symptoms?\" (e.g., chest pain, cough, dizziness, fever, runny nose)      denies  10. O2 SATURATION MONITOR:  \"Do you use an oxygen saturation monitor (pulse oximeter) at home?\" If Yes, ask: \"What is your reading (oxygen level) today?\" \"What is your usual oxygen saturation reading?\" (e.g., 95%)        N/A  11. PREGNANCY: \"Is there any chance you are pregnant?\" \"When was your last menstrual period?\"        N/A  12. TRAVEL: \"Have you traveled out of the country in the last month?\" (e.g., travel history, exposures)        N/A    Protocols " used: Breathing Difficulty-Adult-[place]

## 2025-05-24 ENCOUNTER — APPOINTMENT (EMERGENCY)
Dept: RADIOLOGY | Facility: HOSPITAL | Age: 65
End: 2025-05-24
Payer: COMMERCIAL

## 2025-05-24 ENCOUNTER — HOSPITAL ENCOUNTER (EMERGENCY)
Facility: HOSPITAL | Age: 65
Discharge: LEFT BEFORE DISCHARGE | End: 2025-05-25
Attending: STUDENT IN AN ORGANIZED HEALTH CARE EDUCATION/TRAINING PROGRAM | Admitting: EMERGENCY MEDICINE
Payer: COMMERCIAL

## 2025-05-24 DIAGNOSIS — J44.1 COPD EXACERBATION (HCC): Primary | ICD-10-CM

## 2025-05-24 DIAGNOSIS — R65.20 SEVERE SEPSIS (HCC): ICD-10-CM

## 2025-05-24 DIAGNOSIS — J18.9 PNEUMONIA: ICD-10-CM

## 2025-05-24 DIAGNOSIS — A41.9 SEVERE SEPSIS (HCC): ICD-10-CM

## 2025-05-24 PROCEDURE — 93005 ELECTROCARDIOGRAM TRACING: CPT

## 2025-05-24 PROCEDURE — 83690 ASSAY OF LIPASE: CPT

## 2025-05-24 PROCEDURE — 0241U HB NFCT DS VIR RESP RNA 4 TRGT: CPT

## 2025-05-24 PROCEDURE — 36415 COLL VENOUS BLD VENIPUNCTURE: CPT

## 2025-05-24 PROCEDURE — 85025 COMPLETE CBC W/AUTO DIFF WBC: CPT

## 2025-05-24 PROCEDURE — 84484 ASSAY OF TROPONIN QUANT: CPT

## 2025-05-24 PROCEDURE — 99285 EMERGENCY DEPT VISIT HI MDM: CPT

## 2025-05-24 PROCEDURE — 71046 X-RAY EXAM CHEST 2 VIEWS: CPT

## 2025-05-24 PROCEDURE — 80053 COMPREHEN METABOLIC PANEL: CPT

## 2025-05-25 ENCOUNTER — APPOINTMENT (EMERGENCY)
Dept: CT IMAGING | Facility: HOSPITAL | Age: 65
End: 2025-05-25
Payer: COMMERCIAL

## 2025-05-25 VITALS
SYSTOLIC BLOOD PRESSURE: 104 MMHG | WEIGHT: 182.76 LBS | BODY MASS INDEX: 27.79 KG/M2 | HEART RATE: 95 BPM | OXYGEN SATURATION: 96 % | DIASTOLIC BLOOD PRESSURE: 59 MMHG | TEMPERATURE: 97.5 F | RESPIRATION RATE: 20 BRPM

## 2025-05-25 LAB
2HR DELTA HS TROPONIN: -1 NG/L
ALBUMIN SERPL BCG-MCNC: 4.3 G/DL (ref 3.5–5)
ALP SERPL-CCNC: 58 U/L (ref 34–104)
ALT SERPL W P-5'-P-CCNC: 21 U/L (ref 7–52)
ANION GAP SERPL CALCULATED.3IONS-SCNC: 8 MMOL/L (ref 4–13)
APTT PPP: 25 SECONDS (ref 23–34)
AST SERPL W P-5'-P-CCNC: 15 U/L (ref 13–39)
ATRIAL RATE: 111 BPM
BASOPHILS # BLD AUTO: 0.02 THOUSANDS/ÂΜL (ref 0–0.1)
BASOPHILS NFR BLD AUTO: 0 % (ref 0–1)
BILIRUB SERPL-MCNC: 0.44 MG/DL (ref 0.2–1)
BUN SERPL-MCNC: 19 MG/DL (ref 5–25)
CALCIUM SERPL-MCNC: 9.2 MG/DL (ref 8.4–10.2)
CARDIAC TROPONIN I PNL SERPL HS: 7 NG/L (ref ?–50)
CARDIAC TROPONIN I PNL SERPL HS: 8 NG/L (ref ?–50)
CHLORIDE SERPL-SCNC: 104 MMOL/L (ref 96–108)
CO2 SERPL-SCNC: 29 MMOL/L (ref 21–32)
CREAT SERPL-MCNC: 1.04 MG/DL (ref 0.6–1.3)
EOSINOPHIL # BLD AUTO: 0.03 THOUSAND/ÂΜL (ref 0–0.61)
EOSINOPHIL NFR BLD AUTO: 0 % (ref 0–6)
ERYTHROCYTE [DISTWIDTH] IN BLOOD BY AUTOMATED COUNT: 13.4 % (ref 11.6–15.1)
FLUAV RNA RESP QL NAA+PROBE: NEGATIVE
FLUBV RNA RESP QL NAA+PROBE: NEGATIVE
GFR SERPL CREATININE-BSD FRML MDRD: 75 ML/MIN/1.73SQ M
GLUCOSE SERPL-MCNC: 176 MG/DL (ref 65–140)
HCT VFR BLD AUTO: 45.4 % (ref 36.5–49.3)
HGB BLD-MCNC: 15.4 G/DL (ref 12–17)
IMM GRANULOCYTES # BLD AUTO: 0.15 THOUSAND/UL (ref 0–0.2)
IMM GRANULOCYTES NFR BLD AUTO: 1 % (ref 0–2)
INR PPP: 0.85 (ref 0.85–1.19)
LACTATE SERPL-SCNC: 1.7 MMOL/L (ref 0.5–2)
LACTATE SERPL-SCNC: 2.6 MMOL/L (ref 0.5–2)
LIPASE SERPL-CCNC: 61 U/L (ref 11–82)
LYMPHOCYTES # BLD AUTO: 1.39 THOUSANDS/ÂΜL (ref 0.6–4.47)
LYMPHOCYTES NFR BLD AUTO: 8 % (ref 14–44)
MCH RBC QN AUTO: 31.6 PG (ref 26.8–34.3)
MCHC RBC AUTO-ENTMCNC: 33.9 G/DL (ref 31.4–37.4)
MCV RBC AUTO: 93 FL (ref 82–98)
MONOCYTES # BLD AUTO: 1.27 THOUSAND/ÂΜL (ref 0.17–1.22)
MONOCYTES NFR BLD AUTO: 8 % (ref 4–12)
NEUTROPHILS # BLD AUTO: 13.73 THOUSANDS/ÂΜL (ref 1.85–7.62)
NEUTS SEG NFR BLD AUTO: 83 % (ref 43–75)
NRBC BLD AUTO-RTO: 0 /100 WBCS
P AXIS: 57 DEGREES
PLATELET # BLD AUTO: 294 THOUSANDS/UL (ref 149–390)
PMV BLD AUTO: 9.1 FL (ref 8.9–12.7)
POTASSIUM SERPL-SCNC: 4.1 MMOL/L (ref 3.5–5.3)
PR INTERVAL: 112 MS
PROCALCITONIN SERPL-MCNC: <0.05 NG/ML
PROT SERPL-MCNC: 6.2 G/DL (ref 6.4–8.4)
PROTHROMBIN TIME: 12.3 SECONDS (ref 12.3–15)
QRS AXIS: 82 DEGREES
QRSD INTERVAL: 92 MS
QT INTERVAL: 332 MS
QTC INTERVAL: 451 MS
RBC # BLD AUTO: 4.88 MILLION/UL (ref 3.88–5.62)
RSV RNA RESP QL NAA+PROBE: NEGATIVE
SARS-COV-2 RNA RESP QL NAA+PROBE: NEGATIVE
SODIUM SERPL-SCNC: 141 MMOL/L (ref 135–147)
T WAVE AXIS: 51 DEGREES
VENTRICULAR RATE: 111 BPM
WBC # BLD AUTO: 16.59 THOUSAND/UL (ref 4.31–10.16)

## 2025-05-25 PROCEDURE — 83605 ASSAY OF LACTIC ACID: CPT

## 2025-05-25 PROCEDURE — 94760 N-INVAS EAR/PLS OXIMETRY 1: CPT

## 2025-05-25 PROCEDURE — 84484 ASSAY OF TROPONIN QUANT: CPT

## 2025-05-25 PROCEDURE — 96361 HYDRATE IV INFUSION ADD-ON: CPT

## 2025-05-25 PROCEDURE — 85730 THROMBOPLASTIN TIME PARTIAL: CPT

## 2025-05-25 PROCEDURE — 36415 COLL VENOUS BLD VENIPUNCTURE: CPT

## 2025-05-25 PROCEDURE — 96365 THER/PROPH/DIAG IV INF INIT: CPT

## 2025-05-25 PROCEDURE — 71260 CT THORAX DX C+: CPT

## 2025-05-25 PROCEDURE — 99285 EMERGENCY DEPT VISIT HI MDM: CPT

## 2025-05-25 PROCEDURE — 94644 CONT INHLJ TX 1ST HOUR: CPT

## 2025-05-25 PROCEDURE — 84145 PROCALCITONIN (PCT): CPT

## 2025-05-25 PROCEDURE — 85610 PROTHROMBIN TIME: CPT

## 2025-05-25 PROCEDURE — 93010 ELECTROCARDIOGRAM REPORT: CPT | Performed by: INTERNAL MEDICINE

## 2025-05-25 PROCEDURE — 87040 BLOOD CULTURE FOR BACTERIA: CPT

## 2025-05-25 RX ORDER — ALBUTEROL SULFATE 5 MG/ML
10 SOLUTION RESPIRATORY (INHALATION) ONCE
Status: COMPLETED | OUTPATIENT
Start: 2025-05-25 | End: 2025-05-25

## 2025-05-25 RX ORDER — DOXYCYCLINE 100 MG/1
100 CAPSULE ORAL 2 TIMES DAILY
Qty: 42 CAPSULE | Refills: 0 | Status: SHIPPED | OUTPATIENT
Start: 2025-05-25 | End: 2025-05-26

## 2025-05-25 RX ORDER — SODIUM CHLORIDE FOR INHALATION 0.9 %
12 VIAL, NEBULIZER (ML) INHALATION ONCE
Status: COMPLETED | OUTPATIENT
Start: 2025-05-25 | End: 2025-05-25

## 2025-05-25 RX ADMIN — SODIUM CHLORIDE 1000 ML: 0.9 INJECTION, SOLUTION INTRAVENOUS at 01:15

## 2025-05-25 RX ADMIN — IPRATROPIUM BROMIDE 1 MG: 0.5 SOLUTION RESPIRATORY (INHALATION) at 01:08

## 2025-05-25 RX ADMIN — ALBUTEROL SULFATE 10 MG: 2.5 SOLUTION RESPIRATORY (INHALATION) at 01:08

## 2025-05-25 RX ADMIN — ISODIUM CHLORIDE 12 ML: 0.03 SOLUTION RESPIRATORY (INHALATION) at 01:08

## 2025-05-25 RX ADMIN — CEFTRIAXONE SODIUM 2000 MG: 10 INJECTION, POWDER, FOR SOLUTION INTRAVENOUS at 00:24

## 2025-05-25 RX ADMIN — SODIUM CHLORIDE 1000 ML: 0.9 INJECTION, SOLUTION INTRAVENOUS at 00:01

## 2025-05-25 RX ADMIN — IOHEXOL 85 ML: 350 INJECTION, SOLUTION INTRAVENOUS at 02:05

## 2025-05-25 NOTE — ED PROVIDER NOTES
Time reflects when diagnosis was documented in both MDM as applicable and the Disposition within this note       Time User Action Codes Description Comment    5/25/2025  3:49 AM Андрей Yeager [J44.1] COPD exacerbation (HCC)     5/25/2025  3:49 AM Андрей Yeager [J18.9] Pneumonia     5/25/2025  3:49 AM Андрей Yeager [A41.9,  R65.20] Severe sepsis (HCC)           ED Disposition       ED Disposition   AMA    Condition   --    Date/Time   Sun May 25, 2025  3:45 AM    Comment   Date: 5/25/2025  Patient: Felipe Sanders III  Admitted: 5/24/2025 11:07 PM  Attending Provider: No att. providers found    Felipe Sanders III or his authorized caregiver has made the decision for the patient to leave the emergency department against  the advice of the emergency department staff. He or his authorized caregiver has been informed and understands the inherent risks, including death, respiratory failure, worsening hypoxia, altered mental status, or worsening physical emotional decline ..  He or his authorized caregiver has decided to accept the responsibility for this decision. Felipe Sanders III and all necessary parties have been advised that he may return for further evaluation or treatment. His condition at time of discharge w as  Gaurded.  Felipe Sanders III had current vital signs as follows:  /59 (BP Location: Right arm)   Pulse 95   Temp 97.5 °F (36.4 °C) (Temporal)   Resp 20   Wt 82.9 kg (182 lb 12.2 oz)                Assessment & Plan       Medical Decision Making  Patient is a 64-year-old male with past medical history of COPD and emphysema presenting to the emergency department with complaint of worsening shortness of breath and cough productive yellow-colored sputum symptoms have been present for about a week.  No posterior oropharyngeal erythema or bilateral nasal congestion.  Physical exam shows rhonchi in the bilateral lower lung bases.  No lower extremity edema.  DDX including but not limited  to: pneumonia, pleural effusion, CHF, SCAPE, PE, PTX, ACS, MI, asthma exacerbation, COPD exacerbation, COVID 19, EVALI, anemia, metabolic abnormality, renal failure.  CBC shows leukocytosis 16.59, lactic acid of 2.6, CMP within normal limits.  Chest x-ray shows right fluffy patchy infiltrate in the right lower lobe when compared to prior chest x-ray.  CT chest abdomen pelvis shows streaky opacities at the lung bases may represent atelectasis versus early infiltrates.  Findings consistent with early pneumonia  With severe sepsis, patient meeting SIRS, with elevated lactate of 2.6.  Sepsis alert called and patient started on ceftriaxone 2 g.  Discussed with patient that he needs admission for IV antibiotics and further monitoring.  Patient declined stating that he needs to do something at 8 AM in the morning.  Patient alert and oriented x 4, and mentating properly as been informed and understands the inherent risks, including death, respiratory failure, worsening hypoxia, altered mental status, or worsening physical and emotional wellbeing.  He has decided to accept the responsibility for this decision.  Patient started on doxycycline and educated to continue his amoxicillin from home.  Discussed konstantin with patient that as soon as he completes what ever he has to do outside of your to return to the emergency department for admission.  Patient states that he will if he starts to feel worse.  Patient given strict return precautions return to emergency room for increasing fever, body aches, chills, altered mental status, shortness of breath, productive cough, increased work of breathing, increased wheezing, increasing lethargy or fatigue, dizziness, chest pain palpitations etc. patient verbalized understanding and states that he will return.      Amount and/or Complexity of Data Reviewed  Labs: ordered.  Radiology: ordered and independent interpretation performed.    Risk  Prescription drug management.        ED Course as  of 05/25/25 0550   Sun May 25, 2025   0000 EKG Interpretation    Rate: 111 BPM  Rhythm: Sinus tachycardia with occasional PVCs Axis: Normal  Intervals: Normal, no blocks, QTc 451ms  Q waves: None  T waves: Normal  ST segments: Normal    Impression: Sinus tachycardia with occasional premature ventricular complex, septal infarct unchanged from prior EKG. EKG for comparison: March 23, 2025  EKG interpreted by me.         Medications   sodium chloride 0.9 % bolus 1,000 mL (0 mL Intravenous Stopped 5/25/25 0100)   ceftriaxone (ROCEPHIN) 2 g/50 mL in dextrose IVPB (0 mg Intravenous Stopped 5/25/25 0100)   albuterol inhalation solution 10 mg (10 mg Nebulization Given 5/25/25 0108)   ipratropium (ATROVENT) 0.02 % inhalation solution 1 mg (1 mg Nebulization Given 5/25/25 0108)   sodium chloride 0.9 % inhalation solution 12 mL (12 mL Nebulization Given 5/25/25 0108)   sodium chloride 0.9 % bolus 1,000 mL (0 mL Intravenous Stopped 5/25/25 0215)   iohexol (OMNIPAQUE) 350 MG/ML injection (MULTI-DOSE) 85 mL (85 mL Intravenous Given 5/25/25 0205)       ED Risk Strat Scores                    No data recorded        SBIRT 22yo+      Flowsheet Row Most Recent Value   Initial Alcohol Screen: US AUDIT-C     1. How often do you have a drink containing alcohol? 0 Filed at: 05/24/2025 2304   2. How many drinks containing alcohol do you have on a typical day you are drinking?  0 Filed at: 05/24/2025 2304   3a. Male UNDER 65: How often do you have five or more drinks on one occasion? 0 Filed at: 05/24/2025 2304   3b. FEMALE Any Age, or MALE 65+: How often do you have 4 or more drinks on one occassion? 0 Filed at: 05/24/2025 2304   Audit-C Score 0 Filed at: 05/24/2025 2304   LAZARA: How many times in the past year have you...    Used an illegal drug or used a prescription medication for non-medical reasons? Never Filed at: 05/24/2025 2304                            History of Present Illness       Chief Complaint   Patient presents with     Shortness of Breath     Pt reports having copd and wanting to get checked for pneumonia because he's short of breath. Pt also reports back pain. Pt reports taking prednisone.       Past Medical History[1]   Past Surgical History[2]   Family History[3]   Social History[4]   E-Cigarette/Vaping    E-Cigarette Use Never User       E-Cigarette/Vaping Substances    Nicotine No     THC No     CBD No     Flavoring No     Other No     Unknown No       I have reviewed and agree with the history as documented.     Patient is a 64-year-old male with past medical history of COPD and emphysema presenting to the emergency department with complaint of worsening shortness of breath and cough productive yellow-colored sputum symptoms have been present for about a week.  Patient states that he was seen by his PCP and diagnosed with potential pneumonia and started empirically on amoxicillin and steroids without significant improvement.  Patient states that the cough is worse at night and waking him up. The shortness of breath is worse on exertion improved with use of albuterol.  Patient states that he also has pain with breathing in his bilateral lower lung fields in the back.  Patient also admits to systemic symptoms of chills and bodyaches.  Patient denies any fever, difficulty swallowing, chest pain, palpitations, lower extremity swelling, orthopnea, rash, abdominal.  Or  symptoms at this time.      Shortness of Breath  Associated symptoms: cough    Associated symptoms: no abdominal pain, no chest pain, no ear pain, no fever, no rash, no sore throat, no vomiting and no wheezing        Review of Systems   Constitutional:  Positive for chills. Negative for activity change, fatigue and fever.   HENT:  Negative for congestion, ear pain, mouth sores, rhinorrhea, sinus pressure, sinus pain and sore throat.    Eyes:  Negative for pain, discharge and visual disturbance.   Respiratory:  Positive for cough and shortness of breath. Negative  for choking, chest tightness, wheezing and stridor.    Cardiovascular:  Negative for chest pain and palpitations.   Gastrointestinal:  Negative for abdominal pain, constipation, diarrhea, nausea and vomiting.   Genitourinary:  Negative for dysuria and hematuria.   Musculoskeletal:  Negative for arthralgias and back pain.   Skin:  Negative for color change and rash.   Neurological:  Negative for seizures, syncope and numbness.   All other systems reviewed and are negative.          Objective       ED Triage Vitals [05/24/25 2302]   Temperature Pulse Blood Pressure Respirations SpO2 Patient Position - Orthostatic VS   97.5 °F (36.4 °C) (!) 115 138/75 16 97 % Sitting      Temp Source Heart Rate Source BP Location FiO2 (%) Pain Score    Temporal Monitor Left arm -- --      Vitals      Date and Time Temp Pulse SpO2 Resp BP Pain Score FACES Pain Rating User   05/25/25 0300 -- 95 96 % 20 104/59 -- -- FH   05/25/25 0230 -- 99 99 % 23 125/56 -- -- AR   05/25/25 0215 -- 105 99 % 22 124/61 -- -- AR   05/25/25 0200 -- 102 -- 24 -- -- -- AR   05/25/25 0145 -- 102 100 % 22 117/67 -- -- AR   05/25/25 0130 -- 96 98 % 22 123/60 -- -- AR   05/25/25 0115 -- 97 98 % 27 122/80 -- -- AR   05/25/25 0100 -- 106 98 % 26 121/79 -- -- AR   05/25/25 0045 -- 105 98 % 24 132/69 -- -- AR   05/24/25 2302 97.5 °F (36.4 °C) 115 97 % 16 138/75 -- -- NO            Physical Exam  Vitals and nursing note reviewed.   Constitutional:       General: He is not in acute distress.     Appearance: He is well-developed. He is not ill-appearing, toxic-appearing or diaphoretic.      Interventions: He is not intubated.  HENT:      Head: Normocephalic and atraumatic.      Mouth/Throat:      Pharynx: No pharyngeal swelling or oropharyngeal exudate.     Eyes:      Extraocular Movements: Extraocular movements intact.      Conjunctiva/sclera: Conjunctivae normal.      Pupils: Pupils are equal, round, and reactive to light.     Neck:      Thyroid: No thyromegaly.       Vascular: No hepatojugular reflux or JVD.      Trachea: No tracheal deviation.     Cardiovascular:      Rate and Rhythm: Normal rate and regular rhythm. No extrasystoles are present.     Pulses: No decreased pulses.      Heart sounds: No murmur heard.     No friction rub. No gallop.   Pulmonary:      Effort: Pulmonary effort is normal. No tachypnea, bradypnea, accessory muscle usage or respiratory distress. He is not intubated.      Breath sounds: Examination of the right-lower field reveals rhonchi. Examination of the left-lower field reveals rhonchi. Rhonchi present. No decreased breath sounds, wheezing or rales.   Chest:      Chest wall: No mass, deformity, tenderness, crepitus or edema. There is no dullness to percussion.   Abdominal:      Palpations: Abdomen is soft.      Tenderness: There is no abdominal tenderness.     Musculoskeletal:         General: No swelling.      Cervical back: Neck supple.   Lymphadenopathy:      Cervical: No cervical adenopathy.     Skin:     General: Skin is warm and dry.      Capillary Refill: Capillary refill takes less than 2 seconds.      Coloration: Skin is not cyanotic or pale.      Findings: No ecchymosis, erythema or rash.      Nails: There is no clubbing.     Neurological:      Mental Status: He is alert.     Psychiatric:         Mood and Affect: Mood normal.         Results Reviewed       Procedure Component Value Units Date/Time    HS Troponin I 2hr [[id number]]  (Normal) Collected: 05/25/25 0213    Lab Status: Final result Specimen: Blood from Arm, Left Updated: 05/25/25 0249     hs TnI 2hr 7 ng/L      Delta 2hr hsTnI -1 ng/L     Lactic acid 2 Hours [[id number]]  (Normal) Collected: 05/25/25 0213    Lab Status: Final result Specimen: Blood from Arm, Left Updated: 05/25/25 0241     LACTIC ACID 1.7 mmol/L     Narrative:      Result may be elevated if tourniquet was used during collection.    Procalcitonin [[id number]]  (Normal) Collected: 05/25/25 0016    Lab Status: Final  result Specimen: Blood from Arm, Right Updated: 05/25/25 0050     Procalcitonin <0.05 ng/ml     FLU/RSV/COVID - if FLU/RSV clinically relevant (2hr TAT) [[id number]]  (Normal) Collected: 05/24/25 1066    Lab Status: Final result Specimen: Nares from Nose Updated: 05/25/25 0041     SARS-CoV-2 Negative     INFLUENZA A PCR Negative     INFLUENZA B PCR Negative     RSV PCR Negative    Narrative:      This test has been performed using the CoV-2/Flu/RSV plus assay on the [employer] platform. This test has been validated by the  and verified by the performing laboratory.     This test is designed to amplify and detect the following: nucleocapsid (N), envelope (E), and RNA-dependent RNA polymerase (RdRP) genes of the SARS-CoV-2 genome; matrix (M), basic polymerase (PB2), and acidic protein (PA) segments of the influenza A genome; matrix (M) and non-structural protein (NS) segments of the influenza B genome, and the nucleocapsid genes of RSV A and RSV B.     Positive results are indicative of the presence of Flu A, Flu B, RSV, and/or SARS-CoV-2 RNA. Positive results for SARS-CoV-2 or suspected novel influenza should be reported to state, local, or federal health departments according to local reporting requirements.      All results should be assessed in conjunction with clinical presentation and other laboratory markers for clinical management.     FOR PEDIATRIC PATIENTS - copy/paste COVID Guidelines URL to browser: [email or web address]       Lactic acid, plasma (w/reflex if result > 2.0) [[id number]]  (Abnormal) Collected: 05/25/25 0010    Lab Status: Final result Specimen: Blood from Arm, Left Updated: 05/25/25 0031     LACTIC ACID 2.6 mmol/L     Narrative:      Result may be elevated if tourniquet was used during collection.    APTT [[id number]]  (Normal) Collected: 05/25/25 0010    Lab Status: Final result Specimen: Blood from Arm, Left Updated:  05/25/25 0027     PTT 25 seconds     Protime-INR [[id number]]  (Normal) Collected: 05/25/25 0010    Lab Status: Final result Specimen: Blood from Arm, Left Updated: 05/25/25 0027     Protime 12.3 seconds      INR 0.85    Narrative:      INR Therapeutic Range    Indication                                             INR Range      Atrial Fibrillation                                               2.0-3.0  Hypercoagulable State                                    2.0.2.3  Left Ventricular Asist Device                            2.0-3.0  Mechanical Heart Valve                                  -    Aortic(with afib, MI, embolism, HF, LA enlargement,    and/or coagulopathy)                                     2.0-3.0 (2.5-3.5)     Mitral                                                             2.5-3.5  Prosthetic/Bioprosthetic Heart Valve               2.0-3.0  Venous thromboembolism (VTE: VT, PE        2.0-3.0    HS Troponin 0hr (reflex protocol) [[id number]]  (Normal) Collected: 05/24/25 2355    Lab Status: Final result Specimen: Blood from Arm, Left Updated: 05/25/25 0024     hs TnI 0hr 8 ng/L     HS Troponin I 4hr [[id number]]     Lab Status: No result Specimen: Blood     Blood culture #1 [[id number]] Collected: 05/25/25 0016    Lab Status: In process Specimen: Blood from Arm, Right Updated: 05/25/25 0022    Comprehensive metabolic panel [[id number]]  (Abnormal) Collected: 05/24/25 2355    Lab Status: Final result Specimen: Blood from Arm, Left Updated: 05/25/25 0018     Sodium 141 mmol/L      Potassium 4.1 mmol/L      Chloride 104 mmol/L      CO2 29 mmol/L      ANION GAP 8 mmol/L      BUN 19 mg/dL      Creatinine 1.04 mg/dL      Glucose 176 mg/dL      Calcium 9.2 mg/dL      AST 15 U/L      ALT 21 U/L      Alkaline Phosphatase 58 U/L      Total Protein 6.2 g/dL      Albumin 4.3 g/dL      Total Bilirubin 0.44 mg/dL      eGFR 75 ml/min/1.73sq m     Narrative:      [employer] Kid[employer] guidelines for Chronic  Kidney Disease (CKD):     Stage 1 with normal or high GFR (GFR > 90 mL/min/1.73 square meters)    Stage 2 Mild CKD (GFR = 60-89 mL/min/1.73 square meters)    Stage 3A Moderate CKD (GFR = 45-59 mL/min/1.73 square meters)    Stage 3B Moderate CKD (GFR = 30-44 mL/min/1.73 square meters)    Stage 4 Severe CKD (GFR = 15-29 mL/min/1.73 square meters)    Stage 5 End Stage CKD (GFR <15 mL/min/1.73 square meters)  Note: GFR calculation is accurate only with a steady state creatinine    Lipase [[id number]]  (Normal) Collected: 05/24/25 2355    Lab Status: Final result Specimen: Blood from Arm, Left Updated: 05/25/25 0018     Lipase 61 u/L     Blood culture #2 [[id number]] Collected: 05/25/25 0010    Lab Status: In process Specimen: Blood from Arm, Left Updated: 05/25/25 0013    UA w Reflex to Microscopic w Reflex to Culture [[id number]]     Lab Status: No result Specimen: Urine     CBC and differential [[id number]]  (Abnormal) Collected: 05/24/25 2355    Lab Status: Final result Specimen: Blood from Arm, Left Updated: 05/25/25 0002     WBC 16.59 Thousand/uL      RBC 4.88 Million/uL      Hemoglobin 15.4 g/dL      Hematocrit 45.4 %      MCV 93 fL      MCH 31.6 pg      MCHC 33.9 g/dL      RDW 13.4 %      MPV 9.1 fL      Platelets 294 Thousands/uL      nRBC 0 /100 WBCs      Segmented % 83 %      Immature Grans % 1 %      Lymphocytes % 8 %      Monocytes % 8 %      Eosinophils Relative 0 %      Basophils Relative 0 %      Absolute Neutrophils 13.73 Thousands/µL      Absolute Immature Grans 0.15 Thousand/uL      Absolute Lymphocytes 1.39 Thousands/µL      Absolute Monocytes 1.27 Thousand/µL      Eosinophils Absolute 0.03 Thousand/µL      Basophils Absolute 0.02 Thousands/µL             CT chest with contrast   ED Interpretation by Андрей Yeager PA-C (05/25 2710)   FINDINGS: Lungs: Moderate bilateral emphysematous changes with an upper lung predominance. Streaky opacities at the lung bases may represent atelectasis versus early  infiltrates. Calcified granuloma in the right lower lobe. Mild bilateral bronchiectasis. Pleural spaces: Unremarkable. No pneumothorax. No pleural effusion. Heart: No cardiomegaly or pericardial effusion. Esophagus: Mild esophageal wall thickening may be related to underdistention or esophagitis. Lymph nodes: Mildly prominent hilar lymph nodes. Vasculature: No aortic aneurysm. The central pulmonary arteries appear intact. Bones/joints: Mild degenerative change in the bones. Soft tissues: Unremarkable.     IMPRESSION:    1. Streaky opacities at the lung bases may represent atelectasis versus early infiltrates.  2. Moderate bilateral emphysematous changes with an upper lung predominance.   3. Mild esophageal wall thickening may be related to underdistention or esophagitis.    Dictated and Authenticated by: Tiara Taylor MD    05/25/2025 3:25 AM Eastern Time        XR chest 2 views   ED Interpretation by Андрей Yeager PA-C (05/24 3805)   Right lower lobe infiltrate when compared to chest x-ray performed on 03/19/2025          Procedures    ED Medication and Procedure Management   Prior to Admission Medications   Prescriptions Last Dose Informant Patient Reported? Taking?   Promethazine-Codeine 6.25-10 MG/5ML SOLN  Self Yes No   albuterol (2.5 mg/3 mL) 0.083 % nebulizer solution  Self Yes No   Sig: Inhale 2.5 mg every 6 (six) hours as needed   amitriptyline (ELAVIL) 25 mg tablet   No No   Sig: Take 1 tablet (25 mg total) by mouth daily at bedtime   dicyclomine (BENTYL) 20 mg tablet  Self No No   Sig: TAKE 1 TABLET BY MOUTH EVERY 6 HOURS.   docusate sodium (COLACE) 100 mg capsule  Self No No   Sig: Take 1 capsule (100 mg total) by mouth 2 (two) times a day for 10 days   fluticasone-umeclidinium-vilanterol (Trelegy Ellipta) 200-62.5-25 mcg/actuation AEPB inhaler  Self No No   Sig: Inhale 1 puff daily Rinse mouth after use.   Patient not taking: Reported on 5/16/2025   ibuprofen (MOTRIN) 600 mg tablet   No No   Sig: Take  1 tablet (600 mg total) by mouth every 6 (six) hours as needed for mild pain   Patient not taking: Reported on 5/16/2025   nicotine (NICODERM CQ) 21 mg/24 hr TD 24 hr patch  Self No No   Sig: Place 1 patch on the skin over 24 hours daily   Patient not taking: Reported on 4/4/2025   oxygen gas  Self Yes No   Sig: Inhale continuous as needed   polyethylene glycol (GOLYTELY) 4000 mL solution  Self No No   Sig: Take 4,000 mL by mouth once for 1 dose   polyethylene glycol (MIRALAX) 17 g packet  Self No No   Sig: Take 17 g by mouth daily for 10 days   predniSONE 10 mg tablet  Self Yes No   Patient not taking: Reported on 5/16/2025   rOPINIRole (REQUIP) 1 mg tablet  Self Yes No   Sig: Take 1 mg by mouth in the morning and 1 mg at noon and 1 mg in the evening.   umeclidinium-vilanterol (Anoro Ellipta) 62.5-25 mcg/actuation inhaler   Yes No   Sig: Inhale 1 puff daily   Patient not taking: Reported on 5/16/2025      Facility-Administered Medications: None     Discharge Medication List as of 5/25/2025  3:51 AM        START taking these medications    Details   doxycycline monohydrate (MONODOX) 100 mg capsule Take 1 capsule (100 mg total) by mouth 2 (two) times a day for 21 days, Starting Sun 5/25/2025, Until Sun 6/15/2025, Normal           CONTINUE these medications which have NOT CHANGED    Details   albuterol (2.5 mg/3 mL) 0.083 % nebulizer solution Inhale 2.5 mg every 6 (six) hours as needed, Starting Thu 3/20/2025, Until Fri 3/20/2026 at 2359, Historical Med      amitriptyline (ELAVIL) 25 mg tablet Take 1 tablet (25 mg total) by mouth daily at bedtime, Starting Wed 5/7/2025, Normal      dicyclomine (BENTYL) 20 mg tablet TAKE 1 TABLET BY MOUTH EVERY 6 HOURS., Starting Thu 3/13/2025, Normal      docusate sodium (COLACE) 100 mg capsule Take 1 capsule (100 mg total) by mouth 2 (two) times a day for 10 days, Starting Tue 4/15/2025, Until Fri 5/2/2025, Normal      fluticasone-umeclidinium-vilanterol (Trelegy Ellipta) 200-62.5-25  mcg/actuation AEPB inhaler Inhale 1 puff daily Rinse mouth after use., Starting Mon 3/24/2025, Normal      ibuprofen (MOTRIN) 600 mg tablet Take 1 tablet (600 mg total) by mouth every 6 (six) hours as needed for mild pain, Starting Fri 5/2/2025, Normal      nicotine (NICODERM CQ) 21 mg/24 hr TD 24 hr patch Place 1 patch on the skin over 24 hours daily, Starting Tue 3/25/2025, Normal      oxygen gas Inhale continuous as needed, Historical Med      polyethylene glycol (GOLYTELY) 4000 mL solution Take 4,000 mL by mouth once for 1 dose, Starting Tue 2/4/2025, Normal      polyethylene glycol (MIRALAX) 17 g packet Take 17 g by mouth daily for 10 days, Starting Tue 4/15/2025, Until Fri 5/16/2025, Normal      predniSONE 10 mg tablet Historical Med      Promethazine-Codeine 6.25-10 MG/5ML SOLN Historical Med      rOPINIRole (REQUIP) 1 mg tablet Take 1 mg by mouth in the morning and 1 mg at noon and 1 mg in the evening., Starting Thu 4/3/2025, Until Sat 5/3/2025, Historical Med      umeclidinium-vilanterol (Anoro Ellipta) 62.5-25 mcg/actuation inhaler Inhale 1 puff daily, Starting Mon 5/5/2025, Historical Med           No discharge procedures on file.  ED SEPSIS DOCUMENTATION   Time reflects when diagnosis was documented in both MDM as applicable and the Disposition within this note       Time User Action Codes Description Comment    5/25/2025  3:49 AM Андрей Yeager [J44.1] COPD exacerbation (HCC)     5/25/2025  3:49 AM Андрей Yeager [J18.9] Pneumonia     5/25/2025  3:49 AM Андрей Yeager [A41.9,  R65.20] Severe sepsis (HCC)            Initial Sepsis Screening       Row Name 05/25/25 0052                Is the patient's history suggestive of a new or worsening infection? Yes (Proceed)  -DW        Suspected source of infection pneumonia  -DW        Indicate SIRS criteria Leukocytosis (WBC > 29520 IJL) OR Leukopenia (WBC <4000 IJL) OR Bandemia (WBC >10% bands);Tachypnea > 20 resp per min;Tachycardia > 90 bpm  -DW   "      Are two or more of the above signs & symptoms of infection both present and new to the patient? Yes (Proceed)  -DW        Assess for evidence of organ dysfunction: Are any of the below criteria present within 6 hours of suspected infection and SIRS criteria that are NOT considered to be chronic conditions? Lactate > 2.0  -DW        Date of presentation of severe sepsis 05/25/25  -DW        Time of presentation of severe sepsis 1253  -DW        Sepsis Note: Click \"NEXT\" below (NOT \"close\") to generate sepsis note based on above information. --                  User Key  (r) = Recorded By, (t) = Taken By, (c) = Cosigned By      Initials Name Provider Type    DW Андрей Yeager PA-C Physician Assistant                           [1]   Past Medical History:  Diagnosis Date    COPD (chronic obstructive pulmonary disease) (HCC)     Diverticulitis of colon     Erectile dysfunction     Hernia of abdominal cavity     Liver cyst    [2]   Past Surgical History:  Procedure Laterality Date    COLONOSCOPY      NOSE SURGERY     [3] No family history on file.  [4]   Social History  Tobacco Use    Smoking status: Every Day     Current packs/day: 1.50     Average packs/day: 1.5 packs/day for 50.0 years (75.0 ttl pk-yrs)     Types: Cigarettes     Passive exposure: Past    Smokeless tobacco: Never   Vaping Use    Vaping status: Never Used   Substance Use Topics    Alcohol use: Never    Drug use: No        Андрей Yeager PA-C  05/25/25 0502    "

## 2025-05-25 NOTE — SEPSIS NOTE
"Sepsis Note   Felipe Sanders III 64 y.o. male MRN: [id number]  Unit/Bed#: ED 19 Encounter: [id number]       Initial Sepsis Screening       Row Name 05/25/25 0052                Is the patient's history suggestive of a new or worsening infection? Yes (Proceed)  -DW        Suspected source of infection pneumonia  -DW        Indicate SIRS criteria Leukocytosis (WBC > 11557 IJL) OR Leukopenia (WBC <4000 IJL) OR Bandemia (WBC >10% bands);Tachypnea > 20 resp per min;Tachycardia > 90 bpm  -DW        Are two or more of the above signs & symptoms of infection both present and new to the patient? Yes (Proceed)  -DW        Assess for evidence of organ dysfunction: Are any of the below criteria present within 6 hours of suspected infection and SIRS criteria that are NOT considered to be chronic conditions? Lactate > 2.0  -DW        Date of presentation of severe sepsis 05/25/25  -DW        Time of presentation of severe sepsis 1253  -DW        Sepsis Note: Click \"NEXT\" below (NOT \"close\") to generate sepsis note based on above information. --                  User Key  (r) = Recorded By, (t) = Taken By, (c) = Cosigned By      Initials Name Provider Type    DW Андрей Yeager PA-C Physician Assistant                   Initial Sepsis Screening       Row Name 05/25/25 0052                   Initial Sepsis Assessment    Is the patient's history suggestive of a new or worsening infection? Yes (Proceed)  -DW        Suspected source of infection pneumonia  -DW        Indicate SIRS criteria Leukocytosis (WBC > 86674 IJL) OR Leukopenia (WBC <4000 IJL) OR Bandemia (WBC >10% bands);Tachypnea > 20 resp per min;Tachycardia > 90 bpm  -DW        Are two or more of the above signs & symptoms of infection both present and new to the patient? Yes (Proceed)  -DW           If the answer is yes to both above questions, suspicion of sepsis is present. Proceed with algorithm to determine if severe sepsis or septic shock criteria are met.    Assess " for evidence of organ dysfunction: Are any of the below criteria present within 6 hours of suspected infection and SIRS criteria that are NOT considered to be chronic conditions? Lactate > 2.0  -DW           Based on the above information, the patient meets criteria for SEVERE sepsis. CALL A SEPSIS ALERT. Use sepsis order set.     Date of presentation of severe sepsis 05/25/25  -DW        Time of presentation of severe sepsis 1253  -DW                  User Key  (r) = Recorded By, (t) = Taken By, (c) = Cosigned By      Initials Name Provider Type    DW Андрей Yeager PA-C Physician Assistant                         Body mass index is 27.79 kg/m².  Wt Readings from Last 1 Encounters:   05/24/25 82.9 kg (182 lb 12.2 oz)        Ideal body weight: 68.4 kg (150 lb 12.7 oz)  Adjusted ideal body weight: 74.2 kg (163 lb 9.3 oz)

## 2025-05-25 NOTE — DISCHARGE INSTRUCTIONS
Take medication as prescribed  Please return to emergency department as soon as possible  Return to emergency room for increasing fever, body aches, chills, altered mental status, shortness of breath, productive cough, increased work of breathing, increased wheezing, increasing lethargy or fatigue, dizziness, chest pain palpitations etc.

## 2025-05-25 NOTE — ED NOTES
Pt reports not wanting blood work because he was stuck many times last time he was here. Pt reports just wanting a chest xray to see if he has pneumonia or not.      Kiersten Pink RN  05/24/25 5666

## 2025-05-26 RX ORDER — DOXYCYCLINE 100 MG/1
100 CAPSULE ORAL 2 TIMES DAILY
Qty: 42 CAPSULE | Refills: 0 | Status: SHIPPED | OUTPATIENT
Start: 2025-05-26 | End: 2025-06-16

## 2025-05-28 ENCOUNTER — TELEPHONE (OUTPATIENT)
Age: 65
End: 2025-05-28

## 2025-05-28 NOTE — TELEPHONE ENCOUNTER
Patient is asking if the provider can review his CT, Xray and lab results from when he was in the hospital. He states he was told at the hospital that he might be sepsis and have pneumonia. He states he was given antibiotics but would a second opinion from his PCP

## 2025-05-28 NOTE — TELEPHONE ENCOUNTER
Patient notified of results. He would like to know why the disposition from the ED differed and stated he had pneumonia and sepsis.    No

## 2025-05-30 ENCOUNTER — NURSE TRIAGE (OUTPATIENT)
Age: 65
End: 2025-05-30

## 2025-05-30 DIAGNOSIS — J44.1 CHRONIC OBSTRUCTIVE PULMONARY DISEASE WITH ACUTE EXACERBATION (HCC): Primary | ICD-10-CM

## 2025-05-30 DIAGNOSIS — R10.84 GENERALIZED ABDOMINAL PAIN: ICD-10-CM

## 2025-05-30 LAB
BACTERIA BLD CULT: NORMAL
BACTERIA BLD CULT: NORMAL

## 2025-05-30 RX ORDER — METHYLPREDNISOLONE 4 MG/1
TABLET ORAL
Qty: 21 EACH | Refills: 0 | Status: SHIPPED | OUTPATIENT
Start: 2025-05-30 | End: 2025-06-04 | Stop reason: ALTCHOICE

## 2025-05-30 NOTE — TELEPHONE ENCOUNTER
"REASON FOR CONVERSATION: COPD    SYMPTOMS: shortness of breath with exertion. No fever, discolored phlegm, chest pain or congestion.    OTHER HEALTH INFORMATION: Was seen in hospital on 5/24 for COPD excerebration. States he always is a-little short of breath and a dose pack of prednisone helps.     PROTOCOL DISPOSITION: Go to Office Now/Urgent Care    CARE ADVICE PROVIDED: Offered to schedule appointment in office, declined at this time. Would like provider to order him prednisone.     PRACTICE FOLLOW-UP: Please review and see if appropriate to order prednisone for patient or other recommendations.        Reason for Disposition   MILD difficulty breathing (e.g., minimal/no SOB at rest, SOB with walking, pulse < 100) and still present when not coughing  (Exception: No change from usual, chronic shortness of breath.)    Answer Assessment - Initial Assessment Questions  1. ONSET: \"When did the cough begin?\"       William cough  2. SEVERITY: \"How bad is the cough today?\"       No cough  3. SPUTUM: \"Describe the color of your sputum\" (e.g., none, dry cough; clear, white, yellow, green)      No sputum  4. HEMOPTYSIS: \"Are you coughing up any blood?\" If so ask: \"How much?\" (e.g., flecks, streaks, tablespoons, etc.)      No blood  5. DIFFICULTY BREATHING: \"Are you having difficulty breathing?\" If Yes, ask: \"How bad is it?\" (e.g., mild, moderate, severe)       Having  mild difficulty breathing    6. FEVER: \"Do you have a fever?\" If Yes, ask: \"What is your temperature, how was it measured, and when did it start?\"      No fever present  7. CARDIAC HISTORY: \"Do you have any history of heart disease?\" (e.g., heart attack, congestive heart failure)       william  8. LUNG HISTORY: \"Do you have any history of lung disease?\"  (e.g., pulmonary embolus, asthma, emphysema)      COPD emphysema  9. PE RISK FACTORS: \"Do you have a history of blood clots?\" (or: recent major surgery, recent prolonged travel, bedridden)      no  10. OTHER " "SYMPTOMS: \"Do you have any other symptoms?\" (e.g., runny nose, wheezing, chest pain)        No chest pain, no runny nose , no wheezing    Protocols used: Cough - Chronic-Adult-[place]    "

## 2025-06-02 ENCOUNTER — NURSE TRIAGE (OUTPATIENT)
Age: 65
End: 2025-06-02

## 2025-06-02 NOTE — TELEPHONE ENCOUNTER
"REASON FOR CONVERSATION: Abdominal Pain    SYMPTOMS: lower abdominal pain, constipated     OTHER HEALTH INFORMATION: pt. Had CT of abdomen, found to be constipated and have fluid distention of the mid and distal appendix which may reflect mucocele, pt. Did not want to pursue surgery, took Miralax and had some stool passed, pt. Was ordered Amitrptyline but has not  been taking it , wants to \" know the cuse of my pain\" and not just take another pill     PROTOCOL DISPOSITION: See Within 2 Weeks in Office    CARE ADVICE PROVIDED: continue with Miralax 1 capful BI, amitriptyline as he is complaining about pain and this was prescribed for him    PRACTICE FOLLOW-UP: f/u 6/19/25        Pt. Calling with abdominal pain, twice daily bowel  movements, left sided abdominal pain, increased pain with movement, relieved a little when moving bowels, passing gas,   Reason for Disposition   Abdominal pain is a chronic symptom (recurrent or ongoing AND lasting > 4 weeks)    Answer Assessment - Initial Assessment Questions  1. LOCATION: \"Where does it hurt?\"       Lower abdomen, left sided   2. RADIATION: \"Does the pain shoot anywhere else?\" (e.g., chest, back)      Denies   3. ONSET: \"When did the pain begin?\" (Minutes, hours or days ago)       Ongoing   4. SUDDEN: \"Gradual or sudden onset?\"      Gradual   5. PATTERN \"Does the pain come and go, or is it constant?\"      Constant   6. SEVERITY: \"How bad is the pain?\"  (e.g., Scale 1-10; mild, moderate, or severe)      7-8/10  7. RECURRENT SYMPTOM: \"Have you ever had this type of stomach pain before?\" If Yes, ask: \"When was the last time?\" and \"What happened that time?\"       Denies   8. CAUSE: \"What do you think is causing the stomach pain?\"      Unsure   9. RELIEVING/AGGRAVATING FACTORS: \"What makes it better or worse?\" (e.g., antacids, bending or twisting motion, bowel movement)      Moving bowels gives a little relief   10. OTHER SYMPTOMS: \"Do you have any other symptoms?\" (e.g., back " pain, diarrhea, fever, urination pain, vomiting)        Constipation    Protocols used: Abdominal Pain - Male-Adult-[place]

## 2025-06-04 ENCOUNTER — OFFICE VISIT (OUTPATIENT)
Dept: INTERNAL MEDICINE CLINIC | Facility: CLINIC | Age: 65
End: 2025-06-04
Payer: COMMERCIAL

## 2025-06-04 VITALS
BODY MASS INDEX: 27.86 KG/M2 | DIASTOLIC BLOOD PRESSURE: 78 MMHG | WEIGHT: 183.8 LBS | RESPIRATION RATE: 20 BRPM | HEART RATE: 99 BPM | OXYGEN SATURATION: 93 % | SYSTOLIC BLOOD PRESSURE: 130 MMHG | HEIGHT: 68 IN

## 2025-06-04 DIAGNOSIS — J43.9 PULMONARY EMPHYSEMA, UNSPECIFIED EMPHYSEMA TYPE (HCC): Primary | ICD-10-CM

## 2025-06-04 DIAGNOSIS — R10.32 LEFT LOWER QUADRANT ABDOMINAL PAIN: ICD-10-CM

## 2025-06-04 PROCEDURE — 99214 OFFICE O/P EST MOD 30 MIN: CPT

## 2025-06-04 RX ORDER — PREDNISONE 10 MG/1
TABLET ORAL
Qty: 30 TABLET | Refills: 0 | Status: SHIPPED | OUTPATIENT
Start: 2025-06-04 | End: 2025-06-12

## 2025-06-04 NOTE — ASSESSMENT & PLAN NOTE
Thoroughly reviewed his pulmonary status, encouraged the patient to make changes on his own, discussed smoking cessation at length.  Gave referral to [facility] pulmonology since patient does not feel supported by his current pulmonologist. No current concern for pneumonia, exacerbation present. Longer prednisone taper provided. Will be seeing PCP in August.  Orders:    predniSONE 10 mg tablet; 4 tab x 4 days, 3 tabs x 3 days, 2 tabs x 2 days, 1 tab x 1 day then stop    Ambulatory Referral to Pulmonology; Future

## 2025-06-04 NOTE — ASSESSMENT & PLAN NOTE
Continued left lower quadrant abdominal pain.  Patient does not drink water on a daily basis.  Discussed that he needs to make the effort to do so.  CT of the abdomen 5/12 shows constipation.  Patient states he does not typically have to strain to have a bowel movement.  He does not have consistent bowel movements.  No evidence of diverticulitis at this time.  He will be seeing GI again 6/19.

## 2025-06-04 NOTE — PROGRESS NOTES
Name: Felipe Sanders III      : 1960      MRN: [id number]  Encounter Provider: CHELSEA Rodríguez  Encounter Date: 2025   Encounter department: [facility] INTERNAL MEDICINE [place]  :  Assessment & Plan  Pulmonary emphysema, unspecified emphysema type (HCC)  Thoroughly reviewed his pulmonary status, encouraged the patient to make changes on his own, discussed smoking cessation at length.  Gave referral to [facility] pulmonology since patient does not feel supported by his current pulmonologist. No current concern for pneumonia, exacerbation present. Longer prednisone taper provided. Will be seeing PCP in August.  Orders:    predniSONE 10 mg tablet; 4 tab x 4 days, 3 tabs x 3 days, 2 tabs x 2 days, 1 tab x 1 day then stop    Ambulatory Referral to Pulmonology; Future    Left lower quadrant abdominal pain  Continued left lower quadrant abdominal pain.  Patient does not drink water on a daily basis.  Discussed that he needs to make the effort to do so.  CT of the abdomen  shows constipation.  Patient states he does not typically have to strain to have a bowel movement.  He does not have consistent bowel movements.  No evidence of diverticulitis at this time.  He will be seeing GI again .              History of Present Illness   Felipe is here today for follow up from a visit to the ED on  with recommended admission for findings consistent with early pneumonia, but he had refused. Discharged with doxycycline. PCP had reviewed xray, official read showing no pneumonia. PCP had sent in for medrol dose pack.      Review of Systems   Constitutional:  Negative for chills and fever.   HENT:  Negative for congestion, ear pain, rhinorrhea, sinus pressure and sore throat.    Eyes:  Negative for pain and visual disturbance.   Respiratory:  Positive for cough and shortness of breath. Negative for chest tightness.    Cardiovascular:  Negative for chest pain, palpitations and leg swelling.  "  Gastrointestinal:  Positive for abdominal pain. Negative for constipation, diarrhea, nausea and vomiting.   Endocrine: Negative.    Genitourinary:  Negative for dysuria, frequency and hematuria.   Musculoskeletal:  Negative for arthralgias, back pain and myalgias.   Skin:  Negative for color change and rash.   Allergic/Immunologic: Negative.    Neurological:  Negative for dizziness, seizures, syncope, light-headedness and headaches.   Hematological: Negative.    Psychiatric/Behavioral: Negative.     All other systems reviewed and are negative.      Objective   /78 (BP Location: Left arm, Patient Position: Sitting, Cuff Size: Adult)   Pulse 99   Resp 20   Ht 5' 8\" (1.727 m)   Wt 83.4 kg (183 lb 12.8 oz)   SpO2 93%   BMI 27.95 kg/m²      Physical Exam  Vitals and nursing note reviewed.   Constitutional:       General: He is not in acute distress.     Appearance: He is well-developed.   Neck:      Vascular: No carotid bruit.     Cardiovascular:      Rate and Rhythm: Normal rate and regular rhythm.      Pulses: Normal pulses.      Heart sounds: Normal heart sounds. No murmur heard.  Pulmonary:      Effort: Pulmonary effort is normal. No respiratory distress.      Breath sounds: Decreased breath sounds present.   Abdominal:      General: Bowel sounds are normal.      Palpations: Abdomen is soft.      Tenderness: There is abdominal tenderness in the left lower quadrant. There is no right CVA tenderness or left CVA tenderness.     Musculoskeletal:         General: No swelling.      Cervical back: Normal range of motion and neck supple.     Skin:     General: Skin is warm and dry.     Neurological:      Mental Status: He is alert and oriented to person, place, and time.     Psychiatric:         Mood and Affect: Mood normal.         "

## 2025-06-06 ENCOUNTER — TELEPHONE (OUTPATIENT)
Age: 65
End: 2025-06-06

## 2025-06-06 NOTE — TELEPHONE ENCOUNTER
I called and spoke with patient to schedule per referral for Pulmonary emphysema. Patient would be hospital follow up admitted 3/23-3/24. Patient stated he was driving in bad cell service area and call was lost. Please schedule hospital follow up if patient returns call.

## 2025-06-12 ENCOUNTER — TELEPHONE (OUTPATIENT)
Age: 65
End: 2025-06-12

## 2025-06-12 ENCOUNTER — OFFICE VISIT (OUTPATIENT)
Dept: INTERNAL MEDICINE CLINIC | Facility: CLINIC | Age: 65
End: 2025-06-12
Payer: COMMERCIAL

## 2025-06-12 ENCOUNTER — NURSE TRIAGE (OUTPATIENT)
Age: 65
End: 2025-06-12

## 2025-06-12 VITALS
BODY MASS INDEX: 27.74 KG/M2 | DIASTOLIC BLOOD PRESSURE: 78 MMHG | HEART RATE: 116 BPM | SYSTOLIC BLOOD PRESSURE: 130 MMHG | OXYGEN SATURATION: 94 % | WEIGHT: 183 LBS | HEIGHT: 68 IN | RESPIRATION RATE: 20 BRPM

## 2025-06-12 DIAGNOSIS — K59.00 CONSTIPATION: ICD-10-CM

## 2025-06-12 DIAGNOSIS — J44.9 CHRONIC OBSTRUCTIVE PULMONARY DISEASE, UNSPECIFIED COPD TYPE (HCC): ICD-10-CM

## 2025-06-12 DIAGNOSIS — J43.9 PULMONARY EMPHYSEMA, UNSPECIFIED EMPHYSEMA TYPE (HCC): ICD-10-CM

## 2025-06-12 DIAGNOSIS — R68.89 CUSHINGOID FACIES: ICD-10-CM

## 2025-06-12 DIAGNOSIS — K13.79 UVULAR HYPERTROPHY: Primary | ICD-10-CM

## 2025-06-12 PROCEDURE — 99214 OFFICE O/P EST MOD 30 MIN: CPT | Performed by: INTERNAL MEDICINE

## 2025-06-12 RX ORDER — ALBUTEROL SULFATE 90 UG/1
1 INHALANT RESPIRATORY (INHALATION) EVERY 6 HOURS PRN
COMMUNITY
Start: 2025-06-04

## 2025-06-12 RX ORDER — FLUTICASONE FUROATE, UMECLIDINIUM BROMIDE AND VILANTEROL TRIFENATATE 200; 62.5; 25 UG/1; UG/1; UG/1
1 POWDER RESPIRATORY (INHALATION) DAILY
Qty: 60 BLISTER | Refills: 1 | Status: SHIPPED | OUTPATIENT
Start: 2025-06-12

## 2025-06-12 RX ORDER — PROMETHAZINE HYDROCHLORIDE AND CODEINE PHOSPHATE 6.25; 1 MG/5ML; MG/5ML
SOLUTION ORAL
Status: CANCELLED | OUTPATIENT
Start: 2025-06-12

## 2025-06-12 RX ORDER — PREDNISONE 20 MG/1
40 TABLET ORAL DAILY
Qty: 10 TABLET | Refills: 0 | Status: SHIPPED | OUTPATIENT
Start: 2025-06-12 | End: 2025-06-17

## 2025-06-12 NOTE — ASSESSMENT & PLAN NOTE
He is not compliant with his inhalers, still smoking, he has an appointment with pulmonary on 7/17.    Orders:    fluticasone-umeclidinium-vilanterol (Trelegy Ellipta) 200-62.5-25 mcg/actuation AEPB inhaler; Inhale 1 puff daily Rinse mouth after use.

## 2025-06-12 NOTE — TELEPHONE ENCOUNTER
Patient called and stated that he saw Dr. Silverman today and at visit he was told that his 3 prescriptions would be sent to the pharmacy. Patient went to  [facility] and they informed him that they only received two prescriptions: Prednisone 20 mg and Trelegy inhaler which they wont have till tomorrow but they didn't receive   Promethazine-Codeine 6.25-10 MG/5ML  .  Patient states that provider told him she would send Rx as he was out of it. Please review and send to [facility] and update pt

## 2025-06-12 NOTE — TELEPHONE ENCOUNTER
Patient called in regarding the previous message. I spoke with the office provider is at lunch. Patient will stop by the office.

## 2025-06-12 NOTE — TELEPHONE ENCOUNTER
"REASON FOR CONVERSATION: Shortness of Breath    SYMPTOMS: Sore red throat with shortness of breath on exertion. States it makes him short of breat at times. Has no trouble swallowing. No fever, No chest pain. No nasal congestion or post nasal drip.  Oxygen is currently 92-92%. States he is not short of breath currently but if he exerts himself he does get winded and had sit down. Does not visualize any white spots on throat, just redness.     OTHER HEALTH INFORMATION: Patient states that he had symptoms at office visit last week wa prescribed Prednisone that helped, has chronic symptoms of COPD.     PROTOCOL DISPOSITION: Discuss With PCP and Callback by Nurse Within 1 Hour    CARE ADVICE PROVIDED: Please review advise patient what recommendations are. Declined at this time to schedule a follow up unless warranted by PCP. He is asking for extended dose of prednisone.     PRACTICE FOLLOW-UP: Please call Elliot with recommendations from PCP at [phone].        Reason for Disposition   Oxygen level (e.g., pulse oximetry) 91 to 94%    Answer Assessment - Initial Assessment Questions  1. RESPIRATORY STATUS: \"Describe your breathing?\" (e.g., wheezing, shortness of breath, unable to speak, severe coughing)       Shortness of breath and sore throat   2. ONSET: \"When did this breathing problem begin?\"       Intermittent breathing issues   3. PATTERN \"Does the difficult breathing come and go, or has it been constant since it started?\"       Comes and goes intermittent  4. SEVERITY: \"How bad is your breathing?\" (e.g., mild, moderate, severe)       Mild  5. RECURRENT SYMPTOM: \"Have you had difficulty breathing before?\" If Yes, ask: \"When was the last time?\" and \"What happened that time?\"       Yes he has had this since office visit last week on 6/3    6. CARDIAC HISTORY: \"Do you have any history of heart disease?\" (e.g., heart attack, angina, bypass surgery, angioplasty)       no  7. LUNG HISTORY: \"Do you have any history of " "lung disease?\"  (e.g., pulmonary embolus, asthma, emphysema)      COPD, asthma and allergies   8. CAUSE: \"What do you think is causing the breathing problem?\"       He is unsure.  9. OTHER SYMPTOMS: \"Do you have any other symptoms?\" (e.g., chest pain, cough, dizziness, fever, runny nose)      No chest pain, no dizziness  10. O2 SATURATION MONITOR:  \"Do you use an oxygen saturation monitor (pulse oximeter) at home?\" If Yes, ask: \"What is your reading (oxygen level) today?\" \"What is your usual oxygen saturation reading?\" (e.g., 95%)        92%    Protocols used: Breathing Difficulty-Adult-[place]    "

## 2025-06-12 NOTE — PROGRESS NOTES
Name: Felipe Sanders III      : 1960      MRN: [id number]  Encounter Provider: Emil Silverman MD  Encounter Date: 2025   Encounter department: [facility] INTERNAL MEDICINE [place]  :  Assessment & Plan  Uvular hypertrophy  Noted.  Orders:    Ambulatory Referral to Otolaryngology; Future    Constipation  He is not compliant with his regimen, Discussed diet changes, use Miralax, colace, not taking linzess, not taking amitriptyline.       Pulmonary emphysema, unspecified emphysema type (HCC)  He is not compliant with his inhalers, still smoking, he has an appointment with pulmonary on . Only take steroids if absolutely necessary.    Orders:    predniSONE 20 mg tablet; Take 2 tablets (40 mg total) by mouth daily for 5 days    Chronic obstructive pulmonary disease, unspecified COPD type (HCC)  He is not compliant with his inhalers, still smoking, he has an appointment with pulmonary on .    Orders:    fluticasone-umeclidinium-vilanterol (Trelegy Ellipta) 200-62.5-25 mcg/actuation AEPB inhaler; Inhale 1 puff daily Rinse mouth after use.    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 Monitor.             Depression Screening and Follow-up Plan: Patient was screened for depression during today's encounter. They screened negative with a PHQ-2 score of 0.      Tobacco Cessation Counseling: Tobacco cessation counseling was provided. The patient is sincerely urged to quit consumption of tobacco. He is not ready to quit tobacco. Medication options discussed.       History of Present Illness   Shortness of Breath  Pertinent negatives include no chest pain, coughing, palpitations or sore throat.   Constipation  Pertinent negatives include no abdominal pain, back pain, fever or vomiting.   Neck Pain   Pertinent negatives include no chest pain or fever.     Review of Systems   Constitutional:  Negative for chills and fever.   HENT:   "Negative for ear pain and sore throat.    Eyes:  Negative for pain and visual disturbance.   Respiratory:  Positive for shortness of breath. Negative for cough.    Cardiovascular:  Negative for chest pain and palpitations.   Gastrointestinal:  Positive for constipation. Negative for abdominal pain and vomiting.   Genitourinary:  Negative for dysuria and hematuria.   Musculoskeletal:  Positive for neck pain. Negative for arthralgias and back pain.   Skin:  Negative for color change and rash.   Neurological:  Negative for seizures and syncope.   All other systems reviewed and are negative.      Objective   /78 (BP Location: Left arm, Patient Position: Sitting, Cuff Size: Adult)   Pulse (!) 116   Resp 20   Ht 5' 8\" (1.727 m)   Wt 83 kg (183 lb)   SpO2 94%   BMI 27.83 kg/m²      Physical Exam  Vitals and nursing note reviewed.   Constitutional:       General: He is not in acute distress.     Appearance: He is ill-appearing.      Comments: Disheveled    Facial plethora      HENT:      Head: Normocephalic and atraumatic.     Eyes:      Conjunctiva/sclera: Conjunctivae normal.       Cardiovascular:      Rate and Rhythm: Normal rate and regular rhythm.      Heart sounds: No murmur heard.  Pulmonary:      Effort: Pulmonary effort is normal. No respiratory distress.      Breath sounds: Decreased breath sounds present.   Abdominal:      Palpations: Abdomen is soft.      Tenderness: There is no abdominal tenderness.     Musculoskeletal:         General: No swelling.      Cervical back: Neck supple.     Skin:     General: Skin is warm and dry.      Capillary Refill: Capillary refill takes less than 2 seconds.     Neurological:      Mental Status: He is alert.     Psychiatric:         Mood and Affect: Mood normal.         "

## 2025-06-12 NOTE — ASSESSMENT & PLAN NOTE
He is not compliant with his inhalers, still smoking, he has an appointment with pulmonary on 7/17. Only take steroids if absolutely necessary.    Orders:    predniSONE 20 mg tablet; Take 2 tablets (40 mg total) by mouth daily for 5 days

## 2025-06-13 NOTE — TELEPHONE ENCOUNTER
LMOM with details, okay with communication form and advised to call back in message if any questions

## 2025-06-18 ENCOUNTER — TELEPHONE (OUTPATIENT)
Dept: GASTROENTEROLOGY | Facility: CLINIC | Age: 65
End: 2025-06-18

## 2025-06-18 NOTE — TELEPHONE ENCOUNTER
Patient calling back to speak to Cheri after call disconnecting.  Called office and spoke with Soto Alonzo in with patient.  Advised patient she will call back.

## 2025-06-18 NOTE — TELEPHONE ENCOUNTER
Called patient and call was disconnected.  Called back - rang once and went to .  Will discuss at appointment tomorrow.

## 2025-06-18 NOTE — TELEPHONE ENCOUNTER
Cheri pt-  Patient has a scheduled appt tomorrow with Cheri. He states he had left several messages and no one returned his call.   He has had abdominal discomfort.  He is not sure if her wants to keep the appt tomorrow?  Please phone [phone] to advise   No

## 2025-06-19 ENCOUNTER — OFFICE VISIT (OUTPATIENT)
Dept: GASTROENTEROLOGY | Facility: CLINIC | Age: 65
End: 2025-06-19
Payer: COMMERCIAL

## 2025-06-19 VITALS
BODY MASS INDEX: 28.04 KG/M2 | HEIGHT: 68 IN | WEIGHT: 185 LBS | SYSTOLIC BLOOD PRESSURE: 128 MMHG | DIASTOLIC BLOOD PRESSURE: 63 MMHG | HEART RATE: 100 BPM | TEMPERATURE: 97.6 F | OXYGEN SATURATION: 97 %

## 2025-06-19 DIAGNOSIS — R10.32 LLQ PAIN: ICD-10-CM

## 2025-06-19 DIAGNOSIS — R10.84 GENERALIZED ABDOMINAL PAIN: ICD-10-CM

## 2025-06-19 DIAGNOSIS — K59.00 CONSTIPATION: ICD-10-CM

## 2025-06-19 DIAGNOSIS — K58.1 IRRITABLE BOWEL SYNDROME WITH CONSTIPATION: Primary | ICD-10-CM

## 2025-06-19 PROCEDURE — 99213 OFFICE O/P EST LOW 20 MIN: CPT | Performed by: PHYSICIAN ASSISTANT

## 2025-06-19 RX ORDER — LINACLOTIDE 72 UG/1
72 CAPSULE, GELATIN COATED ORAL DAILY
Qty: 30 CAPSULE | Refills: 3 | Status: SHIPPED | OUTPATIENT
Start: 2025-06-19

## 2025-06-19 NOTE — PROGRESS NOTES
Name: Felipe Sanders III      : 1960      MRN: [id number]  Encounter Provider: Cheri Marquez PA-C  Encounter Date: 2025   Encounter department: [facility] [place]  :  Assessment & Plan  Irritable bowel syndrome with constipation  LLQ pain  He continues to describe symptoms daily which fluctuate in intensity  He continues to not take any of the medication I prescribed  He is supposed to be taking Linzess and amitriptyline    He is having bowel movements once a day to every other day but they are small and incomplete  He is bloated    He has had a multitude of CT scans over the past few months  There has been question of appendicitis however this has been ruled out  Surgery has offered appendectomy but he required pulmonary clearance first which she never obtained  He has no right lower quadrant pain    Most recent CT AP from May 12, 2025 documented constipation and stable fluid distention of the mid and distal appendix which may reflect a mucocele    Advised if worsening lower or right lower quadrant pain he should continue to follow-up with surgery to discuss appendectomy again  He has consistently refused follow-up consultations      History of Present Illness   HPI  Felipe Sanders III is a 64 y.o. male with a history of COPD who presents for routine follow-up.  The patient continues to report constipation, bloating and lower abdominal pain.  He reports that the pain is most significant in the left lower quadrant.  The pain comes and goes and fluctuates in intensity but can be quite severe when it is at its worst.  He has bowel movements every 1 to 2 days.  He describes them as small and incomplete.  He has to strain to pass them.  There is no bleeding.  He has had a multitude of CT scans of his abdomen over the past few months with question of appendicitis.  He most recently had a CT scan on May 12 which documented stable fluid distention of the mid and  distal appendix which may represent mucocele.  His last surgical consultation was on May 9 at which point no acute surgical intervention was recommended but the patient was advised to follow-up in the outpatient setting.  He denies any right lower quadrant pain.  He has no nausea or vomiting.  He has no fevers or chills.  The patient has been advised to take Linzess and amitriptyline on multiple occasions but he has not yet done this.  He does not know why he is not taking any medication.  His last colonoscopy was in December 2024 which was normal exam to the cecum.       Review of Systems  Medical History Reviewed by provider this encounter:     .  Past Medical History   Past Medical History[1]  Past Surgical History[2]  Family History[3]   reports that he has been smoking cigarettes. He has a 75 pack-year smoking history. He has been exposed to tobacco smoke. He has never used smokeless tobacco. He reports that he does not drink alcohol and does not use drugs.  Current Outpatient Medications   Medication Instructions    albuterol (PROVENTIL HFA,VENTOLIN HFA) 90 mcg/act inhaler 1 puff, Inhalation, Every 6 hours PRN    albuterol 2.5 mg, Every 6 hours PRN    amitriptyline (ELAVIL) 25 mg, Oral, Daily at bedtime    dicyclomine (BENTYL) 20 mg, Oral, Every 6 hours    fluticasone-umeclidinium-vilanterol (Trelegy Ellipta) 200-62.5-25 mcg/actuation AEPB inhaler 1 puff, Inhalation, Daily, Rinse mouth after use.    Linzess 72 mcg, Oral, Daily    nicotine (NICODERM CQ) 21 mg/24 hr TD 24 hr patch 1 patch, Transdermal, Daily    oxygen gas Continuous PRN    polyethylene glycol (GOLYTELY) 4000 mL solution 4,000 mL, Oral, Once    polyethylene glycol (MIRALAX) 17 g, Oral, Daily    Promethazine-Codeine 6.25-10 MG/5ML SOLN     rOPINIRole (REQUIP) 1 mg, 3 times daily   Allergies[4]   Medications Ordered Prior to Encounter[5]   Social History[6]     Objective   /63 (BP Location: Right arm, Patient Position: Sitting, Cuff Size:  "Standard)   Pulse 100   Temp 97.6 °F (36.4 °C) (Temporal)   Ht 5' 8\" (1.727 m)   Wt 83.9 kg (185 lb)   SpO2 97%   BMI 28.13 kg/m²      Physical Exam  Vitals reviewed.   Constitutional:       Appearance: Normal appearance.   HENT:      Head: Normocephalic and atraumatic.     Eyes:      Extraocular Movements: Extraocular movements intact.      Pupils: Pupils are equal, round, and reactive to light.       Cardiovascular:      Rate and Rhythm: Normal rate and regular rhythm.   Abdominal:      General: Bowel sounds are normal. There is distension.      Palpations: Abdomen is soft. There is no mass.      Tenderness: There is abdominal tenderness (LLQ).     Skin:     General: Skin is warm and dry.      Coloration: Skin is not jaundiced.     Neurological:      General: No focal deficit present.      Mental Status: He is alert and oriented to person, place, and time.                [1]   Past Medical History:  Diagnosis Date    COPD (chronic obstructive pulmonary disease) (HCC)     Diverticulitis of colon     Erectile dysfunction     Hernia of abdominal cavity     Liver cyst    [2]   Past Surgical History:  Procedure Laterality Date    COLONOSCOPY      NOSE SURGERY     [3] No family history on file.  [4] No Known Allergies  [5]   Current Outpatient Medications on File Prior to Visit   Medication Sig Dispense Refill    albuterol (2.5 mg/3 mL) 0.083 % nebulizer solution Inhale 2.5 mg every 6 (six) hours as needed      albuterol (PROVENTIL HFA,VENTOLIN HFA) 90 mcg/act inhaler Inhale 1 puff every 6 (six) hours as needed for wheezing      dicyclomine (BENTYL) 20 mg tablet TAKE 1 TABLET BY MOUTH EVERY 6 HOURS. 360 tablet 1    fluticasone-umeclidinium-vilanterol (Trelegy Ellipta) 200-62.5-25 mcg/actuation AEPB inhaler Inhale 1 puff daily Rinse mouth after use. 60 blister 1    oxygen gas Inhale continuous as needed      Promethazine-Codeine 6.25-10 MG/5ML SOLN       rOPINIRole (REQUIP) 1 mg tablet Take 1 mg by mouth in the " morning and 1 mg at noon and 1 mg in the evening.      [DISCONTINUED] amitriptyline (ELAVIL) 25 mg tablet TAKE 1 TABLET BY MOUTH DAILY AT BEDTIME 90 tablet 1    nicotine (NICODERM CQ) 21 mg/24 hr TD 24 hr patch Place 1 patch on the skin over 24 hours daily (Patient not taking: Reported on 4/4/2025) 28 patch 0    polyethylene glycol (GOLYTELY) 4000 mL solution Take 4,000 mL by mouth once for 1 dose (Patient not taking: Reported on 6/4/2025) 4000 mL 0    polyethylene glycol (MIRALAX) 17 g packet Take 17 g by mouth daily for 10 days 170 g 0     No current facility-administered medications on file prior to visit.   [6]   Social History  Tobacco Use    Smoking status: Every Day     Current packs/day: 1.50     Average packs/day: 1.5 packs/day for 50.0 years (75.0 ttl pk-yrs)     Types: Cigarettes     Passive exposure: Past    Smokeless tobacco: Never   Vaping Use    Vaping status: Never Used   Substance and Sexual Activity    Alcohol use: Never    Drug use: No    Sexual activity: Yes

## 2025-06-20 RX ORDER — PROMETHAZINE HYDROCHLORIDE AND CODEINE PHOSPHATE 6.25; 1 MG/5ML; MG/5ML
SOLUTION ORAL
Status: CANCELLED | OUTPATIENT
Start: 2025-06-20

## 2025-06-22 ENCOUNTER — OFFICE VISIT (OUTPATIENT)
Dept: URGENT CARE | Facility: CLINIC | Age: 65
End: 2025-06-22
Payer: COMMERCIAL

## 2025-06-22 ENCOUNTER — APPOINTMENT (EMERGENCY)
Dept: RADIOLOGY | Facility: HOSPITAL | Age: 65
End: 2025-06-22
Payer: COMMERCIAL

## 2025-06-22 ENCOUNTER — HOSPITAL ENCOUNTER (EMERGENCY)
Facility: HOSPITAL | Age: 65
Discharge: LEFT BEFORE DISCHARGE | End: 2025-06-23
Attending: EMERGENCY MEDICINE
Payer: COMMERCIAL

## 2025-06-22 VITALS
OXYGEN SATURATION: 97 % | DIASTOLIC BLOOD PRESSURE: 68 MMHG | SYSTOLIC BLOOD PRESSURE: 108 MMHG | WEIGHT: 184.6 LBS | BODY MASS INDEX: 28.07 KG/M2 | HEART RATE: 93 BPM | RESPIRATION RATE: 18 BRPM | TEMPERATURE: 96.4 F

## 2025-06-22 DIAGNOSIS — J42 CHRONIC BRONCHITIS, UNSPECIFIED CHRONIC BRONCHITIS TYPE (HCC): Primary | ICD-10-CM

## 2025-06-22 DIAGNOSIS — M79.9 SOFT TISSUE DISORDER: ICD-10-CM

## 2025-06-22 DIAGNOSIS — R13.10 DYSPHAGIA: Primary | ICD-10-CM

## 2025-06-22 PROCEDURE — 99213 OFFICE O/P EST LOW 20 MIN: CPT | Performed by: NURSE PRACTITIONER

## 2025-06-22 PROCEDURE — 70360 X-RAY EXAM OF NECK: CPT

## 2025-06-22 PROCEDURE — 99284 EMERGENCY DEPT VISIT MOD MDM: CPT

## 2025-06-22 RX ORDER — BUDESONIDE 0.25 MG/2ML
0.25 INHALANT ORAL 2 TIMES DAILY
Qty: 50 ML | Refills: 0 | Status: SHIPPED | OUTPATIENT
Start: 2025-06-22 | End: 2025-07-05

## 2025-06-22 RX ADMIN — DEXAMETHASONE SODIUM PHOSPHATE 10 MG: 10 INJECTION, SOLUTION INTRAMUSCULAR; INTRAVENOUS at 23:15

## 2025-06-22 NOTE — PROGRESS NOTES
[facility]        NAME: Felipe Sanders III is a 64 y.o. male  : 1960    MRN: [id number]  DATE: 2025  TIME: 1:36 PM    Assessment and Plan   Chronic bronchitis, unspecified chronic bronchitis type (HCC) [J42]  1. Chronic bronchitis, unspecified chronic bronchitis type (HCC)  budesonide (Pulmicort) 0.25 mg/2 mL nebulizer solution      2. Soft tissue disorder          Advised would like to avoid PO steroids as he just completed another round of this. Discussed he can use budesonide in his daily nebulizer treatments and follow up with pulmonology. No wheezing on exam, lungs clear. On portable oxygen as baseline.     There is a palpable soft tissue lump anterior neck, suspect possible cyst, no signs of infection. Advised PCP follow up for ultrasound evaluation to determine further and possible referral to general surgery if needed.     Patient Instructions       Follow up with PCP in 3-5 days.  Proceed to  ER if symptoms worsen.    If tests are performed, our office will contact you with results only if changes need to made to the care plan discussed with you at the visit. [facility]    Chief Complaint     Chief Complaint   Patient presents with    Neck Pain     Swelling, redness lower anterior neck X 3 months.         History of Present Illness       Patient has complaint of a lump and redness on the front of his neck for about 3 months. States at times if feels like it is hard to breath. Has an itching sensation inside. No rash to the skin.     Was seen by pcp a few days ago and told his uvula was swollen and started on prednisone which he finished. He is asking for more prednisone.     Baseline copd on oxygen at home and uses nebulizer treatments qid. Unsure if he only has albuterol or if he has another medication. States he has two boxes that are different and one helps the other does not make much difference.         Review of Systems   Review  of Systems   Constitutional:  Negative for chills, fatigue and fever.   HENT:  Positive for trouble swallowing. Negative for congestion and ear pain.    Respiratory:  Positive for cough, chest tightness, shortness of breath and wheezing.    Cardiovascular:  Negative for chest pain and leg swelling.   Skin:  Negative for color change, rash and wound.         Current Medications     Current Medications[1]    Current Allergies     Allergies as of 06/22/2025    (No Known Allergies)            The following portions of the patient's history were reviewed and updated as appropriate: allergies, current medications, past family history, past medical history, past social history, past surgical history and problem list.     Past Medical History[2]    Past Surgical History[3]    Family History[4]      Medications have been verified.        Objective   /68 (BP Location: Left arm, Patient Position: Sitting, Cuff Size: Adult)   Pulse 93   Temp (!) 96.4 °F (35.8 °C) (Tympanic)   Resp 18   Wt 83.7 kg (184 lb 9.6 oz)   SpO2 97%   BMI 28.07 kg/m²        Physical Exam     Physical Exam  Vitals and nursing note reviewed.   Constitutional:       General: He is not in acute distress.     Appearance: Normal appearance. He is not ill-appearing.   HENT:      Head: Normocephalic and atraumatic.     Cardiovascular:      Rate and Rhythm: Normal rate and regular rhythm.      Heart sounds: Normal heart sounds, S1 normal and S2 normal.   Pulmonary:      Effort: Pulmonary effort is normal. No accessory muscle usage or respiratory distress.      Breath sounds: Normal breath sounds. No wheezing or rhonchi.      Comments: On oxygen portable.     Skin:     General: Skin is warm and dry.      Capillary Refill: Capillary refill takes less than 2 seconds.      Findings: No rash.           Comments: Soft non tender half dollar sized possible cyst on the anterior lower neck.      Neurological:      General: No focal deficit present.      Mental  Status: He is alert and oriented to person, place, and time.      Motor: Motor function is intact.     Psychiatric:         Attention and Perception: Attention and perception normal.         Mood and Affect: Mood and affect normal.         Speech: Speech normal.         Behavior: Behavior normal. Behavior is cooperative.         Thought Content: Thought content normal.                        [1]   Current Outpatient Medications:     albuterol (2.5 mg/3 mL) 0.083 % nebulizer solution, Inhale 2.5 mg every 6 (six) hours as needed, Disp: , Rfl:     albuterol (PROVENTIL HFA,VENTOLIN HFA) 90 mcg/act inhaler, Inhale 1 puff every 6 (six) hours as needed for wheezing, Disp: , Rfl:     amitriptyline (ELAVIL) 25 mg tablet, Take 1 tablet (25 mg total) by mouth daily at bedtime, Disp: 90 tablet, Rfl: 1    budesonide (Pulmicort) 0.25 mg/2 mL nebulizer solution, Take 2 mL (0.25 mg total) by nebulization 2 (two) times a day for 13 days Rinse mouth after use., Disp: 50 mL, Rfl: 0    dicyclomine (BENTYL) 20 mg tablet, TAKE 1 TABLET BY MOUTH EVERY 6 HOURS., Disp: 360 tablet, Rfl: 1    fluticasone-umeclidinium-vilanterol (Trelegy Ellipta) 200-62.5-25 mcg/actuation AEPB inhaler, Inhale 1 puff daily Rinse mouth after use., Disp: 60 blister, Rfl: 1    linaCLOtide (Linzess) 72 MCG CAPS, Take 72 mcg by mouth in the morning, Disp: 30 capsule, Rfl: 3    oxygen gas, Inhale continuous as needed, Disp: , Rfl:     Promethazine-Codeine 6.25-10 MG/5ML SOLN, , Disp: , Rfl:     nicotine (NICODERM CQ) 21 mg/24 hr TD 24 hr patch, Place 1 patch on the skin over 24 hours daily (Patient not taking: Reported on 4/4/2025), Disp: 28 patch, Rfl: 0    polyethylene glycol (GOLYTELY) 4000 mL solution, Take 4,000 mL by mouth once for 1 dose (Patient not taking: Reported on 6/4/2025), Disp: 4000 mL, Rfl: 0    polyethylene glycol (MIRALAX) 17 g packet, Take 17 g by mouth daily for 10 days, Disp: 170 g, Rfl: 0    rOPINIRole (REQUIP) 1 mg tablet, Take 1 mg by mouth  in the morning and 1 mg at noon and 1 mg in the evening., Disp: , Rfl:   [2]   Past Medical History:  Diagnosis Date    COPD (chronic obstructive pulmonary disease) (HCC)     Diverticulitis of colon     Erectile dysfunction     Hernia of abdominal cavity     Liver cyst    [3]   Past Surgical History:  Procedure Laterality Date    COLONOSCOPY      NOSE SURGERY     [4] No family history on file.

## 2025-06-23 VITALS
RESPIRATION RATE: 18 BRPM | WEIGHT: 187.17 LBS | BODY MASS INDEX: 28.46 KG/M2 | OXYGEN SATURATION: 96 % | SYSTOLIC BLOOD PRESSURE: 128 MMHG | TEMPERATURE: 97.5 F | HEART RATE: 93 BPM | DIASTOLIC BLOOD PRESSURE: 73 MMHG

## 2025-06-23 NOTE — ED NOTES
PT Eloped from the department at this time, provider made aware.      Derrick Ignacio, ENRIQUE  06/23/25 0023

## 2025-06-25 ENCOUNTER — RESULTS FOLLOW-UP (OUTPATIENT)
Dept: EMERGENCY DEPT | Facility: HOSPITAL | Age: 65
End: 2025-06-25

## 2025-06-25 DIAGNOSIS — R22.1 NECK MASS: Primary | ICD-10-CM

## 2025-06-25 NOTE — TELEPHONE ENCOUNTER
Patient called back, he was unsure of how to schedule his ultrasound. Patient was given the telephone number to our central scheduling department and also transferred over for further assistance.

## 2025-06-27 ENCOUNTER — PATIENT OUTREACH (OUTPATIENT)
Dept: CASE MANAGEMENT | Facility: OTHER | Age: 65
End: 2025-06-27

## 2025-06-27 NOTE — PROGRESS NOTES
Referral received via [employer] Medicaid Report.  9 ED visits and 3 hospitalizations in the past 6 months.  Referred to High Utilizer Care Plan Committee on 6/24/25 to be reviewed for careplan.      Email received from High Utilizer Committee on 6/26/25.  Committee reviewed and determined a care plan is not appropriate at this time.  No further recommendation from committee.   Admission or ED Risk Score:  44  Patient may have Social drivers which may be impacting his medical care.  Patient is referred to OP CM as a Rising Risk Utilizer.    High Utilizer Program initiated and IB Message sent to OP RN CM Medicaid Pool with case communication for care coordination.   OPCM Watch List database updated.

## 2025-06-28 ENCOUNTER — HOSPITAL ENCOUNTER (OUTPATIENT)
Dept: ULTRASOUND IMAGING | Facility: HOSPITAL | Age: 65
Discharge: HOME | End: 2025-06-28
Attending: INTERNAL MEDICINE
Payer: COMMERCIAL

## 2025-06-28 DIAGNOSIS — R22.1 NECK MASS: ICD-10-CM

## 2025-06-28 PROCEDURE — 76536 US EXAM OF HEAD AND NECK: CPT

## 2025-06-30 ENCOUNTER — PATIENT OUTREACH (OUTPATIENT)
Dept: CASE MANAGEMENT | Facility: OTHER | Age: 65
End: 2025-06-30

## 2025-06-30 NOTE — PROGRESS NOTES
Received referral for care management due to rising utilization. Chart reviewed.  Last ED visit 6/22 for sore throat, neck swelling, dysphagia. Left ED without treatment.  Past medical history of COPD, Viral Hep B, diverticulitis.  Call to patient, left message on machine with my contact information.  Will retry again next week.

## 2025-06-30 NOTE — PROGRESS NOTES
Pt returned my call. I introduced self and role of RN DRE.  He asked for test results of ultrasound of thyroid. Advised Felipe to schedule an appt with PCP, I am not at will to provide that type of information.    He states he can call and make his appointments and has transportation.  He declines further outreach calls and hung up on this RN .  Will close program and advise the team.

## 2025-07-01 ENCOUNTER — TELEPHONE (OUTPATIENT)
Age: 65
End: 2025-07-01

## 2025-07-01 NOTE — TELEPHONE ENCOUNTER
Patient calling to say that he is having continued stomach pain.  Did not want to give more information.  Will only speak with Valorie.  Would like callback to discuss.

## 2025-07-01 NOTE — TELEPHONE ENCOUNTER
Patient called in to have US thygirishd results. Not yet resulted. Please inform patient when results are in.

## 2025-07-02 NOTE — TELEPHONE ENCOUNTER
Pt called in regards to his recent results and asking if PCP has yet addressed them.  Informed pt that PCP has not yet commented on results.  Radiology has net yet submitted final report.  Please review and advise.

## 2025-07-02 NOTE — TELEPHONE ENCOUNTER
I spoke to patient.  He is taking Linzess 72 mcg daily and amitriptyline 25 mg at bedtime   Bowel movements have not improved   -Advised increase Linzess to 2 pills daily which will be 145  Advised it is too soon to evaluate the effectiveness of amitriptyline as it is only been 2 weeks.  He will continue 25 mg

## 2025-07-03 ENCOUNTER — TELEPHONE (OUTPATIENT)
Age: 65
End: 2025-07-03

## 2025-07-03 DIAGNOSIS — Z87.19 HISTORY OF DIVERTICULITIS: ICD-10-CM

## 2025-07-03 DIAGNOSIS — R93.2 ABNORMAL CT OF LIVER: ICD-10-CM

## 2025-07-03 DIAGNOSIS — R10.32 LEFT LOWER QUADRANT ABDOMINAL PAIN: ICD-10-CM

## 2025-07-03 RX ORDER — DICYCLOMINE HCL 20 MG
20 TABLET ORAL EVERY 6 HOURS
Qty: 360 TABLET | Refills: 1 | Status: SHIPPED | OUTPATIENT
Start: 2025-07-03

## 2025-07-03 NOTE — TELEPHONE ENCOUNTER
Patient called in to report he received a notification that he had results from his US, but per pts zita his US results haven't been released to the PCP. Triage nurse made pt aware.

## 2025-07-07 ENCOUNTER — TELEPHONE (OUTPATIENT)
Age: 65
End: 2025-07-07

## 2025-07-07 NOTE — TELEPHONE ENCOUNTER
"Patient is calling for results of the ultrasound.  Says that his throat is \"killing him\".  Please advise.  Thank you.  "

## 2025-07-07 NOTE — TELEPHONE ENCOUNTER
"Pt advised. Pt stated he still has swelling and pain in his throat. He stated\" Tell her don't waste her time giving me a ENT doctor because they can't see me until next year.\"     "

## 2025-07-11 ENCOUNTER — HOSPITAL ENCOUNTER (EMERGENCY)
Facility: HOSPITAL | Age: 65
Discharge: HOME | End: 2025-07-12
Admitting: EMERGENCY MEDICINE
Payer: COMMERCIAL

## 2025-07-11 ENCOUNTER — APPOINTMENT (EMERGENCY)
Dept: RADIOLOGY | Facility: HOSPITAL | Age: 65
End: 2025-07-11
Payer: COMMERCIAL

## 2025-07-11 DIAGNOSIS — R22.1 NECK MASS: ICD-10-CM

## 2025-07-11 DIAGNOSIS — R10.9 CHRONIC ABDOMINAL PAIN: ICD-10-CM

## 2025-07-11 DIAGNOSIS — G89.29 CHRONIC ABDOMINAL PAIN: ICD-10-CM

## 2025-07-11 DIAGNOSIS — R06.00 DYSPNEA: Primary | ICD-10-CM

## 2025-07-11 PROCEDURE — 71046 X-RAY EXAM CHEST 2 VIEWS: CPT

## 2025-07-11 PROCEDURE — 99285 EMERGENCY DEPT VISIT HI MDM: CPT

## 2025-07-11 PROCEDURE — 94640 AIRWAY INHALATION TREATMENT: CPT

## 2025-07-11 RX ORDER — ALBUTEROL SULFATE 0.83 MG/ML
5 SOLUTION RESPIRATORY (INHALATION) ONCE
Status: COMPLETED | OUTPATIENT
Start: 2025-07-11 | End: 2025-07-12

## 2025-07-11 NOTE — TELEPHONE ENCOUNTER
"Patient called in stating his throat is \" still killing him.\" He cannot wait to see ENT for months. I asked him if he is willing to drive further to see a doctor who could see him sooner and he is agreeable. He would like recommendations on what his next step will be. Can call Bryan at [phone].  "

## 2025-07-12 VITALS
RESPIRATION RATE: 20 BRPM | DIASTOLIC BLOOD PRESSURE: 68 MMHG | TEMPERATURE: 97.8 F | SYSTOLIC BLOOD PRESSURE: 109 MMHG | OXYGEN SATURATION: 91 % | HEART RATE: 79 BPM

## 2025-07-12 LAB
2HR DELTA HS TROPONIN: 0 NG/L
ALBUMIN SERPL BCG-MCNC: 4.1 G/DL (ref 3.5–5)
ALP SERPL-CCNC: 49 U/L (ref 34–104)
ALT SERPL W P-5'-P-CCNC: 12 U/L (ref 7–52)
ANION GAP SERPL CALCULATED.3IONS-SCNC: 9 MMOL/L (ref 4–13)
AST SERPL W P-5'-P-CCNC: 15 U/L (ref 13–39)
ATRIAL RATE: 83 BPM
ATRIAL RATE: 86 BPM
ATRIAL RATE: 86 BPM
ATRIAL RATE: 87 BPM
BASOPHILS # BLD AUTO: 0.06 THOUSANDS/ÂΜL (ref 0–0.1)
BASOPHILS NFR BLD AUTO: 0 % (ref 0–1)
BILIRUB SERPL-MCNC: 0.4 MG/DL (ref 0.2–1)
BUN SERPL-MCNC: 23 MG/DL (ref 5–25)
CALCIUM SERPL-MCNC: 9.1 MG/DL (ref 8.4–10.2)
CARDIAC TROPONIN I PNL SERPL HS: 6 NG/L (ref ?–50)
CARDIAC TROPONIN I PNL SERPL HS: 6 NG/L (ref ?–50)
CHLORIDE SERPL-SCNC: 107 MMOL/L (ref 96–108)
CO2 SERPL-SCNC: 25 MMOL/L (ref 21–32)
CREAT SERPL-MCNC: 1.06 MG/DL (ref 0.6–1.3)
EOSINOPHIL # BLD AUTO: 0.16 THOUSAND/ÂΜL (ref 0–0.61)
EOSINOPHIL NFR BLD AUTO: 1 % (ref 0–6)
ERYTHROCYTE [DISTWIDTH] IN BLOOD BY AUTOMATED COUNT: 13 % (ref 11.6–15.1)
FLUAV RNA RESP QL NAA+PROBE: NEGATIVE
FLUBV RNA RESP QL NAA+PROBE: NEGATIVE
GFR SERPL CREATININE-BSD FRML MDRD: 73 ML/MIN/1.73SQ M
GLUCOSE SERPL-MCNC: 130 MG/DL (ref 65–140)
HCT VFR BLD AUTO: 42.2 % (ref 36.5–49.3)
HGB BLD-MCNC: 14.8 G/DL (ref 12–17)
IMM GRANULOCYTES # BLD AUTO: 0.16 THOUSAND/UL (ref 0–0.2)
IMM GRANULOCYTES NFR BLD AUTO: 1 % (ref 0–2)
LYMPHOCYTES # BLD AUTO: 2.06 THOUSANDS/ÂΜL (ref 0.6–4.47)
LYMPHOCYTES NFR BLD AUTO: 14 % (ref 14–44)
MCH RBC QN AUTO: 32.2 PG (ref 26.8–34.3)
MCHC RBC AUTO-ENTMCNC: 35.1 G/DL (ref 31.4–37.4)
MCV RBC AUTO: 92 FL (ref 82–98)
MONOCYTES # BLD AUTO: 1.52 THOUSAND/ÂΜL (ref 0.17–1.22)
MONOCYTES NFR BLD AUTO: 11 % (ref 4–12)
NEUTROPHILS # BLD AUTO: 10.45 THOUSANDS/ÂΜL (ref 1.85–7.62)
NEUTS SEG NFR BLD AUTO: 73 % (ref 43–75)
NRBC BLD AUTO-RTO: 0 /100 WBCS
P AXIS: 79 DEGREES
P AXIS: 83 DEGREES
P AXIS: 84 DEGREES
P AXIS: 84 DEGREES
PLATELET # BLD AUTO: 314 THOUSANDS/UL (ref 149–390)
PMV BLD AUTO: 9.5 FL (ref 8.9–12.7)
POTASSIUM SERPL-SCNC: 3.9 MMOL/L (ref 3.5–5.3)
PR INTERVAL: 122 MS
PR INTERVAL: 124 MS
PR INTERVAL: 124 MS
PR INTERVAL: 130 MS
PROT SERPL-MCNC: 6.4 G/DL (ref 6.4–8.4)
QRS AXIS: 80 DEGREES
QRS AXIS: 82 DEGREES
QRS AXIS: 83 DEGREES
QRS AXIS: 85 DEGREES
QRSD INTERVAL: 94 MS
QRSD INTERVAL: 94 MS
QRSD INTERVAL: 96 MS
QRSD INTERVAL: 98 MS
QT INTERVAL: 354 MS
QT INTERVAL: 358 MS
QT INTERVAL: 364 MS
QT INTERVAL: 374 MS
QTC INTERVAL: 425 MS
QTC INTERVAL: 427 MS
QTC INTERVAL: 428 MS
QTC INTERVAL: 447 MS
RBC # BLD AUTO: 4.6 MILLION/UL (ref 3.88–5.62)
RSV RNA RESP QL NAA+PROBE: NEGATIVE
SARS-COV-2 RNA RESP QL NAA+PROBE: NEGATIVE
SODIUM SERPL-SCNC: 141 MMOL/L (ref 135–147)
T WAVE AXIS: 73 DEGREES
T WAVE AXIS: 73 DEGREES
T WAVE AXIS: 80 DEGREES
T WAVE AXIS: 82 DEGREES
VENTRICULAR RATE: 83 BPM
VENTRICULAR RATE: 86 BPM
VENTRICULAR RATE: 86 BPM
VENTRICULAR RATE: 87 BPM
WBC # BLD AUTO: 14.41 THOUSAND/UL (ref 4.31–10.16)

## 2025-07-12 PROCEDURE — 87637 SARSCOV2&INF A&B&RSV AMP PRB: CPT

## 2025-07-12 PROCEDURE — 85025 COMPLETE CBC W/AUTO DIFF WBC: CPT

## 2025-07-12 PROCEDURE — 84484 ASSAY OF TROPONIN QUANT: CPT

## 2025-07-12 PROCEDURE — 0241U HB NFCT DS VIR RESP RNA 4 TRGT: CPT

## 2025-07-12 PROCEDURE — 93010 ELECTROCARDIOGRAM REPORT: CPT | Performed by: INTERNAL MEDICINE

## 2025-07-12 PROCEDURE — 93005 ELECTROCARDIOGRAM TRACING: CPT

## 2025-07-12 PROCEDURE — 36415 COLL VENOUS BLD VENIPUNCTURE: CPT

## 2025-07-12 PROCEDURE — 80053 COMPREHEN METABOLIC PANEL: CPT

## 2025-07-12 RX ORDER — DIPHENHYDRAMINE HYDROCHLORIDE AND LIDOCAINE HYDROCHLORIDE AND ALUMINUM HYDROXIDE AND MAGNESIUM HYDRO
10 KIT ONCE
Status: COMPLETED | OUTPATIENT
Start: 2025-07-12 | End: 2025-07-12

## 2025-07-12 RX ORDER — PREDNISONE 20 MG/1
40 TABLET ORAL DAILY
Qty: 10 TABLET | Refills: 0 | Status: SHIPPED | OUTPATIENT
Start: 2025-07-12 | End: 2025-07-17

## 2025-07-12 RX ADMIN — IPRATROPIUM BROMIDE 0.5 MG: 0.5 SOLUTION RESPIRATORY (INHALATION) at 00:04

## 2025-07-12 RX ADMIN — LIDOCAINE HYDROCHLORIDE 10 ML: 20 SOLUTION ORAL; TOPICAL at 02:25

## 2025-07-12 RX ADMIN — ALBUTEROL SULFATE 5 MG: 2.5 SOLUTION RESPIRATORY (INHALATION) at 00:04

## 2025-07-12 NOTE — ED PROVIDER NOTES
Time reflects when diagnosis was documented in both MDM as applicable and the Disposition within this note       Time User Action Codes Description Comment    7/12/2025  1:39 AM PeteriaFrancis Add [R06.00] Dyspnea     7/12/2025  1:39 AM Krivchenia Francis Add [R22.1] Neck mass     7/12/2025  1:39 AM Krivchenia, Francis Add [R10.9,  G89.29] Chronic abdominal pain           ED Disposition       None          Assessment & Plan       Medical Decision Making  64-year-old male with history of COPD, emphysema, and chronic abdominal pain presents today for multiple complaints, most notably for shortness of breath for the past 2 days.  Patient states he has been on a course of steroids prescribed by his pulmonologist but feels that he has been having intermittent shortness of breath for the past couple of days with associated cough productive of thick white sputum.    Differential: Viral URI, COPD exacerbation, pneumonia, ACS, pneumothorax, pleural effusion.    Plan: Cardiac workup, viral panel.  Will plan to treat with breathing treatment and reevaluate.    Patient reports improvement of his symptoms following breathing treatment.  Patient's labs are grossly reassuring without evidence of pneumonia on chest x-ray and overall reassuring blood work.  Leukocytosis of 14 likely secondary to current prednisone use.  Patient signed out to Dr. Covington pending results of 2-hour troponin.    Amount and/or Complexity of Data Reviewed  Labs: ordered. Decision-making details documented in ED Course.  Radiology: ordered and independent interpretation performed.    Risk  Prescription drug management.        ED Course as of 07/12/25 0224   Sat Jul 12, 2025   0029 This EKG interpreted by me. Rate 83, rhythm normal sinus with PACs, normal axis, intervals normal, no acute ST or T wave changes     0029 WBC(!): 14.41  Currently on prednisone   0106 Patient reporting improvement of shortness of breath after breathing treatment.  Reporting persistent  irritation in his throat.  Will give Magic mouthwash.       Medications   diphenhydramine, lidocaine, Al/Mg hydroxide, simethicone (Magic Mouthwash) oral solution 10 mL (has no administration in time range)   albuterol inhalation solution 5 mg (5 mg Nebulization Given 7/12/25 0004)   ipratropium (ATROVENT) 0.02 % inhalation solution 0.5 mg (0.5 mg Nebulization Given 7/12/25 0004)       ED Risk Strat Scores   HEART Risk Score      Flowsheet Row Most Recent Value   Heart Score Risk Calculator    History 0 Filed at: 07/12/2025 0112   ECG 0 Filed at: 07/12/2025 0112   Age 1 Filed at: 07/12/2025 0112   Risk Factors 2 Filed at: 07/12/2025 0112   Troponin 0 Filed at: 07/12/2025 0112   HEART Score 3 Filed at: 07/12/2025 0112          HEART Risk Score      Flowsheet Row Most Recent Value   Heart Score Risk Calculator    History 0 Filed at: 07/12/2025 0112   ECG 0 Filed at: 07/12/2025 0112   Age 1 Filed at: 07/12/2025 0112   Risk Factors 2 Filed at: 07/12/2025 0112   Troponin 0 Filed at: 07/12/2025 0112   HEART Score 3 Filed at: 07/12/2025 0112                      No data recorded        SBIRT 20yo+      Flowsheet Row Most Recent Value   Initial Alcohol Screen: US AUDIT-C     1. How often do you have a drink containing alcohol? 0 Filed at: 07/11/2025 2246   2. How many drinks containing alcohol do you have on a typical day you are drinking?  0 Filed at: 07/11/2025 2246   3a. Male UNDER 65: How often do you have five or more drinks on one occasion? 0 Filed at: 07/11/2025 2246   Audit-C Score 0 Filed at: 07/11/2025 2246   LAZARA: How many times in the past year have you...    Used an illegal drug or used a prescription medication for non-medical reasons? Never Filed at: 07/11/2025 2246                            History of Present Illness       Chief Complaint   Patient presents with    Shortness of Breath     Pt complains of pain and sob with breathing       Past Medical History[1]   Past Surgical History[2]   Family History[3]  "  Social History[4]   E-Cigarette/Vaping    E-Cigarette Use Never User       E-Cigarette/Vaping Substances    Nicotine No     THC No     CBD No     Flavoring No     Other No     Unknown No       I have reviewed and agree with the history as documented.     Patient is a 64-year-old male with history of COPD, emphysema, and chronic abdominal pain presenting today for evaluation of multiple complaints.  Patient reports that for the past 2 days he is not having shortness of breath that he states is associated with occasional wheezing and cough with whitish sputum.  He denies any fevers, chest pain, palpitations, or leg swelling.  Patient is also complaining of acute on chronic left sided abdominal pain that he states has had for 4 years.  Patient states that he is seen by gastroenterology but states that \"they do not other doing\".  He states that he does not take all the medication that they prescribed but still has persistent pain.  He says he is try to get a new doctor that was recommended by his friend.  He states this pain overall feels similar to prior episodes.  Patient is also endorsing a several week history of a anterior neck mass.  Patient was evaluated for this and this Emergency Department on 6/22 for left before being evaluated.  Patient had an outpatient thyroid ultrasound performed which revealed subcentimeter thyroid nodules but no larger masses.  Patient has follow-up with his family doctor on Monday to discuss further testing for this.  He denies any dysphonia, dysphagia, neck pain, or difficulty moving his neck.        Review of Systems   Constitutional:  Negative for chills and fever.   HENT:  Negative for congestion, rhinorrhea, sore throat, trouble swallowing and voice change.    Eyes:  Negative for pain and redness.   Respiratory:  Positive for cough, shortness of breath and wheezing.    Cardiovascular:  Negative for chest pain and palpitations.   Gastrointestinal:  Positive for abdominal pain. " Negative for constipation, diarrhea, nausea and vomiting.   Genitourinary:  Negative for dysuria and hematuria.   Musculoskeletal:  Negative for back pain and neck pain.   Skin:  Negative for pallor and rash.   Neurological:  Negative for weakness and numbness.   All other systems reviewed and are negative.          Objective       ED Triage Vitals [07/11/25 2245]   Temperature Pulse Blood Pressure Respirations SpO2 Patient Position - Orthostatic VS   97.8 °F (36.6 °C) 105 137/66 18 95 % Sitting      Temp Source Heart Rate Source BP Location FiO2 (%) Pain Score    Tympanic Monitor Left arm -- --      Vitals      Date and Time Temp Pulse SpO2 Resp BP Pain Score FACES Pain Rating User   07/12/25 0100 -- 86 92 % 20 121/69 -- --    07/12/25 0030 -- 85 92 % 22 114/75 -- --    07/11/25 2245 97.8 °F (36.6 °C) 105 95 % 18 137/66 -- -- EJ            Physical Exam  Vitals and nursing note reviewed.   Constitutional:       General: He is not in acute distress.     Appearance: Normal appearance. He is well-developed. He is not ill-appearing, toxic-appearing or diaphoretic.   HENT:      Head: Normocephalic and atraumatic.      Right Ear: External ear normal.      Left Ear: External ear normal.      Nose: Nose normal. No congestion or rhinorrhea.      Mouth/Throat:      Mouth: Mucous membranes are moist.     Eyes:      General: No scleral icterus.        Right eye: No discharge.         Left eye: No discharge.      Conjunctiva/sclera: Conjunctivae normal.     Neck:      Comments: Slight swelling of the anterior neck without underlying fluctuance, induration.  Cardiovascular:      Rate and Rhythm: Normal rate and regular rhythm. No extrasystoles are present.     Pulses: Normal pulses. No decreased pulses.      Heart sounds: Normal heart sounds. No murmur heard.     No friction rub. No gallop.   Pulmonary:      Effort: Pulmonary effort is normal. No tachypnea, bradypnea, accessory muscle usage or respiratory distress.       Breath sounds: No stridor. Decreased breath sounds present. No wheezing, rhonchi or rales.   Abdominal:      General: Abdomen is flat.      Palpations: Abdomen is soft.     Musculoskeletal:         General: Normal range of motion.      Cervical back: Normal range of motion and neck supple. No rigidity or tenderness.      Right lower leg: No tenderness. No edema.      Left lower leg: No tenderness. No edema.     Skin:     General: Skin is warm and dry.      Capillary Refill: Capillary refill takes less than 2 seconds.      Coloration: Skin is not jaundiced or pale.     Neurological:      General: No focal deficit present.      Mental Status: He is alert and oriented to person, place, and time.     Psychiatric:         Mood and Affect: Mood normal.         Behavior: Behavior normal.         Results Reviewed       Procedure Component Value Units Date/Time    HS Troponin I 2hr [[id number]] Collected: 07/12/25 0223    Lab Status: No result Specimen: Blood from Hand, Left     FLU/RSV/COVID - if FLU/RSV clinically relevant (2hr TAT) [[id number]]  (Normal) Collected: 07/12/25 0004    Lab Status: Final result Specimen: Nares from Nose Updated: 07/12/25 0104     SARS-CoV-2 Negative     INFLUENZA A PCR Negative     INFLUENZA B PCR Negative     RSV PCR Negative    Narrative:      This test has been performed using the CoV-2/Flu/RSV plus assay on the [employer] GeneXpert platform. This test has been validated by the  and verified by the performing laboratory.     This test is designed to amplify and detect the following: nucleocapsid (N), envelope (E), and RNA-dependent RNA polymerase (RdRP) genes of the SARS-CoV-2 genome; matrix (M), basic polymerase (PB2), and acidic protein (PA) segments of the influenza A genome; matrix (M) and non-structural protein (NS) segments of the influenza B genome, and the nucleocapsid genes of RSV A and RSV B.     Positive results are indicative of the presence of Flu A, Flu B, RSV, and/or  SARS-CoV-2 RNA. Positive results for SARS-CoV-2 or suspected novel influenza should be reported to state, local, or federal health departments according to local reporting requirements.      All results should be assessed in conjunction with clinical presentation and other laboratory markers for clinical management.     FOR PEDIATRIC PATIENTS - copy/paste COVID Guidelines URL to browser: [email or web address]       HS Troponin I 4hr [[id number]]     Lab Status: No result Specimen: Blood     HS Troponin 0hr (reflex protocol) [[id number]]  (Normal) Collected: 07/12/25 0004    Lab Status: Final result Specimen: Blood from Arm, Left Updated: 07/12/25 0047     hs TnI 0hr 6 ng/L     Comprehensive metabolic panel [[id number]] Collected: 07/12/25 0004    Lab Status: Final result Specimen: Blood from Arm, Left Updated: 07/12/25 0040     Sodium 141 mmol/L      Potassium 3.9 mmol/L      Chloride 107 mmol/L      CO2 25 mmol/L      ANION GAP 9 mmol/L      BUN 23 mg/dL      Creatinine 1.06 mg/dL      Glucose 130 mg/dL      Calcium 9.1 mg/dL      AST 15 U/L      ALT 12 U/L      Alkaline Phosphatase 49 U/L      Total Protein 6.4 g/dL      Albumin 4.1 g/dL      Total Bilirubin 0.40 mg/dL      eGFR 73 ml/min/1.73sq m     Narrative:      National Kidney Disease Foundation guidelines for Chronic Kidney Disease (CKD):     Stage 1 with normal or high GFR (GFR > 90 mL/min/1.73 square meters)    Stage 2 Mild CKD (GFR = 60-89 mL/min/1.73 square meters)    Stage 3A Moderate CKD (GFR = 45-59 mL/min/1.73 square meters)    Stage 3B Moderate CKD (GFR = 30-44 mL/min/1.73 square meters)    Stage 4 Severe CKD (GFR = 15-29 mL/min/1.73 square meters)    Stage 5 End Stage CKD (GFR <15 mL/min/1.73 square meters)  Note: GFR calculation is accurate only with a steady state creatinine    CBC and differential [[id number]]  (Abnormal) Collected: 07/12/25 0004    Lab Status: Final result Specimen: Blood from  Arm, Left Updated: 07/12/25 0024     WBC 14.41 Thousand/uL      RBC 4.60 Million/uL      Hemoglobin 14.8 g/dL      Hematocrit 42.2 %      MCV 92 fL      MCH 32.2 pg      MCHC 35.1 g/dL      RDW 13.0 %      MPV 9.5 fL      Platelets 314 Thousands/uL      nRBC 0 /100 WBCs      Segmented % 73 %      Immature Grans % 1 %      Lymphocytes % 14 %      Monocytes % 11 %      Eosinophils Relative 1 %      Basophils Relative 0 %      Absolute Neutrophils 10.45 Thousands/µL      Absolute Immature Grans 0.16 Thousand/uL      Absolute Lymphocytes 2.06 Thousands/µL      Absolute Monocytes 1.52 Thousand/µL      Eosinophils Absolute 0.16 Thousand/µL      Basophils Absolute 0.06 Thousands/µL             XR chest 2 views   ED Interpretation by Francis Whiting MD (07/11 3961)   No acute cardiopulmonary abnormalities          Procedures    ED Medication and Procedure Management   Prior to Admission Medications   Prescriptions Last Dose Informant Patient Reported? Taking?   Promethazine-Codeine 6.25-10 MG/5ML SOLN  Self Yes No   albuterol (2.5 mg/3 mL) 0.083 % nebulizer solution  Self Yes No   Sig: Inhale 2.5 mg every 6 (six) hours as needed   albuterol (PROVENTIL HFA,VENTOLIN HFA) 90 mcg/act inhaler  Self Yes No   Sig: Inhale 1 puff every 6 (six) hours as needed for wheezing   amitriptyline (ELAVIL) 25 mg tablet   No No   Sig: Take 1 tablet (25 mg total) by mouth daily at bedtime   budesonide (Pulmicort) 0.25 mg/2 mL nebulizer solution   No No   Sig: Take 2 mL (0.25 mg total) by nebulization 2 (two) times a day for 13 days Rinse mouth after use.   dicyclomine (BENTYL) 20 mg tablet   No No   Sig: TAKE 1 TABLET BY MOUTH EVERY 6 HOURS   fluticasone-umeclidinium-vilanterol (Trelegy Ellipta) 200-62.5-25 mcg/actuation AEPB inhaler  Self No No   Sig: Inhale 1 puff daily Rinse mouth after use.   linaCLOtide (Linzess) 72 MCG CAPS   No No   Sig: Take 72 mcg by mouth in the morning   nicotine (NICODERM CQ) 21 mg/24 hr TD 24 hr patch  Self No  No   Sig: Place 1 patch on the skin over 24 hours daily   Patient not taking: Reported on 4/4/2025   oxygen gas  Self Yes No   Sig: Inhale continuous as needed   polyethylene glycol (GOLYTELY) 4000 mL solution  Self No No   Sig: Take 4,000 mL by mouth once for 1 dose   Patient not taking: Reported on 6/4/2025   polyethylene glycol (MIRALAX) 17 g packet  Self No No   Sig: Take 17 g by mouth daily for 10 days   rOPINIRole (REQUIP) 1 mg tablet  Self Yes No   Sig: Take 1 mg by mouth in the morning and 1 mg at noon and 1 mg in the evening.      Facility-Administered Medications: None     Patient's Medications   Discharge Prescriptions    No medications on file     No discharge procedures on file.  ED SEPSIS DOCUMENTATION   Time reflects when diagnosis was documented in both MDM as applicable and the Disposition within this note       Time User Action Codes Description Comment    7/12/2025  1:39 AM Francis Whiting Add [R06.00] Dyspnea     7/12/2025  1:39 AM Francis Whiting Add [R22.1] Neck mass     7/12/2025  1:39 AM Francis Whiting Add [R10.9,  G89.29] Chronic abdominal pain                      [1]   Past Medical History:  Diagnosis Date    COPD (chronic obstructive pulmonary disease) (HCC)     Diverticulitis of colon     Erectile dysfunction     Hernia of abdominal cavity     Liver cyst    [2]   Past Surgical History:  Procedure Laterality Date    COLONOSCOPY      NOSE SURGERY     [3] No family history on file.  [4]   Social History  Tobacco Use    Smoking status: Every Day     Current packs/day: 1.50     Average packs/day: 1.5 packs/day for 50.0 years (75.0 ttl pk-yrs)     Types: Cigarettes     Passive exposure: Past    Smokeless tobacco: Never   Vaping Use    Vaping status: Never Used   Substance Use Topics    Alcohol use: Never    Drug use: No        Francis Whiting MD  07/12/25 0224

## 2025-07-12 NOTE — DISCHARGE INSTRUCTIONS
Please call your pulmonologist on Monday to discuss your breathing symptoms.  Please go to your scheduled appointments to be reevaluated for your neck mass.  Please continue to follow the instructions of gastroenterology with regards to treating your chronic abdominal pain.    Please return if you develop any new or concerning symptoms including new wheezing, cough, fevers, or difficulty breathing.

## 2025-07-14 ENCOUNTER — OFFICE VISIT (OUTPATIENT)
Dept: INTERNAL MEDICINE CLINIC | Facility: CLINIC | Age: 65
End: 2025-07-14
Payer: COMMERCIAL

## 2025-07-14 VITALS
SYSTOLIC BLOOD PRESSURE: 134 MMHG | WEIGHT: 187 LBS | DIASTOLIC BLOOD PRESSURE: 86 MMHG | BODY MASS INDEX: 28.34 KG/M2 | OXYGEN SATURATION: 97 % | RESPIRATION RATE: 20 BRPM | HEIGHT: 68 IN | HEART RATE: 71 BPM

## 2025-07-14 DIAGNOSIS — R22.1 NECK MASS: ICD-10-CM

## 2025-07-14 DIAGNOSIS — B18.1 CHRONIC VIRAL HEPATITIS B WITHOUT DELTA AGENT AND WITHOUT COMA (HCC): ICD-10-CM

## 2025-07-14 DIAGNOSIS — R06.02 SOB (SHORTNESS OF BREATH): ICD-10-CM

## 2025-07-14 DIAGNOSIS — R68.89 CUSHINGOID FACIES: Primary | ICD-10-CM

## 2025-07-14 LAB
ATRIAL RATE: 83 BPM
P AXIS: 83 DEGREES
PR INTERVAL: 126 MS
QRS AXIS: 83 DEGREES
QRSD INTERVAL: 94 MS
QT INTERVAL: 368 MS
QTC INTERVAL: 432 MS
T WAVE AXIS: 85 DEGREES
VENTRICULAR RATE: 83 BPM

## 2025-07-14 PROCEDURE — 93010 ELECTROCARDIOGRAM REPORT: CPT | Performed by: INTERNAL MEDICINE

## 2025-07-14 PROCEDURE — 99214 OFFICE O/P EST MOD 30 MIN: CPT | Performed by: INTERNAL MEDICINE

## 2025-07-14 RX ORDER — DIPHENHYDRAMINE HYDROCHLORIDE AND LIDOCAINE HYDROCHLORIDE AND ALUMINUM HYDROXIDE AND MAGNESIUM HYDRO
10 KIT EVERY 4 HOURS PRN
Qty: 237 ML | Refills: 3 | Status: SHIPPED | OUTPATIENT
Start: 2025-07-14 | End: 2025-07-15 | Stop reason: SDUPTHER

## 2025-07-14 NOTE — PROGRESS NOTES
Name: Felipe Sanders III      : 1960      MRN: [id number]  Encounter Provider: Emil Silverman MD  Encounter Date: 2025   Encounter department: [facility] INTERNAL [facility]IC[facility] [place]  :  Assessment & Plan  Chronic viral hepatitis B without delta agent and without coma (HCC)  H/o chronic hep B.       Cushingoid facies    Moon face with facial plethora noted 2/2 exogenous steroids constantly being prescribed. Monitor.        Neck mass  US revealing slight anterior neck bulging is adjacent to the thyroid cartilage and US thyroid revealing sub centimeter nodules, obtain CT neck   Orders:    CT soft tissue neck w contrast; Future    SOB (shortness of breath)    He is not compliant with his inhalers, still smoking, he has an appointment with pulmonary on .   Orders:    diphenhydramine, lidocaine, Al/Mg hydroxide, simethicone (Magic Mouthwash) SUSP; Swish and spit 10 mL every 4 (four) hours as needed for mouth pain or discomfort          Depression Screening and Follow-up Plan: Patient was screened for depression during today's encounter. They screened negative with a PHQ-2 score of 0.      Tobacco Cessation Counseling: Tobacco cessation counseling was provided. The patient is sincerely urged to quit consumption of tobacco. He is not ready to quit tobacco. Medication options discussed.       History of Present Illness   Sore Throat   Pertinent negatives include no abdominal pain, coughing, ear pain, shortness of breath or vomiting.     Review of Systems   Constitutional:  Negative for chills and fever.   HENT:  Positive for sore throat. Negative for ear pain.    Eyes:  Negative for pain and visual disturbance.   Respiratory:  Negative for cough and shortness of breath.    Cardiovascular:  Negative for chest pain and palpitations.   Gastrointestinal:  Negative for abdominal pain and vomiting.   Genitourinary:  Negative for dysuria and hematuria.   Musculoskeletal:  Negative for arthralgias and back  "pain.   Skin:  Negative for color change and rash.   Neurological:  Negative for seizures and syncope.   All other systems reviewed and are negative.      Objective   /86 (BP Location: Left arm, Patient Position: Sitting, Cuff Size: Adult)   Pulse 71   Resp 20   Ht 5' 8\" (1.727 m)   Wt 84.8 kg (187 lb)   SpO2 97%   BMI 28.43 kg/m²      Physical Exam  Vitals and nursing note reviewed.   Constitutional:       General: He is not in acute distress.     Appearance: He is well-developed.   HENT:      Head: Normocephalic and atraumatic.      Mouth/Throat:      Mouth: Mucous membranes are moist.      Pharynx: Pharyngeal swelling present.     Eyes:      Conjunctiva/sclera: Conjunctivae normal.       Cardiovascular:      Rate and Rhythm: Normal rate and regular rhythm.      Heart sounds: No murmur heard.  Pulmonary:      Effort: Pulmonary effort is normal. No respiratory distress.   Abdominal:      Tenderness: There is no abdominal tenderness.     Musculoskeletal:         General: No swelling.      Cervical back: Neck supple.     Skin:     General: Skin is dry.     Neurological:      General: No focal deficit present.      Mental Status: He is alert.         " Dermal Autograft Text: The defect edges were debeveled with a #15 scalpel blade.  Given the location of the defect, shape of the defect and the proximity to free margins a dermal autograft was deemed most appropriate.  Using a sterile surgical marker, the primary defect shape was transferred to the donor site. The area thus outlined was incised deep to adipose tissue with a #15 scalpel blade.  The harvested graft was then trimmed of adipose and epidermal tissue until only dermis was left.  The skin graft was then placed in the primary defect and oriented appropriately.

## 2025-07-15 ENCOUNTER — NURSE TRIAGE (OUTPATIENT)
Age: 65
End: 2025-07-15

## 2025-07-15 DIAGNOSIS — R06.02 SOB (SHORTNESS OF BREATH): ICD-10-CM

## 2025-07-15 RX ORDER — LIDOCAINE HYDROCHLORIDE 20 MG/ML
10 SOLUTION OROPHARYNGEAL 4 TIMES DAILY PRN
Qty: 237 ML | Refills: 3 | OUTPATIENT
Start: 2025-07-15

## 2025-07-15 RX ORDER — DIPHENHYDRAMINE HYDROCHLORIDE AND LIDOCAINE HYDROCHLORIDE AND ALUMINUM HYDROXIDE AND MAGNESIUM HYDRO
10 KIT EVERY 4 HOURS PRN
Qty: 237 ML | Refills: 3 | Status: SHIPPED | OUTPATIENT
Start: 2025-07-15

## 2025-07-16 NOTE — TELEPHONE ENCOUNTER
"Spoke with pt and reviewed recommendations. Pt stated yesterday she wanted me to increase..(could not hear)and I just don't get it\" then disconnected line.     "

## 2025-07-18 NOTE — ED PROVIDER NOTES
"Time reflects when diagnosis was documented in both MDM as applicable and the Disposition within this note       Time User Action Codes Description Comment    6/23/2025  5:00 PM Vincenzo Covington Add [R13.10] Dysphagia           ED Disposition       ED Disposition   Left from Room after Provider Exam    Condition   --    Date/Time   Mon Jun 23, 2025 12:24 AM    Comment   --             Assessment & Plan       Medical Decision Making  64-year-old male with dysphagia, will check x-ray soft tissue neck, give GI cocktail, reassess.    Informed that patient left the room after my evaluation, did not have a chance to review CT or recommend further imaging studies.    Amount and/or Complexity of Data Reviewed  Radiology: ordered.             Medications   dexamethasone oral liquid 10 mg 1 mL (10 mg Oral Given 6/22/25 2315)       ED Risk Strat Scores                    No data recorded        SBIRT 20yo+      Flowsheet Row Most Recent Value   Initial Alcohol Screen: US AUDIT-C     1. How often do you have a drink containing alcohol? 0 Filed at: 06/22/2025 2142   2. How many drinks containing alcohol do you have on a typical day you are drinking?  0 Filed at: 06/22/2025 2142   3a. Male UNDER 65: How often do you have five or more drinks on one occasion? 0 Filed at: 06/22/2025 2142   Audit-C Score 0 Filed at: 06/22/2025 2142   LAZARA: How many times in the past year have you...    Used an illegal drug or used a prescription medication for non-medical reasons? Never Filed at: 06/22/2025 2142                            History of Present Illness       Chief Complaint   Patient presents with    Sore Throat     Patient co \"sore scratchy throat and a lump and swelling on my neck.\" Patient reports started 3 months ago was given a antibiotic with no relief or change. Patient reports \"I am just worried about my breathing because when I lay down its harder to breath.\"     Neck Swelling       Past Medical History[1]   Past Surgical History[2] "   Family History[3]   Social History[4]   E-Cigarette/Vaping    E-Cigarette Use Never User       E-Cigarette/Vaping Substances    Nicotine No     THC No     CBD No     Flavoring No     Other No     Unknown No       I have reviewed and agree with the history as documented.     64-year-old male presenting to the emergency department for evaluation of difficulty swallowing.  Patient noticed that he had a sore scratchy lump in his neck it has been there for 3 months, has been given antibiotic without relief.  Patient states that it is difficult to breathe when he lays down.  No difficulty tolerating secretions no voice change.  No stridor at this time        Review of Systems   Constitutional:  Negative for appetite change, chills, fatigue and fever.   HENT:  Negative for sneezing and sore throat.    Eyes:  Negative for visual disturbance.   Respiratory:  Negative for cough, choking, chest tightness, shortness of breath and wheezing.    Cardiovascular:  Negative for chest pain and palpitations.   Gastrointestinal:  Negative for abdominal pain, constipation, diarrhea, nausea and vomiting.   Genitourinary:  Negative for difficulty urinating and dysuria.   Musculoskeletal:  Positive for neck pain.   Neurological:  Negative for dizziness, weakness, light-headedness, numbness and headaches.   All other systems reviewed and are negative.          Objective       ED Triage Vitals [06/22/25 2138]   Temperature Pulse Blood Pressure Respirations SpO2 Patient Position - Orthostatic VS   97.5 °F (36.4 °C) (!) 107 130/68 22 95 % Sitting      Temp Source Heart Rate Source BP Location FiO2 (%) Pain Score    Oral Monitor Left arm -- 1      Vitals      Date and Time Temp Pulse SpO2 Resp BP Pain Score FACES Pain Rating User   06/22/25 2352 -- 93 96 % 18 128/73 -- -- BS   06/22/25 2138 97.5 °F (36.4 °C) 107 95 % 22 130/68 1 -- NEPTALI            Physical Exam  Vitals and nursing note reviewed.   Constitutional:       General: He is not in  acute distress.     Appearance: He is well-developed. He is not diaphoretic.   HENT:      Head: Normocephalic and atraumatic.     Eyes:      Pupils: Pupils are equal, round, and reactive to light.     Neck:      Vascular: No JVD.      Trachea: No tracheal deviation.     Cardiovascular:      Rate and Rhythm: Normal rate and regular rhythm.      Heart sounds: Normal heart sounds. No murmur heard.     No friction rub. No gallop.   Pulmonary:      Effort: Pulmonary effort is normal. No respiratory distress.      Breath sounds: Normal breath sounds. No wheezing or rales.   Abdominal:      General: Bowel sounds are normal. There is no distension.      Palpations: Abdomen is soft.      Tenderness: There is no abdominal tenderness. There is no guarding or rebound.     Skin:     General: Skin is warm and dry.      Coloration: Skin is not pale.     Neurological:      Mental Status: He is alert and oriented to person, place, and time.      Cranial Nerves: No cranial nerve deficit.      Motor: No abnormal muscle tone.     Psychiatric:         Behavior: Behavior normal.         Results Reviewed       None            XR neck soft tissue   Final Interpretation by Nicanor Vidales MD (06/24 1118)      Slight anterior neck bulging is adjacent to the thyroid cartilage but should be correlated with clinical exam.      Should symptoms persist barium swallow and/or ultrasound or CT study may be useful.      Follow-up referral to PCP for possible ultrasound was noted in the [facility] clinical note.      Workstation performed: [id number]             Procedures    ED Medication and Procedure Management   Prior to Admission Medications   Prescriptions Last Dose Informant Patient Reported? Taking?   Promethazine-Codeine 6.25-10 MG/5ML SOLN  Self Yes No   albuterol (2.5 mg/3 mL) 0.083 % nebulizer solution  Self Yes No   Sig: Inhale 2.5 mg every 6 (six) hours as needed   albuterol (PROVENTIL HFA,VENTOLIN HFA) 90 mcg/act inhaler  Self Yes No    Sig: Inhale 1 puff every 6 (six) hours as needed for wheezing   amitriptyline (ELAVIL) 25 mg tablet   No No   Sig: Take 1 tablet (25 mg total) by mouth daily at bedtime   budesonide (Pulmicort) 0.25 mg/2 mL nebulizer solution   No No   Sig: Take 2 mL (0.25 mg total) by nebulization 2 (two) times a day for 13 days Rinse mouth after use.   fluticasone-umeclidinium-vilanterol (Trelegy Ellipta) 200-62.5-25 mcg/actuation AEPB inhaler  Self No No   Sig: Inhale 1 puff daily Rinse mouth after use.   linaCLOtide (Linzess) 72 MCG CAPS   No No   Sig: Take 72 mcg by mouth in the morning   nicotine (NICODERM CQ) 21 mg/24 hr TD 24 hr patch  Self No No   Sig: Place 1 patch on the skin over 24 hours daily   Patient not taking: Reported on 4/4/2025   oxygen gas  Self Yes No   Sig: Inhale continuous as needed   polyethylene glycol (GOLYTELY) 4000 mL solution  Self No No   Sig: Take 4,000 mL by mouth once for 1 dose   Patient not taking: Reported on 6/4/2025   polyethylene glycol (MIRALAX) 17 g packet  Self No No   Sig: Take 17 g by mouth daily for 10 days   rOPINIRole (REQUIP) 1 mg tablet  Self Yes No   Sig: Take 1 mg by mouth in the morning and 1 mg at noon and 1 mg in the evening.      Facility-Administered Medications: None     Discharge Medication List as of 6/23/2025 12:24 AM        CONTINUE these medications which have NOT CHANGED    Details   albuterol (2.5 mg/3 mL) 0.083 % nebulizer solution Inhale 2.5 mg every 6 (six) hours as needed, Starting Thu 3/20/2025, Until Fri 3/20/2026 at 2359, Historical Med      albuterol (PROVENTIL HFA,VENTOLIN HFA) 90 mcg/act inhaler Inhale 1 puff every 6 (six) hours as needed for wheezing, Starting Wed 6/4/2025, Historical Med      amitriptyline (ELAVIL) 25 mg tablet Take 1 tablet (25 mg total) by mouth daily at bedtime, Starting Thu 6/19/2025, Normal      budesonide (Pulmicort) 0.25 mg/2 mL nebulizer solution Take 2 mL (0.25 mg total) by nebulization 2 (two) times a day for 13 days Rinse  mouth after use., Starting Sun 6/22/2025, Until Sat 7/5/2025, Normal      fluticasone-umeclidinium-vilanterol (Trelegy Ellipta) 200-62.5-25 mcg/actuation AEPB inhaler Inhale 1 puff daily Rinse mouth after use., Starting Thu 6/12/2025, Normal      linaCLOtide (Linzess) 72 MCG CAPS Take 72 mcg by mouth in the morning, Starting Thu 6/19/2025, Normal      nicotine (NICODERM CQ) 21 mg/24 hr TD 24 hr patch Place 1 patch on the skin over 24 hours daily, Starting Tue 3/25/2025, Normal      oxygen gas Inhale continuous as needed, Historical Med      polyethylene glycol (GOLYTELY) 4000 mL solution Take 4,000 mL by mouth once for 1 dose, Starting Tu 2/4/2025, Normal      polyethylene glycol (MIRALAX) 17 g packet Take 17 g by mouth daily for 10 days, Starting Tue 4/15/2025, Until Thu 6/12/2025, Normal      Promethazine-Codeine 6.25-10 MG/5ML SOLN Historical Med      rOPINIRole (REQUIP) 1 mg tablet Take 1 mg by mouth in the morning and 1 mg at noon and 1 mg in the evening., Starting Thu 4/3/2025, Until Thu 6/19/2025, Historical Med      dicyclomine (BENTYL) 20 mg tablet TAKE 1 TABLET BY MOUTH EVERY 6 HOURS., Starting Thu 3/13/2025, Normal           No discharge procedures on file.  ED SEPSIS DOCUMENTATION   Time reflects when diagnosis was documented in both MDM as applicable and the Disposition within this note       Time User Action Codes Description Comment    6/23/2025  5:00 PM Vincenzo Covington Add [R13.10] Dysphagia                    [1]   Past Medical History:  Diagnosis Date    COPD (chronic obstructive pulmonary disease) (HCC)     Diverticulitis of colon     Erectile dysfunction     Hernia of abdominal cavity     Liver cyst    [2]   Past Surgical History:  Procedure Laterality Date    COLONOSCOPY      NOSE SURGERY     [3] No family history on file.  [4]   Social History  Tobacco Use    Smoking status: Every Day     Current packs/day: 1.50     Average packs/day: 1.5 packs/day for 50.0 years (75.0 ttl pk-yrs)     Types:  Cigarettes     Passive exposure: Past    Smokeless tobacco: Never   Vaping Use    Vaping status: Never Used   Substance Use Topics    Alcohol use: Never    Drug use: No        Vincenzo Covington MD  07/18/25 0228

## 2025-07-20 ENCOUNTER — APPOINTMENT (OUTPATIENT)
Dept: RADIOLOGY | Facility: CLINIC | Age: 65
End: 2025-07-20
Attending: PHYSICIAN ASSISTANT
Payer: COMMERCIAL

## 2025-07-20 ENCOUNTER — OFFICE VISIT (OUTPATIENT)
Dept: URGENT CARE | Facility: CLINIC | Age: 65
End: 2025-07-20
Payer: COMMERCIAL

## 2025-07-20 VITALS
SYSTOLIC BLOOD PRESSURE: 140 MMHG | RESPIRATION RATE: 22 BRPM | OXYGEN SATURATION: 92 % | DIASTOLIC BLOOD PRESSURE: 89 MMHG | HEART RATE: 76 BPM

## 2025-07-20 DIAGNOSIS — R06.02 SHORTNESS OF BREATH: ICD-10-CM

## 2025-07-20 DIAGNOSIS — J44.1 COPD EXACERBATION (HCC): Primary | ICD-10-CM

## 2025-07-20 PROCEDURE — 94640 AIRWAY INHALATION TREATMENT: CPT | Performed by: PHYSICIAN ASSISTANT

## 2025-07-20 PROCEDURE — 99214 OFFICE O/P EST MOD 30 MIN: CPT | Performed by: PHYSICIAN ASSISTANT

## 2025-07-20 PROCEDURE — 71046 X-RAY EXAM CHEST 2 VIEWS: CPT

## 2025-07-20 PROCEDURE — G0463 HOSPITAL OUTPT CLINIC VISIT: HCPCS | Performed by: PHYSICIAN ASSISTANT

## 2025-07-20 RX ORDER — PREDNISONE 10 MG/1
1 TABLET ORAL DAILY
COMMUNITY
Start: 2025-06-24

## 2025-07-20 RX ORDER — FLUTICASONE PROPIONATE 50 MCG
2 SPRAY, SUSPENSION (ML) NASAL DAILY
COMMUNITY
Start: 2025-07-12

## 2025-07-20 RX ORDER — ALBUTEROL SULFATE 0.83 MG/ML
2.5 SOLUTION RESPIRATORY (INHALATION) ONCE
Status: COMPLETED | OUTPATIENT
Start: 2025-07-20 | End: 2025-07-20

## 2025-07-20 RX ADMIN — ALBUTEROL SULFATE 2.5 MG: 0.83 SOLUTION RESPIRATORY (INHALATION) at 09:41

## 2025-07-20 NOTE — PROGRESS NOTES
[facility]  Name: Felipe Sanders III      : 1960      MRN: [id number]  Encounter Provider: Symone Murdock PA-C  Encounter Date: 2025   Encounter department: [facility]  :  Assessment & Plan  COPD exacerbation (HCC)    Orders:    Transfer to other facility    Shortness of breath    Orders:    albuterol inhalation solution 2.5 mg    XR chest pa and lateral; Future        Patient Instructions  Chest x-ray unchanged from . Patient states that he feels improved after albuterol neb however his pulse ox is decreased down to 92 on Room air/ previous pulse ox when on 4L.  I advised patient that he should proceed to the ER for further workup as I do not have blood work.  Patient states he wants another prescription of prednisone.  I advised him this is not appropriate as he just came off of a high-dose taper of prednisone and that he needs further workup to determine cause.  I did discuss with him being compliant with his medications. Advised him to place his O2 back on. Patient states he will proceed to the ER. Declined EMS transfer   Follow up with PCP in 3-5 days.  Proceed to  ER if symptoms worsen.  If tests are performed, our office will contact you with results only if changes need to made to the care plan discussed with you at the visit. [facility]    Chief Complaint:   Chief Complaint   Patient presents with    Shortness of Breath     Started yesterday, breathing was bothering him. Having problems with stomach not yet dxed by       History of Present Illness   This is a 64-year-old male with past medical history of COPD and emphysema here complaining of increased shortness of breath and chest tightness for the last 1 to 2 days.  Patient was seen in the ED on  with a workup.  He was discharged on 40 mg of prednisone for 5 days.  Patient notes he uses O2 as needed but however has been using it regularly for  the last 1 to 2 days and fluctuates between 3 to 4 L.  He is currently on 4 L.  He was supposed to see pulmonology on 717 but did not go to his appointment.  He is not compliant with his inhalers and last used his Trelegy a few days ago.  He does state that he regularly does 4 albuterol nebs a day.  He states his last neb was at 5 AM.  He notes chronic nasal congestion and chronic cough.  He denies chest pain, vomiting, fevers, sore throat.  He also notes that he has chronic IBS issues which she takes Linzess and an antidepressant for.    Shortness of Breath  Pertinent negatives include no chest pain or sore throat.         Review of Systems   Constitutional:  Negative for fever.   HENT:  Positive for congestion. Negative for ear pain and sore throat.    Respiratory:  Positive for shortness of breath.    Cardiovascular:  Negative for chest pain.   Gastrointestinal:  Negative for vomiting.     Past Medical History   Past Medical History[1]  Past Surgical History[2]  Family History[3]  he reports that he has been smoking cigarettes. He has a 75 pack-year smoking history. He has been exposed to tobacco smoke. He has never used smokeless tobacco. He reports that he does not drink alcohol and does not use drugs.  Current Outpatient Medications   Medication Instructions    albuterol (PROVENTIL HFA,VENTOLIN HFA) 90 mcg/act inhaler 1 puff, Every 6 hours PRN    albuterol 2.5 mg, Every 6 hours PRN    amitriptyline (ELAVIL) 25 mg, Oral, Daily at bedtime    budesonide (PULMICORT) 0.25 mg, Nebulization, 2 times daily, Rinse mouth after use.    dicyclomine (BENTYL) 20 mg, Oral, Every 6 hours    diphenhydramine, lidocaine, Al/Mg hydroxide, simethicone (Magic Mouthwash) SUSP 10 mL, Swish & Spit, Every 4 hours PRN    fluticasone (FLONASE) 50 mcg/act nasal spray 2 sprays, Daily    fluticasone-umeclidinium-vilanterol (Trelegy Ellipta) 200-62.5-25 mcg/actuation AEPB inhaler 1 puff, Inhalation, Daily, Rinse mouth after use.    Linzess  "72 mcg, Oral, Daily    nicotine (NICODERM CQ) 21 mg/24 hr TD 24 hr patch 1 patch, Transdermal, Daily    oxygen gas Continuous PRN    polyethylene glycol (GOLYTELY) 4000 mL solution 4,000 mL, Oral, Once    polyethylene glycol (MIRALAX) 17 g, Oral, Daily    predniSONE 10 mg tablet 1 tablet, Daily    Promethazine-Codeine 6.25-10 MG/5ML SOLN     rOPINIRole (REQUIP) 1 mg, 3 times daily   Allergies[4]     Objective   /89   Pulse 76   Resp 22   SpO2 92% Comment: on room air     Physical Exam  Vitals and nursing note reviewed.   HENT:      Nose: Congestion present.     Cardiovascular:      Rate and Rhythm: Normal rate and regular rhythm.   Pulmonary:      Effort: Tachypnea present.      Breath sounds: Decreased air movement present. Decreased breath sounds present. No wheezing, rhonchi or rales.      Comments: Currently using oxygen.    Patient still has decreased breath sounds post neb.     Mini neb    Performed by: Symone Murdock PA-C  Authorized by: Symone Murdock PA-C    Universal Protocol:  Consent: Verbal consent obtained  Risks and benefits: risks, benefits and alternatives were discussed  Consent given by: patient  Patient understanding: patient states understanding of the procedure being performed  Patient identity confirmed: verbally with patient    Number of treatments:  1  Treatment 1:   Pre-Procedure     Symptoms:  Shortness of breath    Lung Sounds:  Decreased breat sounds    HR:  101    RR:  22    SP02:  94 on 4L O2    Medication Administered:  Albuterol 2.5 mg  Post-Procedure     Post-treatment symptoms: improved SOB.    Lung sounds:  Unchanged    HR:  76    RR:  18    SP02:  92        Portions of the record may have been created with voice recognition software.  Occasional wrong word or \"sound a like\" substitutions may have occurred due to the inherent limitations of voice recognition software.  Read the chart carefully and recognize, using context, where substitutions have occurred.     "     [1]   Past Medical History:  Diagnosis Date    COPD (chronic obstructive pulmonary disease) (HCC)     Diverticulitis of colon     Erectile dysfunction     Hernia of abdominal cavity     Liver cyst    [2]   Past Surgical History:  Procedure Laterality Date    COLONOSCOPY      NOSE SURGERY     [3] No family history on file.  [4] No Known Allergies

## 2025-07-22 DIAGNOSIS — K58.1 IRRITABLE BOWEL SYNDROME WITH CONSTIPATION: Primary | ICD-10-CM

## 2025-07-22 RX ORDER — TENAPANOR HYDROCHLORIDE 53.2 MG/1
50 TABLET ORAL 2 TIMES DAILY
Qty: 60 TABLET | Refills: 0 | Status: SHIPPED | OUTPATIENT
Start: 2025-07-22

## 2025-07-22 NOTE — TELEPHONE ENCOUNTER
Please advise patient that Cheri is out of the office today.  It will take 1 month for amitriptyline to show its full effect.  Would he like us to send the higher dose of Linzess at 290 mcg daily?

## 2025-07-22 NOTE — TELEPHONE ENCOUNTER
Sent IBSRELA. If he has severe RLQ or periumbilical pain, needs to go to the ED secondary to hx of dilated appendix.

## 2025-07-22 NOTE — TELEPHONE ENCOUNTER
Pt in the ER 7/20/25 for abdominal pain. Calling back today with continued abdominal pain, today rates it 3-4/10 but states yesterday it was 7/10. Taking Bentyl ATC, Linzess daily, and Ibuprofen prn (advised to take with food). Reports that he had a small BM this morning, a few yesterday and a large one the day before that. Advised pt to try heating pad in addition. Also advised pt that if pain gets above an 8/10, he should go back to ER. Pt verbalized understanding.

## 2025-07-22 NOTE — TELEPHONE ENCOUNTER
Spoke with pt, he feels Linemilys is giving him abdominal pain. When he missed a dose pain was gone, when he started again pain came back. Is agreeable to alternative.  To note: has been taking miralax with linzess

## 2025-07-22 NOTE — TELEPHONE ENCOUNTER
Spoke with pt, made aware of provider rec's. He states on occasion he has some streaks of BRB, advised most likely from straining and can try Sitz bath and or prep H if hemorrhoids bother him.

## 2025-08-12 ENCOUNTER — HOSPITAL ENCOUNTER (EMERGENCY)
Facility: HOSPITAL | Age: 65
Discharge: HOME | End: 2025-08-13
Attending: EMERGENCY MEDICINE | Admitting: EMERGENCY MEDICINE
Payer: COMMERCIAL

## 2025-08-12 ENCOUNTER — APPOINTMENT (EMERGENCY)
Dept: CT IMAGING | Facility: HOSPITAL | Age: 65
End: 2025-08-12
Payer: COMMERCIAL

## 2025-08-12 ENCOUNTER — APPOINTMENT (EMERGENCY)
Dept: RADIOLOGY | Facility: HOSPITAL | Age: 65
End: 2025-08-12
Payer: COMMERCIAL

## 2025-08-12 ENCOUNTER — NURSE TRIAGE (OUTPATIENT)
Age: 65
End: 2025-08-12

## 2025-08-20 ENCOUNTER — TELEPHONE (OUTPATIENT)
Dept: GASTROENTEROLOGY | Facility: CLINIC | Age: 65
End: 2025-08-20

## 2025-08-20 DIAGNOSIS — K59.04 CHRONIC IDIOPATHIC CONSTIPATION: Primary | ICD-10-CM

## 2025-08-20 RX ORDER — LINACLOTIDE 145 UG/1
145 CAPSULE, GELATIN COATED ORAL DAILY
Qty: 30 CAPSULE | Refills: 3 | Status: SHIPPED | OUTPATIENT
Start: 2025-08-20